# Patient Record
Sex: MALE | Race: WHITE | NOT HISPANIC OR LATINO | Employment: OTHER | URBAN - METROPOLITAN AREA
[De-identification: names, ages, dates, MRNs, and addresses within clinical notes are randomized per-mention and may not be internally consistent; named-entity substitution may affect disease eponyms.]

---

## 2017-02-19 ENCOUNTER — HOSPITAL ENCOUNTER (EMERGENCY)
Facility: HOSPITAL | Age: 72
Discharge: HOME | End: 2017-02-19
Attending: EMERGENCY MEDICINE | Admitting: EMERGENCY MEDICINE

## 2017-02-19 VITALS
SYSTOLIC BLOOD PRESSURE: 126 MMHG | TEMPERATURE: 98.4 F | HEART RATE: 82 BPM | WEIGHT: 164 LBS | DIASTOLIC BLOOD PRESSURE: 82 MMHG | HEIGHT: 64 IN | RESPIRATION RATE: 16 BRPM | BODY MASS INDEX: 28 KG/M2 | OXYGEN SATURATION: 95 %

## 2017-02-19 DIAGNOSIS — R33.9 URINARY RETENTION: Primary | ICD-10-CM

## 2017-02-19 LAB
BACTERIA UR QL AUTO: ABNORMAL /HPF
BILIRUB UR QL STRIP: NEGATIVE
CLARITY UR: CLEAR
COLOR UR: YELLOW
GLUCOSE UR STRIP-MCNC: NEGATIVE MG/DL
HGB UR QL STRIP.AUTO: ABNORMAL
HYALINE CASTS UR QL AUTO: ABNORMAL /LPF
KETONES UR QL STRIP: NEGATIVE
LEUKOCYTE ESTERASE UR QL STRIP.AUTO: ABNORMAL
NITRITE UR QL STRIP: NEGATIVE
PH UR STRIP.AUTO: 6.5 [PH] (ref 5–8)
PROT UR QL STRIP: NEGATIVE
RBC # UR: ABNORMAL /HPF
REF LAB TEST METHOD: ABNORMAL
SP GR UR STRIP: 1.01 (ref 1–1.03)
SQUAMOUS #/AREA URNS HPF: ABNORMAL /HPF
UROBILINOGEN UR QL STRIP: ABNORMAL
WBC UR QL AUTO: ABNORMAL /HPF

## 2017-02-19 PROCEDURE — 87086 URINE CULTURE/COLONY COUNT: CPT | Performed by: PHYSICIAN ASSISTANT

## 2017-02-19 PROCEDURE — 51702 INSERT TEMP BLADDER CATH: CPT

## 2017-02-19 PROCEDURE — 99283 EMERGENCY DEPT VISIT LOW MDM: CPT

## 2017-02-19 PROCEDURE — 81001 URINALYSIS AUTO W/SCOPE: CPT | Performed by: PHYSICIAN ASSISTANT

## 2017-02-19 RX ORDER — TAMSULOSIN HYDROCHLORIDE 0.4 MG/1
1 CAPSULE ORAL NIGHTLY
COMMUNITY
End: 2020-01-13 | Stop reason: SDUPTHER

## 2017-02-19 RX ORDER — CIPROFLOXACIN 500 MG/1
500 TABLET, FILM COATED ORAL 2 TIMES DAILY
Qty: 14 TABLET | Refills: 0 | Status: SHIPPED | OUTPATIENT
Start: 2017-02-19 | End: 2017-04-03

## 2017-02-19 RX ORDER — ROSUVASTATIN CALCIUM 10 MG/1
10 TABLET, COATED ORAL DAILY
COMMUNITY
End: 2017-04-03

## 2017-02-20 NOTE — ED PROVIDER NOTES
I supervised care provided by the midlevel provider.    We have discussed this patient's history, physical exam, and treatment plan.   I have reviewed the note and personally saw and examined the patient and agree with the plan of care.    Pt with urinary retention that onset today. Pt has a hx of urinary retention and has been on Flomax in the past. He has not seen a urologist previously for sxs.     On exam, there is a Davis catheter in place with >300cc of urine in leg bag. There is no abd tenderness. VS stable. He is A&Ox3 and in NAD.     Will check UA and discharge with Davis catheter in place to follow up with urologist.      Labs  Results for orders placed or performed during the hospital encounter of 02/19/17   Urinalysis With / Culture If Indicated   Result Value Ref Range    Color, UA Yellow Yellow, Straw    Appearance, UA Clear Clear    pH, UA 6.5 5.0 - 8.0    Specific Gravity, UA 1.007 1.005 - 1.030    Glucose, UA Negative Negative    Ketones, UA Negative Negative    Bilirubin, UA Negative Negative    Blood, UA Small (1+) (A) Negative    Protein, UA Negative Negative    Leuk Esterase, UA Small (1+) (A) Negative    Nitrite, UA Negative Negative    Urobilinogen, UA 0.2 E.U./dL 0.2 - 1.0 E.U./dL   Urinalysis, Microscopic Only   Result Value Ref Range    RBC, UA 13-20 (A) None Seen /HPF    WBC, UA 0-2 (A) None Seen /HPF    Bacteria, UA None Seen None Seen /HPF    Squamous Epithelial Cells, UA 0-2 None Seen, 0-2 /HPF    Hyaline Casts, UA None Seen None Seen /LPF    Methodology Automated Microscopy      Documentation assistance provided by dago Garcia for Dr. Del Cid.  Information recorded by the dago was done at my direction and has been verified and validated by me.       Travis Garcia  02/19/17 2024       Cosmo Del Cid MD  02/20/17 0642

## 2017-02-20 NOTE — ED PROVIDER NOTES
EMERGENCY DEPARTMENT ENCOUNTER    CHIEF COMPLAINT  Chief Complaint: Urinary Retention   History given by: patient, spouse  History limited by: none  Room Number: 18/18  PMD: Eden Molina MD      HPI:  Pt is a 72 y.o. male who presents complaining of urinary retention onset this morning at 9AM. The pt also c/o lower abd pain.   He denies N/V, CP, and SOA.   H/o congenital myopathy.   H/o prostate issues per the spouse.   He takes Flomax.     Duration:  Onset this morning  Onset: constant  Timing: constant  Location: , lower abd  Radiation: none  Quality: urine retention  Intensity/Severity: moderate  Progression: improved with catheter   Associated Symptoms: lower abd pain  Aggravating Factors: none  Alleviating Factors: urinary catheter  Previous Episodes: h/o prostate isseus  Treatment before arrival: none    PAST MEDICAL HISTORY  Active Ambulatory Problems     Diagnosis Date Noted   • CAD (coronary artery disease), native coronary artery    • Acute myocardial infarction    • Hypertension    • Hyperlipidemia      Resolved Ambulatory Problems     Diagnosis Date Noted   • No Resolved Ambulatory Problems     Past Medical History   Diagnosis Date   • Aneurysm of right femoral artery    • Diabetes mellitus    • Gastrointestinal hemorrhage        PAST SURGICAL HISTORY  Past Surgical History   Procedure Laterality Date   • Coronary angioplasty with stent placement     • Other surgical history       percutaneous injection of extremity pseudoaneurysm   • Cardiac catheterization  06/2015     30% Left main, 99% mid to distal LAD, 70-80% left circumflex, 80% proximal to mid RCA.       FAMILY HISTORY  Family History   Problem Relation Age of Onset   • Coronary artery disease Mother        SOCIAL HISTORY  Social History     Social History   • Marital status:      Spouse name: N/A   • Number of children: N/A   • Years of education: N/A     Occupational History   • Not on file.     Social History Main Topics   •  Smoking status: Never Smoker   • Smokeless tobacco: Not on file   • Alcohol use Yes      Comment: social   • Drug use: Not on file   • Sexual activity: Not on file     Other Topics Concern   • Not on file     Social History Narrative   • No narrative on file       ALLERGIES  Ampicillin and Iodinated diagnostic agents    REVIEW OF SYSTEMS  Review of Systems   Constitutional: Negative.    HENT: Negative.    Respiratory: Negative.    Cardiovascular: Negative.    Gastrointestinal: Negative.    Genitourinary: Negative.         Urine retention, now improved with khan catheter.    Musculoskeletal: Negative.    All other systems reviewed and are negative.      PHYSICAL EXAM  ED Triage Vitals   Temp Heart Rate Resp BP SpO2   02/19/17 1916 02/19/17 1916 02/19/17 1916 02/19/17 1916 02/19/17 1916   98.4 °F (36.9 °C) 80 16 140/77 97 %      Temp src Heart Rate Source Patient Position BP Location FiO2 (%)   -- -- -- -- --              Physical Exam   Constitutional: He is oriented to person, place, and time and well-developed, well-nourished, and in no distress.   Neck: Normal range of motion. Neck supple.   Cardiovascular: Normal rate, regular rhythm and normal heart sounds.    Pulmonary/Chest: Effort normal and breath sounds normal. No respiratory distress.   Abdominal: Soft. There is no tenderness. There is no rebound and no guarding.   Genitourinary:   Genitourinary Comments: Khan catheter in good position.  Testicles non-tender   Musculoskeletal: Normal range of motion. He exhibits no edema.   Neurological: He is alert and oriented to person, place, and time. He has normal sensation and normal strength.   Good strength in LE bilat.    Skin: Skin is warm and dry.   Psychiatric: Mood and affect normal.   Nursing note and vitals reviewed.      LAB RESULTS  Lab Results (last 24 hours)     Procedure Component Value Units Date/Time    Urinalysis With / Culture If Indicated [[id number]]  (Abnormal) Collected:  02/19/17 1948     Specimen:  Urine from Urine, Catheter Updated:  02/19/17 2005     Color, UA Yellow      Appearance, UA Clear      pH, UA 6.5      Specific Gravity, UA 1.007      Glucose, UA Negative      Ketones, UA Negative      Bilirubin, UA Negative      Blood, UA Small (1+) (A)      Protein, UA Negative      Leuk Esterase, UA Small (1+) (A)      Nitrite, UA Negative      Urobilinogen, UA 0.2 E.U./dL     Urinalysis, Microscopic Only [[id number]]  (Abnormal) Collected:  02/19/17 1948    Specimen:  Urine from Urine, Catheter Updated:  02/19/17 2005     RBC, UA 13-20 (A) /HPF      WBC, UA 0-2 (A) /HPF      Bacteria, UA None Seen /HPF      Squamous Epithelial Cells, UA 0-2 /HPF      Hyaline Casts, UA None Seen /LPF      Methodology Automated Microscopy     Urine Culture [[id number]] Collected:  02/19/17 1948    Specimen:  Urine from Urine, Catheter Updated:  02/19/17 2003          I ordered the above labs and reviewed the results        PROCEDURES  Procedures      PROGRESS AND CONSULTS  ED Course     8:04 PM  Ordered UA for further evaluation.     8:13 PM  Discussed pt's case with Dr. Del Cid who agrees with plan of care.     8:32 PM  Pt rechecked and is resting comfortably in NAD. Decision to d/c pt was discussed along with instructions to f/u with PMD and urologist. Pt understands and agrees with plan of care, all questions and concerns addressed.         MEDICAL DECISION MAKING  Results were reviewed/discussed with the patient and they were also made aware of online access. Pt also made aware that some labs, such as cultures, will not be resulted during ER visit and follow up with PMD is necessary.     MDM  Number of Diagnoses or Management Options  Urinary retention:      Amount and/or Complexity of Data Reviewed  Clinical lab tests: reviewed and ordered (UTI - see results)           DIAGNOSIS  Final diagnoses:   Urinary retention       DISPOSITION  Discharged    DISCHARGE    Patient discharged in stable condition.    Reviewed  implications of results, diagnosis, meds, responsibility to follow up, warning signs and symptoms of possible worsening, potential complications and reasons to return to ER.    Patient/Family voiced understanding of above instructions.    Discussed plan for discharge, as there is no emergent indication for admission.  Pt/family is agreeable and understands need for follow up and repeat testing.  Pt is aware that discharge does not mean that nothing is wrong but it indicates no emergency is present that requires admission and they must continue care with follow-up as given below or physician of their choice.     FOLLOW-UP  Patrick Prado MD  [address] [place], [place] [id number]  [phone]    Schedule an appointment as soon as possible for a visit in 2 days  for follow-up and catheter removal         Medication List      New Prescriptions          ciprofloxacin 500 MG tablet   Commonly known as:  CIPRO   Take 1 tablet by mouth 2 (Two) Times a Day.                 Latest Documented Vital Signs:  As of 8:34 PM  BP- 140/77 HR- 80 Temp- 98.4 °F (36.9 °C) O2 sat- 97%    --  Documentation assistance provided by dago Vaughan for JEFF GALVAN.  Information recorded by the dago was done at my direction and has been verified and validated by me.       Richie Vaughan  02/19/17 2967       CARLENE Sanchez  02/19/17 7546

## 2017-02-20 NOTE — ED NOTES
Patient's khan bag switched to a leg bag. Instructed patient and spouse how to use it and to follow up with urology to have the catheter removed.      Manjula Baum RN  02/19/17 2050

## 2017-02-21 LAB — BACTERIA SPEC AEROBE CULT: NO GROWTH

## 2017-04-03 ENCOUNTER — OFFICE VISIT (OUTPATIENT)
Dept: CARDIOLOGY | Facility: CLINIC | Age: 72
End: 2017-04-03

## 2017-04-03 VITALS
HEIGHT: 64 IN | HEART RATE: 67 BPM | DIASTOLIC BLOOD PRESSURE: 68 MMHG | SYSTOLIC BLOOD PRESSURE: 122 MMHG | BODY MASS INDEX: 28.17 KG/M2 | WEIGHT: 165 LBS

## 2017-04-03 DIAGNOSIS — E78.2 MIXED HYPERLIPIDEMIA: ICD-10-CM

## 2017-04-03 DIAGNOSIS — I25.10 CORONARY ARTERY DISEASE INVOLVING NATIVE CORONARY ARTERY OF NATIVE HEART WITHOUT ANGINA PECTORIS: Primary | ICD-10-CM

## 2017-04-03 DIAGNOSIS — I21.4 ACUTE NON-ST ELEVATION MYOCARDIAL INFARCTION (NSTEMI) (HCC): ICD-10-CM

## 2017-04-03 DIAGNOSIS — I10 ESSENTIAL HYPERTENSION: ICD-10-CM

## 2017-04-03 PROCEDURE — 93000 ELECTROCARDIOGRAM COMPLETE: CPT | Performed by: INTERNAL MEDICINE

## 2017-04-03 PROCEDURE — 99214 OFFICE O/P EST MOD 30 MIN: CPT | Performed by: INTERNAL MEDICINE

## 2017-04-03 RX ORDER — ROSUVASTATIN CALCIUM 40 MG/1
40 TABLET, COATED ORAL DAILY
COMMUNITY
End: 2020-02-05 | Stop reason: SDUPTHER

## 2017-04-03 NOTE — PROGRESS NOTES
Subjective:     Encounter Date: 4/3/2017      Patient ID: Amandeep Manriquez Jr. is a 72 y.o. male.    Chief Complaint:  Coronary Artery Disease   Symptoms include muscle weakness.       Dear Dr. Molina,    I had the pleasure of seeing this patient in the office today for follow-up of his cardiac status.  He has a history of CAD.It is been one year since his last visit.    His only complaint is that he had issues with his low back.  He did haven't have an epidural injection there.  He states that he has some difficulty urinating and had to have an indwelling catheter for a while.  Since then, he'shad some episodes with dizziness.  It seems to bother him more when he first stands up.  Outside of that he's had no other cardiac complaints.This patient denies any chest pain, pressure, tightness, squeezing, or heartburn.  This patient has not experienced any feeling of palpitations, tachycardia or heart racing and no presyncope or syncope.  There has not been any problems with dizziness or lightheadedness.  There has not been any orthopnea or PND, and no problems with lower extremity edema.  This patient denies any shortness of breath at rest or with activity and has not had any wheezing.  This patient has not had any problems with unexplained nausea or vomiting. The patient has continued to perform daily activities of living without any specific problem or change in the level of activity.  This patient has not been recently hospitalized for any reason.    He has a history of a non-ST segment elevation myocardial infarction.  There was caused by subtotally occluded LAD there was treated with stent placement.  He then was readmitted on July 21, 2015 for staged intervention to the circumflex and he received a 3.5 x 18 no immediate or drug-eluting stent placed in the distal left circumflex.    Patient had a GI bleed in June 2015 secondary to gastric ulcerations and H. pylori.  He had been on Proventil and he was changed  to Plavix.  He also had a right groin pseudoaneurysm that occurred after his initial cardiac catheterization; his heart catheterization been performed through the right femoral artery secondary to tortuosity in the right radial artery.    The following portions of the patient's history were reviewed and updated as appropriate: allergies, current medications, past family history, past medical history, past social history, past surgical history and problem list.    Past Medical History:   Diagnosis Date   • Acute myocardial infarction    • Aneurysm of right femoral artery    • CAD (coronary artery disease), native coronary artery    • Diabetes mellitus    • Gastrointestinal hemorrhage    • Hyperlipidemia    • Hypertension        Past Surgical History:   Procedure Laterality Date   • CARDIAC CATHETERIZATION  06/2015    30% Left main, 99% mid to distal LAD, 70-80% left circumflex, 80% proximal to mid RCA.   • CORONARY ANGIOPLASTY WITH STENT PLACEMENT     • OTHER SURGICAL HISTORY      percutaneous injection of extremity pseudoaneurysm       Social History     Social History   • Marital status:      Spouse name: N/A   • Number of children: N/A   • Years of education: N/A     Occupational History   • Not on file.     Social History Main Topics   • Smoking status: Never Smoker   • Smokeless tobacco: Not on file      Comment: caffeine use   • Alcohol use Yes      Comment: social   • Drug use: Not on file   • Sexual activity: Not on file     Other Topics Concern   • Not on file     Social History Narrative       Review of Systems   Constitution: Negative for chills, decreased appetite, fever and night sweats.   HENT: Positive for hearing loss. Negative for ear discharge, ear pain, nosebleeds and sore throat.    Eyes: Negative for blurred vision, double vision and pain.   Cardiovascular: Negative for cyanosis.   Respiratory: Negative for hemoptysis and sputum production.    Endocrine: Negative for cold intolerance and  "heat intolerance.   Hematologic/Lymphatic: Negative for adenopathy.   Skin: Negative for dry skin, itching, nail changes, rash and suspicious lesions.   Musculoskeletal: Positive for joint pain, joint swelling and muscle weakness. Negative for arthritis, gout, muscle cramps, myalgias and neck pain.   Gastrointestinal: Negative for anorexia, bowel incontinence, constipation, diarrhea, dysphagia, hematemesis and jaundice.   Genitourinary: Negative for bladder incontinence, dysuria, flank pain, frequency, hematuria and nocturia.   Neurological: Negative for focal weakness, numbness, paresthesias and seizures.   Psychiatric/Behavioral: Negative for altered mental status, hallucinations, hypervigilance, suicidal ideas and thoughts of violence.   Allergic/Immunologic: Negative for persistent infections.         ECG 12 Lead  Date/Time: 4/3/2017 11:37 AM  Performed by: MIKE ESPINAL III.  Authorized by: MIKE ESPINAL III   Comparison: compared with previous ECG   Similar to previous ECG  Rhythm: sinus rhythm  Rate: normal  Conduction: conduction normal  ST Segments: ST segments normal  T Waves: T waves normal  QRS axis: normal  Other: no other findings  Clinical impression: normal ECG               Objective:     Vitals:    04/03/17 1049   BP: 122/68   Pulse: 67   Weight: 165 lb (74.8 kg)   Height: 64\" (162.6 cm)         Physical Exam   Constitutional: He is oriented to person, place, and time. He appears well-developed and well-nourished. No distress.   HENT:   Head: Normocephalic and atraumatic.   Nose: Nose normal.   Mouth/Throat: Oropharynx is clear and moist.   Eyes: Conjunctivae and EOM are normal. Pupils are equal, round, and reactive to light. Right eye exhibits no discharge. Left eye exhibits no discharge.   Neck: Normal range of motion. Neck supple. No tracheal deviation present. No thyromegaly present.   Cardiovascular: Normal rate, regular rhythm, S1 normal, S2 normal, normal heart sounds and normal pulses.  " Exam reveals no S3.    Pulmonary/Chest: Effort normal and breath sounds normal. No stridor. No respiratory distress. He exhibits no tenderness.   Abdominal: Soft. Bowel sounds are normal. He exhibits no distension and no mass. There is no tenderness. There is no rebound and no guarding.   Musculoskeletal: Normal range of motion. He exhibits no tenderness or deformity.   Lymphadenopathy:     He has no cervical adenopathy.   Neurological: He is alert and oriented to person, place, and time. He has normal reflexes. He exhibits abnormal muscle tone.   Skin: Skin is warm and dry. No rash noted. He is not diaphoretic. No erythema.   Psychiatric: He has a normal mood and affect. Thought content normal.       Lab Review:           Performed      Assessment:          Diagnosis Plan   1. Coronary artery disease involving native coronary artery of native heart without angina pectoris  ECG 12 Lead   2. Acute non-ST elevation myocardial infarction (NSTEMI)  ECG 12 Lead   3. Essential hypertension  ECG 12 Lead   4. Mixed hyperlipidemia            Plan:     Coronary Artery Disease  Assessment  • The patient has no angina    Plan  • Lifestyle modifications discussed include adhering to a heart healthy diet, avoidance of tobacco products, maintenance of a healthy weight, medication compliance, regular exercise and regular monitoring of cholesterol and blood pressure    Subjective - Objective  • There has been a previous stent procedure using KELSEY  • Current antiplatelet therapy includes aspirin 81 mg      This patient is having some symptoms of dizziness that started after his issues with his low back.  I would have him stop his metoprolol, and call me in one week.    Thank you very much for allowing us to participate in the care of this pleasant patient.  Please don't hesitate to call if I can be of assistance in any way.      Current Outpatient Prescriptions:   •  aspirin 81 MG tablet, Take 1 tablet by mouth daily., Disp: , Rfl:    •  baclofen (LIORESAL) 10 MG tablet, Take 1 tablet by mouth 3 (three) times a day., Disp: , Rfl:   •  cholecalciferol (VITAMIN D3) 47569 UNITS capsule, Take 1 capsule by mouth daily., Disp: , Rfl:   •  Docusate Calcium (STOOL SOFTENER PO), Take  by mouth As Needed., Disp: , Rfl:   •  HYDROcodone-acetaminophen (NORCO) 5-325 MG per tablet, Take 1 tablet by mouth every 4 (four) hours as needed. For pain, Disp: , Rfl:   •  latanoprost (XALATAN) 0.005 % ophthalmic solution, 1 drop every night., Disp: , Rfl:   •  metoprolol tartrate (LOPRESSOR) 25 MG tablet, TAKE ONE TABLET BY MOUTH TWO TIMES A DAY, Disp: 60 tablet, Rfl: 5  •  Multiple Vitamin (MULTI VITAMIN DAILY PO), Take  by mouth., Disp: , Rfl:   •  nitroglycerin (NITROSTAT) 0.4 MG SL tablet, Place 1 tablet under the tongue. For chest pain.  If pain remains after 5 minutes, call 911 and repeat dose.  Max 3 tabs in 15 minutes., Disp: , Rfl:   •  rosuvastatin (CRESTOR) 40 MG tablet, Take 40 mg by mouth Daily., Disp: , Rfl:   •  Solifenacin Succinate (VESICARE PO), Take 5 mg by mouth., Disp: , Rfl:   •  tamsulosin (FLOMAX) 0.4 MG capsule 24 hr capsule, Take 1 capsule by mouth Every Night., Disp: , Rfl:

## 2017-04-11 ENCOUNTER — TELEPHONE (OUTPATIENT)
Dept: CARDIOLOGY | Facility: CLINIC | Age: 72
End: 2017-04-11

## 2017-04-11 NOTE — TELEPHONE ENCOUNTER
Pt was seen on 4/3/17. You wanted him to hold his metoprolol. He started back taking the metoprolol 25MG .5 Two times a day on sunday bc his blood pressure was 152/92.    FYI: He went to see Dr.Trinkle [facility] and they cleaned out his ears, Determine that he had a UTI and he is on antibiotics for three days. They also prescribe meclizine 25MG Three times a day PRN for his dizziness    PT #:[phone]    Thanks Sonja

## 2018-04-13 ENCOUNTER — OFFICE VISIT (OUTPATIENT)
Dept: CARDIOLOGY | Facility: CLINIC | Age: 73
End: 2018-04-13

## 2018-04-13 VITALS
SYSTOLIC BLOOD PRESSURE: 126 MMHG | DIASTOLIC BLOOD PRESSURE: 86 MMHG | WEIGHT: 173 LBS | HEIGHT: 64 IN | BODY MASS INDEX: 29.53 KG/M2 | HEART RATE: 67 BPM

## 2018-04-13 DIAGNOSIS — I25.10 CORONARY ARTERY DISEASE INVOLVING NATIVE CORONARY ARTERY OF NATIVE HEART WITHOUT ANGINA PECTORIS: Primary | Chronic | ICD-10-CM

## 2018-04-13 DIAGNOSIS — E78.2 MIXED HYPERLIPIDEMIA: Chronic | ICD-10-CM

## 2018-04-13 DIAGNOSIS — I21.4 ACUTE NON-ST ELEVATION MYOCARDIAL INFARCTION (NSTEMI) (HCC): Chronic | ICD-10-CM

## 2018-04-13 DIAGNOSIS — I10 ESSENTIAL HYPERTENSION: Chronic | ICD-10-CM

## 2018-04-13 PROCEDURE — 93000 ELECTROCARDIOGRAM COMPLETE: CPT | Performed by: INTERNAL MEDICINE

## 2018-04-13 PROCEDURE — 99214 OFFICE O/P EST MOD 30 MIN: CPT | Performed by: INTERNAL MEDICINE

## 2018-04-13 NOTE — PROGRESS NOTES
Subjective:     Encounter Date: 4/13/2018      Patient ID: Amandeep Manriquez Jr. is a 73 y.o. male.    Chief Complaint:  Coronary Artery Disease   Symptoms include muscle weakness.       Dear Dr. Molina,    I had the pleasure of seeing this patient in the office today for follow-up of his cardiac status.  He has a history of CAD. It is been one year since his last visit.    He's been having some problems with dizziness and thinks he could be coming from his metoprolol.  He is not had any cardiac complaints.This patient denies any chest pain, pressure, tightness, squeezing, or heartburn.  This patient has not experienced any feeling of palpitations, tachycardia or heart racing and no presyncope or syncope. There has not been any orthopnea or PND, and no problems with lower extremity edema.  This patient denies any shortness of breath at rest or with activity and has not had any wheezing.  This patient has not had any problems with unexplained nausea or vomiting. The patient has continued to perform daily activities of living without any specific problem or change in the level of activity.  This patient has not been recently hospitalized for any reason.    He has a history of a non-ST segment elevation myocardial infarction.  There was caused by subtotally occluded LAD there was treated with stent placement.  He then was readmitted on July 21, 2015 for staged intervention to the circumflex and he received a 3.5 x 18 no immediate or drug-eluting stent placed in the distal left circumflex.    Patient had a GI bleed in June 2015 secondary to gastric ulcerations and H. pylori.  He had been on Proventil and he was changed to Plavix.  He also had a right groin pseudoaneurysm that occurred after his initial cardiac catheterization; his heart catheterization been performed through the right femoral artery secondary to tortuosity in the right radial artery.    The following portions of the patient's history were reviewed  and updated as appropriate: allergies, current medications, past family history, past medical history, past social history, past surgical history and problem list.    Past Medical History:   Diagnosis Date   • Acute myocardial infarction    • Aneurysm of right femoral artery    • CAD (coronary artery disease), native coronary artery    • Diabetes mellitus    • Gastrointestinal hemorrhage    • Hyperlipidemia    • Hypertension        Past Surgical History:   Procedure Laterality Date   • CARDIAC CATHETERIZATION  06/2015    30% Left main, 99% mid to distal LAD, 70-80% left circumflex, 80% proximal to mid RCA.   • CORONARY ANGIOPLASTY WITH STENT PLACEMENT     • OTHER SURGICAL HISTORY      percutaneous injection of extremity pseudoaneurysm       Social History     Social History   • Marital status:      Spouse name: N/A   • Number of children: N/A   • Years of education: N/A     Occupational History   • Not on file.     Social History Main Topics   • Smoking status: Never Smoker   • Smokeless tobacco: Not on file      Comment: caffeine use   • Alcohol use Yes      Comment: social   • Drug use: Unknown   • Sexual activity: Not on file     Other Topics Concern   • Not on file     Social History Narrative   • No narrative on file       Review of Systems   Constitution: Negative for chills, decreased appetite, fever and night sweats.   HENT: Positive for hearing loss. Negative for ear discharge, ear pain, nosebleeds and sore throat.    Eyes: Negative for blurred vision, double vision and pain.   Cardiovascular: Negative for cyanosis.   Respiratory: Negative for hemoptysis and sputum production.    Endocrine: Negative for cold intolerance and heat intolerance.   Hematologic/Lymphatic: Negative for adenopathy.   Skin: Negative for dry skin, itching, nail changes, rash and suspicious lesions.   Musculoskeletal: Positive for joint pain, joint swelling and muscle weakness. Negative for arthritis, gout, muscle cramps,  "myalgias and neck pain.   Gastrointestinal: Negative for anorexia, bowel incontinence, constipation, diarrhea, dysphagia, hematemesis and jaundice.   Genitourinary: Negative for bladder incontinence, dysuria, flank pain, frequency, hematuria and nocturia.   Neurological: Negative for focal weakness, numbness, paresthesias and seizures.   Psychiatric/Behavioral: Negative for altered mental status, hallucinations, hypervigilance, suicidal ideas and thoughts of violence.   Allergic/Immunologic: Negative for persistent infections.         ECG 12 Lead  Date/Time: 4/13/2018 1:10 PM  Performed by: MIKE ESPINAL III.  Authorized by: MIKE ESPINAL III   Comparison: compared with previous ECG   Similar to previous ECG  Rhythm: sinus rhythm  Rate: normal  Conduction: conduction normal  ST Segments: ST segments normal  T Waves: T waves normal  QRS axis: normal  Other: no other findings  Clinical impression: normal ECG               Objective:     Vitals:    04/13/18 1252   BP: 126/86   Pulse: 67   Weight: 78.5 kg (173 lb)   Height: 162.6 cm (64\")         Physical Exam   Constitutional: He is oriented to person, place, and time. He appears well-developed and well-nourished. No distress.   HENT:   Head: Normocephalic and atraumatic.   Nose: Nose normal.   Mouth/Throat: Oropharynx is clear and moist.   Eyes: Conjunctivae and EOM are normal. Pupils are equal, round, and reactive to light. Right eye exhibits no discharge. Left eye exhibits no discharge.   Neck: Normal range of motion. Neck supple. No tracheal deviation present. No thyromegaly present.   Cardiovascular: Normal rate, regular rhythm, S1 normal, S2 normal, normal heart sounds and normal pulses.  Exam reveals no S3.    Pulmonary/Chest: Effort normal and breath sounds normal. No stridor. No respiratory distress. He exhibits no tenderness.   Abdominal: Soft. Bowel sounds are normal. He exhibits no distension and no mass. There is no tenderness. There is no rebound and " no guarding.   Musculoskeletal: Normal range of motion. He exhibits no tenderness or deformity.   Lymphadenopathy:     He has no cervical adenopathy.   Neurological: He is alert and oriented to person, place, and time. He has normal reflexes. He exhibits abnormal muscle tone.   Skin: Skin is warm and dry. No rash noted. He is not diaphoretic. No erythema.   Psychiatric: He has a normal mood and affect. Thought content normal.       Lab Review:           Performed      Assessment:          Diagnosis Plan   1. Coronary artery disease involving native coronary artery of native heart without angina pectoris  ECG 12 Lead   2. Essential hypertension  ECG 12 Lead   3. Acute non-ST elevation myocardial infarction (NSTEMI)  ECG 12 Lead   4. Mixed hyperlipidemia  ECG 12 Lead          Plan:     Coronary Artery Disease  Assessment  • The patient has no angina    Plan  • Lifestyle modifications discussed include adhering to a heart healthy diet, avoidance of tobacco products, maintenance of a healthy weight, medication compliance, regular exercise and regular monitoring of cholesterol and blood pressure    Subjective - Objective  • There has been a previous stent procedure using KELSEY  • Current antiplatelet therapy includes aspirin 81 mg      2. This patient is having some symptoms of dizziness that started after his issues with his low back.  I would have him stop his metoprolol, and call me in one week.  3.  Mixed hyperlipidemia-continue lipid-lowering therapy  4.  Hypertensive heart disease without heart failure-continue to follow; will await response off the metoprolol    Thank you very much for allowing us to participate in the care of this pleasant patient.  Please don't hesitate to call if I can be of assistance in any way.      Current Outpatient Prescriptions:   •  aspirin 81 MG tablet, Take 1 tablet by mouth daily., Disp: , Rfl:   •  baclofen (LIORESAL) 10 MG tablet, Take 1 tablet by mouth 3 (three) times a day., Disp:  , Rfl:   •  cholecalciferol (VITAMIN D3) 79512 UNITS capsule, Take 1 capsule by mouth daily., Disp: , Rfl:   •  Docusate Calcium (STOOL SOFTENER PO), Take 1 tablet by mouth As Needed., Disp: , Rfl:   •  HYDROcodone-acetaminophen (NORCO) 5-325 MG per tablet, Take 1 tablet by mouth every 4 (four) hours as needed. For pain, Disp: , Rfl:   •  latanoprost (XALATAN) 0.005 % ophthalmic solution, 1 drop every night., Disp: , Rfl:   •  metoprolol tartrate (LOPRESSOR) 25 MG tablet, TAKE ONE TABLET BY MOUTH TWO TIMES A DAY (Patient taking differently: TAKE ONE half TABLET BY MOUTH TWO TIMES A DAY), Disp: 60 tablet, Rfl: 5  •  Multiple Vitamin (MULTI VITAMIN DAILY PO), Take 1 tablet by mouth 2 (Two) Times a Day., Disp: , Rfl:   •  nitroglycerin (NITROSTAT) 0.4 MG SL tablet, Place 1 tablet under the tongue. For chest pain.  If pain remains after 5 minutes, call 911 and repeat dose.  Max 3 tabs in 15 minutes., Disp: , Rfl:   •  rosuvastatin (CRESTOR) 40 MG tablet, Take 40 mg by mouth Daily., Disp: , Rfl:   •  Solifenacin Succinate (VESICARE PO), Take 5 mg by mouth., Disp: , Rfl:   •  tamsulosin (FLOMAX) 0.4 MG capsule 24 hr capsule, Take 1 capsule by mouth Every Night., Disp: , Rfl:

## 2018-05-01 ENCOUNTER — TRANSCRIBE ORDERS (OUTPATIENT)
Dept: ADMINISTRATIVE | Facility: HOSPITAL | Age: 73
End: 2018-05-01

## 2018-05-01 DIAGNOSIS — R42 DIZZINESS: Primary | ICD-10-CM

## 2018-05-03 ENCOUNTER — TELEPHONE (OUTPATIENT)
Dept: CARDIOLOGY | Facility: CLINIC | Age: 73
End: 2018-05-03

## 2018-05-03 NOTE — TELEPHONE ENCOUNTER
Pt's wife called to update you regarding his dizziness. You had him stop the metoprolol to see if it helped. She said that it didn't so pt is back on the medication. His PCP is sending him to have an MRI of the head/neck and an US of the carotids on 5/8. That will be done at [facility].

## 2018-05-08 ENCOUNTER — HOSPITAL ENCOUNTER (OUTPATIENT)
Dept: CARDIOLOGY | Facility: HOSPITAL | Age: 73
Discharge: HOME | End: 2018-05-08

## 2018-05-08 ENCOUNTER — HOSPITAL ENCOUNTER (OUTPATIENT)
Dept: MRI IMAGING | Facility: HOSPITAL | Age: 73
Discharge: HOME | End: 2018-05-08
Admitting: FAMILY MEDICINE

## 2018-05-08 DIAGNOSIS — R42 DIZZINESS: ICD-10-CM

## 2018-05-08 LAB
BH CV XLRA MEAS LEFT DIST CCA EDV: 11 CM/SEC
BH CV XLRA MEAS LEFT DIST CCA PSV: 53 CM/SEC
BH CV XLRA MEAS LEFT DIST ICA EDV: 13.2 CM/SEC
BH CV XLRA MEAS LEFT DIST ICA PSV: 35.6 CM/SEC
BH CV XLRA MEAS LEFT MID ICA EDV: 24.8 CM/SEC
BH CV XLRA MEAS LEFT MID ICA PSV: 65.2 CM/SEC
BH CV XLRA MEAS LEFT PROX CCA EDV: 14.7 CM/SEC
BH CV XLRA MEAS LEFT PROX CCA PSV: 71.5 CM/SEC
BH CV XLRA MEAS LEFT PROX ECA EDV: 12.2 CM/SEC
BH CV XLRA MEAS LEFT PROX ECA PSV: 64 CM/SEC
BH CV XLRA MEAS LEFT PROX ICA EDV: 12.2 CM/SEC
BH CV XLRA MEAS LEFT PROX ICA PSV: 42 CM/SEC
BH CV XLRA MEAS LEFT PROX SCLA PSV: 57.4 CM/SEC
BH CV XLRA MEAS LEFT VERTEBRAL A EDV: 7.46 CM/SEC
BH CV XLRA MEAS LEFT VERTEBRAL A PSV: 19.2 CM/SEC
BH CV XLRA MEAS RIGHT DIST CCA EDV: 17.6 CM/SEC
BH CV XLRA MEAS RIGHT DIST CCA PSV: 66.3 CM/SEC
BH CV XLRA MEAS RIGHT DIST ICA EDV: 15.7 CM/SEC
BH CV XLRA MEAS RIGHT DIST ICA PSV: 46.4 CM/SEC
BH CV XLRA MEAS RIGHT MID ICA EDV: 15.7 CM/SEC
BH CV XLRA MEAS RIGHT MID ICA PSV: 47.5 CM/SEC
BH CV XLRA MEAS RIGHT PROX CCA EDV: 11.7 CM/SEC
BH CV XLRA MEAS RIGHT PROX CCA PSV: 65.7 CM/SEC
BH CV XLRA MEAS RIGHT PROX ECA EDV: 9.04 CM/SEC
BH CV XLRA MEAS RIGHT PROX ECA PSV: 54.2 CM/SEC
BH CV XLRA MEAS RIGHT PROX ICA EDV: 11.4 CM/SEC
BH CV XLRA MEAS RIGHT PROX ICA PSV: 43.6 CM/SEC
BH CV XLRA MEAS RIGHT PROX SCLA PSV: 73.3 CM/SEC
BH CV XLRA MEAS RIGHT VERTEBRAL A EDV: 7.27 CM/SEC
BH CV XLRA MEAS RIGHT VERTEBRAL A PSV: 22 CM/SEC
LEFT ARM BP: NORMAL MMHG
RIGHT ARM BP: NORMAL MMHG

## 2018-05-08 PROCEDURE — 82565 ASSAY OF CREATININE: CPT

## 2018-05-08 PROCEDURE — A9577 INJ MULTIHANCE: HCPCS | Performed by: FAMILY MEDICINE

## 2018-05-08 PROCEDURE — 0 GADOBENATE DIMEGLUMINE 529 MG/ML SOLUTION: Performed by: FAMILY MEDICINE

## 2018-05-08 PROCEDURE — 93880 EXTRACRANIAL BILAT STUDY: CPT

## 2018-05-08 PROCEDURE — 70553 MRI BRAIN STEM W/O & W/DYE: CPT

## 2018-05-08 RX ADMIN — GADOBENATE DIMEGLUMINE 15 ML: 529 INJECTION, SOLUTION INTRAVENOUS at 08:55

## 2018-05-09 LAB — CREAT BLDA-MCNC: 0.5 MG/DL (ref 0.6–1.3)

## 2018-09-21 ENCOUNTER — HOSPITAL ENCOUNTER (EMERGENCY)
Facility: HOSPITAL | Age: 73
Discharge: HOME | End: 2018-09-21
Attending: EMERGENCY MEDICINE | Admitting: EMERGENCY MEDICINE

## 2018-09-21 ENCOUNTER — APPOINTMENT (OUTPATIENT)
Dept: GENERAL RADIOLOGY | Facility: HOSPITAL | Age: 73
End: 2018-09-21

## 2018-09-21 VITALS
HEIGHT: 64 IN | DIASTOLIC BLOOD PRESSURE: 99 MMHG | WEIGHT: 170 LBS | RESPIRATION RATE: 15 BRPM | OXYGEN SATURATION: 95 % | HEART RATE: 75 BPM | TEMPERATURE: 98 F | BODY MASS INDEX: 29.02 KG/M2 | SYSTOLIC BLOOD PRESSURE: 162 MMHG

## 2018-09-21 DIAGNOSIS — M16.10 HIP ARTHRITIS: ICD-10-CM

## 2018-09-21 DIAGNOSIS — M53.3 SACROILIAC PAIN: Primary | ICD-10-CM

## 2018-09-21 PROCEDURE — 99283 EMERGENCY DEPT VISIT LOW MDM: CPT

## 2018-09-21 PROCEDURE — 73502 X-RAY EXAM HIP UNI 2-3 VIEWS: CPT

## 2018-09-21 RX ORDER — LIDOCAINE 50 MG/G
1 PATCH TOPICAL EVERY 24 HOURS
Qty: 7 PATCH | Refills: 0 | Status: SHIPPED | OUTPATIENT
Start: 2018-09-21 | End: 2018-09-28

## 2018-09-21 NOTE — ED PROVIDER NOTES
" EMERGENCY DEPARTMENT ENCOUNTER    Room Number:  HAI[facility]/I  Date seen:  9/21/2018  Time seen: 2:21 PM  PCP: Eden Molina MD  Historian: Patient      HPI:  Chief Complaint: Hip pain    Context: Amandeep Manriquez Jr. is a 73 y.o. male who presents to the ED c/o atraumatic R hip pain that began 1 week ago. Pt reports the pain has become progressively worse since that time. He reports similar episode approximately 2 years ago that improved with steroid injection and PT. He has been taking Norco. No fever. No recent trauma.     Pain Location: R hip  Radiation: none  Quality: \"pain\"  Intensity/Severity: moderate  Duration: 1 week  Onset quality: \"pain\"  Timing: constant  Progression: worsened  Aggravating Factors: bearing weight, movement  Alleviating Factors: none  Previous Episodes: similar episode 2 years ago that improved with steroids and PT  Treatment before arrival: took Norco without improvement  Associated Symptoms: none    PAST MEDICAL HISTORY  Active Ambulatory Problems     Diagnosis Date Noted   • CAD (coronary artery disease), native coronary artery    • Acute myocardial infarction    • Essential hypertension    • Mixed hyperlipidemia      Resolved Ambulatory Problems     Diagnosis Date Noted   • No Resolved Ambulatory Problems     Past Medical History:   Diagnosis Date   • Acute myocardial infarction    • 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CMS/HCC)    • CAD (coronary artery disease), native coronary artery    • Diabetes mellitus (CMS/[facility])    • Essential hypertension    • Gastrointestinal hemorrhage    • Hyperlipidemia    • Hypertension    • Mixed hyperlipidemia          PAST SURGICAL HISTORY  Past Surgical History:   Procedure Laterality Date   • CARDIAC CATHETERIZATION  06/2015    30% Left main, 99% mid to distal LAD, 70-80% left circumflex, 80% proximal to mid RCA.   • CORONARY ANGIOPLASTY WITH STENT PLACEMENT     • OTHER SURGICAL HISTORY      percutaneous injection of extremity pseudoaneurysm "         FAMILY HISTORY  Family History   Problem Relation Age of Onset   • Coronary artery disease Mother          SOCIAL HISTORY  Social History     Social History   • Marital status:      Spouse name: N/A   • Number of children: N/A   • Years of education: N/A     Occupational History   • Not on file.     Social History Main Topics   • Smoking status: Never Smoker   • Smokeless tobacco: Not on file      Comment: caffeine use   • Alcohol use Yes      Comment: social   • Drug use: Unknown   • Sexual activity: Not on file     Other Topics Concern   • Not on file     Social History Narrative   • No narrative on file         ALLERGIES  Ampicillin and Iodinated diagnostic agents        REVIEW OF SYSTEMS  Review of Systems   Constitutional: Negative for fever.   HENT: Negative for sore throat.    Respiratory: Negative for shortness of breath.    Cardiovascular: Negative for chest pain.   Gastrointestinal: Negative for abdominal pain.   Endocrine: Negative for polyuria.   Genitourinary: Negative for dysuria.   Musculoskeletal: Positive for arthralgias (R hip) and gait problem (due to pain). Negative for neck pain.   Skin: Negative for rash.   Neurological: Negative for headaches.   All other systems reviewed and are negative.           PHYSICAL EXAM  ED Triage Vitals   Temp Heart Rate Resp BP SpO2   09/21/18 1203 09/21/18 1203 09/21/18 1203 09/21/18 1259 09/21/18 1203   98.4 °F (36.9 °C) 75 18 162/99 95 %      Temp src Heart Rate Source Patient Position BP Location FiO2 (%)   -- -- 09/21/18 1259 09/21/18 1259 --     Sitting Right arm          GENERAL: not distressed  HENT: nares patent  EYES: no scleral icterus  CV: regular rhythm, regular rate, 2+ DP pulses  RESPIRATORY: normal effort  ABDOMEN: soft  MUSCULOSKELETAL: no deformity, tenderness R SI joint. Mild pain with hip rotation  NEURO: alert, DE [place] GARZA, FC  SKIN: warm, dry    Vital signs and nursing notes reviewed.        XR Hip With or Without Pelvis 2 - 3 View  Right - mild degenerative changes. Nothing acute.          Ordered the above noted radiological studies. Reviewed by me in PACS.          PROCEDURES  Procedures        MEDICATIONS GIVEN IN ER  Medications - No data to display                PROGRESS AND CONSULTS     2:27 PM  Discussed with pt about his negative acute imaging and plan to discharge. Instructed to apply lidocaine patches and to follow-up with orthopedist. Pt understands and agrees with plan. All concerns addressed.       MEDICAL DECISION MAKING      MDM  Number of Diagnoses or Management Options  Hip arthritis:   Sacroiliac pain:   Diagnosis management comments: Pt with hip pain mostly at SI joint. Will trial Lidoderm patches. He is neuro intact. Has obvious arthritis on plain films. F/U with ortho.        Amount and/or Complexity of Data Reviewed  Tests in the radiology section of CPT®: ordered and reviewed (XR R hip: degenerative changes otherwise negative)  Independent visualization of images, tracings, or specimens: yes (Arthritis )               DIAGNOSIS  Final diagnoses:   Sacroiliac pain   Hip arthritis         DISPOSITION  DISCHARGE    Patient discharged in stable condition.    Reviewed implications of results, diagnosis, meds, responsibility to follow up, warning signs and symptoms of possible worsening, potential complications and reasons to return to ER.    Patient/Family voiced understanding of above instructions.    Discussed plan for discharge, as there is no emergent indication for admission. Patient referred to primary care provider for BP management due to today's BP. Pt/family is agreeable and understands need for follow up and repeat testing.  Pt is aware that discharge does not mean that nothing is wrong but it indicates no emergency is present that requires admission and they must continue care with follow-up as given below or physician of their choice.     FOLLOW-UP  [facility]  [address] [address]  [phone]  Schedule an appointment as soon as possible for a visit   for hip pain         Medication List      New Prescriptions    lidocaine 5 %  Commonly known as:  LIDODERM  Place 1 patch on the skin as directed by provider Daily for 7 days. Remove   & Discard patch within 12 hours or as directed by MD        Changed    metoprolol tartrate 25 MG tablet  Commonly known as:  LOPRESSOR  TAKE ONE TABLET BY MOUTH TWO TIMES A DAY  What changed:  See the new instructions.                      Latest Documented Vital Signs:  As of 2:29 PM  BP- 162/99 HR- 75 Temp- 98.4 °F (36.9 °C) O2 sat- 95%        --  Documentation assistance provided by dago Garcia for Dr. Angel MD.  Information recorded by the scribe was done at my direction and has been verified and validated by me.                 Travis Garcia  09/21/18 1429       Aguilar Ortiz II, MD  09/22/18 0981

## 2018-09-24 ENCOUNTER — TELEPHONE (OUTPATIENT)
Dept: ORTHOPEDIC SURGERY | Facility: CLINIC | Age: 73
End: 2018-09-24

## 2018-10-01 ENCOUNTER — TELEPHONE (OUTPATIENT)
Dept: ORTHOPEDIC SURGERY | Facility: CLINIC | Age: 73
End: 2018-10-01

## 2018-10-01 NOTE — TELEPHONE ENCOUNTER
Side, I'm booked until then.  He can see if Dr. Garcia wants to reorder SI injection but I can't really overbook for that.

## 2018-10-09 ENCOUNTER — HOSPITAL ENCOUNTER (EMERGENCY)
Facility: HOSPITAL | Age: 73
Discharge: HOME | End: 2018-10-09
Attending: FAMILY MEDICINE | Admitting: FAMILY MEDICINE

## 2018-10-09 VITALS
BODY MASS INDEX: 24.48 KG/M2 | SYSTOLIC BLOOD PRESSURE: 160 MMHG | WEIGHT: 171 LBS | HEIGHT: 70 IN | HEART RATE: 75 BPM | OXYGEN SATURATION: 95 % | DIASTOLIC BLOOD PRESSURE: 98 MMHG | RESPIRATION RATE: 16 BRPM | TEMPERATURE: 98.1 F

## 2018-10-09 DIAGNOSIS — B02.9 HERPES ZOSTER WITHOUT COMPLICATION: Primary | ICD-10-CM

## 2018-10-09 PROCEDURE — 99283 EMERGENCY DEPT VISIT LOW MDM: CPT

## 2018-10-09 RX ORDER — OXYCODONE HYDROCHLORIDE AND ACETAMINOPHEN 5; 325 MG/1; MG/1
1 TABLET ORAL EVERY 4 HOURS PRN
Qty: 20 TABLET | Refills: 0 | Status: SHIPPED | OUTPATIENT
Start: 2018-10-09 | End: 2018-11-29 | Stop reason: DRUGHIGH

## 2018-10-09 RX ORDER — FAMCICLOVIR 500 MG/1
500 TABLET ORAL 3 TIMES DAILY
Qty: 30 TABLET | Refills: 0 | Status: SHIPPED | OUTPATIENT
Start: 2018-10-09 | End: 2018-11-29

## 2018-10-09 NOTE — DISCHARGE INSTRUCTIONS
Watch for eye symptoms or a sense of a foreign body in your eye.  We would want you to see your Opthalmologist promplty.  Return if you worsen or develop unexpected symptoms.

## 2018-10-09 NOTE — ED PROVIDER NOTES
EMERGENCY DEPARTMENT ENCOUNTER    CHIEF COMPLAINT  Chief Complaint: HA  History given by: Pt  History limited by: Nothing  Room Number: 29/29  PMD: Eden Molina MD      HPI:  Pt is a 73 y.o. male who presents complaining of L frontal HA that radiates to his L occiput starting at 0600 this morning. Pt reports gradual onset of his HA. He also notes he has a red area on his L forehead. Pt denies eye pain or foreign body sensation in the eye, but wife states that the pt chronically sleeps with his eyes open with chronic dry eyes. She reports he had cataract surgery in 1-2 months ago. Wife reports that the pt is to see Dr. Herrera for his chronic back pain. Wife reports that the pt is on hydrocodone for his back pain.     Duration - 3.5 hours  Onset - gradual  Timing - constant  Location of Headache - left-sided unilateral  Radiation - with radiation to left occiput  Description of Headache - pain  Intensity/Severity - moderate  Progression - unchanged  Associated Symptoms - rash to the L forehead  Aura -None  Aggravating Factors- none  Alleviating Factors - unable to obtain relief with hydrocodone  History of Headaches- None stated  Treatment Prior to Arrival - None stated PTA    PAST MEDICAL HISTORY  Active Ambulatory Problems     Diagnosis Date Noted   • CAD (coronary artery disease), native coronary artery    • Acute myocardial infarction (CMS/HCC)    • Essential hypertension    • Mixed hyperlipidemia      Resolved Ambulatory Problems     Diagnosis Date Noted   • No Resolved Ambulatory Problems     Past Medical History:   Diagnosis Date   • Acute myocardial infarction (CMS/HCC)    • 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CMS/HCC)    • CAD (coronary artery disease), native coronary artery    • Diabetes mellitus (CMS/HCC)    • Essential hypertension    • Gastrointestinal hemorrhage    • Hyperlipidemia    • Hypertension    • Mixed hyperlipidemia        PAST SURGICAL HISTORY  Past Surgical History:   Procedure Laterality  Date   • CARDIAC CATHETERIZATION  06/2015    30% Left main, 99% mid to distal LAD, 70-80% left circumflex, 80% proximal to mid RCA.   • CORONARY ANGIOPLASTY WITH STENT PLACEMENT     • OTHER SURGICAL HISTORY      percutaneous injection of extremity pseudoaneurysm       FAMILY HISTORY  Family History   Problem Relation Age of Onset   • Coronary artery disease Mother        SOCIAL HISTORY  Social History     Social History   • Marital status:      Spouse name: N/A   • Number of children: N/A   • Years of education: N/A     Occupational History   • Not on file.     Social History Main Topics   • Smoking status: Never Smoker   • Smokeless tobacco: Not on file      Comment: caffeine use   • Alcohol use Yes      Comment: social   • Drug use: Unknown   • Sexual activity: Not on file     Other Topics Concern   • Not on file     Social History Narrative   • No narrative on file       ALLERGIES  Ampicillin and Iodinated diagnostic agents    REVIEW OF SYSTEMS  Review of Systems   Constitutional: Negative for activity change, appetite change and fever.   HENT: Negative for congestion and sore throat.    Eyes: Negative.  Negative for discharge and redness.   Respiratory: Negative for cough and shortness of breath.    Cardiovascular: Negative for chest pain and leg swelling.   Gastrointestinal: Negative for abdominal pain, diarrhea and vomiting.   Endocrine: Negative.    Genitourinary: Negative for decreased urine volume and dysuria.   Musculoskeletal: Negative for neck pain.   Skin: Positive for rash (L forehead). Negative for wound.   Allergic/Immunologic: Negative.    Neurological: Positive for headaches. Negative for weakness and numbness.   Hematological: Negative.    Psychiatric/Behavioral: Negative.    All other systems reviewed and are negative.      PHYSICAL EXAM  ED Triage Vitals [10/09/18 0920]   Temp Heart Rate Resp BP SpO2   97.8 °F (36.6 °C) 78 -- 160/80 98 %      Temp src Heart Rate Source Patient Position BP  Location FiO2 (%)   -- -- -- -- --       Physical Exam   Constitutional: He is oriented to person, place, and time. No distress.   HENT:   Head: Normocephalic and atraumatic.   No active tearing, no foreign body sensation   Eyes: Pupils are equal, round, and reactive to light. EOM are normal.   Neck: Normal range of motion. Neck supple.   Cardiovascular: Normal rate, regular rhythm and normal heart sounds.    Pulmonary/Chest: Effort normal and breath sounds normal. No respiratory distress.   Abdominal: Soft. There is no tenderness. There is no rebound and no guarding.   Musculoskeletal: Normal range of motion. He exhibits no edema.   Neurological: He is alert and oriented to person, place, and time. He has normal sensation and normal strength.   Skin: Skin is warm and dry.   Pt has a rash along the first branch of the 5th cranial nerve. There is a quarter sized L vesicular lesion to the L forehead .   Psychiatric: Mood and affect normal.   Nursing note and vitals reviewed.      PROCEDURES  Procedures      PROGRESS AND CONSULTS     0974 - Informed pt that his examination is most consistent with shingles.  He has no signs of eye involvement but I warned him to [facility] for that. D/w pt the plan to discharge home with percocet and antiviral and the need to follow up with PCP. Provided RTER warnings. Pt understands and agrees with plan. All questions answered.      MEDICAL DECISION MAKING  Results were reviewed/discussed with the patient and they were also made aware of online access. Pt also made aware that some labs, such as cultures, will not be resulted during ER visit and follow up with PMD is necessary.     MDM       DIAGNOSIS  Final diagnoses:   Herpes zoster without complication       DISPOSITION  DISCHARGE    Patient discharged in stable condition.    Reviewed implications of results, diagnosis, meds, responsibility to follow up, warning signs and symptoms of possible worsening, potential complications and  reasons to return to ER.    Patient/Family voiced understanding of above instructions.    Discussed plan for discharge, as there is no emergent indication for admission. Patient referred to primary care provider for BP management due to today's BP. Pt/family is agreeable and understands need for follow up and repeat testing.  Pt is aware that discharge does not mean that nothing is wrong but it indicates no emergency is present that requires admission and they must continue care with follow-up as given below or physician of their choice.     FOLLOW-UP  Eden Molina MD  [address] [place], [place] [id number]  [phone]    Call in 1 week  For Follow up, As needed         Medication List      New Prescriptions    famciclovir 500 MG tablet  Commonly known as:  FAMVIR  Take 1 tablet by mouth 3 (Three) Times a Day.     oxyCODONE-acetaminophen 5-325 MG per tablet  Commonly known as:  PERCOCET  Take 1 tablet by mouth Every 4 (Four) Hours As Needed (pain).        Changed    metoprolol tartrate 25 MG tablet  Commonly known as:  LOPRESSOR  TAKE ONE TABLET BY MOUTH TWO TIMES A DAY  What changed:  See the new instructions.              Latest Documented Vital Signs:  As of 10:16 AM  BP- 160/98 HR- 75 Temp- 98.1 °F (36.7 °C) (Oral) O2 sat- 95%    --  Documentation assistance provided by dago Latif for Dr. Tom.  Information recorded by the scribe was done at my direction and has been verified and validated by me.     Fletcher Latif  10/09/18 1016       Julian Tom MD  10/09/18 [id number]

## 2018-10-10 ENCOUNTER — OFFICE VISIT (OUTPATIENT)
Dept: ORTHOPEDIC SURGERY | Facility: CLINIC | Age: 73
End: 2018-10-10

## 2018-10-10 VITALS — TEMPERATURE: 99.1 F | WEIGHT: 171 LBS | BODY MASS INDEX: 24.48 KG/M2 | HEIGHT: 70 IN

## 2018-10-10 DIAGNOSIS — M25.551 HIP PAIN, RIGHT: ICD-10-CM

## 2018-10-10 DIAGNOSIS — M54.50 SPINE PAIN, LUMBAR: Primary | ICD-10-CM

## 2018-10-10 PROCEDURE — 99204 OFFICE O/P NEW MOD 45 MIN: CPT | Performed by: ORTHOPAEDIC SURGERY

## 2018-10-10 PROCEDURE — 72100 X-RAY EXAM L-S SPINE 2/3 VWS: CPT | Performed by: ORTHOPAEDIC SURGERY

## 2018-10-10 PROCEDURE — 73502 X-RAY EXAM HIP UNI 2-3 VIEWS: CPT | Performed by: ORTHOPAEDIC SURGERY

## 2018-10-10 NOTE — PROGRESS NOTES
New patient or new problem visit    No chief complaint on file.      HPI: He complains of a two-week history of right hip pain worse with standing relieved with sitting.  The pain is severe intermittent aching in not associated with trauma and is relieved with anti-inflammatory medications and Tylenol.  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  He also has bilateral knee DJD and valgus deformity which the inhibits walking.    PFSH: See chart- reviewed    Review of Systems   Constitutional: Negative for chills, fever and unexpected weight change.   HENT: Positive for tinnitus. Negative for trouble swallowing and voice change.    Eyes: Negative for visual disturbance.   Respiratory: Negative for cough and shortness of breath.    Cardiovascular: Negative for chest pain and leg swelling.   Gastrointestinal: Negative for abdominal pain, nausea and vomiting.   Endocrine: Negative for cold intolerance and heat intolerance.   Genitourinary: Negative for difficulty urinating, frequency and urgency.   Musculoskeletal: Positive for arthralgias and back pain.   Skin: Positive for wound. Negative for rash.   Allergic/Immunologic: Negative for immunocompromised state.   Neurological: Positive for dizziness. Negative for weakness and numbness.   Hematological: Does not bruise/bleed easily.   Psychiatric/Behavioral: Negative for dysphoric mood. The patient is not nervous/anxious.        PE: Constitutional: Vital signs above-noted.  Awake, alert and oriented    Psychiatric: Affect and insight do not appear grossly disturbed.    Pulmonary: Breathing is unlabored, color is good.    Skin: Warm, dry and normal turgor    Cardiac:  pedal pulses intact.  No edema.    Eyesight and hearing appear adequate for examination purposes      Musculoskeletal:  There is no tenderness to percussion and palpation of the spine. Motion appears undisturbed.  Posture is unremarkable to coronal  and sagittal inspection.    The skin about the area is intact.  There is no palpable or visible deformity.  There is no local spasm.       Neurologic:   Reflexes are absent in the patellae and achilles.   Motor function is undisturbed in quadriceps, EHL, and gastrocnemius on the right but he has slight weakness the left EHL   Sensation appears symmetrically intact to light touch and he is weak in hip flexion bilaterally.  In the bilateral lower extremities there is evidence of bilateral Atrophy.   Clonus is absent..  Gait appears undisturbed.  SLR test negative    Knees appear arthritic and are associated with some flexion contracture and valgus deformity and Stinchfield test is negative.      MEDICAL DECISION MAKING    XRAY: Plain film x-rays of the lumbar spine show well-maintained lordosis and thoracolumbar anterior osteophytes and appear fairly unremarkable otherwise pelvis and hip x-rays look normal.    Other: n/a    Impression: This seems intrinsic to the hip.    Plan: Get a hip MRI for further evaluation looking for he fusion, occult fracture or subclinical arthritis.  I don't think this represents radicular pain

## 2018-10-19 ENCOUNTER — HOSPITAL ENCOUNTER (OUTPATIENT)
Dept: MRI IMAGING | Facility: HOSPITAL | Age: 73
Discharge: HOME | End: 2018-10-19
Attending: ORTHOPAEDIC SURGERY | Admitting: ORTHOPAEDIC SURGERY

## 2018-10-19 DIAGNOSIS — M25.551 HIP PAIN, RIGHT: ICD-10-CM

## 2018-10-19 PROCEDURE — 73721 MRI JNT OF LWR EXTRE W/O DYE: CPT

## 2018-10-22 ENCOUNTER — TELEPHONE (OUTPATIENT)
Dept: ORTHOPEDIC SURGERY | Facility: CLINIC | Age: 73
End: 2018-10-22

## 2018-10-22 NOTE — TELEPHONE ENCOUNTER
Patient wife has been informed of [facility] prior message and she voiced understanding wkt 10/22/18 0922

## 2018-10-22 NOTE — TELEPHONE ENCOUNTER
Wife called for hip MRI results. She was informed RAVIN out of office until 10/30. Says  in a lot of pain and asked if other MD could give results.

## 2018-10-22 NOTE — TELEPHONE ENCOUNTER
Can you call him?  Let him know that it shows arthritis and some swelling in one of hte hip muscles.  May be a candidate for an injection.  We will have RAVIN discuss this with him when he gets back

## 2018-10-29 ENCOUNTER — TELEPHONE (OUTPATIENT)
Dept: ORTHOPEDIC SURGERY | Facility: CLINIC | Age: 73
End: 2018-10-29

## 2018-10-29 DIAGNOSIS — M16.11 OSTEOARTHRITIS OF RIGHT HIP, UNSPECIFIED OSTEOARTHRITIS TYPE: Primary | ICD-10-CM

## 2018-10-31 NOTE — TELEPHONE ENCOUNTER
Him that the MRI demonstrates mild hip arthritis on both sides but no fractures.  Not sure what is causing the pain but I would suggest schedule him for fluoroscopic guided injection of steroid into the painful hip.  Have him call 2 days afterward and let us know if it is helping.

## 2018-10-31 NOTE — TELEPHONE ENCOUNTER
Wife called again for MRI results. She wanted me to remind [facility] that patient has had the hip pain since mid-September. I informed her that the message is on [facility]'s desk.

## 2018-11-06 ENCOUNTER — HOSPITAL ENCOUNTER (OUTPATIENT)
Dept: GENERAL RADIOLOGY | Facility: HOSPITAL | Age: 73
Discharge: HOME | End: 2018-11-06
Attending: ORTHOPAEDIC SURGERY | Admitting: RADIOLOGY

## 2018-11-06 DIAGNOSIS — M16.11 OSTEOARTHRITIS OF RIGHT HIP, UNSPECIFIED OSTEOARTHRITIS TYPE: ICD-10-CM

## 2018-11-06 PROCEDURE — 25010000002 METHYLPREDNISOLONE PER 125 MG: Performed by: RADIOLOGY

## 2018-11-06 PROCEDURE — 77002 NEEDLE LOCALIZATION BY XRAY: CPT

## 2018-11-06 RX ORDER — METHYLPREDNISOLONE SODIUM SUCCINATE 125 MG/2ML
80 INJECTION, POWDER, LYOPHILIZED, FOR SOLUTION INTRAMUSCULAR; INTRAVENOUS
Status: COMPLETED | OUTPATIENT
Start: 2018-11-06 | End: 2018-11-06

## 2018-11-06 RX ORDER — BUPIVACAINE HYDROCHLORIDE 2.5 MG/ML
10 INJECTION, SOLUTION EPIDURAL; INFILTRATION; INTRACAUDAL ONCE
Status: COMPLETED | OUTPATIENT
Start: 2018-11-06 | End: 2018-11-06

## 2018-11-06 RX ORDER — LIDOCAINE HYDROCHLORIDE 10 MG/ML
10 INJECTION, SOLUTION INFILTRATION; PERINEURAL ONCE
Status: COMPLETED | OUTPATIENT
Start: 2018-11-06 | End: 2018-11-06

## 2018-11-06 RX ADMIN — METHYLPREDNISOLONE SODIUM SUCCINATE 80 MG: 125 INJECTION, POWDER, LYOPHILIZED, FOR SOLUTION INTRAMUSCULAR; INTRAVENOUS at 14:31

## 2018-11-06 RX ADMIN — LIDOCAINE HYDROCHLORIDE 3 ML: 10 INJECTION, SOLUTION INFILTRATION; PERINEURAL at 14:31

## 2018-11-06 RX ADMIN — BUPIVACAINE HYDROCHLORIDE 5 ML: 2.5 INJECTION, SOLUTION EPIDURAL; INFILTRATION; INTRACAUDAL; PERINEURAL at 14:31

## 2018-11-09 ENCOUNTER — TELEPHONE (OUTPATIENT)
Dept: ORTHOPEDIC SURGERY | Facility: CLINIC | Age: 73
End: 2018-11-09

## 2018-11-09 NOTE — TELEPHONE ENCOUNTER
"Just FYI: Patient's wife Julissa stated patient was told to report back to [facility] after getting Fluro-guided R Hip injection Tuesday 11/06/18 PM - patient \"able to step into shower yesterday Thursday 11/08/19, no pain however legs & hips felt weak, but had not stood or walked for about 6-8 weeks. Was able to shower again today but had to sit to shave & brush his teeth. Scheduled to have in-home PT today and a couple of times per week for __ weeks to be determined to help patient get his strength back.\" Wife Julissa stated will call \[facility]\"" to request future LESI orders. Thanks /srh    "

## 2018-11-09 NOTE — TELEPHONE ENCOUNTER
Notified wife Julissa of [facility]'s reply and scheduled 1st available IHC per [facility] / R HIP / MRI 10/19/18 with SPM on 11/29/18.

## 2018-11-09 NOTE — TELEPHONE ENCOUNTER
Good, that tells me the pain is more likely from his hip and not his back.  Less get him into see Dr. pritchard.

## 2018-11-29 ENCOUNTER — CONSULT (OUTPATIENT)
Dept: ORTHOPEDIC SURGERY | Facility: CLINIC | Age: 73
End: 2018-11-29

## 2018-11-29 VITALS — WEIGHT: 170 LBS | TEMPERATURE: 98.2 F | BODY MASS INDEX: 28.32 KG/M2 | HEIGHT: 65 IN

## 2018-11-29 DIAGNOSIS — M47.818 ARTHROPATHY OF RIGHT SACROILIAC JOINT: ICD-10-CM

## 2018-11-29 DIAGNOSIS — M25.551 RIGHT HIP PAIN: Primary | ICD-10-CM

## 2018-11-29 DIAGNOSIS — G71.20 CONGENITAL MYOPATHY (HCC): ICD-10-CM

## 2018-11-29 PROCEDURE — 99214 OFFICE O/P EST MOD 30 MIN: CPT | Performed by: ORTHOPAEDIC SURGERY

## 2018-11-29 RX ORDER — METRONIDAZOLE 7.5 MG/G
GEL TOPICAL 2 TIMES DAILY
Status: ON HOLD | COMMUNITY
End: 2022-06-13

## 2018-11-29 NOTE — PROGRESS NOTES
"Patient Name: Amandeep Manriquez Jr.   YOB: 1945  Referring Primary Care Physician: Eden Molina MD  BMI: Body mass index is 28.73 kg/m².    Chief Complaint:    Chief Complaint   Patient presents with   • Right Hip - Establish Care, Pain        Subjective:    HPI:   Amandeep Manriquez Jr. is a pleasant 73 y.o. year old who presents today for evaluation of   Chief Complaint   Patient presents with   • Right Hip - Establish Care, Pain    (Location). Duration: This has been going on for weeks. Timing: The pain is acute. Context or causative factors: unknown.  Relieving Factors:  In the past has tried cortisone injections  In the right hip.  Seen by Dr Silva and sent here.  C/o \"hip\" pain and reaches for right SI joint.  Has congential myopathy.  Seen with wife who is well versed.  Slowly lost function over the last few months.  Walked with [place] crutches for years but recently more in wheelchair.  No injury. Has seen Dr Osborn and is going back.  Questionable progressive neurological disease at this point.    This problem is new to this examiner.     Medications:   Home Medications:  Current Outpatient Medications on File Prior to Visit   Medication Sig   • Ascorbic Acid (VITAMIN C PO) Take  by mouth.   • aspirin 81 MG tablet Take 1 tablet by mouth daily.   • baclofen (LIORESAL) 10 MG tablet Take 1 tablet by mouth 3 (three) times a day.   • cholecalciferol (VITAMIN D3) 76204 UNITS capsule Take 1 capsule by mouth daily.   • HYDROcodone-acetaminophen (NORCO) 5-325 MG per tablet Take 1 tablet by mouth every 4 (four) hours as needed. For pain   • latanoprost (XALATAN) 0.005 % ophthalmic solution 1 drop every night.   • metoprolol tartrate (LOPRESSOR) 25 MG tablet TAKE ONE TABLET BY MOUTH TWO TIMES A DAY (Patient taking differently: TAKE ONE half TABLET BY MOUTH TWO TIMES A DAY)   • metroNIDAZOLE (METROGEL) 0.75 % gel Apply  topically to the appropriate area as directed 2 (Two) Times a Day.   • Multiple " Vitamin (MULTI VITAMIN DAILY PO) Take 1 tablet by mouth 2 (Two) Times a Day.   • nitroglycerin (NITROSTAT) 0.4 MG SL tablet Place 1 tablet under the tongue. For chest pain.  If pain remains after 5 minutes, call 911 and repeat dose.  Max 3 tabs in 15 minutes.   • rosuvastatin (CRESTOR) 40 MG tablet Take 40 mg by mouth Daily.   • Solifenacin Succinate (VESICARE PO) Take 5 mg by mouth.   • tamsulosin (FLOMAX) 0.4 MG capsule 24 hr capsule Take 1 capsule by mouth Every Night.   • [DISCONTINUED] Docusate Calcium (STOOL SOFTENER PO) Take 1 tablet by mouth As Needed.   • [DISCONTINUED] famciclovir (FAMVIR) 500 MG tablet Take 1 tablet by mouth 3 (Three) Times a Day.   • [DISCONTINUED] oxyCODONE-acetaminophen (PERCOCET) 5-325 MG per tablet Take 1 tablet by mouth Every 4 (Four) Hours As Needed (pain).     No current facility-administered medications on file prior to visit.      Current Medications:  Scheduled Meds:  Continuous Infusions:  No current facility-administered medications for this visit.   PRN Meds:.    I have reviewed the patient's medical history in detail and updated the computerized patient record.  Review and summarization of old records includes:    Past Medical History:   Diagnosis Date   • Acute myocardial infarction (CMS/HCC)    • 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CMS/HCC)    • CAD (coronary artery disease), native coronary artery    • Diabetes mellitus (CMS/HCC)    • Essential hypertension    • Gastrointestinal hemorrhage    • Hyperlipidemia    • Hypertension    • Mixed hyperlipidemia         Past Surgical History:   Procedure Laterality Date   • CARDIAC CATHETERIZATION  06/2015    30% Left main, 99% mid to distal LAD, 70-80% left circumflex, 80% proximal to mid RCA.   • CORONARY ANGIOPLASTY WITH STENT PLACEMENT     • OTHER SURGICAL HISTORY      percutaneous injection of extremity pseudoaneurysm        Social History     Occupational History   • Not on file   Tobacco Use   • Smoking status: Never Smoker  "  • Tobacco comment: caffeine use   Substance and Sexual Activity   • Alcohol use: Yes     Comment: social   • Drug use: Not on file   • Sexual activity: Not on file    Social History     Social History Narrative   • Not on file        Family History   Problem Relation Age of Onset   • Coronary artery disease Mother        ROS: 14 point review of systems was performed and all other systems were reviewed and are negative except for documented findings in HPI and today's encounter.     Allergies:   Allergies   Allergen Reactions   • Iodinated Diagnostic Agents Hives   • Ampicillin Diarrhea     Constitutional:  Denies fever, shaking or chills   Eyes:  Denies change in visual acuity   HENT:  Denies nasal congestion or sore throat   Respiratory:  Denies cough or shortness of breath   Cardiovascular:  Denies chest pain or severe LE edema   GI:  Denies abdominal pain, nausea, vomiting, bloody stools or diarrhea   Musculoskeletal:  Numbness, tingling, pain, or loss of motor function only as noted above in history of present illness.  : Denies painful urination or hematuria  Integument:  Denies rash, lesion or ulceration   Neurologic:  Denies headache or focal weakness  Endocrine:  Denies lymphadenopathy  Psych:  Denies confusion or change in mental status   Hem:  Denies active bleeding    Subjective     Objective:    Physical Exam: 73 y.o. male  Wt Readings from Last 3 Encounters:   11/29/18 77.1 kg (170 lb)   10/19/18 77.6 kg (171 lb 1.2 oz)   10/10/18 77.6 kg (171 lb)     Ht Readings from Last 3 Encounters:   11/29/18 163.8 cm (64.5\")   10/19/18 177.8 cm (70\")   10/10/18 177.8 cm (70\")     Body mass index is 28.73 kg/m².    Vitals:    11/29/18 1035   Temp: 98.2 °F (36.8 °C)       Vital signs reviewed.   General Appearance:    Alert, cooperative, in no acute distress                  Eyes: conjunctiva clear  ENT: external ears and nose atraumatic  CV: no peripheral edema  Resp: normal respiratory effort  Skin: no rashes " or wounds; normal turgor  Psych: mood and affect appropriate  Lymph: no nodes appreciated  Neuro: gross sensation intact  Vascular:  Palpable peripheral pulse in noted extremity  Musculoskeletal Extremities: can get up from wheelchair very slowl and with great effort with legs spread wide and with altered balance.  right lower ext rotates outward at the ankle.  mod tender over the right SI joint.  axial loading and rotation of both his causes no pain.  symmetric hip stiffness- ? related to myopathy.  Facies and eye musculature affected.        Radiology:   Imaging done previously in the office, images were personally viewed and discussed at length with the patient:    Indication: pain related symptoms,  Views: 2V AP&LAT right hip(s) and lumbar spine   Findings: deg changes in the spine and SI joint, hips with mild to mod changes, small cyst.  MRI of the right hip shows same.  Comparison views: not available      Assessment:     ICD-10-CM ICD-9-CM   1. Right hip pain M25.551 719.45   2. Arthropathy of right sacroiliac joint M47.818 721.3   3. 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  Risks, benefits, alternatives, comparisons, and complications of accepted medicines, injections, recommendations, surgical procedures, and therapies explained and education provided in laymen's terms. The patient was given the opportunity to ask questions and they were answerved to their satisfaction.   Natural history and expected course of this patient's diagnosis discussed along with evaluation of therapies. Questions answered.  Reviewed medical records from other provider(s) for past and current medical history pertinent to this complaint:  PCP/ancillary staff, Care Everywhere and Specialist  Specialty referral. will refer to PMR and get opinion and seeing neurology back.  Loss of function more concerning for pathology in those areas.  Despite the injection  "helping the hip (perhaps more of a systemic effect like ACTH shot) do not feel that the \"hip\" is the problem here.  Could refer to neuro for SI syx/pain clinic.      11/29/2018  "

## 2019-01-04 ENCOUNTER — TELEPHONE (OUTPATIENT)
Dept: ORTHOPEDIC SURGERY | Facility: CLINIC | Age: 74
End: 2019-01-04

## 2019-01-04 DIAGNOSIS — M25.551 RIGHT HIP PAIN: Primary | ICD-10-CM

## 2019-01-04 NOTE — TELEPHONE ENCOUNTER
Patient's wife called stating Fluro guided Right hip injection worked for 5 weeks. Pain returned, but not as severe, lasted for 4-5 days, but patient able to withstand it & no pain currently.     Wanting to begin PT at [facility], wife left a message with Dr. Kimble's office today 01/04/19 to see if he can submit referral to [facility] for Right hip Physical therapy, but since Dr. Kimble is a [facility] doctor, would SPM please enter OP PT order to [facility] to eval & treat Right Hip? Thanks /srh

## 2019-01-07 DIAGNOSIS — M25.551 RIGHT HIP PAIN: Primary | ICD-10-CM

## 2019-01-15 ENCOUNTER — HOSPITAL ENCOUNTER (OUTPATIENT)
Dept: PHYSICAL THERAPY | Facility: HOSPITAL | Age: 74
Setting detail: THERAPIES SERIES
Discharge: HOME | End: 2019-01-15

## 2019-01-15 DIAGNOSIS — M53.3 SI (SACROILIAC) PAIN: ICD-10-CM

## 2019-01-15 DIAGNOSIS — R29.898 MUSCULAR DECONDITIONING: Primary | ICD-10-CM

## 2019-01-15 DIAGNOSIS — G71.20 CONGENITAL MYOPATHY (HCC): ICD-10-CM

## 2019-01-15 PROCEDURE — 97110 THERAPEUTIC EXERCISES: CPT | Performed by: PHYSICAL THERAPIST

## 2019-01-15 PROCEDURE — 97161 PT EVAL LOW COMPLEX 20 MIN: CPT | Performed by: PHYSICAL THERAPIST

## 2019-01-15 NOTE — THERAPY EVALUATION
Outpatient Physical Therapy Ortho Initial Evaluation  [facility]     Patient Name: Amandeep Manriquez Jr.  : 1945  MRN: [id number]  Today's Date: 1/15/2019      Visit Date: 01/15/2019      Subjective Evaluation    History of Present Illness  Onset date: 2018.  Mechanism of injury:  2 cataract surgeries with limited mobility.  Went to ER in Sept with pain and was referred to back specialist.  Lumbar MRI. R hip injection relieved pain for 5 weeks. Was referred to hip specialist who thought it could be SIJ.  Was referred to Dr. Kimble and back to Dr. Osborn who thinks he is out of shape from eye surgery.  Also had episode of shingles. He is back to exercise.  He cannot stand to shave everyday after showering, may sit in wheelchair. 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 He uses walker in the house and forearm crutches outside the house.       Patient Occupation: Retired   Pain  Current pain ratin  At best pain ratin  At worst pain rating: 3  Location: Lower back/ right SI joint   Quality: dull ache  Progression: improved    Social Support  Lives in: condominium  Lives with: spouse    Treatments  Previous treatment: injection treatment  Patient Goals  Patient goals for therapy: increased strength  Patient goal: Increased standing tolerance, do stairs with 1-2 rails, return to pool to do his HEP         Patient Active Problem List   Diagnosis   • CAD (coronary artery disease), native coronary artery   • Acute myocardial infarction (CMS/HCC)   • Essential hypertension   • Mixed hyperlipidemia   • Congenital myopathy (CMS/HCC)   • Right hip pain   • Arthropathy of right sacroiliac joint        Past Medical History:   Diagnosis Date   • Acute myocardial infarction (CMS/HCC)    • 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CMS/HCC)    • CAD (coronary artery disease), native coronary artery    • Diabetes mellitus (CMS/HCC)    • Essential hypertension    • Gastrointestinal  hemorrhage    • Hyperlipidemia    • Hypertension    • Mixed hyperlipidemia         Past Surgical History:   Procedure Laterality Date   • CARDIAC CATHETERIZATION  06/2015    30% Left main, 99% mid to distal LAD, 70-80% left circumflex, 80% proximal to mid RCA.   • CORONARY ANGIOPLASTY WITH STENT PLACEMENT     • OTHER SURGICAL HISTORY      percutaneous injection of extremity pseudoaneurysm       Visit Dx:     ICD-10-CM ICD-9-CM   1. Muscular deconditioning R29.898 781.99   2. SI (sacroiliac) pain M53.3 724.6   3. 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       Patient History     Row Name 01/15/19 1600             Fall Risk Assessment    Any falls in the past year:  No  -PB         Services    Are you currently receiving Home Health services  No  -PB         Daily Activities    Primary Language  English  -PB      Teaching needs identified  Home Exercise Program  -PB      Patient is concerned about/has problems with  Climbing Stairs;Difficulty with self care (i.e. bathing, dressing, toileting:;Standing;Transfers (getting out of a chair, bed);Walking  -PB      Does patient have problems with the following?  None  -PB      Barriers to learning  None  -PB      Pt Participated in POC and Goals  Yes  -PB         Safety    Are you being hurt, hit, or frightened by anyone at home or in your life?  No  -PB      Are you being neglected by a caregiver  No  -PB        User Key  (r) = Recorded By, (t) = Taken By, (c) = Cosigned By    Initials Name Provider Type    Gabriela Hart, PT Physical Therapist          Objective       Static Posture     Shoulders  Rounded.    Thoracic Spine  Hyperkyphosis.    Knee   Knee (Left): Flexed.   Knee (Right): Flexed.     Active Range of Motion     Additional Active Range of Motion Details  AROM B knee 25-70      Strength/Myotome Testing     Left Hip   Planes of Motion   Flexion: 3+  Abduction: 4-  Adduction: 3+    Right Hip   Planes of Motion   Flexion: 3+  Abduction: 4-  Adduction:  3+    Left Knee   Flexion: 3+  Extension: 3+    Right Knee   Flexion: 4-  Extension: 4-    Left Ankle/Foot   Dorsiflexion: 4  Plantar flexion: 3+  Inversion: 2-  Eversion: 3+    Right Ankle/Foot   Dorsiflexion: 4+  Plantar flexion: 3+  Inversion: 2-  Eversion: 3+    Ambulation   Weight-Bearing Status   Assistive device used: forearm crutches    Ambulation: Level Surfaces   Ambulation with assistive device: independent    Observational Gait   Decreased walking speed and stride length.     Additional Observational Gait Details  Patient walks using loft-strand crutches with 3-point gait.   Sit to stand from chair without arms require additional external support from table to push up to standing using a very wide base of support and weight over right forearm/elbow.  Sit to stand from chair with arms requires heavy support from both arms and very wide base of support.  Patient requires at least 1 crutch to  place and maintain balance.       Quality of Movement During Gait   Trunk  Forward lean.     Knee    Knee (Left): Positive stiff knee.   Knee (Right): Positive stiff knee.                              Assessment/Plan     PT OP Goals     Row Name 01/15/19 1700          PT Short Term Goals    STG Date to Achieve  02/26/19  -PB     STG 1  Patient will perform previous gym exercise program with reduced weight without increased pain with assistance for machine set-up  -PB     STG 1 Progress  New  -PB     STG 2  Patient will increase his standing tolerance to stand to shave daily instead of using wheelchair   -PB     STG 2 Progress  New  -PB     STG 3  Patient will go up and down 4 stairs using hand rail non-reciprocally  -PB     STG 3 Progress  New  -PB     STG 4  Patient will demonstrate minimal difficulty turning over from right to left sides to improve his bed mobility   -PB     STG 4 Progress  New  -PB        Long Term Goals    LTG Date to Achieve  04/13/19  -PB     LTG 1  Patient (with assistance from wife)  will return to gym exercise program without increased pain   -PB     LTG 1 Progress  New  -PB     LTG 2  Patient (with assistance from wife) will return to aquatic exercises using stairs to descend into pool and lift chair out of pool   -PB     LTG 2 Progress  New  -PB     LTG 3  Patient will walk 1 lap around track with good endurance   -PB     LTG 3 Progress  New  -PB        Time Calculation    PT Goal Re-Cert Due Date  04/13/19  -PB       User Key  (r) = Recorded By, (t) = Taken By, (c) = Cosigned By    Initials Name Provider Type    Gabriela Hart, PT Physical Therapist          PT Assessment/Plan     Row Name 01/15/19 1709 01/15/19 1701       PT Assessment    Functional Limitations  --  Impaired gait;Decreased safety during functional activities;Limitations in functional capacity and performance;Performance in self-care ADL;Performance in leisure activities;Limitation in home management  -PB    Impairments  --  Gait;Balance;Joint mobility;Joint integrity;Pain;Muscle strength;Posture  -PB    Assessment Comments  Amandeep ASHLIE Manriquez Jr. is a 73 y.o. year-old male referred to physical therapy for right SI joint pain that started during a period of reduced activity. He presents with a stable clinical presentation now that the pain is low and manageable.  He has comorbidities of congenital myelopathy, arthritis limiting the joint mobility of both knees and personal factors of relying on supportive wife for personal care that may affect his progress in the plan of care.  Signs and symptoms are consistent with physical therapy diagnosis of deconditioning after period of inactivity resulting in painful flare to R SI joint.  -PB  --    Please refer to paper survey for additional self-reported information  --  Yes  -PB    Rehab Potential  --  Good  -PB    Patient/caregiver participated in establishment of treatment plan and goals  --  Yes  -PB    Patient would benefit from skilled therapy intervention  --  Yes  -PB        PT Plan    PT Frequency  --  2x/week  -PB    Predicted Duration of Therapy Intervention (Therapy Eval)  --  30-90 days   -PB    Planned CPT's?  --  PT EVAL LOW COMPLEXITY: 32920;PT THER PROC EA 15 MIN: 51559;PT MANUAL THERAPY EA 15 MIN: 38617;PT GAIT TRAINING EA 15 MIN: 25011;PT AQUATIC THERAPY EA 15 MIN: 19542;PT HOT OR COLD PACK TREAT MCARE;PT ULTRASOUND EA 15 MIN: 14263;PT NEUROMUSC RE-EDUCATION EA 15 MIN: 68673;PT THER ACT EA 15 MIN: 59458;PT SELF CARE/HOME MGMT/TRAIN EA 15: 25628  -PB    PT Plan Comments  --  Gym exercises to work on returning patient with wife's assist to prior Matrix exercise machines; working on functional stair mobility; increasing standing tolerance, bed mobility turning to each side, and increaing walking endurance   -PB      User Key  (r) = Recorded By, (t) = Taken By, (c) = Cosigned By    Initials Name Provider Type    PB Gabriela Parson, PT Physical Therapist            Exercises     Row Name 01/15/19 1600             Subjective Pain    Able to rate subjective pain?  yes  -PB      Pre-Treatment Pain Level  0  -PB      Post-Treatment Pain Level  0  -PB      Subjective Pain Comment  No current pain   -PB         Total Minutes    64227 - PT Therapeutic Exercise Minutes  15  -PB         Exercise 1    Exercise Name 1  Patient and wife demonstrates his HEP that he has recently returned to doing that includes some upper body exercises for AROM, using band and using 3 pound weights and leg exercises for seated marches, supine pelvic tilts, leg raises supine and SL, hip adductor setting with pillow and ankle pulling out with band.  Discussed his prior HEP at [facility] that included some weight machines that his wife would set up for him to use and pool exercises (he feels confident with the pool exercises but discussed his fear of slipping on wet floor in family changing room although his wife does put towels down on floor and suggest it may be safer to just use the wheelchair)   -PB       Cueing 1  Verbal  -PB        User Key  (r) = Recorded By, (t) = Taken By, (c) = Cosigned By    Initials Name Provider Type    Gabriela Hart, PT Physical Therapist                        Outcome Measure Options: Modifed Owestry  Modified Oswestry  Modified Oswestry Score/Comments: 46% disability       Time Calculation:     Therapy Suggested Charges     Code   Minutes Charges    15794 (CPT®) Hc Pt Neuromusc Re Education Ea 15 Min      36119 (CPT®) Hc Pt Ther Proc Ea 15 Min 15 1    01688 (CPT®) Hc Gait Training Ea 15 Min      20466 (CPT®) Hc Pt Therapeutic Act Ea 15 Min      13676 (CPT®) Hc Pt Manual Therapy Ea 15 Min      11909 (CPT®) Hc Pt Ther Massage- Per 15 Min      21147 (CPT®) Hc Pt Iontophoresis Ea 15 Min      84285 (CPT®) Hc Pt Elec Stim Ea-Per 15 Min      46098 (CPT®) Hc Pt Ultrasound Ea 15 Min      47920 (CPT®) Hc Pt Self Care/Mgmt/Train Ea 15 Min      83792 (CPT®) Hc Pt Prosthetic (S) Train Initial Encounter, Each 15 Min      33197 (CPT®) Hc Orthotic(S) Mgmt/Train Initial Encounter, Each 15min      24266 (CPT®) Hc Pt Aquatic Therapy Ea 15 Min      24847 (CPT®) Hc Pt Orthotic(S)/Prosthetic(S) Encounter, Each 15 Min       (CPT®) Hc Pt Electrical Stim Unattended      Total  15 1          Start Time: 1300  Stop Time: 1345  Time Calculation (min): 45 min         PT G-Codes  Outcome Measure Options: Modifed Owestry  Modified Oswestry Score/Comments: 46% disability          Gabriela Parson, PT  1/15/2019

## 2019-01-17 ENCOUNTER — HOSPITAL ENCOUNTER (OUTPATIENT)
Dept: PHYSICAL THERAPY | Facility: HOSPITAL | Age: 74
Setting detail: THERAPIES SERIES
Discharge: HOME | End: 2019-01-17

## 2019-01-17 DIAGNOSIS — G71.20 CONGENITAL MYOPATHY (HCC): ICD-10-CM

## 2019-01-17 DIAGNOSIS — R29.898 MUSCULAR DECONDITIONING: Primary | ICD-10-CM

## 2019-01-17 DIAGNOSIS — M53.3 SI (SACROILIAC) PAIN: ICD-10-CM

## 2019-01-17 PROCEDURE — 97110 THERAPEUTIC EXERCISES: CPT | Performed by: PHYSICAL THERAPIST

## 2019-01-17 NOTE — THERAPY TREATMENT NOTE
Outpatient Physical Therapy Ortho Treatment Note  [facility]     Patient Name: Amandeep Manriquez Jr.  : 1945  MRN: [id number]  Today's Date: 2019      Visit Date: 2019    Visit Dx:    ICD-10-CM ICD-9-CM   1. Muscular deconditioning R29.898 781.99   2. SI (sacroiliac) pain M53.3 724.6   3. 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       Patient Active Problem List   Diagnosis   • CAD (coronary artery disease), native coronary artery   • Acute myocardial infarction (CMS/HCC)   • Essential hypertension   • Mixed hyperlipidemia   • Congenital myopathy (CMS/HCC)   • Right hip pain   • Arthropathy of right sacroiliac joint        Past Medical History:   Diagnosis Date   • Acute myocardial infarction (CMS/HCC)    • 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CMS/HCC)    • CAD (coronary artery disease), native coronary artery    • Diabetes mellitus (CMS/HCC)    • Essential hypertension    • Gastrointestinal hemorrhage    • Hyperlipidemia    • Hypertension    • Mixed hyperlipidemia         Past Surgical History:   Procedure Laterality Date   • CARDIAC CATHETERIZATION  2015    30% Left main, 99% mid to distal LAD, 70-80% left circumflex, 80% proximal to mid RCA.   • CORONARY ANGIOPLASTY WITH STENT PLACEMENT     • OTHER SURGICAL HISTORY      percutaneous injection of extremity pseudoaneurysm         PT Assessment/Plan     Row Name 19 1330          PT Assessment    Assessment Comments  Amandeep tolerated today's treatment well.  We experimented with previous machines in the wellness center and attempted the leg press (Matrix and Jay Jay varieties) and feel the Jay Jay will be more appropriate moving forward.  He had been using the Matrix hip adductor, which we attempted today and caused L knee pain, and the Matrix knee extension in the past and feel that he and his wife would benefit from some education to use more appropriate machines to improve function better.   The leg press, if he tolerates it, would  work similar muscle groups and add gluteal work as well and may prove, with time, to be more beneficial functionally.  We tried step ups on one inch step and he was unable to do this exercise.  During the leg press I noticed the R knee tended displayed a valgus drift and then added the hip clam to begin strengthening the gluteus medius to attempt strengthening that area.  -MP        PT Plan    PT Plan Comments  Monitor the effects of today's treatment and continue as indicated.  -MP       User Key  (r) = Recorded By, (t) = Taken By, (c) = Cosigned By    Initials Name Provider Type    Peyman Lunsford PT Physical Therapist          Exercises     Row Name 01/17/19 1300             Subjective Comments    Subjective Comments  Amandeep and his wife reported that he seems to be better generally.    -MP         Subjective Pain    Able to rate subjective pain?  yes  -MP      Pre-Treatment Pain Level  0  -MP      Post-Treatment Pain Level  0  -MP      Subjective Pain Comment  Amandeep reported no problems or pain following today's session.  -MP         Total Minutes    28912 - PT Therapeutic Exercise Minutes  45  -MP         Exercise 1    Exercise Name 1  Refer to land flow sheet  -MP      Cueing 1  Verbal;Tactile  -MP      Additional Comments  Cues required for the use of the NuStep, leg press (Matrix and Jay Jay), performance on the hip abductor for technique concerning range and eccentric poritions of the exercises for joint and soft tissue protection.    -MP         Exercise 2    Additional Comments  Exercises attempted today included NuStep, 2 leg presses, hip abductor, hip adductor, step ups and lying hip clams with theraband.    -MP        User Key  (r) = Recorded By, (t) = Taken By, (c) = Cosigned By    Initials Name Provider Type    Peyman Lunsford PT Physical Therapist          PT OP Goals     Row Name 01/17/19 1300          PT Short Term Goals    STG Date to Achieve  02/26/19  -MP     STG 1  Patient will perform  previous gym exercise program with reduced weight without increased pain with assistance for machine set-up  -MP     STG 1 Progress  Partially Met  -MP     STG 2  Patient will increase his standing tolerance to stand to shave daily instead of using wheelchair   -MP     STG 2 Progress  Ongoing  -MP     STG 3  Patient will go up and down 4 stairs using hand rail non-reciprocally  -MP     STG 3 Progress  Ongoing  -MP     STG 4  Patient will demonstrate minimal difficulty turning over from right to left sides to improve his bed mobility   -MP     STG 4 Progress  Ongoing  -MP        Long Term Goals    LTG Date to Achieve  04/13/19  -MP     LTG 1  Patient (with assistance from wife) will return to gym exercise program without increased pain   -MP     LTG 1 Progress  Ongoing  -MP     LTG 2  Patient (with assistance from wife) will return to aquatic exercises using stairs to descend into pool and lift chair out of pool   -MP     LTG 2 Progress  Ongoing  -MP     LTG 3  Patient will walk 1 lap around track with good endurance   -MP     LTG 3 Progress  Ongoing  -MP       User Key  (r) = Recorded By, (t) = Taken By, (c) = Cosigned By    Initials Name Provider Type    Peyman Lunsford, PT Physical Therapist          Therapy Education  Education Details: Progressed his HEP with hip clams, green theraband provided, in hooklying.  Given: HEP, Symptoms/condition management  Program: New  How Provided: Written  Provided to: Patient(and his spouse)  Level of Understanding: Teach back education performed    Time Calculation:   Start Time: 1115  Stop Time: 1200  Time Calculation (min): 45 min  Therapy Suggested Charges     Code   Minutes Charges    89622 (CPT®) Hc Pt Neuromusc Re Education Ea 15 Min      62180 (CPT®) Hc Pt Ther Proc Ea 15 Min 45 3    36341 (CPT®) Hc Gait Training Ea 15 Min      71899 (CPT®) Hc Pt Therapeutic Act Ea 15 Min      33326 (CPT®) Hc Pt Manual Therapy Ea 15 Min      72487 (CPT®) Hc Pt Ther Massage- Per 15 Min       13430 (CPT®) Hc Pt Iontophoresis Ea 15 Min      51180 (CPT®) Hc Pt Elec Stim Ea-Per 15 Min      55431 (CPT®) Hc Pt Ultrasound Ea 15 Min      39741 (CPT®) Hc Pt Self Care/Mgmt/Train Ea 15 Min      26216 (CPT®) Hc Pt Prosthetic (S) Train Initial Encounter, Each 15 Min      20402 (CPT®) Hc Orthotic(S) Mgmt/Train Initial Encounter, Each 15min      49207 (CPT®) Hc Pt Aquatic Therapy Ea 15 Min      79000 (CPT®) Hc Pt Orthotic(S)/Prosthetic(S) Encounter, Each 15 Min       (CPT®) Hc Pt Electrical Stim Unattended      Total  45 3        Therapy Charges for Today     Code Description Service Date Service Provider Modifiers Qty    09764371928 HC PT THER PROC EA 15 MIN 1/17/2019 Peyman Garsia, PT GP 3          Peyman Garsia, PT  1/17/2019

## 2019-01-21 ENCOUNTER — HOSPITAL ENCOUNTER (OUTPATIENT)
Dept: PHYSICAL THERAPY | Facility: HOSPITAL | Age: 74
Setting detail: THERAPIES SERIES
Discharge: HOME | End: 2019-01-21

## 2019-01-21 DIAGNOSIS — R29.898 MUSCULAR DECONDITIONING: Primary | ICD-10-CM

## 2019-01-21 PROCEDURE — 97110 THERAPEUTIC EXERCISES: CPT | Performed by: PHYSICAL THERAPIST

## 2019-01-21 NOTE — THERAPY TREATMENT NOTE
Outpatient Physical Therapy Ortho Treatment Note  [facility]     Patient Name: Amandeep Manriquez Jr.  : 1945  MRN: [id number]  Today's Date: 2019      Visit Date: 2019    Visit Dx:    ICD-10-CM ICD-9-CM   1. Muscular deconditioning R29.898 781.99       Patient Active Problem List   Diagnosis   • CAD (coronary artery disease), native coronary artery   • Acute myocardial infarction (CMS/HCC)   • Essential hypertension   • Mixed hyperlipidemia   • Congenital myopathy (CMS/HCC)   • Right hip pain   • Arthropathy of right sacroiliac joint        Past Medical History:   Diagnosis Date   • Acute myocardial infarction (CMS/HCC)    • 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CMS/HCC)    • CAD (coronary artery disease), native coronary artery    • Diabetes mellitus (CMS/[facility])    • Essential hypertension    • Gastrointestinal hemorrhage    • Hyperlipidemia    • Hypertension    • Mixed hyperlipidemia         Past Surgical History:   Procedure Laterality Date   • CARDIAC CATHETERIZATION  2015    30% Left main, 99% mid to distal LAD, 70-80% left circumflex, 80% proximal to mid RCA.   • CORONARY ANGIOPLASTY WITH STENT PLACEMENT     • OTHER SURGICAL HISTORY      percutaneous injection of extremity pseudoaneurysm                       PT Assessment/Plan     Row Name 19 1236          PT Assessment    Assessment Comments  progressed with closed chain strength today;  fatigued at conclusion.  issued progressed HEP. motivated.  should continue to do well with all intervention provided  -     Please refer to paper survey for additional self-reported information  Yes  -     Rehab Potential  Good  -     Patient/caregiver participated in establishment of treatment plan and goals  Yes  -       User Key  (r) = Recorded By, (t) = Taken By, (c) = Cosigned By    Initials Name Provider Type     Julian Sutton, PT Physical Therapist              Exercises     Row Name 19 1200             Subjective  Comments    Subjective Comments  quesitons about hEP  -DH         Total Minutes    05734 - PT Therapeutic Exercise Minutes  45  -DH         Exercise 1    Exercise Name 1  see flow sheet  -      Cueing 1  -- progressed with closed chain strengthening  -        User Key  (r) = Recorded By, (t) = Taken By, (c) = Cosigned By    Initials Name Provider Type     Julian Sutton, PT Physical Therapist                                            Time Calculation:   Start Time: 1100  Stop Time: 1145  Time Calculation (min): 45 min  Therapy Suggested Charges     Code   Minutes Charges    85411 (CPT®) Hc Pt Neuromusc Re Education Ea 15 Min      38002 (CPT®) Hc Pt Ther Proc Ea 15 Min 45 3    47804 (CPT®) Hc Gait Training Ea 15 Min      55665 (CPT®) Hc Pt Therapeutic Act Ea 15 Min      55467 (CPT®) Hc Pt Manual Therapy Ea 15 Min      78372 (CPT®) Hc Pt Ther Massage- Per 15 Min      16037 (CPT®) Hc Pt Iontophoresis Ea 15 Min      57104 (CPT®) Hc Pt Elec Stim Ea-Per 15 Min      35535 (CPT®) Hc Pt Ultrasound Ea 15 Min      88968 (CPT®) Hc Pt Self Care/Mgmt/Train Ea 15 Min      07296 (CPT®) Hc Pt Prosthetic (S) Train Initial Encounter, Each 15 Min      43218 (CPT®) Hc Orthotic(S) Mgmt/Train Initial Encounter, Each 15min      23003 (CPT®) Hc Pt Aquatic Therapy Ea 15 Min      09696 (CPT®) Hc Pt Orthotic(S)/Prosthetic(S) Encounter, Each 15 Min       (CPT®) Hc Pt Electrical Stim Unattended      Total  45 3        Therapy Charges for Today     Code Description Service Date Service Provider Modifiers Qty    53319900857 HC PT THER PROC EA 15 MIN 1/21/2019 Julian Sutton, PT GP 3                    Julian Sutton, PT  1/21/2019

## 2019-01-22 ENCOUNTER — APPOINTMENT (OUTPATIENT)
Dept: PHYSICAL THERAPY | Facility: HOSPITAL | Age: 74
End: 2019-01-22

## 2019-01-28 ENCOUNTER — HOSPITAL ENCOUNTER (OUTPATIENT)
Dept: PHYSICAL THERAPY | Facility: HOSPITAL | Age: 74
Setting detail: THERAPIES SERIES
Discharge: HOME | End: 2019-01-28

## 2019-01-28 DIAGNOSIS — G71.20 CONGENITAL MYOPATHY (HCC): ICD-10-CM

## 2019-01-28 DIAGNOSIS — M53.3 SI (SACROILIAC) PAIN: ICD-10-CM

## 2019-01-28 DIAGNOSIS — R29.898 MUSCULAR DECONDITIONING: Primary | ICD-10-CM

## 2019-01-28 PROCEDURE — 97110 THERAPEUTIC EXERCISES: CPT | Performed by: PHYSICAL THERAPIST

## 2019-01-28 PROCEDURE — 97116 GAIT TRAINING THERAPY: CPT | Performed by: PHYSICAL THERAPIST

## 2019-01-28 NOTE — THERAPY TREATMENT NOTE
Outpatient Physical Therapy Ortho Treatment Note  [facility]     Patient Name: Amandeep Manriquez Jr.  : 1945  MRN: [id number]  Today's Date: 2019      Visit Date: 2019    Visit Dx:    ICD-10-CM ICD-9-CM   1. Muscular deconditioning R29.898 781.99   2. SI (sacroiliac) pain M53.3 724.6   3. 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       Patient Active Problem List   Diagnosis   • CAD (coronary artery disease), native coronary artery   • Acute myocardial infarction (CMS/HCC)   • Essential hypertension   • Mixed hyperlipidemia   • Congenital myopathy (CMS/HCC)   • Right hip pain   • Arthropathy of right sacroiliac joint        Past Medical History:   Diagnosis Date   • Acute myocardial infarction (CMS/HCC)    • 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CMS/HCC)    • CAD (coronary artery disease), native coronary artery    • Diabetes mellitus (CMS/HCC)    • Essential hypertension    • Gastrointestinal hemorrhage    • Hyperlipidemia    • Hypertension    • Mixed hyperlipidemia         Past Surgical History:   Procedure Laterality Date   • CARDIAC CATHETERIZATION  2015    30% Left main, 99% mid to distal LAD, 70-80% left circumflex, 80% proximal to mid RCA.   • CORONARY ANGIOPLASTY WITH STENT PLACEMENT     • OTHER SURGICAL HISTORY      percutaneous injection of extremity pseudoaneurysm                       PT Assessment/Plan     Row Name 19 1614          PT Assessment    Assessment Comments  Jan is able to stand longer now to shave and not feeling pain.  He needs some assistance going up 4 stairs non reciprocally but able to do so with assist.   -PB       User Key  (r) = Recorded By, (t) = Taken By, (c) = Cosigned By    Initials Name Provider Type    Gabriela Hart, PT Physical Therapist              Exercises     Row Name 19 1600             Subjective Comments    Subjective Comments  Patient reports that HEP that used to hurt have not been hurting  -PB         Subjective Pain    Able  to rate subjective pain?  yes  -PB      Pre-Treatment Pain Level  0  -PB      Post-Treatment Pain Level  0  -PB         Total Minutes    63552 - Gait Training Minutes   10  -PB      10507 - PT Therapeutic Exercise Minutes  30  -PB         Exercise 1    Exercise Name 1  Ther ex: Patient started with independent Nustep L5 x 8 min.  Performed gym machines with patient and wife instruction in set up for hip abd increased to 50# 10/3; added seated leg curls 25# 15/2 (used RTB around ankles to help hold R leg onto ankle support); Matrix leg press 20# 10/3 (help getting legs onto plate), Jay Jay leg press R and L seperated 100 # x 10 each leg.    -PB      Cueing 1  Verbal;Tactile Cues for machines set up and leg positioning   -PB         Exercise 2    Exercise Name 2  Gait training:  Practiced functional stair climbing going up leading R non reciprocally with rail on L and crutch in R hand x 4 steps with some assist from his belt and wife assisting on third step to raise L leg up to step and going down with rail on L and crutch in R non reciprocally with CGA.   -PB      Cueing 2  Verbal;Tactile Safety and muscle assistance going up  -PB        User Key  (r) = Recorded By, (t) = Taken By, (c) = Cosigned By    Initials Name Provider Type    Gabriela Hart, PT Physical Therapist                                            Time Calculation:   Start Time: 1520  Stop Time: 1600  Time Calculation (min): 40 min  Therapy Suggested Charges     Code   Minutes Charges    43018 (CPT®) Hc Pt Neuromusc Re Education Ea 15 Min      92476 (CPT®) Hc Pt Ther Proc Ea 15 Min 30 2    18877 (CPT®) Hc Gait Training Ea 15 Min 10 1    35222 (CPT®) Hc Pt Therapeutic Act Ea 15 Min      61486 (CPT®) Hc Pt Manual Therapy Ea 15 Min      78027 (CPT®) Hc Pt Ther Massage- Per 15 Min      86746 (CPT®) Hc Pt Iontophoresis Ea 15 Min      29050 (CPT®) Hc Pt Elec Stim Ea-Per 15 Min      89220 (CPT®) Hc Pt Ultrasound Ea 15 Min      93751 (CPT®) Hc Pt Self  Care/Mgmt/Train Ea 15 Min      88963 (CPT®) Hc Pt Prosthetic (S) Train Initial Encounter, Each 15 Min      22482 (CPT®) Hc Orthotic(S) Mgmt/Train Initial Encounter, Each 15min      75555 (CPT®) Hc Pt Aquatic Therapy Ea 15 Min      25819 (CPT®) Hc Pt Orthotic(S)/Prosthetic(S) Encounter, Each 15 Min       (CPT®) Hc Pt Electrical Stim Unattended      Total  40 3        Therapy Charges for Today     Code Description Service Date Service Provider Modifiers Qty    68898717724 HC PT THER PROC EA 15 MIN 1/28/2019 Gabriela Parson, PT GP 2    57924047437 HC GAIT TRAINING EA 15 MIN 1/28/2019 Gabriela Parson, PT GP 1                    Gabriela Parson, PT  1/28/2019

## 2019-01-30 ENCOUNTER — APPOINTMENT (OUTPATIENT)
Dept: PHYSICAL THERAPY | Facility: HOSPITAL | Age: 74
End: 2019-01-30

## 2019-02-08 ENCOUNTER — HOSPITAL ENCOUNTER (OUTPATIENT)
Dept: PHYSICAL THERAPY | Facility: HOSPITAL | Age: 74
Setting detail: THERAPIES SERIES
Discharge: HOME | End: 2019-02-08

## 2019-02-08 DIAGNOSIS — R29.898 MUSCULAR DECONDITIONING: Primary | ICD-10-CM

## 2019-02-08 DIAGNOSIS — M53.3 SI (SACROILIAC) PAIN: ICD-10-CM

## 2019-02-08 DIAGNOSIS — G71.20 CONGENITAL MYOPATHY (HCC): ICD-10-CM

## 2019-02-08 PROCEDURE — 97116 GAIT TRAINING THERAPY: CPT | Performed by: PHYSICAL THERAPIST

## 2019-02-08 PROCEDURE — 97110 THERAPEUTIC EXERCISES: CPT | Performed by: PHYSICAL THERAPIST

## 2019-02-08 NOTE — THERAPY TREATMENT NOTE
Outpatient Physical Therapy Ortho Treatment Note  [facility]     Patient Name: Amandeep Manriquez Jr.  : 1945  MRN: [id number]  Today's Date: 2019      Visit Date: 2019    Visit Dx:    ICD-10-CM ICD-9-CM   1. Muscular deconditioning R29.898 781.99   2. SI (sacroiliac) pain M53.3 724.6   3. 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       Patient Active Problem List   Diagnosis   • CAD (coronary artery disease), native coronary artery   • Acute myocardial infarction (CMS/HCC)   • Essential hypertension   • Mixed hyperlipidemia   • Congenital myopathy (CMS/HCC)   • Right hip pain   • Arthropathy of right sacroiliac joint        Past Medical History:   Diagnosis Date   • Acute myocardial infarction (CMS/HCC)    • 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CMS/HCC)    • CAD (coronary artery disease), native coronary artery    • Diabetes mellitus (CMS/HCC)    • Essential hypertension    • Gastrointestinal hemorrhage    • Hyperlipidemia    • Hypertension    • Mixed hyperlipidemia         Past Surgical History:   Procedure Laterality Date   • CARDIAC CATHETERIZATION  2015    30% Left main, 99% mid to distal LAD, 70-80% left circumflex, 80% proximal to mid RCA.   • CORONARY ANGIOPLASTY WITH STENT PLACEMENT     • OTHER SURGICAL HISTORY      percutaneous injection of extremity pseudoaneurysm                       PT Assessment/Plan     Row Name 19 1638          PT Assessment    Assessment Comments  Jan reports no return of R hip pain but does have some chronic knee pain that he needs to be careful not to aggravate with exercises. He has most difficulty with stair mobility but walking has returned to at least 90%.   -PB       User Key  (r) = Recorded By, (t) = Taken By, (c) = Cosigned By    Initials Name Provider Type    Gabriela Hart, PT Physical Therapist              Exercises     Row Name 19 1600             Subjective Comments    Subjective Comments  Patient and wife report that he has  been to the gym twice on their own.  -PB         Subjective Pain    Able to rate subjective pain?  yes  -PB      Pre-Treatment Pain Level  0  -PB      Post-Treatment Pain Level  0  -PB      Subjective Pain Comment  Intermittent R knee pain reported on Nustep and leg extension   -PB         Total Minutes    68701 - Gait Training Minutes   10  -PB      68662 - PT Therapeutic Exercise Minutes  30  -PB         Exercise 1    Exercise Name 1  Ther ex: Patient started with independent Nustep L5 x 10 min.  Performed gym machines with patient and wife instruction in set up for hip abd increased to 60# 10/3; seated leg curls 27.5# 10/3 (Used GTB around ankles to help hold R leg onto ankle support); Matrix leg press 25# 10/3 (help getting legs onto plate), Jay Jay leg press R and L seperated 105 # x 10/3 each leg.  Added K leg extension 2# 10/3 (they had tried Matrix at 10# with pain in R knee and discussed how this exercise may not be good for his R knee).  Step ups performed to 4 inch step using B crutches with great difficullty going up with the R as well as the L leg).   -PB      Cueing 1  Verbal;Tactile Cues for leg positioning and assessing response   -PB         Exercise 2    Exercise Name 2  Gait training:  Practiced functional stair climbing going up leading R non reciprocally with rail on L and crutch in R hand x 4 steps with some assist from his belt and assisting on 3 of 4 steps to raise L leg up to step and going down with rail on L and crutch in R non reciprocally with SBA.  Patient given butt kicks and hip lifts as HEP to help with leg mobility weakness on stairs.   -PB      Cueing 2  Verbal;Tactile Assist with stairs for safety  -PB        User Key  (r) = Recorded By, (t) = Taken By, (c) = Cosigned By    Initials Name Provider Type    Gabriela Hart, PT Physical Therapist                         PT OP Goals     Row Name 02/08/19 1600          PT Short Term Goals    STG Date to Achieve  02/26/19  -PB     STG  1  Patient will perform previous gym exercise program with reduced weight without increased pain with assistance for machine set-up  -PB     STG 1 Progress  Met  -PB     STG 1 Progress Comments  No hip pain, some R knee pain with adjustments   -PB     STG 2  Patient will increase his standing tolerance to stand to shave daily instead of using wheelchair   -PB     STG 2 Progress  Met  -PB     STG 3  Patient will go up and down 4 stairs using hand rail non-reciprocally  -PB     STG 3 Progress  Progressing  -PB     STG 3 Progress Comments  Needs assistance to lift the L leg up to step   -PB     STG 4  Patient will demonstrate minimal difficulty turning over from right to left sides to improve his bed mobility   -PB     STG 4 Progress  Ongoing  -PB        Long Term Goals    LTG Date to Achieve  04/13/19  -PB     LTG 1  Patient (with assistance from wife) will return to gym exercise program without increased pain   -PB     LTG 1 Progress  Met  -PB     LTG 2  Patient (with assistance from wife) will return to aquatic exercises using stairs to descend into pool and lift chair out of pool   -PB     LTG 2 Progress  Ongoing  -PB     LTG 2 Progress Comments  Plan to return to this weekend   -PB     LTG 3  Patient will walk 1 lap around track with good endurance   -PB     LTG 3 Progress  Ongoing  -PB     LTG 3 Progress Comments  Not performed but patient is walking longer path into house   -PB       User Key  (r) = Recorded By, (t) = Taken By, (c) = Cosigned By    Initials Name Provider Type    Gabriela Hart, PT Physical Therapist                         Time Calculation:   Start Time: 1345  Stop Time: 1430  Time Calculation (min): 45 min  Therapy Suggested Charges     Code   Minutes Charges    54598 (CPT®) Hc Pt Neuromusc Re Education Ea 15 Min      18633 (CPT®) Hc Pt Ther Proc Ea 15 Min 30 2    31359 (CPT®) Hc Gait Training Ea 15 Min 10 1    65100 (CPT®) Hc Pt Therapeutic Act Ea 15 Min      37318 (CPT®) Hc Pt Manual  Therapy Ea 15 Min      81336 (CPT®) Hc Pt Ther Massage- Per 15 Min      57996 (CPT®) Hc Pt Iontophoresis Ea 15 Min      09584 (CPT®) Hc Pt Elec Stim Ea-Per 15 Min      86988 (CPT®) Hc Pt Ultrasound Ea 15 Min      88405 (CPT®) Hc Pt Self Care/Mgmt/Train Ea 15 Min      93694 (CPT®) Hc Pt Prosthetic (S) Train Initial Encounter, Each 15 Min      79515 (CPT®) Hc Orthotic(S) Mgmt/Train Initial Encounter, Each 15min      26435 (CPT®) Hc Pt Aquatic Therapy Ea 15 Min      10190 (CPT®) Hc Pt Orthotic(S)/Prosthetic(S) Encounter, Each 15 Min       (CPT®) Hc Pt Electrical Stim Unattended      Total  40 3        Therapy Charges for Today     Code Description Service Date Service Provider Modifiers Qty    45398152130 HC PT THER PROC EA 15 MIN 2/8/2019 Gabriela Parson, PT GP 2    73693522817 HC GAIT TRAINING EA 15 MIN 2/8/2019 Gabriela Parson, PT GP 1                    Gabriela Parson, PT  2/8/2019

## 2019-05-01 ENCOUNTER — DOCUMENTATION (OUTPATIENT)
Dept: PHYSICAL THERAPY | Facility: HOSPITAL | Age: 74
End: 2019-05-01

## 2019-05-01 DIAGNOSIS — R29.898 MUSCULAR DECONDITIONING: Primary | ICD-10-CM

## 2019-05-01 DIAGNOSIS — M53.3 SI (SACROILIAC) PAIN: ICD-10-CM

## 2019-05-01 DIAGNOSIS — G71.20 CONGENITAL MYOPATHY (HCC): ICD-10-CM

## 2019-05-01 NOTE — THERAPY DISCHARGE NOTE
Outpatient Physical Therapy Discharge Summary         Patient Name: Amandeep Manriquez Jr.  : 1945  MRN: [id number]    Today's Date: 2019    Visit Dx:    ICD-10-CM ICD-9-CM   1. Muscular deconditioning R29.898 781.99   2. SI (sacroiliac) pain M53.3 724.6   3. 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 He reports no return of R hip pain but does have some chronic knee pain that he needs to be careful not to aggravate with exercises. He has most difficulty with stair mobility but walking has returned to at least 90%  He returned to his regular exercise program.       Time Calculation:                    Gabriela Parson, PT  2019

## 2020-01-13 ENCOUNTER — TELEPHONE (OUTPATIENT)
Dept: FAMILY MEDICINE CLINIC | Facility: CLINIC | Age: 75
End: 2020-01-13

## 2020-01-13 RX ORDER — TAMSULOSIN HYDROCHLORIDE 0.4 MG/1
1 CAPSULE ORAL NIGHTLY
Qty: 30 CAPSULE | Refills: 5 | Status: SHIPPED | OUTPATIENT
Start: 2020-01-13 | End: 2020-07-06

## 2020-01-13 NOTE — TELEPHONE ENCOUNTER
Patient's wife Julissa called requesting refill on:  Tamsulosin 0.4 mg cap  Patient out of medication.      Send to [facility].  FYI- Patient scheduled for 2/5 with Tracy.     Thank you

## 2020-01-22 ENCOUNTER — APPOINTMENT (OUTPATIENT)
Dept: CT IMAGING | Facility: HOSPITAL | Age: 75
End: 2020-01-22

## 2020-01-22 ENCOUNTER — HOSPITAL ENCOUNTER (EMERGENCY)
Facility: HOSPITAL | Age: 75
Discharge: HOME | End: 2020-01-22
Attending: EMERGENCY MEDICINE | Admitting: EMERGENCY MEDICINE

## 2020-01-22 VITALS
TEMPERATURE: 97.2 F | WEIGHT: 170 LBS | RESPIRATION RATE: 14 BRPM | DIASTOLIC BLOOD PRESSURE: 72 MMHG | HEART RATE: 68 BPM | HEIGHT: 65 IN | SYSTOLIC BLOOD PRESSURE: 125 MMHG | BODY MASS INDEX: 28.32 KG/M2 | OXYGEN SATURATION: 96 %

## 2020-01-22 DIAGNOSIS — B37.2 SKIN YEAST INFECTION: Primary | ICD-10-CM

## 2020-01-22 DIAGNOSIS — N20.1 LEFT URETERAL STONE: ICD-10-CM

## 2020-01-22 LAB
ANION GAP SERPL CALCULATED.3IONS-SCNC: 9.5 MMOL/L (ref 5–15)
BACTERIA UR QL AUTO: NORMAL /HPF
BASOPHILS # BLD AUTO: 0.04 10*3/MM3 (ref 0–0.2)
BASOPHILS NFR BLD AUTO: 0.6 % (ref 0–1.5)
BILIRUB UR QL STRIP: NEGATIVE
BUN BLD-MCNC: 10 MG/DL (ref 8–23)
BUN/CREAT SERPL: 25.6 (ref 7–25)
CALCIUM SPEC-SCNC: 9.4 MG/DL (ref 8.6–10.5)
CHLORIDE SERPL-SCNC: 100 MMOL/L (ref 98–107)
CLARITY UR: CLEAR
CO2 SERPL-SCNC: 27.5 MMOL/L (ref 22–29)
COLOR UR: YELLOW
CREAT BLD-MCNC: 0.39 MG/DL (ref 0.76–1.27)
DEPRECATED RDW RBC AUTO: 42.4 FL (ref 37–54)
EOSINOPHIL # BLD AUTO: 0.1 10*3/MM3 (ref 0–0.4)
EOSINOPHIL NFR BLD AUTO: 1.5 % (ref 0.3–6.2)
ERYTHROCYTE [DISTWIDTH] IN BLOOD BY AUTOMATED COUNT: 12.5 % (ref 12.3–15.4)
GFR SERPL CREATININE-BSD FRML MDRD: >150 ML/MIN/1.73
GLUCOSE BLD-MCNC: 115 MG/DL (ref 65–99)
GLUCOSE UR STRIP-MCNC: NEGATIVE MG/DL
HCT VFR BLD AUTO: 42.2 % (ref 37.5–51)
HGB BLD-MCNC: 14 G/DL (ref 13–17.7)
HGB UR QL STRIP.AUTO: NEGATIVE
HYALINE CASTS UR QL AUTO: NORMAL /LPF
IMM GRANULOCYTES # BLD AUTO: 0.03 10*3/MM3 (ref 0–0.05)
IMM GRANULOCYTES NFR BLD AUTO: 0.4 % (ref 0–0.5)
KETONES UR QL STRIP: NEGATIVE
LEUKOCYTE ESTERASE UR QL STRIP.AUTO: ABNORMAL
LYMPHOCYTES # BLD AUTO: 1.02 10*3/MM3 (ref 0.7–3.1)
LYMPHOCYTES NFR BLD AUTO: 15 % (ref 19.6–45.3)
MCH RBC QN AUTO: 30.8 PG (ref 26.6–33)
MCHC RBC AUTO-ENTMCNC: 33.2 G/DL (ref 31.5–35.7)
MCV RBC AUTO: 92.7 FL (ref 79–97)
MONOCYTES # BLD AUTO: 0.8 10*3/MM3 (ref 0.1–0.9)
MONOCYTES NFR BLD AUTO: 11.8 % (ref 5–12)
NEUTROPHILS # BLD AUTO: 4.79 10*3/MM3 (ref 1.7–7)
NEUTROPHILS NFR BLD AUTO: 70.7 % (ref 42.7–76)
NITRITE UR QL STRIP: NEGATIVE
NRBC BLD AUTO-RTO: 0 /100 WBC (ref 0–0.2)
PH UR STRIP.AUTO: 7.5 [PH] (ref 5–8)
PLATELET # BLD AUTO: 178 10*3/MM3 (ref 140–450)
PMV BLD AUTO: 9.6 FL (ref 6–12)
POTASSIUM BLD-SCNC: 4.1 MMOL/L (ref 3.5–5.2)
PROT UR QL STRIP: NEGATIVE
RBC # BLD AUTO: 4.55 10*6/MM3 (ref 4.14–5.8)
RBC # UR: NORMAL /HPF
REF LAB TEST METHOD: NORMAL
SODIUM BLD-SCNC: 137 MMOL/L (ref 136–145)
SP GR UR STRIP: 1.01 (ref 1–1.03)
SQUAMOUS #/AREA URNS HPF: NORMAL /HPF
UROBILINOGEN UR QL STRIP: ABNORMAL
WBC NRBC COR # BLD: 6.78 10*3/MM3 (ref 3.4–10.8)
WBC UR QL AUTO: NORMAL /HPF

## 2020-01-22 PROCEDURE — 96375 TX/PRO/DX INJ NEW DRUG ADDON: CPT

## 2020-01-22 PROCEDURE — 85025 COMPLETE CBC W/AUTO DIFF WBC: CPT | Performed by: EMERGENCY MEDICINE

## 2020-01-22 PROCEDURE — 25010000002 DIPHENHYDRAMINE PER 50 MG: Performed by: EMERGENCY MEDICINE

## 2020-01-22 PROCEDURE — 25010000002 ONDANSETRON PER 1 MG: Performed by: EMERGENCY MEDICINE

## 2020-01-22 PROCEDURE — 99284 EMERGENCY DEPT VISIT MOD MDM: CPT

## 2020-01-22 PROCEDURE — 25010000002 HYDROMORPHONE PER 4 MG: Performed by: EMERGENCY MEDICINE

## 2020-01-22 PROCEDURE — 25010000002 IOPAMIDOL 61 % SOLUTION: Performed by: EMERGENCY MEDICINE

## 2020-01-22 PROCEDURE — 81001 URINALYSIS AUTO W/SCOPE: CPT | Performed by: EMERGENCY MEDICINE

## 2020-01-22 PROCEDURE — 74177 CT ABD & PELVIS W/CONTRAST: CPT

## 2020-01-22 PROCEDURE — 80048 BASIC METABOLIC PNL TOTAL CA: CPT | Performed by: EMERGENCY MEDICINE

## 2020-01-22 PROCEDURE — 63710000001 PREDNISONE PER 1 MG: Performed by: EMERGENCY MEDICINE

## 2020-01-22 PROCEDURE — 63710000001 PREDNISONE PER 5 MG: Performed by: EMERGENCY MEDICINE

## 2020-01-22 PROCEDURE — 96374 THER/PROPH/DIAG INJ IV PUSH: CPT

## 2020-01-22 RX ORDER — DIPHENHYDRAMINE HYDROCHLORIDE 50 MG/ML
50 INJECTION INTRAMUSCULAR; INTRAVENOUS ONCE
Status: COMPLETED | OUTPATIENT
Start: 2020-01-22 | End: 2020-01-22

## 2020-01-22 RX ORDER — CYCLOSPORINE 0.5 MG/ML
1 EMULSION OPHTHALMIC 2 TIMES DAILY
COMMUNITY

## 2020-01-22 RX ORDER — NYSTATIN 100000 [USP'U]/G
POWDER TOPICAL
Qty: 30 G | Refills: 0 | Status: ON HOLD | OUTPATIENT
Start: 2020-01-22 | End: 2022-06-13

## 2020-01-22 RX ORDER — ONDANSETRON 2 MG/ML
4 INJECTION INTRAMUSCULAR; INTRAVENOUS ONCE
Status: COMPLETED | OUTPATIENT
Start: 2020-01-22 | End: 2020-01-22

## 2020-01-22 RX ORDER — NYSTATIN 100000 [USP'U]/G
POWDER TOPICAL
Qty: 30 G | Refills: 0 | Status: SHIPPED | OUTPATIENT
Start: 2020-01-22 | End: 2020-01-22 | Stop reason: SDUPTHER

## 2020-01-22 RX ORDER — POLYETHYLENE GLYCOL 3350 17 G/17G
17 POWDER, FOR SOLUTION ORAL DAILY
Status: ON HOLD | COMMUNITY
End: 2022-06-13

## 2020-01-22 RX ORDER — HYDROMORPHONE HYDROCHLORIDE 1 MG/ML
0.5 INJECTION, SOLUTION INTRAMUSCULAR; INTRAVENOUS; SUBCUTANEOUS ONCE
Status: COMPLETED | OUTPATIENT
Start: 2020-01-22 | End: 2020-01-22

## 2020-01-22 RX ORDER — OXYBUTYNIN CHLORIDE 5 MG/1
5 TABLET ORAL 3 TIMES DAILY
COMMUNITY
End: 2020-02-07 | Stop reason: SDUPTHER

## 2020-01-22 RX ORDER — SENNOSIDES 8.6 MG
TABLET ORAL NIGHTLY
Status: ON HOLD | COMMUNITY
End: 2022-06-13

## 2020-01-22 RX ORDER — SACCHAROMYCES BOULARDII 250 MG
250 CAPSULE ORAL 2 TIMES DAILY
Status: ON HOLD | COMMUNITY
End: 2022-06-13

## 2020-01-22 RX ORDER — SODIUM CHLORIDE 0.9 % (FLUSH) 0.9 %
10 SYRINGE (ML) INJECTION AS NEEDED
Status: DISCONTINUED | OUTPATIENT
Start: 2020-01-22 | End: 2020-01-22 | Stop reason: HOSPADM

## 2020-01-22 RX ADMIN — HYDROMORPHONE HYDROCHLORIDE 0.5 MG: 1 INJECTION, SOLUTION INTRAMUSCULAR; INTRAVENOUS; SUBCUTANEOUS at 12:14

## 2020-01-22 RX ADMIN — IOPAMIDOL 85 ML: 612 INJECTION, SOLUTION INTRAVENOUS at 13:29

## 2020-01-22 RX ADMIN — ONDANSETRON 4 MG: 2 INJECTION INTRAMUSCULAR; INTRAVENOUS at 12:14

## 2020-01-22 RX ADMIN — DIPHENHYDRAMINE HYDROCHLORIDE 50 MG: 50 INJECTION, SOLUTION INTRAMUSCULAR; INTRAVENOUS at 11:50

## 2020-01-22 RX ADMIN — PREDNISONE 50 MG: 20 TABLET ORAL at 11:49

## 2020-01-23 NOTE — ED PROVIDER NOTES
EMERGENCY DEPARTMENT ENCOUNTER    Room Number:  39/39  Date of encounter:  1/22/2020  PCP: Eden Molina MD  Historian: patient, wife      HPI:  Chief Complaint: scrotal pain  A complete HPI/ROS/PMH/PSH/SH/FH are unobtainable due to: none    Context: Amandeep Manriquez Jr. is a 74 y.o. male who presents to the ED c/o scrotal irritation. Onset 2 days. Symptoms are constant. Worse with nothing. Improved by nothing. He declines pain medicine. No fever. Not diabetic although it is on his chart. No abdominal pain.       PAST MEDICAL HISTORY  Active Ambulatory Problems     Diagnosis Date Noted   • CAD (coronary artery disease), native coronary artery    • Acute myocardial infarction (CMS/HCC)    • Essential hypertension    • Mixed hyperlipidemia    • Congenital myopathy (CMS/HCC) 11/29/2018   • Right hip pain 11/29/2018   • Arthropathy of right sacroiliac joint 11/29/2018     Resolved Ambulatory Problems     Diagnosis Date Noted   • No Resolved Ambulatory Problems     Past Medical History:   Diagnosis Date   • 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CMS/[facility])    • Diabetes mellitus (CMS/[facility])    • Gastrointestinal hemorrhage    • Hyperlipidemia    • Hypertension          PAST SURGICAL HISTORY  Past Surgical History:   Procedure Laterality Date   • CARDIAC CATHETERIZATION  06/2015    30% Left main, 99% mid to distal LAD, 70-80% left circumflex, 80% proximal to mid RCA.   • CORONARY ANGIOPLASTY WITH STENT PLACEMENT     • OTHER SURGICAL HISTORY      percutaneous injection of extremity pseudoaneurysm         FAMILY HISTORY  Family History   Problem Relation Age of Onset   • Coronary artery disease Mother          SOCIAL HISTORY  Social History     Socioeconomic History   • Marital status:      Spouse name: Not on file   • Number of children: Not on file   • Years of education: Not on file   • Highest education level: Not on file   Tobacco Use   • Smoking status: Never Smoker   • Tobacco comment: caffeine use   Substance  and Sexual Activity   • Alcohol use: Yes     Comment: social         ALLERGIES  Iodinated diagnostic agents and Ampicillin        REVIEW OF SYSTEMS  Review of Systems     All systems reviewed and negative except for those discussed in HPI.       PHYSICAL EXAM    I have reviewed the triage vital signs and nursing notes.    ED Triage Vitals   Temp Heart Rate Resp BP SpO2   01/22/20 1006 01/22/20 1006 01/22/20 1006 01/22/20 1120 01/22/20 1006   96.8 °F (36 °C) 68 16 155/89 96 %      Temp src Heart Rate Source Patient Position BP Location FiO2 (%)   01/22/20 1006 01/22/20 1006 -- -- --   Tympanic Monitor          Physical Exam  GENERAL: not distressed  HENT: nares patent  EYES: no scleral icterus  CV: regular rhythm, regular rate  RESPIRATORY: normal effort  ABDOMEN: soft nontender  : beefy red irritation to scrotum that is most noticeable in a distribution consistent with the imprint of his phallus. No crepitus. No bullae. Uncircumcised.   MUSCULOSKELETAL: no deformity  NEURO: alert, moves all extremities, follows commands  SKIN: warm, dry. Irritation as noted above        LAB RESULTS  Recent Results (from the past 24 hour(s))   Basic Metabolic Panel    Collection Time: 01/22/20 11:50 AM   Result Value Ref Range    Glucose 115 (H) 65 - 99 mg/dL    BUN 10 8 - 23 mg/dL    Creatinine 0.39 (L) 0.76 - 1.27 mg/dL    Sodium 137 136 - 145 mmol/L    Potassium 4.1 3.5 - 5.2 mmol/L    Chloride 100 98 - 107 mmol/L    CO2 27.5 22.0 - 29.0 mmol/L    Calcium 9.4 8.6 - 10.5 mg/dL    eGFR Non African Amer >150 >60 mL/min/1.73    BUN/Creatinine Ratio 25.6 (H) 7.0 - 25.0    Anion Gap 9.5 5.0 - 15.0 mmol/L   CBC Auto Differential    Collection Time: 01/22/20 11:50 AM   Result Value Ref Range    WBC 6.78 3.40 - 10.80 10*3/mm3    RBC 4.55 4.14 - 5.80 10*6/mm3    Hemoglobin 14.0 13.0 - 17.7 g/dL    Hematocrit 42.2 37.5 - 51.0 %    MCV 92.7 79.0 - 97.0 fL    MCH 30.8 26.6 - 33.0 pg    MCHC 33.2 31.5 - 35.7 g/dL    RDW 12.5 12.3 - 15.4 %     RDW-SD 42.4 37.0 - 54.0 fl    MPV 9.6 6.0 - 12.0 fL    Platelets 178 140 - 450 10*3/mm3    Neutrophil % 70.7 42.7 - 76.0 %    Lymphocyte % 15.0 (L) 19.6 - 45.3 %    Monocyte % 11.8 5.0 - 12.0 %    Eosinophil % 1.5 0.3 - 6.2 %    Basophil % 0.6 0.0 - 1.5 %    Immature Grans % 0.4 0.0 - 0.5 %    Neutrophils, Absolute 4.79 1.70 - 7.00 10*3/mm3    Lymphocytes, Absolute 1.02 0.70 - 3.10 10*3/mm3    Monocytes, Absolute 0.80 0.10 - 0.90 10*3/mm3    Eosinophils, Absolute 0.10 0.00 - 0.40 10*3/mm3    Basophils, Absolute 0.04 0.00 - 0.20 10*3/mm3    Immature Grans, Absolute 0.03 0.00 - 0.05 10*3/mm3    nRBC 0.0 0.0 - 0.2 /100 WBC   Urinalysis With Microscopic If Indicated (No Culture) - Urine, Clean Catch    Collection Time: 01/22/20  1:59 PM   Result Value Ref Range    Color, UA Yellow Yellow, Straw    Appearance, UA Clear Clear    pH, UA 7.5 5.0 - 8.0    Specific Gravity, UA 1.007 1.005 - 1.030    Glucose, UA Negative Negative    Ketones, UA Negative Negative    Bilirubin, UA Negative Negative    Blood, UA Negative Negative    Protein, UA Negative Negative    Leuk Esterase, UA Trace (A) Negative    Nitrite, UA Negative Negative    Urobilinogen, UA 0.2 E.U./dL 0.2 - 1.0 E.U./dL   Urinalysis, Microscopic Only - Urine, Clean Catch    Collection Time: 01/22/20  1:59 PM   Result Value Ref Range    RBC, UA None Seen None Seen, 0-2 /HPF    WBC, UA 0-2 None Seen, 0-2 /HPF    Bacteria, UA None Seen None Seen /HPF    Squamous Epithelial Cells, UA 0-2 None Seen, 0-2 /HPF    Hyaline Casts, UA None Seen None Seen /LPF    Methodology Manual Light Microscopy        Ordered the above labs and independently reviewed the results.        RADIOLOGY  Ct Abdomen Pelvis With Contrast    Result Date: 1/22/2020  CT ABDOMEN AND PELVIS WITH IV CONTRAST  HISTORY: 74-year-old male with scrotal edema and erythema.  TECHNIQUE: Radiation dose reduction techniques were utilized, including automated exposure control and exposure modulation based on body  size. 3 mm images were obtained through the abdomen and pelvis after the administration of IV contrast. Compared with previous CTA of the abdomen and pelvis from 06/13/2015.  FINDINGS: There is no change in the appearance of the liver with atrophy of the left hepatic lobe. There is prominence of the biliary tree likely related to the previous cholecystectomy. Splenic size is normal. The pancreas and adrenals appear unremarkable. There is a 1 cm nonobstructing stone within the right kidney and there are several variable sized stones within the left kidney. There is a 7 mm stone within the distal left ureter just proximal to the UVJ with mild distention of the left ureter and essentially no dilatation of the left renal collecting system. There is a 4 cm left renal cyst. No acute bowel abnormality is seen. There is extensive sigmoid diverticulosis and there is diverticulosis scattered throughout the entire colon without evidence for diverticulitis. The appendix appears normal. There are extensive abdominal aortic atherosclerotic changes without aneurysmal dilatation. There is no free fluid or lymphadenopathy.      1. There is a 0.7 cm stone within the distal left ureter just proximal to the UVJ with only very mild dilatation of the left ureter and no significant dilatation of the left renal collecting system. There is bilateral nephrolithiasis. 2. Extensive colonic diverticulosis without evidence for diverticulitis.  Discussed with Dr. Ortiz.        I ordered the above noted radiological studies. Reviewed by me and discussed with radiologist.  See dictation for official radiology interpretation.      PROCEDURES    Procedures      MEDICATIONS GIVEN IN ER    Medications   diphenhydrAMINE (BENADRYL) injection 50 mg (50 mg Intravenous Given 1/22/20 1150)   HYDROmorphone (DILAUDID) injection 0.5 mg (0.5 mg Intravenous Given 1/22/20 1214)   ondansetron (ZOFRAN) injection 4 mg (4 mg Intravenous Given 1/22/20 1214)   iopamidol  (ISOVUE-300) 61 % injection 100 mL (85 mL Intravenous Given by Other 1/22/20 1329)         PROGRESS, DATA ANALYSIS, CONSULTS, AND MEDICAL DECISION MAKING    All labs have been independently reviewed by me.  All radiology studies have been reviewed by me and discussed with radiologist dictating the report.   EKG's independently viewed and interpreted by me.  Discussion below represents my analysis of pertinent findings related to patient's condition, differential diagnosis, treatment plan and final disposition.    I spoke with Dr. Alvarze, radiology who states patient has UVJ stone. He states his pain is well controlled. He initially declined pain meds. Then he wanted medicine.    Now, patient does not want anything for home. Already on flomax. No UTI.     ED Course as of Jan 22 2146 Wed Jan 22, 2020   1130 Differential diagnosis includes eczema herpeticum, yeast infection, neck-.    [TD]   1324 WBC: 6.78 [TD]   1324 Creatinine(!): 0.39 [TD]   1324 Sodium: 137 [TD]   1324 Potassium: 4.1 [TD]   1438 Bacteria, UA: None Seen [TD]   1438 WBC, UA: 0-2 [TD]   1438 Leukocytes, UA(!): Trace [TD]   1438 Nitrite, UA: Negative [TD]      ED Course User Index  [TD] Aguilar Ortiz II, MD       I find his scrotal exam to be c/w candidal infection. He has moisture noted at scrotum and wife believes he leaks quite a bit. I reiterated importance of staying dry. I gave detailed RTER precautions should he develop signs of Lachelle's.     He will f/u with urology. No hydro on CT scan.     I interpreted CT myself. No hydro.     AS OF 9:46 PM VITALS:    BP - 125/72  HR - 68  TEMP - 97.2 °F (36.2 °C) (Tympanic)  O2 SATS - 96%        DIAGNOSIS  Final diagnoses:   Skin yeast infection of scrotum   Left ureteral stone         DISPOSITION  DISCHARGE    FOLLOW-UP  Eden Molina MD  [address] [place] [id number] [phone]    Schedule an appointment as soon as possible for a visit in 1 day  If symptoms worsen          Medication List      New Prescriptions    nystatin 698362 UNIT/GM powder  Generic drug:  nystatin  Apply three times daily to the affected skin until skin heals.        Changed    metoprolol tartrate 25 MG tablet  Commonly known as:  LOPRESSOR  TAKE ONE TABLET BY MOUTH TWO TIMES A DAY  What changed:    how much to take  how to take this  when to take this                   Aguilar Ortiz II, MD  01/22/20 5556

## 2020-02-05 ENCOUNTER — OFFICE VISIT (OUTPATIENT)
Dept: FAMILY MEDICINE CLINIC | Facility: CLINIC | Age: 75
End: 2020-02-05

## 2020-02-05 VITALS
BODY MASS INDEX: 29.02 KG/M2 | DIASTOLIC BLOOD PRESSURE: 72 MMHG | OXYGEN SATURATION: 98 % | WEIGHT: 170 LBS | TEMPERATURE: 98 F | SYSTOLIC BLOOD PRESSURE: 142 MMHG | HEART RATE: 56 BPM | HEIGHT: 64 IN

## 2020-02-05 DIAGNOSIS — M17.11 PRIMARY OSTEOARTHRITIS OF RIGHT KNEE: ICD-10-CM

## 2020-02-05 DIAGNOSIS — I10 ESSENTIAL HYPERTENSION: Chronic | ICD-10-CM

## 2020-02-05 DIAGNOSIS — G71.20 CONGENITAL MYOPATHY (HCC): ICD-10-CM

## 2020-02-05 DIAGNOSIS — E78.2 MIXED HYPERLIPIDEMIA: Chronic | ICD-10-CM

## 2020-02-05 DIAGNOSIS — H61.23 BILATERAL IMPACTED CERUMEN: ICD-10-CM

## 2020-02-05 DIAGNOSIS — G71.20 MYOPATHY, CONGENITAL (HCC): ICD-10-CM

## 2020-02-05 DIAGNOSIS — N20.0 RENAL STONE: ICD-10-CM

## 2020-02-05 DIAGNOSIS — I25.10 CORONARY ARTERY DISEASE INVOLVING NATIVE CORONARY ARTERY OF NATIVE HEART WITHOUT ANGINA PECTORIS: Chronic | ICD-10-CM

## 2020-02-05 DIAGNOSIS — N40.0 BENIGN PROSTATIC HYPERPLASIA WITHOUT LOWER URINARY TRACT SYMPTOMS: ICD-10-CM

## 2020-02-05 DIAGNOSIS — M48.061 DEGENERATIVE LUMBAR SPINAL STENOSIS: Primary | ICD-10-CM

## 2020-02-05 DIAGNOSIS — M47.818 ARTHROPATHY OF RIGHT SACROILIAC JOINT: ICD-10-CM

## 2020-02-05 DIAGNOSIS — R42 VERTIGO: ICD-10-CM

## 2020-02-05 PROBLEM — M25.551 RIGHT HIP PAIN: Status: RESOLVED | Noted: 2018-11-29 | Resolved: 2020-02-05

## 2020-02-05 PROCEDURE — 99214 OFFICE O/P EST MOD 30 MIN: CPT | Performed by: FAMILY MEDICINE

## 2020-02-05 RX ORDER — ROSUVASTATIN CALCIUM 40 MG/1
40 TABLET, COATED ORAL DAILY
Qty: 90 TABLET | Refills: 1 | Status: SHIPPED | OUTPATIENT
Start: 2020-02-05 | End: 2020-02-07 | Stop reason: SDUPTHER

## 2020-02-05 RX ORDER — HYDROCODONE BITARTRATE AND ACETAMINOPHEN 5; 325 MG/1; MG/1
1 TABLET ORAL EVERY 8 HOURS PRN
Qty: 90 TABLET | Refills: 0 | Status: SHIPPED | OUTPATIENT
Start: 2020-02-05 | End: 2020-03-30 | Stop reason: SDUPTHER

## 2020-02-05 NOTE — PROGRESS NOTES
Subjective   Amandeep Manriquez Jr. is a 75 y.o. male.     Vitals:    02/05/20 0955   BP: 142/72   Pulse: 56   Temp: 98 °F (36.7 °C)   SpO2: 98%        Chief Complaint   Patient presents with   • Hypertension     get established with us at [facility] recent ER visit BP elevated   • Follow-up     yeast infection diagnosed at ER caught scrotum in zipper and went to ER        History of Present Illness    The following portions of the patient's history were reviewed and updated as appropriate: allergies, current medications, past family history, past medical history, past social history, past surgical history and problem list.    3-month follow-up for arthritis, chronic back pain, and emergency room follow-up from 2 weeks ago for scrotal yeast infection and an incidental finding of a renal stone    Patient was in the emergency room approximately 2 weeks ago after he accidentally caught his scrotum in the zipper of his pants.  He was having such severe pain and irritation that he sought evaluation at the emergency room.  At that time the emergency room doctors did perform a CT scan of his abdomen and pelvis which was within normal limits except for a left ureteral stone, mild hydronephrosis.  The only medication prescribed was some Nystop powder for the yeast infection otherwise patient was just told to increase his fluids for the kidney stone.  Since that time the scrotal infection has resolved.  He has had no further pain.  Thus, he thinks he has passed the kidney stone.  Note, he does have a chronic history of renal stones.    Today, he just needs his medication refilled for his chronic back pain which has been well controlled with 2-3 Norco 5 mg/day.  He has significant osteoarthritis of the right hip as well as severe lumbar degenerative disc disease.  His blood pressure has been well controlled on meta propanol times years, needs this refilled 2.  His cholesterol has been well controlled on Crestor times years, needs  refill last office visit with labs at [facility] was November 2019 and these labs were within normal limits per patient report.  At this time records are still unavailable as patient has not been to record release yet.    He would also like his ears checked today.  He does get recurrent cerumen impactions and often needs these irrigated at least 2-3 times per year.    Review of Systems   Constitutional: Negative for unexpected weight gain and unexpected weight loss.   Respiratory: Negative for shortness of breath.    Cardiovascular: Negative for chest pain.   Musculoskeletal: Positive for arthralgias and back pain.       Objective   Physical Exam   Constitutional: He appears well-developed.   HENT:   Head: Normocephalic and atraumatic.   Bilateral partial cerumen noted in both ears   Eyes: Pupils are equal, round, and reactive to light. Conjunctivae are normal.   Neck: Normal range of motion. Neck supple. No thyromegaly present.   Cardiovascular: Normal rate, regular rhythm and normal heart sounds.   Pulmonary/Chest: Effort normal and breath sounds normal.   Abdominal: Soft. Bowel sounds are normal. He exhibits no distension. There is no hepatosplenomegaly. There is no tenderness.   Musculoskeletal: He exhibits no edema.   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  Stable with his crutches   Lymphadenopathy:     He has no cervical adenopathy.   Nursing note and vitals reviewed.       LABS/STUDIES  ---11/2019 CMP, lipids within normal limits per patient report, urine tox done at [facility] and within prescription history use                                    Emergency room studies consistent with CT of abdomen pelvis with a left ureteral stone, otherwise labs within normal limits      Assessment/Plan   Amandeep was seen today for hypertension and follow-up.    Diagnoses and all orders for this visit:    Degenerative lumbar spinal stenosis  --- stable, Brady, urine tox, contract all  up-to-date.  No change with medication refill Norco 5 mg quantity 90, take as written below  -     HYDROcodone-acetaminophen (NORCO) 5-325 MG per tablet; Take 1 tablet by mouth Every 8 (Eight) Hours As Needed for Moderate Pain . For pain    Arthropathy of right sacroiliac joint  -     HYDROcodone-acetaminophen (NORCO) 5-325 MG per tablet; Take 1 tablet by mouth Every 8 (Eight) Hours As Needed for Moderate Pain . For pain    Primary osteoarthritis of right knee  -     HYDROcodone-acetaminophen (NORCO) 5-325 MG per tablet; Take 1 tablet by mouth Every 8 (Eight) Hours As Needed for Moderate Pain . For pain      Renal stone  --recurrent, asymptomatic, continue to push fluids as needed    Bilateral impacted cerumen  ----recurrent problem, partial occlusion at this point will hold irrigation until next visit    Vertigo  --- chronic, multifactorial, stable    Coronary artery disease involving native coronary artery of native heart without angina pectoris    Essential hypertension  ----stable, refill Lopressor 25 mg 1 p.o. twice daily    Mixed hyperlipidemia  --- stable, refill Crestor 40 mg 1 p.o. daily, labs due next office visit for lipids and CMP    Congenital myopathy (CMS/HCC)    Benign prostatic hyperplasia without lower urinary tract symptoms    Other orders  -     rosuvastatin (CRESTOR) 40 MG tablet; Take 1 tablet by mouth Daily.  -     metoprolol tartrate (LOPRESSOR) 25 MG tablet; Take 1 tablet by mouth 2 (Two) Times a Day.                   Return in about 3 months (around 5/5/2020).

## 2020-02-07 RX ORDER — OXYBUTYNIN CHLORIDE 5 MG/1
5 TABLET ORAL 3 TIMES DAILY
Qty: 90 TABLET | Refills: 3 | Status: SHIPPED | OUTPATIENT
Start: 2020-02-07 | End: 2020-06-05

## 2020-02-07 RX ORDER — ROSUVASTATIN CALCIUM 40 MG/1
20 TABLET, COATED ORAL DAILY
Qty: 90 TABLET | Refills: 3 | Status: SHIPPED | OUTPATIENT
Start: 2020-02-07 | End: 2020-08-03

## 2020-03-30 DIAGNOSIS — M47.818 ARTHROPATHY OF RIGHT SACROILIAC JOINT: ICD-10-CM

## 2020-03-30 DIAGNOSIS — M17.11 PRIMARY OSTEOARTHRITIS OF RIGHT KNEE: ICD-10-CM

## 2020-03-30 DIAGNOSIS — M48.061 DEGENERATIVE LUMBAR SPINAL STENOSIS: ICD-10-CM

## 2020-03-30 RX ORDER — HYDROCODONE BITARTRATE AND ACETAMINOPHEN 5; 325 MG/1; MG/1
1 TABLET ORAL EVERY 8 HOURS PRN
Qty: 90 TABLET | Refills: 0 | Status: SHIPPED | OUTPATIENT
Start: 2020-03-30 | End: 2020-05-05 | Stop reason: SDUPTHER

## 2020-05-04 ENCOUNTER — TELEPHONE (OUTPATIENT)
Dept: FAMILY MEDICINE CLINIC | Facility: CLINIC | Age: 75
End: 2020-05-04

## 2020-05-04 NOTE — TELEPHONE ENCOUNTER
PATIENTS WIFE CALLED IN REGARDS TO A APPOINTMENT THE PATIENT HAS SCHEDULED FOR 05/5/2020 AND SHE WOULD LIKE TO RESCHEDULE THAT FOR A PHONE VISIT INSTEAD.    PLEASE ADVISE AND CALL PT OR WIFE BACK [phone].

## 2020-05-05 ENCOUNTER — OFFICE VISIT (OUTPATIENT)
Dept: FAMILY MEDICINE CLINIC | Facility: CLINIC | Age: 75
End: 2020-05-05

## 2020-05-05 VITALS — HEIGHT: 64 IN | WEIGHT: 170 LBS | BODY MASS INDEX: 29.02 KG/M2

## 2020-05-05 DIAGNOSIS — E78.2 MIXED HYPERLIPIDEMIA: Primary | Chronic | ICD-10-CM

## 2020-05-05 DIAGNOSIS — M47.818 ARTHROPATHY OF RIGHT SACROILIAC JOINT: ICD-10-CM

## 2020-05-05 DIAGNOSIS — M48.061 DEGENERATIVE LUMBAR SPINAL STENOSIS: ICD-10-CM

## 2020-05-05 DIAGNOSIS — M17.11 PRIMARY OSTEOARTHRITIS OF RIGHT KNEE: ICD-10-CM

## 2020-05-05 PROCEDURE — 99443 PR PHYS/QHP TELEPHONE EVALUATION 21-30 MIN: CPT | Performed by: FAMILY MEDICINE

## 2020-05-05 RX ORDER — HYDROCODONE BITARTRATE AND ACETAMINOPHEN 5; 325 MG/1; MG/1
1 TABLET ORAL EVERY 8 HOURS PRN
Qty: 90 TABLET | Refills: 0 | Status: SHIPPED | OUTPATIENT
Start: 2020-05-05 | End: 2020-06-22 | Stop reason: SDUPTHER

## 2020-05-05 NOTE — PROGRESS NOTES
You have chosen to receive care through a telephone visit today. Do you consent to use a telephone visit for your medical care today? Yes     Pt is a pleasant 75 y.o. YO male here for televisit for 3-month follow-up for lumbar degenerative spinal stenosis and osteoarthritis, also hyperlipidemia    Patient's last office visit with me February 2020 was for medication refills.  No changes were made.  He was also seen for follow-up in the emergency room after renal stone.  On a good note, he has been doing fine.  He has been staying home and keeping safe.    Patient has significant lumbar degenerative disc disease with spinal stenosis, osteoarthritis of multiple joints, and congenital myopathy.  All of which cause extreme limitation in mobility and discomfort.  Currently, he takes approximately 2-3 hydrocodone per day.  Urine tox screen is up-to-date was performed November 2019 no aberrant behaviors.  Brady reviewed today shows hydrocodone 5 mg a quantity of 90 last refilled March 31, 2020.  Needs refill today.      Patient's hyperlipidemia has been well controlled with Crestor.  Tolerates medication without side effects.  Last labs November 2019 consistent with therapeutic LDL less than 70    Medication list reviewed    Amandeep was seen today for hip and knee pain.    Diagnoses and all orders for this visit:    Degenerative lumbar spinal stenosis --Brady, urine tox, contract all up-to-date.  Patient will be due for updated urine tox at next office visit.  Unable to do so today due to this being a telemedicine visit.  Will refill hydrocodone 5 mg a quantity of 90 per 30 to 45 days.  Refill sent today  -     HYDROcodone-acetaminophen (NORCO) 5-325 MG per tablet; Take 1 tablet by mouth Every 8 (Eight) Hours As Needed for Moderate Pain . For pain    Arthropathy of right sacroiliac joint  -     HYDROcodone-acetaminophen (NORCO) 5-325 MG per tablet; Take 1 tablet by mouth Every 8 (Eight) Hours As Needed for Moderate Pain .  For pain    Primary osteoarthritis of right knee  -     HYDROcodone-acetaminophen (NORCO) 5-325 MG per tablet; Take 1 tablet by mouth Every 8 (Eight) Hours As Needed for Moderate Pain . For pain    Hyperlipidemia --controlled.  No change to Crestor.  Will refill Crestor 40 mg 1 p.o. daily upon request.  Note, patient is due for lab work however given the pandemic we will postpone this until the next office visit in approximately 3 months.  At that time he will need lipids, CMP    Instructed patient to call the office if symptoms are not improving.  Warnings to go to emergency room given.    This visit has been rescheduled as a phone visit to comply with patient safety concerns in accordance with CDC recommendations. Total time of discussion was 21 minutes.      Arrive at 11:18am

## 2020-06-05 RX ORDER — OXYBUTYNIN CHLORIDE 5 MG/1
TABLET ORAL
Qty: 90 TABLET | Refills: 2 | Status: SHIPPED | OUTPATIENT
Start: 2020-06-05 | End: 2020-09-03

## 2020-06-22 DIAGNOSIS — M48.061 DEGENERATIVE LUMBAR SPINAL STENOSIS: ICD-10-CM

## 2020-06-22 DIAGNOSIS — M17.11 PRIMARY OSTEOARTHRITIS OF RIGHT KNEE: ICD-10-CM

## 2020-06-22 DIAGNOSIS — M47.818 ARTHROPATHY OF RIGHT SACROILIAC JOINT: ICD-10-CM

## 2020-07-02 RX ORDER — HYDROCODONE BITARTRATE AND ACETAMINOPHEN 5; 325 MG/1; MG/1
1 TABLET ORAL EVERY 8 HOURS PRN
Qty: 90 TABLET | Refills: 0 | Status: SHIPPED | OUTPATIENT
Start: 2020-07-02 | End: 2020-08-17 | Stop reason: SDUPTHER

## 2020-07-02 NOTE — TELEPHONE ENCOUNTER
Pt has called twice and sent to [facility] pool which has not been answered. Pt has been without meds for 2 nights and requesting this asap. Please advise.  Kathy Read

## 2020-07-06 RX ORDER — TAMSULOSIN HYDROCHLORIDE 0.4 MG/1
CAPSULE ORAL
Qty: 30 CAPSULE | Refills: 4 | Status: SHIPPED | OUTPATIENT
Start: 2020-07-06 | End: 2020-12-08

## 2020-08-03 RX ORDER — ROSUVASTATIN CALCIUM 40 MG/1
TABLET, COATED ORAL
Qty: 90 TABLET | Refills: 0 | Status: SHIPPED | OUTPATIENT
Start: 2020-08-03 | End: 2020-11-05

## 2020-08-17 ENCOUNTER — OFFICE VISIT (OUTPATIENT)
Dept: FAMILY MEDICINE CLINIC | Facility: CLINIC | Age: 75
End: 2020-08-17

## 2020-08-17 VITALS
HEART RATE: 67 BPM | HEIGHT: 64 IN | TEMPERATURE: 98.4 F | SYSTOLIC BLOOD PRESSURE: 112 MMHG | DIASTOLIC BLOOD PRESSURE: 70 MMHG | OXYGEN SATURATION: 98 % | WEIGHT: 170 LBS | BODY MASS INDEX: 29.02 KG/M2

## 2020-08-17 DIAGNOSIS — I25.10 CORONARY ARTERY DISEASE INVOLVING NATIVE CORONARY ARTERY OF NATIVE HEART WITHOUT ANGINA PECTORIS: Chronic | ICD-10-CM

## 2020-08-17 DIAGNOSIS — Z79.899 HIGH RISK MEDICATION USE: ICD-10-CM

## 2020-08-17 DIAGNOSIS — H61.23 BILATERAL IMPACTED CERUMEN: ICD-10-CM

## 2020-08-17 DIAGNOSIS — E78.2 MIXED HYPERLIPIDEMIA: Chronic | ICD-10-CM

## 2020-08-17 DIAGNOSIS — I10 ESSENTIAL HYPERTENSION: Chronic | ICD-10-CM

## 2020-08-17 DIAGNOSIS — M48.061 DEGENERATIVE LUMBAR SPINAL STENOSIS: Primary | ICD-10-CM

## 2020-08-17 DIAGNOSIS — E55.9 VITAMIN D DEFICIENCY: ICD-10-CM

## 2020-08-17 DIAGNOSIS — M17.11 PRIMARY OSTEOARTHRITIS OF RIGHT KNEE: ICD-10-CM

## 2020-08-17 DIAGNOSIS — M47.818 ARTHROPATHY OF RIGHT SACROILIAC JOINT: ICD-10-CM

## 2020-08-17 PROCEDURE — 99214 OFFICE O/P EST MOD 30 MIN: CPT | Performed by: FAMILY MEDICINE

## 2020-08-17 PROCEDURE — 69209 REMOVE IMPACTED EAR WAX UNI: CPT | Performed by: FAMILY MEDICINE

## 2020-08-17 RX ORDER — HYDROCODONE BITARTRATE AND ACETAMINOPHEN 5; 325 MG/1; MG/1
1 TABLET ORAL EVERY 8 HOURS PRN
Qty: 90 TABLET | Refills: 0 | Status: SHIPPED | OUTPATIENT
Start: 2020-08-17 | End: 2020-09-29 | Stop reason: SDUPTHER

## 2020-08-17 NOTE — PROGRESS NOTES
Subjective   Amandeep Manriquez Jr. is a 75 y.o. male.     Vitals:    08/17/20 1026   BP: 112/70   Pulse: 67   Temp: 98.4 °F (36.9 °C)   SpO2: 98%        Chief Complaint   Patient presents with   • Hyperlipidemia     check up   • Back Pain     needs update on pain meds   • Hypertension        History of Present Illness    3-month follow-up on chronic back pain, hypertension, hyperlipidemia, and complaints of decreased hearing/ear wax?    Last office visit via telehealth for medication refills.  No changes made.  Currently, patient is doing well even despite the pandemic.  He is staying home and practicing safe social distancing.  Today, only complains of decreased hearing in both ears.  May be impacted with earwax and requesting irrigation?  No recent illness, or ear pain.    Significant history of lumbar DDD with stenosis, bilateral hip and knee osteoarthritis have been well controlled with hydrocodone 5 mg a quantity of 2 to 3/day.  Last urine tox screen for compliance was approximately 1 year ago ([place] records still have not been scanned).  Brady reviewed by me today shows hydrocodone 5 mg a quantity of 90 last refilled July 3, 2020.  Request refill today    Hypertension has been well controlled with metoprolol 25 mg daily.  Tolerates medication without side effects.  Denies chest pain, shortness of air.    Hyperlipidemia has been well controlled with Crestor 40 mg daily.  Tolerates medication without side effects.  Last lipids checked January 2020, fasting today    History of vitamin D deficiency previously treated with prescription vitamin D 50,000 units weekly.  Of note, currently patient is only taking over-the-counter vitamin D 1000 units daily given him and his wife have been trying to get outside on a daily basis for at least 20 to 30 minutes.    The following portions of the patient's history were reviewed and updated as appropriate: allergies, current medications, past family history, past medical  history, past social history, past surgical history and problem list.    Review of Systems   Constitutional: Negative for unexpected weight gain and unexpected weight loss.   Respiratory: Negative for cough, chest tightness and shortness of breath.    Cardiovascular: Negative for chest pain.   I have reviewed and confirmed the accuracy of the ROS as documented by the MA/LPN/RN Eden Molina MD      Objective   Physical Exam   Constitutional: He appears well-developed.   HENT:   Head: Normocephalic and atraumatic.   Bilateral cerumen impaction   Eyes: Pupils are equal, round, and reactive to light. Conjunctivae are normal.   Neck: Normal range of motion. Neck supple. No thyromegaly present.   Cardiovascular: Normal rate, regular rhythm and normal heart sounds.   Pulmonary/Chest: Effort normal and breath sounds normal.   Abdominal: Soft. Bowel sounds are normal. He exhibits no distension. There is no hepatosplenomegaly. There is no tenderness.   Musculoskeletal: He exhibits no edema.   Lymphadenopathy:     He has no cervical adenopathy.   Psychiatric: He has a normal mood and affect. His behavior is normal. Judgment and thought content normal.   Nursing note and vitals reviewed.       LABS/STUDIES --January 2020, BMP, lipids within normal limits    Ear Cerumen Removal  Date/Time: 8/17/2020 12:22 PM  Performed by: Eden Molina MD  Authorized by: Eden Molina MD   Consent: Verbal consent obtained.  Consent given by: patient  Patient understanding: patient states understanding of the procedure being performed  Patient consent: the patient's understanding of the procedure matches consent given    Anesthesia:  Local Anesthetic: none  Location details: left ear and right ear  Patient tolerance: Patient tolerated the procedure well with no immediate complications  Procedure type: irrigation   Sedation:  Patient sedated: no             Assessment/Plan   Amandeep was seen today for hyperlipidemia, back pain and  hypertension.    Diagnoses and all orders for this visit:    Degenerative lumbar spinal stenosis ---stable.  Brady reviewed.  Will update urine tox screen for compliance today.  No change in medication.  Refill Norco as listed below, quantity 90 per 30 days  -     HYDROcodone-acetaminophen (NORCO) 5-325 MG per tablet; Take 1 tablet by mouth Every 8 (Eight) Hours As Needed for Moderate Pain . For pain    Arthropathy of right sacroiliac joint  -     HYDROcodone-acetaminophen (NORCO) 5-325 MG per tablet; Take 1 tablet by mouth Every 8 (Eight) Hours As Needed for Moderate Pain . For pain    Primary osteoarthritis of right knee  -     HYDROcodone-acetaminophen (NORCO) 5-325 MG per tablet; Take 1 tablet by mouth Every 8 (Eight) Hours As Needed for Moderate Pain . For pain    Essential hypertension --stable.  No change in medication.  Will refill metoprolol 25 mg daily upon request.  Check CMP today  -     Comprehensive Metabolic Panel    Mixed hyperlipidemia --stable.  Recheck lipids and CMP today.  Goal LDL needs to remain less than 70 given history of CAD, as long as therapeutic then no change with Crestor 40 mg daily, will refill upon request  -     Lipid Panel With LDL / HDL Ratio    Vitamin D deficiency --stable.  Recheck vitamin D today.  If therapeutic then may continue over-the-counter vitamin D, otherwise may need to restart prescription vitamin D at 50,000 units weekly?  -     Vitamin D 25 Hydroxy    Coronary artery disease involving native coronary artery of native heart without angina pectoris    High risk medication use  -     ToxASSURE Select 13 Discrete -    Bilateral impacted cerumen --exacerbation.  Status post irrigation bilaterally without complications.  Cleared                 Return in about 3 months (around 11/17/2020) for Medicare Wellness.

## 2020-08-20 LAB
25(OH)D3+25(OH)D2 SERPL-MCNC: 46.3 NG/ML (ref 30–100)
6MAM UR QL CFM: NEGATIVE
6MAM/CREAT UR: NOT DETECTED NG/MG CREAT
7AMINOCLONAZEPAM/CREAT UR: NOT DETECTED NG/MG CREAT
A-OH ALPRAZ/CREAT UR: NOT DETECTED NG/MG CREAT
A-OH-TRIAZOLAM/CREAT UR CFM: NOT DETECTED NG/MG CREAT
ALBUMIN SERPL-MCNC: 4.4 G/DL (ref 3.5–5.2)
ALBUMIN/GLOB SERPL: 2.6 G/DL
ALFENTANIL/CREAT UR CFM: NOT DETECTED NG/MG CREAT
ALP SERPL-CCNC: 81 U/L (ref 39–117)
ALPHA-HYDROXYMIDAZOLAM, URINE: NOT DETECTED NG/MG CREAT
ALPRAZ/CREAT UR CFM: NOT DETECTED NG/MG CREAT
ALT SERPL-CCNC: 15 U/L (ref 1–41)
AMOBARBITAL UR QL CFM: NOT DETECTED
AMPHET/CREAT UR: NOT DETECTED NG/MG CREAT
AMPHETAMINES UR QL CFM: NEGATIVE
AST SERPL-CCNC: 19 U/L (ref 1–40)
BARBITAL UR QL CFM: NOT DETECTED
BARBITURATES UR QL CFM: NEGATIVE
BENZODIAZ UR QL CFM: NEGATIVE
BILIRUB SERPL-MCNC: 0.5 MG/DL (ref 0–1.2)
BUN SERPL-MCNC: 14 MG/DL (ref 8–23)
BUN/CREAT SERPL: 30.4 (ref 7–25)
BUPRENORPHINE UR QL CFM: NEGATIVE
BUPRENORPHINE/CREAT UR: NOT DETECTED NG/MG CREAT
BUTABARBITAL UR QL CFM: NOT DETECTED
BUTALBITAL UR QL CFM: NOT DETECTED
BZE/CREAT UR: NOT DETECTED NG/MG CREAT
CALCIUM SERPL-MCNC: 9.2 MG/DL (ref 8.6–10.5)
CANNABINOIDS UR QL CFM: NEGATIVE
CARBOXYTHC/CREAT UR: NOT DETECTED NG/MG CREAT
CHLORIDE SERPL-SCNC: 103 MMOL/L (ref 98–107)
CHOLEST SERPL-MCNC: 133 MG/DL (ref 0–200)
CLONAZEPAM/CREAT UR CFM: NOT DETECTED NG/MG CREAT
CO2 SERPL-SCNC: 26.9 MMOL/L (ref 22–29)
COCAETHYLENE/CREAT UR CFM: NOT DETECTED NG/MG CREAT
COCAINE UR QL CFM: NEGATIVE
COCAINE/CREAT UR CFM: NOT DETECTED NG/MG CREAT
CODEINE/CREAT UR: NOT DETECTED NG/MG CREAT
CREAT SERPL-MCNC: 0.46 MG/DL (ref 0.76–1.27)
CREAT UR-MCNC: 63 MG/DL
DESALKYLFLURAZ/CREAT UR: NOT DETECTED NG/MG CREAT
DESMETHYLFLUNITRAZEPAM: NOT DETECTED NG/MG CREAT
DHC/CREAT UR: 478 NG/MG CREAT
DIAZEPAM/CREAT UR: NOT DETECTED NG/MG CREAT
DRUGS UR: NORMAL
EDDP/CREAT UR: NOT DETECTED NG/MG CREAT
ETHANOL UR CFM-MCNC: NOT DETECTED G/DL
ETHANOL UR QL CFM: NEGATIVE
FENTANYL UR QL CFM: NEGATIVE
FENTANYL/CREAT UR: NOT DETECTED NG/MG CREAT
FLUNITRAZEPAM UR QL CFM: NOT DETECTED NG/MG CREAT
GLOBULIN SER CALC-MCNC: 1.7 GM/DL
GLUCOSE SERPL-MCNC: 88 MG/DL (ref 65–99)
HDLC SERPL-MCNC: 49 MG/DL (ref 40–60)
HYDROCODONE/CREAT UR: 1981 NG/MG CREAT
HYDROMORPHONE/CREAT UR: 490 NG/MG CREAT
LDLC SERPL CALC-MCNC: 63 MG/DL (ref 0–100)
LDLC/HDLC SERPL: 1.29 {RATIO}
LEVEL OF DETECTION:: NORMAL
LORAZEPAM/CREAT UR: NOT DETECTED NG/MG CREAT
MDA/CREAT UR: NOT DETECTED NG/MG CREAT
MDMA/CREAT UR: NOT DETECTED NG/MG CREAT
MEPHOBARBITAL UR QL CFM: NOT DETECTED
METHADONE UR QL CFM: NEGATIVE
METHADONE/CREAT UR: NOT DETECTED NG/MG CREAT
METHAMPHET/CREAT UR: NOT DETECTED NG/MG CREAT
MIDAZOLAM/CREAT UR CFM: NOT DETECTED NG/MG CREAT
MORPHINE/CREAT UR: NOT DETECTED NG/MG CREAT
N-NORTRAMADOL/CREAT UR CFM: NOT DETECTED NG/MG CREAT
NARCOTICS UR: NEGATIVE
NORBUPRENORPHINE/CREAT UR: NOT DETECTED NG/MG CREAT
NORCODEINE/CREAT UR CFM: NOT DETECTED NG/MG CREAT
NORDIAZEPAM/CREAT UR: NOT DETECTED NG/MG CREAT
NORFENTANYL/CREAT UR: NOT DETECTED NG/MG CREAT
NORHYDROCODONE/CREAT UR: 3249 NG/MG CREAT
NORMORPHINE UR-MCNC: NOT DETECTED NG/MG CREAT
NOROXYCODONE/CREAT UR: NOT DETECTED NG/MG CREAT
NOROXYMORPHONE/CREAT UR CFM: NOT DETECTED NG/MG CREAT
O-NORTRAMADOL UR CFM-MCNC: NOT DETECTED NG/MG CREAT
OPIATES UR QL CFM: NORMAL
OXAZEPAM/CREAT UR: NOT DETECTED NG/MG CREAT
OXYCODONE UR QL CFM: NEGATIVE
OXYCODONE/CREAT UR: NOT DETECTED NG/MG CREAT
OXYMORPHONE/CREAT UR: NOT DETECTED NG/MG CREAT
PENTOBARB UR QL CFM: NOT DETECTED
PHENOBARB UR QL CFM: NOT DETECTED
POTASSIUM SERPL-SCNC: 4.4 MMOL/L (ref 3.5–5.2)
PROT SERPL-MCNC: 6.1 G/DL (ref 6–8.5)
SECOBARBITAL UR QL CFM: NOT DETECTED
SODIUM SERPL-SCNC: 141 MMOL/L (ref 136–145)
SUFENTANIL/CREAT UR CFM: NOT DETECTED NG/MG CREAT
TAPENTADOL UR QL CFM: NEGATIVE
TAPENTADOL/CREAT UR: NOT DETECTED NG/MG CREAT
TEMAZEPAM/CREAT UR: NOT DETECTED NG/MG CREAT
THIOPENTAL UR QL CFM: NOT DETECTED
TRAMADOL UR QL CFM: NOT DETECTED NG/MG CREAT
TRIGL SERPL-MCNC: 104 MG/DL (ref 0–150)
VLDLC SERPL CALC-MCNC: 20.8 MG/DL

## 2020-09-03 RX ORDER — OXYBUTYNIN CHLORIDE 5 MG/1
TABLET ORAL
Qty: 90 TABLET | Refills: 1 | Status: SHIPPED | OUTPATIENT
Start: 2020-09-03 | End: 2020-11-05

## 2020-09-29 DIAGNOSIS — M17.11 PRIMARY OSTEOARTHRITIS OF RIGHT KNEE: ICD-10-CM

## 2020-09-29 DIAGNOSIS — M48.061 DEGENERATIVE LUMBAR SPINAL STENOSIS: ICD-10-CM

## 2020-09-29 DIAGNOSIS — M47.818 ARTHROPATHY OF RIGHT SACROILIAC JOINT: ICD-10-CM

## 2020-10-01 RX ORDER — HYDROCODONE BITARTRATE AND ACETAMINOPHEN 5; 325 MG/1; MG/1
1 TABLET ORAL EVERY 8 HOURS PRN
Qty: 90 TABLET | Refills: 0 | Status: SHIPPED | OUTPATIENT
Start: 2020-10-01 | End: 2020-11-09 | Stop reason: SDUPTHER

## 2020-11-05 RX ORDER — ROSUVASTATIN CALCIUM 40 MG/1
TABLET, COATED ORAL
Qty: 30 TABLET | Refills: 1 | Status: SHIPPED | OUTPATIENT
Start: 2020-11-05 | End: 2021-01-04 | Stop reason: SDUPTHER

## 2020-11-05 RX ORDER — OXYBUTYNIN CHLORIDE 5 MG/1
TABLET ORAL
Qty: 30 TABLET | Refills: 1 | Status: SHIPPED | OUTPATIENT
Start: 2020-11-05 | End: 2020-11-26

## 2020-11-09 DIAGNOSIS — M17.11 PRIMARY OSTEOARTHRITIS OF RIGHT KNEE: ICD-10-CM

## 2020-11-09 DIAGNOSIS — M47.818 ARTHROPATHY OF RIGHT SACROILIAC JOINT: ICD-10-CM

## 2020-11-09 DIAGNOSIS — M48.061 DEGENERATIVE LUMBAR SPINAL STENOSIS: ICD-10-CM

## 2020-11-09 NOTE — TELEPHONE ENCOUNTER
Caller: Julissa Manriquez    Relationship: Emergency Contact    Best call back number: [phone]    Medication needed:   Requested Prescriptions     Pending Prescriptions Disp Refills   • HYDROcodone-acetaminophen (NORCO) 5-325 MG per tablet 90 tablet 0     Sig: Take 1 tablet by mouth Every 8 (Eight) Hours As Needed for Moderate Pain . For pain       When do you need the refill by: ASAP    What details did the patient provide when requesting the medication: none    Does the patient have less than a 3 day supply:  [] Yes  [x] No    What is the patient's preferred pharmacy: [facility] [place] [id number] [address] [facility] [address] [facility] [phone] [facility] [phone] FX

## 2020-11-14 RX ORDER — HYDROCODONE BITARTRATE AND ACETAMINOPHEN 5; 325 MG/1; MG/1
1 TABLET ORAL EVERY 8 HOURS PRN
Qty: 90 TABLET | Refills: 0 | Status: SHIPPED | OUTPATIENT
Start: 2020-11-14 | End: 2021-01-04 | Stop reason: SDUPTHER

## 2020-11-26 RX ORDER — OXYBUTYNIN CHLORIDE 5 MG/1
TABLET ORAL
Qty: 30 TABLET | Refills: 0 | Status: SHIPPED | OUTPATIENT
Start: 2020-11-26 | End: 2020-12-08

## 2020-12-08 RX ORDER — OXYBUTYNIN CHLORIDE 5 MG/1
TABLET ORAL
Qty: 90 TABLET | Refills: 1 | Status: SHIPPED | OUTPATIENT
Start: 2020-12-08 | End: 2021-02-03

## 2020-12-08 RX ORDER — TAMSULOSIN HYDROCHLORIDE 0.4 MG/1
CAPSULE ORAL
Qty: 90 CAPSULE | Refills: 1 | Status: SHIPPED | OUTPATIENT
Start: 2020-12-08 | End: 2021-06-02

## 2020-12-08 NOTE — TELEPHONE ENCOUNTER
PATIENT'S WIFE, KRANTHI, CALLED TO CHECK ON THE STATUS OF THE MEDICATION REFILL OF OXYBUTYNIN 5MG. THE PATIENT IS COMPLETELY OUT OF MEDICATION AND NEEDS MEDICATION FOR TONIGHT.    PATIENT'S WIFE CALLBACK: [phone]

## 2021-01-04 ENCOUNTER — OFFICE VISIT (OUTPATIENT)
Dept: FAMILY MEDICINE CLINIC | Facility: CLINIC | Age: 76
End: 2021-01-04

## 2021-01-04 DIAGNOSIS — M17.11 PRIMARY OSTEOARTHRITIS OF RIGHT KNEE: ICD-10-CM

## 2021-01-04 DIAGNOSIS — E78.2 MIXED HYPERLIPIDEMIA: Chronic | ICD-10-CM

## 2021-01-04 DIAGNOSIS — M48.061 DEGENERATIVE LUMBAR SPINAL STENOSIS: Primary | ICD-10-CM

## 2021-01-04 PROCEDURE — 99443 PR PHYS/QHP TELEPHONE EVALUATION 21-30 MIN: CPT | Performed by: FAMILY MEDICINE

## 2021-01-04 RX ORDER — HYDROCODONE BITARTRATE AND ACETAMINOPHEN 5; 325 MG/1; MG/1
1 TABLET ORAL EVERY 8 HOURS PRN
Qty: 90 TABLET | Refills: 0 | Status: SHIPPED | OUTPATIENT
Start: 2021-01-04 | End: 2021-02-17 | Stop reason: SDUPTHER

## 2021-01-04 RX ORDER — ROSUVASTATIN CALCIUM 40 MG/1
40 TABLET, COATED ORAL DAILY
Qty: 90 TABLET | Refills: 1 | Status: SHIPPED | OUTPATIENT
Start: 2021-01-04 | End: 2021-07-01

## 2021-01-04 NOTE — PROGRESS NOTES
You have chosen to receive care through a telephone visit today. Do you consent to use a telephone visit for your medical care today? Yes     Pt is a pleasant 75 y.o. YO male here for televisit for   4 month F/u for chronic back pain and hyperlipidemia    LOV with me in late Aug for med refills, cerumen impaction, and labs. No changes made.   Overall, has been doing very well.   No compliants today, just needs refills for pain and chol med.     Severe DDD with stenosis and Nasir knee OA--remains well controlled with NOrco 5mg qty of 2-3 per day.   Tox done in Aug, approp.  cesar reviewed today shows last refill qty 90 on 11/17/20.    Ran out of pain med recently.    Lipids--last LDL was 63 in Aug. No change with crestor 40 mg a day.     Medication list reviewed    Diagnoses and all orders for this visit:    1. Degenerative lumbar spinal stenosis (Primary) --stable. Urine tox and Cesar reviewed, appropriate. No change with med.   Refill Norco 5 mg qty 90 per 30-60 days as directed below.   -     HYDROcodone-acetaminophen (NORCO) 5-325 MG per tablet; Take 1 tablet by mouth Every 8 (Eight) Hours As Needed for Moderate Pain . For pain  Dispense: 90 tablet; Refill: 0    2. Primary osteoarthritis of right knee ---stable. Urine Tox and Cesar up to date, appropriate. No change with meds.   Refill Norco as directed below.   -     HYDROcodone-acetaminophen (NORCO) 5-325 MG per tablet; Take 1 tablet by mouth Every 8 (Eight) Hours As Needed for Moderate Pain . For pain  Dispense: 90 tablet; Refill: 0    3. Mixed hyperlipidemia --stable. Lipids done in Aug, no change in med.  Refill Crestor 40 mg q day  Cont with low chol and healthy heart diet.   Will need to recheck lipids and CMP at next visit in 3 months    Other orders  -     rosuvastatin (CRESTOR) 40 MG tablet; Take 1 tablet by mouth Daily.  Dispense: 90 tablet; Refill: 1         Instructed patient to call the office if symptoms are not improving.  Warnings to go to  emergency room given.    This visit has been rescheduled as a phone visit to comply with patient safety concerns in accordance with CDC recommendations. Total time of discussion was 21 minutes.  Telephone visit started 336pm

## 2021-02-03 RX ORDER — OXYBUTYNIN CHLORIDE 5 MG/1
TABLET ORAL
Qty: 90 TABLET | Refills: 0 | Status: SHIPPED | OUTPATIENT
Start: 2021-02-03 | End: 2021-03-03

## 2021-02-04 RX ORDER — BACLOFEN 10 MG/1
10 TABLET ORAL 3 TIMES DAILY
Qty: 270 TABLET | Refills: 0 | Status: SHIPPED | OUTPATIENT
Start: 2021-02-04 | End: 2021-02-17 | Stop reason: SDUPTHER

## 2021-02-17 DIAGNOSIS — M17.11 PRIMARY OSTEOARTHRITIS OF RIGHT KNEE: ICD-10-CM

## 2021-02-17 DIAGNOSIS — M48.061 DEGENERATIVE LUMBAR SPINAL STENOSIS: ICD-10-CM

## 2021-02-17 NOTE — TELEPHONE ENCOUNTER
Caller: Julissa Manriquez    Relationship: Emergency Contact    Best call back number: [phone]     Medication needed:   Requested Prescriptions     Pending Prescriptions Disp Refills   • HYDROcodone-acetaminophen (NORCO) 5-325 MG per tablet 90 tablet 0     Sig: Take 1 tablet by mouth Every 8 (Eight) Hours As Needed for Moderate Pain . For pain       When do you need the refill by: ASAP    Does the patient have less than a 3 day supply:  [x] Yes  [] No    What is the patient's preferred pharmacy: [facility] [place] [address] [address] [facility] [address]  - [phone] [facility] [phone] FX

## 2021-02-18 RX ORDER — BACLOFEN 10 MG/1
10 TABLET ORAL 3 TIMES DAILY
Qty: 270 TABLET | Refills: 0 | Status: SHIPPED | OUTPATIENT
Start: 2021-02-18 | End: 2021-07-09 | Stop reason: SDUPTHER

## 2021-02-18 RX ORDER — HYDROCODONE BITARTRATE AND ACETAMINOPHEN 5; 325 MG/1; MG/1
1 TABLET ORAL EVERY 8 HOURS PRN
Qty: 90 TABLET | Refills: 0 | Status: SHIPPED | OUTPATIENT
Start: 2021-02-18 | End: 2021-04-06 | Stop reason: SDUPTHER

## 2021-03-03 RX ORDER — OXYBUTYNIN CHLORIDE 5 MG/1
TABLET ORAL
Qty: 90 TABLET | Refills: 0 | Status: SHIPPED | OUTPATIENT
Start: 2021-03-03 | End: 2021-04-02

## 2021-04-02 DIAGNOSIS — M48.061 DEGENERATIVE LUMBAR SPINAL STENOSIS: ICD-10-CM

## 2021-04-02 DIAGNOSIS — M17.11 PRIMARY OSTEOARTHRITIS OF RIGHT KNEE: ICD-10-CM

## 2021-04-02 RX ORDER — OXYBUTYNIN CHLORIDE 5 MG/1
TABLET ORAL
Qty: 90 TABLET | Refills: 0 | Status: SHIPPED | OUTPATIENT
Start: 2021-04-02 | End: 2021-05-06

## 2021-04-02 RX ORDER — HYDROCODONE BITARTRATE AND ACETAMINOPHEN 5; 325 MG/1; MG/1
1 TABLET ORAL EVERY 8 HOURS PRN
Qty: 90 TABLET | Refills: 0 | Status: CANCELLED | OUTPATIENT
Start: 2021-04-02

## 2021-04-06 ENCOUNTER — OFFICE VISIT (OUTPATIENT)
Dept: FAMILY MEDICINE CLINIC | Facility: CLINIC | Age: 76
End: 2021-04-06

## 2021-04-06 VITALS
OXYGEN SATURATION: 98 % | BODY MASS INDEX: 29.18 KG/M2 | DIASTOLIC BLOOD PRESSURE: 82 MMHG | TEMPERATURE: 97.7 F | SYSTOLIC BLOOD PRESSURE: 140 MMHG | HEIGHT: 64 IN | HEART RATE: 52 BPM

## 2021-04-06 DIAGNOSIS — E78.2 MIXED HYPERLIPIDEMIA: Chronic | ICD-10-CM

## 2021-04-06 DIAGNOSIS — Z12.11 ENCOUNTER FOR SCREENING COLONOSCOPY: ICD-10-CM

## 2021-04-06 DIAGNOSIS — M48.061 DEGENERATIVE LUMBAR SPINAL STENOSIS: ICD-10-CM

## 2021-04-06 DIAGNOSIS — I10 ESSENTIAL HYPERTENSION: Chronic | ICD-10-CM

## 2021-04-06 DIAGNOSIS — E55.9 VITAMIN D DEFICIENCY: ICD-10-CM

## 2021-04-06 DIAGNOSIS — Z00.00 ENCOUNTER FOR MEDICARE ANNUAL WELLNESS EXAM: Primary | ICD-10-CM

## 2021-04-06 DIAGNOSIS — M17.11 PRIMARY OSTEOARTHRITIS OF RIGHT KNEE: ICD-10-CM

## 2021-04-06 PROBLEM — N32.81 OAB (OVERACTIVE BLADDER): Status: ACTIVE | Noted: 2021-04-06

## 2021-04-06 PROCEDURE — 96160 PT-FOCUSED HLTH RISK ASSMT: CPT | Performed by: FAMILY MEDICINE

## 2021-04-06 PROCEDURE — 1125F AMNT PAIN NOTED PAIN PRSNT: CPT | Performed by: FAMILY MEDICINE

## 2021-04-06 PROCEDURE — 99214 OFFICE O/P EST MOD 30 MIN: CPT | Performed by: FAMILY MEDICINE

## 2021-04-06 PROCEDURE — 1170F FXNL STATUS ASSESSED: CPT | Performed by: FAMILY MEDICINE

## 2021-04-06 PROCEDURE — G0439 PPPS, SUBSEQ VISIT: HCPCS | Performed by: FAMILY MEDICINE

## 2021-04-06 RX ORDER — HYDROCODONE BITARTRATE AND ACETAMINOPHEN 5; 325 MG/1; MG/1
1 TABLET ORAL EVERY 8 HOURS PRN
Qty: 60 TABLET | Refills: 0 | Status: SHIPPED | OUTPATIENT
Start: 2021-04-06 | End: 2021-05-25 | Stop reason: SDUPTHER

## 2021-04-06 NOTE — PROGRESS NOTES
The ABCs of the Annual Wellness Visit  Subsequent Medicare Wellness Visit    Chief Complaint   Patient presents with   • Medicare Wellness-subsequent     YEARLY WELLNESS VISIT PATIENT IS FASTING WANTS EARS CHECKED FOR WAX BUILD UP   • Hyperlipidemia   • Back Pain   • Hypertension       Subjective   History of Present Illness:  Amandeep Manriquez Jr. is a 76 y.o. male who presents for a Subsequent Medicare Wellness Visit.    Presents for yearly wellness exam  AND  6-month follow-up on lumbar stenosis, HTN, hyperlipidemia, vitamin D deficiency, and history of cerumen impaction    LOV with me in January via telephone visit only for med refills.  No changes were made at that visit.    Overall, continues to do very well all considering.  No ER visits or hospitalizations.    No complaints today except for he would like to have his ears checked to see if he is building up wax again?  Just due for refills, and 6-month labs  Compliant with and tolerates all medications without side effects.  Really only needing his hydrocodone 2 nightly.  Last Urine Tox screen for compliance was in August 2020 and consistent with prescription history.  Brady reviewed by me today shows last refill for Norco 5 mg a quantity of 90 on 2/18/2021.  Very low risk patient behavior.    Routine health maintenance/screening test:  Last C-scope approximately 5 years ago, at [facility], patient believes it was normal yet history of polyps in past?  All vaccines up-to-date  Tries to maintain a healthy well-balanced diet and exercises on a daily basis/walking  Cigar smoker, social alcohol only  Family history negative for colon cancer, prostate cancer    HEALTH RISK ASSESSMENT    Recent Hospitalizations:  No hospitalization(s) within the last year.    Current Medical Providers:  Patient Care Team:  Eden Molina MD as PCP - General (Family Medicine)    Smoking Status:  Social History     Tobacco Use   Smoking Status Current Every Day  Smoker   • Types: Cigars   Smokeless Tobacco Never Used   Tobacco Comment    caffeine use       Alcohol Consumption:  Social History     Substance and Sexual Activity   Alcohol Use Yes    Comment: social       Depression Screen:   PHQ-2/PHQ-9 Depression Screening 4/6/2021   Little interest or pleasure in doing things 0   Feeling down, depressed, or hopeless 0   Total Score 0       Fall Risk Screen:  ALIDA Fall Risk Assessment was completed, and patient is at MODERATE risk for falls. Assessment completed on:4/6/2021    Health Habits and Functional and Cognitive Screening:  Functional & Cognitive Status 4/6/2021   Do you have difficulty preparing food and eating? Yes   Do you have difficulty bathing yourself, getting dressed or grooming yourself? Yes   Do you have difficulty using the toilet? No   Do you have difficulty moving around from place to place? Yes   Do you have trouble with steps or getting out of a bed or a chair? Yes   Current Diet Well Balanced Diet   Dental Exam Up to date   Eye Exam Up to date   Exercise (times per week) 7 times per week   Current Exercise Activities Include Walking   Do you need help using the phone?  No   Are you deaf or do you have serious difficulty hearing?  Yes   Do you need help with transportation? Yes   Do you need help shopping? Yes   Do you need help preparing meals?  Yes   Do you need help with housework?  Yes   Do you need help with laundry? Yes   Do you need help taking your medications? No   Do you need help managing money? No   Do you ever drive or ride in a car without wearing a seat belt? No         Does the patient have evidence of cognitive impairment? No    Asprin use counseling:Taking ASA appropriately as indicated    Age-appropriate Screening Schedule:  Refer to the list below for future screening recommendations based on patient's age, sex and/or medical conditions. Orders for these recommended tests are listed in the plan section. The patient has been provided  with a written plan.    Health Maintenance   Topic Date Due   • COLONOSCOPY  Never done   • INFLUENZA VACCINE  08/01/2021   • LIPID PANEL  08/17/2021   • TDAP/TD VACCINES (2 - Td) 10/02/2029   • ZOSTER VACCINE  Completed          The following portions of the patient's history were reviewed and updated as appropriate: allergies, current medications, past family history, past medical history, past social history, past surgical history and problem list.    Outpatient Medications Prior to Visit   Medication Sig Dispense Refill   • aspirin 81 MG tablet Take 1 tablet by mouth daily.     • baclofen (LIORESAL) 10 MG tablet Take 1 tablet by mouth 3 (Three) Times a Day. 270 tablet 0   • cycloSPORINE (RESTASIS) 0.05 % ophthalmic emulsion 1 drop 2 (Two) Times a Day.     • latanoprost (XALATAN) 0.005 % ophthalmic solution 1 drop every night.     • metoprolol tartrate (LOPRESSOR) 25 MG tablet Take 1 tablet by mouth 2 (Two) Times a Day. 60 tablet 5   • Multiple Vitamin (MULTI VITAMIN DAILY PO) Take 1 tablet by mouth 2 (Two) Times a Day.     • oxybutynin (DITROPAN) 5 MG tablet TAKE ONE TABLET BY MOUTH THREE TIMES A DAY 90 tablet 0   • rosuvastatin (CRESTOR) 40 MG tablet Take 1 tablet by mouth Daily. 90 tablet 1   • tamsulosin (FLOMAX) 0.4 MG capsule 24 hr capsule TAKE ONE CAPSULE BY MOUTH ONCE NIGHTLY 90 capsule 1   • HYDROcodone-acetaminophen (NORCO) 5-325 MG per tablet Take 1 tablet by mouth Every 8 (Eight) Hours As Needed for Moderate Pain . For pain 90 tablet 0   • Ascorbic Acid (VITAMIN C PO) Take  by mouth.     • cholecalciferol (VITAMIN D3) 89686 UNITS capsule Take 1 capsule by mouth daily.     • hydrocortisone-pramoxine (PRAMOSONE) 1-2.5 % lotion lotion Apply 1 application topically to the appropriate area as directed Every 12 (Twelve) Hours.     • metroNIDAZOLE (METROGEL) 0.75 % gel Apply  topically to the appropriate area as directed 2 (Two) Times a Day.     • nitroglycerin (NITROSTAT) 0.4 MG SL tablet Place 1 tablet  "under the tongue. For chest pain.  If pain remains after 5 minutes, call 911 and repeat dose.  Max 3 tabs in 15 minutes.     • nystatin (MYCOSTATIN) 313810 UNIT/GM powder Apply three times daily to the affected skin until skin heals. 30 g 0   • polyethylene glycol (MIRALAX) packet Take 17 g by mouth Daily.     • saccharomyces boulardii (FLORASTOR) 250 MG capsule Take 250 mg by mouth 2 (Two) Times a Day.     • senna (SENOKOT) 8.6 MG tablet tablet Take  by mouth Every Night.     • Aspirin Buf,CaCarb-MgCarb-MgO, 81 MG tablet Take 81 mg by mouth Daily.       No facility-administered medications prior to visit.       Patient Active Problem List   Diagnosis   • CAD (coronary artery disease), native coronary artery   • Essential hypertension   • Mixed hyperlipidemia   • Congenital myopathy (CMS/HCC)   • Arthropathy of right sacroiliac joint   • Osteoarthritis of right knee   • Degenerative lumbar spinal stenosis   • Renal stone   • Bilateral impacted cerumen   • Benign prostatic hyperplasia without lower urinary tract symptoms   • Vertigo   • High risk medication use   • Vitamin D deficiency   • OAB (overactive bladder)       Advanced Care Planning:  ACP discussion was held with the patient during this visit. Patient has an advance directive (not in EMR), copy requested.    Review of Systems   Constitutional: Negative for fever and unexpected weight change.   Respiratory: Negative for cough and shortness of breath.        Compared to one year ago, the patient feels his physical health is the same.  Compared to one year ago, the patient feels his mental health is the same.    Reviewed chart for potential of high risk medication in the elderly: yes  Reviewed chart for potential of harmful drug interactions in the elderly:yes    Objective         Vitals:    04/06/21 1130   BP: 140/82   Pulse: 52   Temp: 97.7 °F (36.5 °C)   SpO2: 98%   Weight: Comment: WHEELCHAIR   Height: 162.6 cm (64\")       Body mass index is 29.18 " kg/m².  Discussed the patient's BMI with him. The BMI is in the acceptable range.    Physical Exam  Vitals and nursing note reviewed.   Constitutional:       Appearance: Normal appearance. He is well-developed and normal weight.   HENT:      Head: Normocephalic and atraumatic.      Right Ear: Tympanic membrane, ear canal and external ear normal.      Left Ear: Tympanic membrane, ear canal and external ear normal.      Nose: Nose normal.   Eyes:      Conjunctiva/sclera: Conjunctivae normal.      Pupils: Pupils are equal, round, and reactive to light.   Neck:      Thyroid: No thyromegaly.   Cardiovascular:      Rate and Rhythm: Normal rate and regular rhythm.      Heart sounds: Normal heart sounds. No murmur heard.     Pulmonary:      Effort: Pulmonary effort is normal.      Breath sounds: Normal breath sounds.   Abdominal:      General: Abdomen is flat. Bowel sounds are normal. There is no distension.      Palpations: Abdomen is soft. There is no hepatomegaly, splenomegaly or mass.      Tenderness: There is no abdominal tenderness. There is no guarding or rebound.      Hernia: No hernia is present.   Musculoskeletal:         General: Normal range of motion.      Cervical back: Normal range of motion and neck supple.      Right lower leg: No edema.      Left lower leg: No edema.   Lymphadenopathy:      Cervical: No cervical adenopathy.   Skin:     General: Skin is warm.   Neurological:      General: No focal deficit present.      Mental Status: He is alert.   Psychiatric:         Mood and Affect: Mood normal.         Behavior: Behavior normal.         Thought Content: Thought content normal.         Judgment: Judgment normal.       LABS --August 2020 -LDL 63, HDL 49, normal CMP, vitamin D 46        Assessment/Plan   Medicare Risks and Personalized Health Plan  CMS Preventative Services Quick Reference  Advance Directive Discussion  Alcohol Misuse  Cardiovascular risk  Chronic Pain   Colon Cancer  Screening  Dementia/Memory   Depression/Dysphoria  Diabetic Lab Screening   Fall Risk  Glaucoma Risk  Hearing Problem  Immunizations Discussed/Encouraged (specific immunizations; adacel Tdap, Influenza, Pneumococcal 23, Prevnar, Shingrix and Covid vaccine )    The above risks/problems have been discussed with the patient.  Pertinent information has been shared with the patient in the After Visit Summary.  Follow up plans and orders are seen below in the Assessment/Plan Section.    Diagnoses and all orders for this visit:    1. Encounter for Medicare annual wellness exam (Primary)  -S/P subsequent  exam done, WNL.  All screening up-to-date except for needs labs listed below, Cologuard (given physical/immobility issues patient prefers this method), an updated copy of Living Will.  See orders below.  Otherwise, continue to improve upon healthy well-balanced diet that includes fruits, vegetables, and increase cardio exercise to greater than 150 minutes weekly.  Continue with safety and fall precautions  -     CBC & Differential  -     Hepatitis C Antibody    2. Encounter for screening colonoscopy  -     Cologuard - Stool, Per Rectum; Future    3. Degenerative lumbar spinal stenosis  -stable.  Urine Tox screen/Rena both reviewed, appropriate.  Continue/refill Norco 5 mg, may take 2 p.o. nightly, quantity of 60 per 30 days.  -     HYDROcodone-acetaminophen (NORCO) 5-325 MG per tablet; Take 1 tablet by mouth Every 8 (Eight) Hours As Needed for Moderate Pain . For pain  Dispense: 60 tablet; Refill: 0    4. Primary osteoarthritis of right knee  -stable.  Same plan as above  -     HYDROcodone-acetaminophen (NORCO) 5-325 MG per tablet; Take 1 tablet by mouth Every 8 (Eight) Hours As Needed for Moderate Pain . For pain  Dispense: 60 tablet; Refill: 0    5. Essential hypertension  -stable.  No change with medications  Continue Lopressor as prescribed, will refill upon request  Continue aspirin 81 mg daily  -      Comprehensive Metabolic Panel    6. Mixed hyperlipidemia  -well-controlled.  Due to recheck lipids and CMP today  Goal LDL needs remain less than 70 given history of CAD  If at goal the no change with Crestor 40 mg 1 p.o. daily, will refill upon request  Otherwise, continue with a low-cholesterol/low carb diet and regular cardio exercise greater than 150 minutes weekly  -     Comprehensive Metabolic Panel  -     Lipid Panel With LDL / HDL Ratio    7. Vitamin D deficiency  -controlled?  Asymptomatic.  Currently off supplemental vitamin D given spring weather and trying to get outside.  We will recheck vitamin D level, further recommendations to follow  -     Vitamin D 25 Hydroxy      Follow Up:  Return in about 3 months (around 7/6/2021) for Recheck.     An After Visit Summary and PPPS were given to the patient.     Wore goggles and face mask during entire visit

## 2021-04-06 NOTE — PATIENT INSTRUCTIONS
Info for living will  Continue current treatment plan  
Has The Growth Been Previously Biopsied?: has been previously biopsied

## 2021-04-07 LAB
25(OH)D3+25(OH)D2 SERPL-MCNC: 58.1 NG/ML
ALBUMIN SERPL-MCNC: 4 G/DL (ref 3.5–5.2)
ALBUMIN/GLOB SERPL: 1.5 G/DL
ALP SERPL-CCNC: 87 U/L (ref 39–117)
ALT SERPL-CCNC: 26 U/L (ref 1–41)
AST SERPL-CCNC: 28 U/L (ref 1–40)
BASOPHILS # BLD AUTO: 0.04 10*3/MM3 (ref 0–0.2)
BASOPHILS NFR BLD AUTO: 0.5 % (ref 0–1.5)
BILIRUB SERPL-MCNC: 0.6 MG/DL (ref 0–1.2)
BUN SERPL-MCNC: 14 MG/DL (ref 8–23)
BUN/CREAT SERPL: 32.6 (ref 7–25)
CALCIUM SERPL-MCNC: 9.4 MG/DL (ref 8.6–10.5)
CHLORIDE SERPL-SCNC: 106 MMOL/L (ref 98–107)
CHOLEST SERPL-MCNC: 128 MG/DL (ref 0–200)
CO2 SERPL-SCNC: 23.9 MMOL/L (ref 22–29)
CREAT SERPL-MCNC: 0.43 MG/DL (ref 0.76–1.27)
EOSINOPHIL # BLD AUTO: 0.1 10*3/MM3 (ref 0–0.4)
EOSINOPHIL NFR BLD AUTO: 1.3 % (ref 0.3–6.2)
ERYTHROCYTE [DISTWIDTH] IN BLOOD BY AUTOMATED COUNT: 13 % (ref 12.3–15.4)
GLOBULIN SER CALC-MCNC: 2.6 GM/DL
GLUCOSE SERPL-MCNC: 92 MG/DL (ref 65–99)
HCT VFR BLD AUTO: 45.9 % (ref 37.5–51)
HCV AB S/CO SERPL IA: <0.1 S/CO RATIO (ref 0–0.9)
HDLC SERPL-MCNC: 50 MG/DL (ref 40–60)
HGB BLD-MCNC: 14.7 G/DL (ref 13–17.7)
IMM GRANULOCYTES # BLD AUTO: 0.03 10*3/MM3 (ref 0–0.05)
IMM GRANULOCYTES NFR BLD AUTO: 0.4 % (ref 0–0.5)
LDLC SERPL CALC-MCNC: 56 MG/DL (ref 0–100)
LDLC/HDLC SERPL: 1.06 {RATIO}
LYMPHOCYTES # BLD AUTO: 1.45 10*3/MM3 (ref 0.7–3.1)
LYMPHOCYTES NFR BLD AUTO: 18.9 % (ref 19.6–45.3)
MCH RBC QN AUTO: 30.5 PG (ref 26.6–33)
MCHC RBC AUTO-ENTMCNC: 32 G/DL (ref 31.5–35.7)
MCV RBC AUTO: 95.2 FL (ref 79–97)
MONOCYTES # BLD AUTO: 0.67 10*3/MM3 (ref 0.1–0.9)
MONOCYTES NFR BLD AUTO: 8.7 % (ref 5–12)
NEUTROPHILS # BLD AUTO: 5.37 10*3/MM3 (ref 1.7–7)
NEUTROPHILS NFR BLD AUTO: 70.2 % (ref 42.7–76)
NRBC BLD AUTO-RTO: 0 /100 WBC (ref 0–0.2)
PLATELET # BLD AUTO: 218 10*3/MM3 (ref 140–450)
POTASSIUM SERPL-SCNC: 4.4 MMOL/L (ref 3.5–5.2)
PROT SERPL-MCNC: 6.6 G/DL (ref 6–8.5)
RBC # BLD AUTO: 4.82 10*6/MM3 (ref 4.14–5.8)
SODIUM SERPL-SCNC: 145 MMOL/L (ref 136–145)
TRIGL SERPL-MCNC: 126 MG/DL (ref 0–150)
VLDLC SERPL CALC-MCNC: 22 MG/DL (ref 5–40)
WBC # BLD AUTO: 7.66 10*3/MM3 (ref 3.4–10.8)

## 2021-05-06 RX ORDER — OXYBUTYNIN CHLORIDE 5 MG/1
TABLET ORAL
Qty: 90 TABLET | Refills: 0 | Status: SHIPPED | OUTPATIENT
Start: 2021-05-06 | End: 2021-06-01

## 2021-05-25 DIAGNOSIS — M17.11 PRIMARY OSTEOARTHRITIS OF RIGHT KNEE: ICD-10-CM

## 2021-05-25 DIAGNOSIS — M48.061 DEGENERATIVE LUMBAR SPINAL STENOSIS: ICD-10-CM

## 2021-05-26 RX ORDER — HYDROCODONE BITARTRATE AND ACETAMINOPHEN 5; 325 MG/1; MG/1
1 TABLET ORAL EVERY 8 HOURS PRN
Qty: 60 TABLET | Refills: 0 | Status: SHIPPED | OUTPATIENT
Start: 2021-05-26 | End: 2021-06-29 | Stop reason: SDUPTHER

## 2021-06-04 RX ORDER — OXYBUTYNIN CHLORIDE 5 MG/1
TABLET ORAL
Qty: 90 TABLET | Refills: 1 | Status: SHIPPED | OUTPATIENT
Start: 2021-06-04 | End: 2021-07-09 | Stop reason: SDUPTHER

## 2021-06-04 RX ORDER — TAMSULOSIN HYDROCHLORIDE 0.4 MG/1
CAPSULE ORAL
Qty: 90 CAPSULE | Refills: 1 | Status: SHIPPED | OUTPATIENT
Start: 2021-06-04 | End: 2021-07-09 | Stop reason: SDUPTHER

## 2021-06-29 DIAGNOSIS — M17.11 PRIMARY OSTEOARTHRITIS OF RIGHT KNEE: ICD-10-CM

## 2021-06-29 DIAGNOSIS — M48.061 DEGENERATIVE LUMBAR SPINAL STENOSIS: ICD-10-CM

## 2021-07-01 RX ORDER — ROSUVASTATIN CALCIUM 40 MG/1
TABLET, COATED ORAL
Qty: 30 TABLET | Refills: 0 | Status: SHIPPED | OUTPATIENT
Start: 2021-07-01 | End: 2021-07-09 | Stop reason: SDUPTHER

## 2021-07-01 RX ORDER — HYDROCODONE BITARTRATE AND ACETAMINOPHEN 5; 325 MG/1; MG/1
1 TABLET ORAL EVERY 8 HOURS PRN
Qty: 60 TABLET | Refills: 0 | Status: SHIPPED | OUTPATIENT
Start: 2021-07-01 | End: 2021-07-09 | Stop reason: SDUPTHER

## 2021-07-09 ENCOUNTER — OFFICE VISIT (OUTPATIENT)
Dept: FAMILY MEDICINE CLINIC | Facility: CLINIC | Age: 76
End: 2021-07-09

## 2021-07-09 VITALS
BODY MASS INDEX: 29.18 KG/M2 | HEART RATE: 60 BPM | DIASTOLIC BLOOD PRESSURE: 70 MMHG | OXYGEN SATURATION: 97 % | WEIGHT: 170 LBS | TEMPERATURE: 98 F | SYSTOLIC BLOOD PRESSURE: 126 MMHG

## 2021-07-09 DIAGNOSIS — N40.0 BENIGN PROSTATIC HYPERPLASIA WITHOUT LOWER URINARY TRACT SYMPTOMS: ICD-10-CM

## 2021-07-09 DIAGNOSIS — E78.2 MIXED HYPERLIPIDEMIA: Chronic | ICD-10-CM

## 2021-07-09 DIAGNOSIS — M48.061 DEGENERATIVE LUMBAR SPINAL STENOSIS: Primary | ICD-10-CM

## 2021-07-09 DIAGNOSIS — I10 ESSENTIAL HYPERTENSION: Chronic | ICD-10-CM

## 2021-07-09 DIAGNOSIS — N32.81 OAB (OVERACTIVE BLADDER): ICD-10-CM

## 2021-07-09 DIAGNOSIS — Z79.899 HIGH RISK MEDICATION USE: ICD-10-CM

## 2021-07-09 DIAGNOSIS — M17.11 PRIMARY OSTEOARTHRITIS OF RIGHT KNEE: ICD-10-CM

## 2021-07-09 PROBLEM — L71.9 ROSACEA: Status: ACTIVE | Noted: 2021-07-09

## 2021-07-09 PROCEDURE — 99214 OFFICE O/P EST MOD 30 MIN: CPT | Performed by: FAMILY MEDICINE

## 2021-07-09 RX ORDER — ROSUVASTATIN CALCIUM 40 MG/1
40 TABLET, COATED ORAL DAILY
Qty: 90 TABLET | Refills: 1 | Status: SHIPPED | OUTPATIENT
Start: 2021-07-09 | End: 2022-01-27

## 2021-07-09 RX ORDER — OXYBUTYNIN CHLORIDE 5 MG/1
5 TABLET ORAL 3 TIMES DAILY
Qty: 270 TABLET | Refills: 1 | Status: SHIPPED | OUTPATIENT
Start: 2021-07-09 | End: 2021-10-19

## 2021-07-09 RX ORDER — BACLOFEN 10 MG/1
10 TABLET ORAL 3 TIMES DAILY
Qty: 270 TABLET | Refills: 1 | Status: SHIPPED | OUTPATIENT
Start: 2021-07-09 | End: 2022-06-20 | Stop reason: HOSPADM

## 2021-07-09 RX ORDER — HYDROCODONE BITARTRATE AND ACETAMINOPHEN 5; 325 MG/1; MG/1
1 TABLET ORAL EVERY 8 HOURS PRN
Qty: 60 TABLET | Refills: 0 | Status: SHIPPED | OUTPATIENT
Start: 2021-08-01 | End: 2021-08-03 | Stop reason: SDUPTHER

## 2021-07-09 RX ORDER — TAMSULOSIN HYDROCHLORIDE 0.4 MG/1
1 CAPSULE ORAL NIGHTLY
Qty: 90 CAPSULE | Refills: 1 | Status: SHIPPED | OUTPATIENT
Start: 2021-07-09 | End: 2021-10-19 | Stop reason: SDUPTHER

## 2021-07-09 NOTE — PROGRESS NOTES
Subjective   Amandeep Manriquez Jr. is a 76 y.o. male.     Vitals:    07/09/21 1006   BP: 126/70   Pulse: 60   Temp: 98 °F (36.7 °C)   SpO2: 97%        Chief Complaint   Patient presents with   • Back Pain     VISIT TO EVALUATE DDD LUMBAR SPINAL STENOSIS REFILL MEDS   • Hyperlipidemia   • Hypertension   • Med Refill        History of Present Illness    3-month follow-up on chronic lumbar spinal stenosis  And  6-month follow-up on HTN, hyperlipidemia, BPH, and OAB    LOV with me in April for wellness exam and chronic med refills.  No changes made at that visit as all labs are WNL.  Screening test/Cologuard updated, negative    Overall, continues to do very well.  No recent ER visits or hospitalizations.  Enjoying some family time this week --as his son is in town with his grandchildren.  Maintains a healthy well-balanced diet, limited exercise due to congenital myopathy and balance issues.    No complaints today.  Just needs several refills.  Compliant with and tolerates all medications without side effects.  Still takes his Norco 1 to 2/day, most recently he has been taking 2 every night.  Last Urine Tox screen for compliance was August 2020 and consistent with prescription history.  Brady report reviewed by me today shows last refill for Norco a quantity of 60 on 7/2/2021.  Very low risk patient behavior.    The following portions of the patient's history were reviewed and updated as appropriate: allergies, current medications, past family history, past medical history, past social history, past surgical history and problem list.    Review of Systems   Constitutional: Negative for fever, unexpected weight gain and unexpected weight loss.   Respiratory: Negative for cough and shortness of breath.    Cardiovascular: Negative for chest pain.       Objective   Physical Exam  Vitals and nursing note reviewed.   Constitutional:       Appearance: Normal appearance. He is well-developed and normal weight.   HENT:       Head: Normocephalic and atraumatic.      Nose: Nose normal.   Eyes:      Conjunctiva/sclera: Conjunctivae normal.      Pupils: Pupils are equal, round, and reactive to light.   Neck:      Thyroid: No thyromegaly.   Cardiovascular:      Rate and Rhythm: Normal rate and regular rhythm.      Heart sounds: Normal heart sounds. No murmur heard.     Pulmonary:      Effort: Pulmonary effort is normal.      Breath sounds: Normal breath sounds.   Abdominal:      General: Abdomen is flat. Bowel sounds are normal. There is no distension.      Palpations: Abdomen is soft. There is no hepatomegaly, splenomegaly or mass.      Tenderness: There is no abdominal tenderness. There is no guarding or rebound.      Hernia: No hernia is present.   Musculoskeletal:         General: Normal range of motion.      Cervical back: Normal range of motion and neck supple.      Right lower leg: No edema.      Left lower leg: No edema.   Lymphadenopathy:      Cervical: No cervical adenopathy.   Skin:     General: Skin is warm.   Neurological:      General: No focal deficit present.      Mental Status: He is alert.   Psychiatric:         Mood and Affect: Mood normal.         Behavior: Behavior normal.         Thought Content: Thought content normal.         Judgment: Judgment normal.          LABS/STUDIES  -April 2021 --LDL 56, HDL 50, vitamin D 58, normal CMP, normal CBC    Procedures     Assessment/Plan   Diagnoses and all orders for this visit:    1. Degenerative lumbar spinal stenosis (Primary)  -stable.  Update Urine Tox screen for compliance today.  Brady report reviewed, appropriate.  No change with medication.  Refill Norco 5 mg as directed below, quantity 60 per 30 days.  Next refill due 8/1/2021  -     HYDROcodone-acetaminophen (NORCO) 5-325 MG per tablet; Take 1 tablet by mouth Every 8 (Eight) Hours As Needed for Moderate Pain . For pain  Dispense: 60 tablet; Refill: 0  -     ToxASSURE Select 13 Discrete -    2. Primary osteoarthritis of  right knee stable.  Same plan as above  -     HYDROcodone-acetaminophen (NORCO) 5-325 MG per tablet; Take 1 tablet by mouth Every 8 (Eight) Hours As Needed for Moderate Pain . For pain  Dispense: 60 tablet; Refill: 0    3. Essential hypertension  -well-controlled.  No change with medication.  Refill Lopressor 25 mg twice daily  Continue ASA 81 mg daily    4. Mixed hyperlipidemia  -well-controlled.  No change with medication.  Lipids and CMP up-to-date and therapeutic.  Refill Crestor 40 mg 1 p.o. daily  Continue with low-cholesterol diet.    5. Benign prostatic hyperplasia without lower urinary tract symptoms  -stable.  No change with medication.  Refill Flomax 0.4 mg 1 p.o. nightly    6. OAB (overactive bladder) stable.  No change of medication.  Refill Ditropan 5 mg 1 p.o. 3 times daily as needed    7. High risk medication use  -     ToxASSURE Select 13 Discrete -    Other orders  -     metoprolol tartrate (LOPRESSOR) 25 MG tablet; Take 1 tablet by mouth 2 (Two) Times a Day.  Dispense: 180 tablet; Refill: 1  -     baclofen (LIORESAL) 10 MG tablet; Take 1 tablet by mouth 3 (Three) Times a Day.  Dispense: 270 tablet; Refill: 1  -     rosuvastatin (CRESTOR) 40 MG tablet; Take 1 tablet by mouth Daily.  Dispense: 90 tablet; Refill: 1  -     oxybutynin (DITROPAN) 5 MG tablet; Take 1 tablet by mouth 3 (Three) Times a Day.  Dispense: 270 tablet; Refill: 1  -     tamsulosin (FLOMAX) 0.4 MG capsule 24 hr capsule; Take 1 capsule by mouth Every Night.  Dispense: 90 capsule; Refill: 1                 Wore goggles and face mask during entire visit.    Return in about 3 months (around 10/9/2021) for Recheck.

## 2021-07-16 LAB
6MAM UR QL CFM: NEGATIVE
6MAM/CREAT UR: NOT DETECTED NG/MG CREAT
7AMINOCLONAZEPAM/CREAT UR: NOT DETECTED NG/MG CREAT
A-OH ALPRAZ/CREAT UR: NOT DETECTED NG/MG CREAT
A-OH-TRIAZOLAM/CREAT UR CFM: NOT DETECTED NG/MG CREAT
ALFENTANIL/CREAT UR CFM: NOT DETECTED NG/MG CREAT
ALPHA-HYDROXYMIDAZOLAM, URINE: NOT DETECTED NG/MG CREAT
ALPRAZ/CREAT UR CFM: NOT DETECTED NG/MG CREAT
AMOBARBITAL UR QL CFM: NOT DETECTED
AMPHET/CREAT UR: NOT DETECTED NG/MG CREAT
AMPHETAMINES UR QL CFM: NEGATIVE
BARBITAL UR QL CFM: NOT DETECTED
BARBITURATES UR QL CFM: NEGATIVE
BENZODIAZ UR QL CFM: NEGATIVE
BUPRENORPHINE UR QL CFM: NEGATIVE
BUPRENORPHINE/CREAT UR: NOT DETECTED NG/MG CREAT
BUTABARBITAL UR QL CFM: NOT DETECTED
BUTALBITAL UR QL CFM: NOT DETECTED
BZE/CREAT UR: NOT DETECTED NG/MG CREAT
CANNABINOIDS UR QL CFM: NEGATIVE
CARBOXYTHC/CREAT UR: NOT DETECTED NG/MG CREAT
CLONAZEPAM/CREAT UR CFM: NOT DETECTED NG/MG CREAT
COCAETHYLENE/CREAT UR CFM: NOT DETECTED NG/MG CREAT
COCAINE UR QL CFM: NEGATIVE
COCAINE/CREAT UR CFM: NOT DETECTED NG/MG CREAT
CODEINE/CREAT UR: NOT DETECTED NG/MG CREAT
CREAT UR-MCNC: 56 MG/DL
DESALKYLFLURAZ/CREAT UR: NOT DETECTED NG/MG CREAT
DESMETHYLFLUNITRAZEPAM: NOT DETECTED NG/MG CREAT
DHC/CREAT UR: 693 NG/MG CREAT
DIAZEPAM/CREAT UR: NOT DETECTED NG/MG CREAT
DRUGS UR: NORMAL
EDDP/CREAT UR: NOT DETECTED NG/MG CREAT
ETHANOL UR CFM-MCNC: NOT DETECTED G/DL
ETHANOL UR QL CFM: NEGATIVE
FENTANYL UR QL CFM: NEGATIVE
FENTANYL/CREAT UR: NOT DETECTED NG/MG CREAT
FLUNITRAZEPAM UR QL CFM: NOT DETECTED NG/MG CREAT
HYDROCODONE/CREAT UR: 2507 NG/MG CREAT
HYDROMORPHONE/CREAT UR: 991 NG/MG CREAT
LEVEL OF DETECTION:: NORMAL
LORAZEPAM/CREAT UR: NOT DETECTED NG/MG CREAT
MDA/CREAT UR: NOT DETECTED NG/MG CREAT
MDMA/CREAT UR: NOT DETECTED NG/MG CREAT
MEPHOBARBITAL UR QL CFM: NOT DETECTED
METHADONE UR QL CFM: NEGATIVE
METHADONE/CREAT UR: NOT DETECTED NG/MG CREAT
METHAMPHET/CREAT UR: NOT DETECTED NG/MG CREAT
MIDAZOLAM/CREAT UR CFM: NOT DETECTED NG/MG CREAT
MORPHINE/CREAT UR: NOT DETECTED NG/MG CREAT
N-NORTRAMADOL/CREAT UR CFM: NOT DETECTED NG/MG CREAT
NARCOTICS UR: NEGATIVE
NORBUPRENORPHINE/CREAT UR: NOT DETECTED NG/MG CREAT
NORCODEINE/CREAT UR CFM: NOT DETECTED NG/MG CREAT
NORDIAZEPAM/CREAT UR: NOT DETECTED NG/MG CREAT
NORFENTANYL/CREAT UR: NOT DETECTED NG/MG CREAT
NORHYDROCODONE/CREAT UR: 4523 NG/MG CREAT
NORMORPHINE UR-MCNC: NOT DETECTED NG/MG CREAT
NOROXYCODONE/CREAT UR: NOT DETECTED NG/MG CREAT
NOROXYMORPHONE/CREAT UR CFM: NOT DETECTED NG/MG CREAT
O-NORTRAMADOL UR CFM-MCNC: NOT DETECTED NG/MG CREAT
OPIATES UR QL CFM: NORMAL
OXAZEPAM/CREAT UR: NOT DETECTED NG/MG CREAT
OXYCODONE UR QL CFM: NEGATIVE
OXYCODONE/CREAT UR: NOT DETECTED NG/MG CREAT
OXYMORPHONE/CREAT UR: NOT DETECTED NG/MG CREAT
PENTOBARB UR QL CFM: NOT DETECTED
PHENOBARB UR QL CFM: NOT DETECTED
SECOBARBITAL UR QL CFM: NOT DETECTED
SUFENTANIL/CREAT UR CFM: NOT DETECTED NG/MG CREAT
TAPENTADOL UR QL CFM: NEGATIVE
TAPENTADOL/CREAT UR: NOT DETECTED NG/MG CREAT
TEMAZEPAM/CREAT UR: NOT DETECTED NG/MG CREAT
THIOPENTAL UR QL CFM: NOT DETECTED
TRAMADOL UR QL CFM: NOT DETECTED NG/MG CREAT

## 2021-08-03 DIAGNOSIS — M17.11 PRIMARY OSTEOARTHRITIS OF RIGHT KNEE: ICD-10-CM

## 2021-08-03 DIAGNOSIS — M48.061 DEGENERATIVE LUMBAR SPINAL STENOSIS: ICD-10-CM

## 2021-08-04 RX ORDER — HYDROCODONE BITARTRATE AND ACETAMINOPHEN 5; 325 MG/1; MG/1
1 TABLET ORAL EVERY 8 HOURS PRN
Qty: 60 TABLET | Refills: 0 | Status: SHIPPED | OUTPATIENT
Start: 2021-08-04 | End: 2021-08-30 | Stop reason: SDUPTHER

## 2021-08-26 ENCOUNTER — TELEPHONE (OUTPATIENT)
Dept: FAMILY MEDICINE CLINIC | Facility: CLINIC | Age: 76
End: 2021-08-26

## 2021-08-26 NOTE — TELEPHONE ENCOUNTER
Caller: Julissa Manriquez     Relationship to Patient: WIFE    Phone Number: [phone]     Reason for Call: PATIENT'S WIFE CALLED OUT OF CONCERN FOR A CHANGE IN THE PATIENT'S MOBILITY. SHE SAID IT HAS BEEN NORMAL FOR HIM TO HAVE MOBILITY ISSUES, BUT THEY HAVE DECLINED MORE. SHE SAID HE IS HAVING DIFFICULTY STEPPING OUT OF THEIR SHOWER (IT'S A STEP-IN ABOUT 6 INCHES). PATIENT'S WIFE IS NOT SURE WHAT'S GOING ON OR WHAT IS CAUSING THIS.  PATIENT HAS AN APPOINTMENT SCHEDULED FOR 10/19/77483 BUT WANTED TO GO AHEAD AND LET DR. NGUYEN WHAT'S GOING ON AHEAD OF TIME. PATIENT HAS BEEN ADDED TO THE WAIT LIST, BUT WIFE ALSO WANTED TO SEE IF THERE WAS ANY WAY TO GET HIM IN [place]ON[place].

## 2021-08-30 DIAGNOSIS — M48.061 DEGENERATIVE LUMBAR SPINAL STENOSIS: ICD-10-CM

## 2021-08-30 DIAGNOSIS — M17.11 PRIMARY OSTEOARTHRITIS OF RIGHT KNEE: ICD-10-CM

## 2021-08-30 NOTE — TELEPHONE ENCOUNTER
Wife notified did better today getting in shower will let me know if she wants to proceed with referral

## 2021-09-01 RX ORDER — HYDROCODONE BITARTRATE AND ACETAMINOPHEN 5; 325 MG/1; MG/1
1 TABLET ORAL EVERY 8 HOURS PRN
Qty: 60 TABLET | Refills: 0 | Status: SHIPPED | OUTPATIENT
Start: 2021-09-01 | End: 2021-09-30 | Stop reason: SDUPTHER

## 2021-09-30 DIAGNOSIS — M48.061 DEGENERATIVE LUMBAR SPINAL STENOSIS: ICD-10-CM

## 2021-09-30 DIAGNOSIS — M17.11 PRIMARY OSTEOARTHRITIS OF RIGHT KNEE: ICD-10-CM

## 2021-10-01 NOTE — TELEPHONE ENCOUNTER
Rx Refill Note  Requested Prescriptions     Pending Prescriptions Disp Refills   • HYDROcodone-acetaminophen (NORCO) 5-325 MG per tablet 60 tablet 0     Sig: Take 1 tablet by mouth Every 8 (Eight) Hours As Needed for Moderate Pain . For pain      Last office visit with prescribing clinician: 7/9/2021      Next office visit with prescribing clinician: 10/19/2021            Kolby Hayden MA  10/01/21, 11:20 EDT

## 2021-10-02 RX ORDER — HYDROCODONE BITARTRATE AND ACETAMINOPHEN 5; 325 MG/1; MG/1
1 TABLET ORAL EVERY 8 HOURS PRN
Qty: 60 TABLET | Refills: 0 | Status: SHIPPED | OUTPATIENT
Start: 2021-10-02 | End: 2021-10-19 | Stop reason: SDUPTHER

## 2021-10-19 ENCOUNTER — OFFICE VISIT (OUTPATIENT)
Dept: FAMILY MEDICINE CLINIC | Facility: CLINIC | Age: 76
End: 2021-10-19

## 2021-10-19 VITALS
BODY MASS INDEX: 29.18 KG/M2 | HEART RATE: 64 BPM | TEMPERATURE: 97.8 F | HEIGHT: 64 IN | OXYGEN SATURATION: 98 % | DIASTOLIC BLOOD PRESSURE: 82 MMHG | SYSTOLIC BLOOD PRESSURE: 136 MMHG

## 2021-10-19 DIAGNOSIS — N40.0 BENIGN PROSTATIC HYPERPLASIA WITHOUT LOWER URINARY TRACT SYMPTOMS: ICD-10-CM

## 2021-10-19 DIAGNOSIS — M17.11 PRIMARY OSTEOARTHRITIS OF RIGHT KNEE: ICD-10-CM

## 2021-10-19 DIAGNOSIS — M48.061 DEGENERATIVE LUMBAR SPINAL STENOSIS: ICD-10-CM

## 2021-10-19 DIAGNOSIS — E78.2 MIXED HYPERLIPIDEMIA: Chronic | ICD-10-CM

## 2021-10-19 DIAGNOSIS — R41.3 SHORT-TERM MEMORY LOSS: Primary | ICD-10-CM

## 2021-10-19 DIAGNOSIS — I10 ESSENTIAL HYPERTENSION: Chronic | ICD-10-CM

## 2021-10-19 DIAGNOSIS — R32 URINARY INCONTINENCE, UNSPECIFIED TYPE: ICD-10-CM

## 2021-10-19 DIAGNOSIS — N32.81 OAB (OVERACTIVE BLADDER): ICD-10-CM

## 2021-10-19 PROCEDURE — 99215 OFFICE O/P EST HI 40 MIN: CPT | Performed by: FAMILY MEDICINE

## 2021-10-19 RX ORDER — HYDROCODONE BITARTRATE AND ACETAMINOPHEN 5; 325 MG/1; MG/1
1 TABLET ORAL EVERY 8 HOURS PRN
Qty: 60 TABLET | Refills: 0 | Status: SHIPPED | OUTPATIENT
Start: 2021-11-01 | End: 2021-11-01 | Stop reason: SDUPTHER

## 2021-10-19 RX ORDER — TAMSULOSIN HYDROCHLORIDE 0.4 MG/1
1 CAPSULE ORAL NIGHTLY
Qty: 90 CAPSULE | Refills: 1 | Status: SHIPPED | OUTPATIENT
Start: 2021-10-19 | End: 2021-12-01 | Stop reason: SDUPTHER

## 2021-10-19 NOTE — PROGRESS NOTES
"Subjective   Amandeep Manriquez Jr. is a 76 y.o. male.     Vitals:    10/19/21 1117   BP: 136/82   Pulse: 64   Temp: 97.8 °F (36.6 °C)   SpO2: 98%        Chief Complaint   Patient presents with   • Back Pain     LUMBAR SPINAL STENOSIS RECENT EPISODE WHERE HE COULDNT GET OUT OF SHOWER   • Urinary Incontinence   • FORGETFULL     WANTS TO DISCUSS THIS ALSO   • Hyperlipidemia   • Hypertension        History of Present Illness    3 month F/u on lumbar spinal stenosis  And   6 month F/u on HTN, Hyperlipidemia, OAB, BPH, and complaints of worse urinary incont, episode of back \"locking up\", and wife states noticing increased memory loss    LOV with me in July for chronic med refills only.   No changes made at that visit, urine tox updated only.   Presents with wife today.    Has a few complaints today:  -- complains of an episode of either \"his back locking up \" where he was unable to get out of the shower or was \"just afraid to move his other leg forward\" according to the wife. No fall, weakness, speech or impairment.   Previously seen neurologist/ Dr. Osborn in past, > 5 years ago.    -- complains of increased urinary incont. B/c he just cannot get to the bathroom in time.   Currently, takes Ditropan TID.    -- also, complains of more issues with short term memory loss. Jan himself complains of this and he wife agrees.   Other family members have said the same thing.   Really never goes anywhere since the pandemic, less stimulation. Still reconciles the checkbook.   Wife has noticed a gradual decline over the last 6 months.     Otherwise, just needs chronic med refills.  Also, due for 6 month fasting labs.  Compliant with and tolerates all meds without side effects.  Still takes Norco 5 mg qty of 2 per day for significant lumbar stenosis and knee OA.     The following portions of the patient's history were reviewed and updated as appropriate: allergies, current medications, past family history, past medical history, past " social history, past surgical history and problem list.    Review of Systems   Constitutional: Negative for fever, unexpected weight gain and unexpected weight loss.   Respiratory: Negative for cough and shortness of breath.    Cardiovascular: Negative for chest pain.       Objective   Physical Exam  Vitals and nursing note reviewed.   Constitutional:       Appearance: Normal appearance. He is well-developed and normal weight.   HENT:      Head: Normocephalic and atraumatic.      Nose: Nose normal.   Eyes:      Conjunctiva/sclera: Conjunctivae normal.      Pupils: Pupils are equal, round, and reactive to light.   Neck:      Thyroid: No thyromegaly.   Cardiovascular:      Rate and Rhythm: Normal rate and regular rhythm.      Heart sounds: Normal heart sounds. No murmur heard.      Pulmonary:      Effort: Pulmonary effort is normal.      Breath sounds: Normal breath sounds.   Abdominal:      General: Abdomen is flat. Bowel sounds are normal. There is no distension.      Palpations: Abdomen is soft. There is no hepatomegaly, splenomegaly or mass.      Tenderness: There is no abdominal tenderness. There is no guarding or rebound.      Hernia: No hernia is present.   Musculoskeletal:         General: Normal range of motion.      Cervical back: Normal range of motion and neck supple.      Right lower leg: No edema.      Left lower leg: No edema.   Lymphadenopathy:      Cervical: No cervical adenopathy.   Skin:     General: Skin is warm.   Neurological:      General: No focal deficit present.      Mental Status: He is alert and oriented to person, place, and time.      Cranial Nerves: No cranial nerve deficit.      Sensory: No sensory deficit.      Motor: Weakness and atrophy present. No tremor.      Gait: Gait abnormal.      Comments: Brief short term mini mental status exam -- 2/3 recalled.     No cogwheel rigidity or masked facies  Walks with assisted aids, chronic x years, history of congenital myopathy  Comes to  visits now in wheelchair   Psychiatric:         Mood and Affect: Mood normal.         Behavior: Behavior normal.         Thought Content: Thought content normal.         Judgment: Judgment normal.          LABS/STUDIES  - April 2021 -- LDL 54, CBC and CMP normal    Procedures     Assessment/Plan   Diagnoses and all orders for this visit:    1. Short-term memory loss (Primary)  - new Dx  Discussed at length with both patient and his wife today about possible Dx, suspect early cognitive impairment due to Vascular dementia??   Yet, given his significant neurologic history, myopathy, and incont issues with refer back to neurologist/ Dr. Osborn (shakira given recent shower episode.)   Check TSH today   -     TSH  -     Ambulatory Referral to Neurology    2. Urinary incontinence, unspecified type  - worse/uncontrolled  Related to some dementia?? Vs just uncontrolled OAB   To neurologist  -     Ambulatory Referral to Neurology    3. OAB (overactive bladder)  - uncontrolled  Stop Ditropan   Start Myrbetriq 25 mg q day, may increase to 50 mg q day    4. Benign prostatic hyperplasia without lower urinary tract symptoms  - controlled  Refill Flomax 0.4 mg qhs    5. Essential hypertension  - controlled  Cont Lopressor as prescribed, will refill upon request.   -     Comprehensive Metabolic Panel    6. Mixed hyperlipidemia  - controlled  Recheck CMP and lipids  Goal LDL needs to be less than 70   Given history of CAD  If at goal then continue crestor 40 mg as prescribed, will refill upon request.  Otherwise may need to add Zetia .  -     Comprehensive Metabolic Panel  -     Lipid Panel With LDL / HDL Ratio  -     TSH    7. Degenerative lumbar spinal stenosis  - controlled.   Urine tox/July and Brady both reviewed, appropriate.  Cont/refill Norco as prescribed below, next refill due 11/1/21  -     HYDROcodone-acetaminophen (NORCO) 5-325 MG per tablet; Take 1 tablet by mouth Every 8 (Eight) Hours As Needed for Moderate Pain . For pain   Dispense: 60 tablet; Refill: 0    8. Primary osteoarthritis of right knee  - same plan as above.  -     HYDROcodone-acetaminophen (NORCO) 5-325 MG per tablet; Take 1 tablet by mouth Every 8 (Eight) Hours As Needed for Moderate Pain . For pain  Dispense: 60 tablet; Refill: 0    Other orders  -     tamsulosin (FLOMAX) 0.4 MG capsule 24 hr capsule; Take 1 capsule by mouth Every Night.  Dispense: 90 capsule; Refill: 1  -     Mirabegron ER (MYRBETRIQ) 50 MG tablet sustained-release 24 hour 24 hr tablet; Take 50 mg by mouth Daily.  Dispense: 30 tablet; Refill: 2      Total time of visit -- 42 min -- this included a more extensive history taking from both patient and wife today, and comprehensive physical exam and medical decision making for multiple uncontrolled problems/new significant problem of memory loss.  Performing brief MMS exam, coordinating care, and answering all questions and concerns from both patient and wife today.            Wore goggles and face mask during entire visit.    Return in about 3 months (around 1/19/2022) for Recheck.

## 2021-10-19 NOTE — PATIENT INSTRUCTIONS
Stop Ditropan, start Myrbetriq    To neurologist for further evaluation    Continue current treatment plan.

## 2021-10-20 LAB
ALBUMIN SERPL-MCNC: 4.2 G/DL (ref 3.7–4.7)
ALBUMIN/GLOB SERPL: 1.9 {RATIO} (ref 1.2–2.2)
ALP SERPL-CCNC: 81 IU/L (ref 44–121)
ALT SERPL-CCNC: 16 IU/L (ref 0–44)
AST SERPL-CCNC: 21 IU/L (ref 0–40)
BILIRUB SERPL-MCNC: 0.4 MG/DL (ref 0–1.2)
BUN SERPL-MCNC: 14 MG/DL (ref 8–27)
BUN/CREAT SERPL: 33 (ref 10–24)
CALCIUM SERPL-MCNC: 9.3 MG/DL (ref 8.6–10.2)
CHLORIDE SERPL-SCNC: 104 MMOL/L (ref 96–106)
CHOLEST SERPL-MCNC: 127 MG/DL (ref 100–199)
CO2 SERPL-SCNC: 25 MMOL/L (ref 20–29)
CREAT SERPL-MCNC: 0.42 MG/DL (ref 0.76–1.27)
GLOBULIN SER CALC-MCNC: 2.2 G/DL (ref 1.5–4.5)
GLUCOSE SERPL-MCNC: 104 MG/DL (ref 65–99)
HDLC SERPL-MCNC: 49 MG/DL
LDLC SERPL CALC-MCNC: 58 MG/DL (ref 0–99)
LDLC/HDLC SERPL: 1.2 RATIO (ref 0–3.6)
POTASSIUM SERPL-SCNC: 4.2 MMOL/L (ref 3.5–5.2)
PROT SERPL-MCNC: 6.4 G/DL (ref 6–8.5)
SODIUM SERPL-SCNC: 141 MMOL/L (ref 134–144)
TRIGL SERPL-MCNC: 112 MG/DL (ref 0–149)
TSH SERPL DL<=0.005 MIU/L-ACNC: 1.14 UIU/ML (ref 0.45–4.5)
VLDLC SERPL CALC-MCNC: 20 MG/DL (ref 5–40)

## 2021-11-01 DIAGNOSIS — M48.061 DEGENERATIVE LUMBAR SPINAL STENOSIS: ICD-10-CM

## 2021-11-01 DIAGNOSIS — M17.11 PRIMARY OSTEOARTHRITIS OF RIGHT KNEE: ICD-10-CM

## 2021-11-01 NOTE — TELEPHONE ENCOUNTER
Rx Refill Note  Requested Prescriptions     Pending Prescriptions Disp Refills   • HYDROcodone-acetaminophen (NORCO) 5-325 MG per tablet 60 tablet 0     Sig: Take 1 tablet by mouth Every 8 (Eight) Hours As Needed for Moderate Pain . For pain      Last office visit with prescribing clinician: 10/19/2021      Next office visit with prescribing clinician: 1/25/2022            Kolby Hayden MA  11/01/21, 10:59 EDT

## 2021-11-01 NOTE — TELEPHONE ENCOUNTER
Caller: Julissa Manriquez    Relationship: Emergency Contact    Requested Prescriptions:   Requested Prescriptions     Pending Prescriptions Disp Refills   • HYDROcodone-acetaminophen (NORCO) 5-325 MG per tablet 60 tablet 0     Sig: Take 1 tablet by mouth Every 8 (Eight) Hours As Needed for Moderate Pain . For pain        [facility] [address] [facility] - [phone]  - [phone] FX    Additional details provided by patient: THE PATIENT WILL NEED THIS MEDICATION BY THIS UPCOMING Thursday.    Best call back number: [phone]    Does the patient have less than a 3 day supply:  [] Yes  [x] No    Milan Jones Rep   11/01/21 10:56 EDT

## 2021-11-04 RX ORDER — HYDROCODONE BITARTRATE AND ACETAMINOPHEN 5; 325 MG/1; MG/1
1 TABLET ORAL EVERY 8 HOURS PRN
Qty: 60 TABLET | Refills: 0 | Status: SHIPPED | OUTPATIENT
Start: 2021-11-04 | End: 2021-12-29 | Stop reason: SDUPTHER

## 2021-12-01 RX ORDER — TAMSULOSIN HYDROCHLORIDE 0.4 MG/1
1 CAPSULE ORAL NIGHTLY
Qty: 90 CAPSULE | Refills: 1 | Status: SHIPPED | OUTPATIENT
Start: 2021-12-01

## 2021-12-01 NOTE — TELEPHONE ENCOUNTER
Caller: Amandeep Manriquez Jr.    Relationship: Self    Best call back number: [phone]    Requested Prescriptions     Pending Prescriptions Disp Refills   • tamsulosin (FLOMAX) 0.4 MG capsule 24 hr capsule 90 capsule 1     Sig: Take 1 capsule by mouth Every Night.        Pharmacy where request should be sent:  [place] [address] [address] [place] [facility]  [facility] - [phone]  - [phone]   [phone]    Additional details provided by patient: PATIENT HAS A FEW DAYS SUPPLY    Does the patient have less than a 3 day supply:  [] Yes  [x] No    Milan Murguia Rep   12/01/21 08:50 EST

## 2021-12-15 ENCOUNTER — TRANSCRIBE ORDERS (OUTPATIENT)
Dept: ADMINISTRATIVE | Facility: HOSPITAL | Age: 76
End: 2021-12-15

## 2021-12-15 DIAGNOSIS — R29.898 LEFT LEG WEAKNESS: Primary | ICD-10-CM

## 2021-12-29 DIAGNOSIS — M48.061 DEGENERATIVE LUMBAR SPINAL STENOSIS: ICD-10-CM

## 2021-12-29 DIAGNOSIS — M17.11 PRIMARY OSTEOARTHRITIS OF RIGHT KNEE: ICD-10-CM

## 2021-12-29 NOTE — TELEPHONE ENCOUNTER
PATIENT NEEDS REFILL ON:HYDROcodone-acetaminophen (NORCO) 5-325 MG per tablet     PATIENT CAN BE REACHED ON: [phone]     PHARMACY PREFERRED:[facility] [address] [place] [address] [address] [facility] [address]  - [phone]  - [phone]   [phone]

## 2021-12-29 NOTE — TELEPHONE ENCOUNTER
Rx Refill Note  Requested Prescriptions     Pending Prescriptions Disp Refills   • HYDROcodone-acetaminophen (NORCO) 5-325 MG per tablet 60 tablet 0     Sig: Take 1 tablet by mouth Every 8 (Eight) Hours As Needed for Moderate Pain . For pain      Last office visit with prescribing clinician: 10/19/2021      Next office visit with prescribing clinician: 1/25/2022            Kolby Hayden MA  12/29/21, 08:49 EST

## 2021-12-31 RX ORDER — HYDROCODONE BITARTRATE AND ACETAMINOPHEN 5; 325 MG/1; MG/1
1 TABLET ORAL EVERY 8 HOURS PRN
Qty: 60 TABLET | Refills: 0 | Status: SHIPPED | OUTPATIENT
Start: 2021-12-31 | End: 2022-02-07 | Stop reason: SDUPTHER

## 2022-01-11 ENCOUNTER — HOSPITAL ENCOUNTER (OUTPATIENT)
Dept: MRI IMAGING | Facility: HOSPITAL | Age: 77
Discharge: HOME | End: 2022-01-11
Admitting: PSYCHIATRY & NEUROLOGY

## 2022-01-11 DIAGNOSIS — R29.898 LEFT LEG WEAKNESS: ICD-10-CM

## 2022-01-11 PROCEDURE — 70551 MRI BRAIN STEM W/O DYE: CPT

## 2022-01-26 ENCOUNTER — TELEPHONE (OUTPATIENT)
Dept: FAMILY MEDICINE CLINIC | Facility: CLINIC | Age: 77
End: 2022-01-26

## 2022-01-27 RX ORDER — ROSUVASTATIN CALCIUM 40 MG/1
TABLET, COATED ORAL
Qty: 90 TABLET | Refills: 1 | Status: SHIPPED | OUTPATIENT
Start: 2022-01-27

## 2022-01-28 NOTE — TELEPHONE ENCOUNTER
Caller: Julissa Manriquez    Relationship: Emergency Contact    Best call back number: [phone]    What is the best time to reach you: ANY TIME    Who are you requesting to speak with (clinical staff, provider,  specific staff member): DR. NGUYEN, CLINICAL STAFF    What was the call regarding: PATIENT'S WIFE CALLED TO RESCHEDULE HIS APPOINTMENT FROM YESTERDAY. DR NGUYEN'S NEXT AVAILABLE APPOINTMENT ISN'T UNTIL END OF MAY AND PATIENT'S WIFE DOES NOT WANT HIM TO WAIT THAT LONG. HE HAS PRESCRIPTIONS THAT REQUIRE HIM TO SEE HER EVERY 3 MONTHS AND THEY ALSO NEEDED TO DISCUSS PATIENT'S RESULTS FROM THE NEUROLOGIST. SHE WANTS TO KNOW IF THERE IS ANY WAY PATIENT CAN BE WORKED INTO THE SCHEDULE IN THE NEXT FEW WEEKS.    PLEASE ADVISE    Do you require a callback: YES    
Pt has been work into a telemedicine visit slot on 2/21/22   
Pt now has been rescheduled to 2/7/22  
20-Nov-2021

## 2022-01-31 RX ORDER — MIRABEGRON 50 MG/1
TABLET, FILM COATED, EXTENDED RELEASE ORAL
Qty: 30 TABLET | Refills: 2 | Status: SHIPPED | OUTPATIENT
Start: 2022-01-31

## 2022-02-07 ENCOUNTER — OFFICE VISIT (OUTPATIENT)
Dept: FAMILY MEDICINE CLINIC | Facility: CLINIC | Age: 77
End: 2022-02-07

## 2022-02-07 VITALS
WEIGHT: 157.5 LBS | DIASTOLIC BLOOD PRESSURE: 74 MMHG | TEMPERATURE: 97.5 F | OXYGEN SATURATION: 98 % | BODY MASS INDEX: 26.89 KG/M2 | HEART RATE: 68 BPM | SYSTOLIC BLOOD PRESSURE: 120 MMHG | HEIGHT: 64 IN

## 2022-02-07 DIAGNOSIS — G31.84 MILD COGNITIVE IMPAIRMENT WITH MEMORY LOSS: ICD-10-CM

## 2022-02-07 DIAGNOSIS — M48.061 DEGENERATIVE LUMBAR SPINAL STENOSIS: Primary | ICD-10-CM

## 2022-02-07 DIAGNOSIS — N32.81 OAB (OVERACTIVE BLADDER): ICD-10-CM

## 2022-02-07 DIAGNOSIS — G45.9 TIA (TRANSIENT ISCHEMIC ATTACK): ICD-10-CM

## 2022-02-07 PROBLEM — F01.50 VASCULAR DEMENTIA WITHOUT BEHAVIORAL DISTURBANCE: Status: ACTIVE | Noted: 2022-02-07

## 2022-02-07 PROBLEM — F01.50 VASCULAR DEMENTIA WITHOUT BEHAVIORAL DISTURBANCE (HCC): Status: RESOLVED | Noted: 2022-02-07 | Resolved: 2022-02-07

## 2022-02-07 PROCEDURE — 99214 OFFICE O/P EST MOD 30 MIN: CPT | Performed by: FAMILY MEDICINE

## 2022-02-07 RX ORDER — ASPIRIN 325 MG
325 TABLET ORAL DAILY
Status: ON HOLD | COMMUNITY
End: 2022-06-13

## 2022-02-07 RX ORDER — HYDROCODONE BITARTRATE AND ACETAMINOPHEN 5; 325 MG/1; MG/1
1 TABLET ORAL EVERY 8 HOURS PRN
Qty: 60 TABLET | Refills: 0 | Status: ON HOLD | OUTPATIENT
Start: 2022-02-07 | End: 2022-06-20 | Stop reason: SDUPTHER

## 2022-02-07 NOTE — PROGRESS NOTES
"Chief Complaint  Back Pain (LUMBAR SPINAL STENOSIS AND FOLLOW UP FROM NEUROLOGIST)     3-month follow-up on lumbar spinal stenosis, OAB, and memory loss  He presents with his wife today.    LOV with me in October for chronic med refills, memory loss issues, and uncontrolled OAB.  At that visit, a few things changes were made.  --Given some gradual onset and concern for short-term memory loss and an episode of \"back locking up and unable to move left leg \"while in shower in September, the decision was made to refer back to his neurologist/Dr. Osborn for further evaluation.  --Ditropan was changed to Myrbetriq due to uncontrolled OAB and side effects  --Routine labs were done and WNL.    Overall, doing okay.  No recent hospitalizations or ER visits.  No further episodes of confusion, weakness.  No recent falls.    He did see his neurologist/Dr. Osborn December 2021,, records unavailable.  Per wife's report, and EEG and numerous labs were done which were essentially all WNL.  An MRI of the brain was ordered and found to be consistent with \"a mini stroke\" in the past.  Therefore, his ASA 81 mg was increased to  mg daily.  No other diagnoses were given, no other medications were started, told to follow-up in 6 months/June 2022.    No complaints today.  Just needs chronic med refills today.  Compliant with and tolerates all medications without side effects.  He does think the Myrbetriq is helping more than the Ditropan, also less side effects.  Still takes Norco 0 to 2/day as needed for significant lumbar spinal stenosis.  Last refill for quantity of 60 with 12/31/2021.    Review of Systems   Constitutional: Negative for fatigue, fever and unexpected weight change.   Respiratory: Negative for cough and shortness of breath.    Cardiovascular: Negative for chest pain.        Subjective          Amandeep Manriquez Jr. presents to [facility]    Objective   Vital Signs:   Vitals:    02/07/22 1305 " "  BP: 120/74   Pulse: 68   Temp: 97.5 °F (36.4 °C)   SpO2: 98%   Weight: 71.4 kg (157 lb 8 oz)   Height: 162.6 cm (64\")      Physical Exam  Vitals and nursing note reviewed.   Constitutional:       Appearance: Normal appearance. He is well-developed and normal weight.   HENT:      Head: Normocephalic and atraumatic.      Nose: Nose normal.   Eyes:      Conjunctiva/sclera: Conjunctivae normal.      Pupils: Pupils are equal, round, and reactive to light.   Neck:      Thyroid: No thyromegaly.   Cardiovascular:      Rate and Rhythm: Normal rate and regular rhythm.      Heart sounds: Normal heart sounds. No murmur heard.      Pulmonary:      Effort: Pulmonary effort is normal.      Breath sounds: Normal breath sounds.   Abdominal:      General: Abdomen is flat. Bowel sounds are normal. There is no distension.      Palpations: Abdomen is soft. There is no hepatomegaly, splenomegaly or mass.      Tenderness: There is no abdominal tenderness. There is no guarding or rebound.      Hernia: No hernia is present.   Musculoskeletal:         General: Normal range of motion.      Cervical back: Normal range of motion and neck supple.      Right lower leg: No edema.      Left lower leg: No edema.   Lymphadenopathy:      Cervical: No cervical adenopathy.   Skin:     General: Skin is warm.   Neurological:      General: No focal deficit present.      Mental Status: He is alert.   Psychiatric:         Mood and Affect: Mood normal.         Behavior: Behavior normal.         Thought Content: Thought content normal.         Judgment: Judgment normal.        Result Review :     CMP    CMP 4/6/21 10/19/21   Glucose 92 104 (A)   BUN 14 14   Creatinine 0.43 (A) 0.42 (A)   eGFR Non  Am >150 112   eGFR African Am >150 129   Sodium 145 141   Potassium 4.4 4.2   Chloride 106 104   Calcium 9.4 9.3   Total Protein 6.6 6.4   Albumin 4.00 4.2   Globulin 2.6 2.2   Total Bilirubin 0.6 0.4   Alkaline Phosphatase 87 81   AST (SGOT) 28 21   ALT " (SGPT) 26 16   (A) Abnormal value       Comments are available for some flowsheets but are not being displayed.           CBC w/diff    CBC w/Diff 4/6/21   WBC 7.66   RBC 4.82   Hemoglobin 14.7   Hematocrit 45.9   MCV 95.2   MCH 30.5   MCHC 32.0   RDW 13.0   Platelets 218   Neutrophil Rel % 70.2   Lymphocyte Rel % 18.9 (A)   Monocyte Rel % 8.7   Eosinophil Rel % 1.3   Basophil Rel % 0.5   (A) Abnormal value            Lipid Panel    Lipid Panel 4/6/21 10/19/21   Total Cholesterol 128 127   Triglycerides 126 112   HDL Cholesterol 50 49   VLDL Cholesterol 22 20   LDL Cholesterol  56 58   LDL/HDL Ratio 1.06 1.2      Comments are available for some flowsheets but are not being displayed.           TSH    TSH 10/19/21   TSH 1.140           January 2022 --- MRI brain with and without contrast --consistent with old infarct compared to 2018, moderate chronic small vessel disease, no acute infarct         Assessment and Plan    Diagnoses and all orders for this visit:    1. Degenerative lumbar spinal stenosis (Primary)  -controlled.  Urine Tox screen/July and Brady report reviewed, appropriate.  No change of medication.  Continue/refill Norco as directed below  -     HYDROcodone-acetaminophen (NORCO) 5-325 MG per tablet; Take 1 tablet by mouth Every 8 (Eight) Hours As Needed for Moderate Pain . For pain  Dispense: 60 tablet; Refill: 0    2. TIA (transient ischemic attack)  -new DX.  S/P MRI of brain, neurology consultation.  Agree with increasing dose of ASA to 325 mg daily.  Continue with aggressive risk factor control --HTN, hyperlipidemia well-controlled (labs up-to-date.)    3. Mild cognitive impairment with memory loss  -stable.  Suspect this is secondary to vascular disease, moderate small vessel disease on MRI of brain.  Discussed in depth natural history with both patient and wife today.  Hold on adding any medications at this time, may consider Namenda in future?  Continue with aggressive risk factor control.    4.  OAB (overactive bladder)  -stable with Myrbetriq, continue medication.        Follow Up   Return in about 3 months (around 5/7/2022) for Medicare Wellness, Recheck.  Patient was given instructions and counseling regarding his condition or for health maintenance advice. Please see specific information pulled into the AVS if appropriate.

## 2022-06-12 ENCOUNTER — HOSPITAL ENCOUNTER (EMERGENCY)
Facility: HOSPITAL | Age: 77
Discharge: HOME | End: 2022-06-12
Attending: EMERGENCY MEDICINE | Admitting: EMERGENCY MEDICINE

## 2022-06-12 ENCOUNTER — APPOINTMENT (OUTPATIENT)
Dept: GENERAL RADIOLOGY | Facility: HOSPITAL | Age: 77
End: 2022-06-12

## 2022-06-12 VITALS
HEART RATE: 91 BPM | RESPIRATION RATE: 20 BRPM | DIASTOLIC BLOOD PRESSURE: 83 MMHG | OXYGEN SATURATION: 94 % | SYSTOLIC BLOOD PRESSURE: 159 MMHG | TEMPERATURE: 98.9 F

## 2022-06-12 DIAGNOSIS — R50.9 FEVER, UNSPECIFIED FEVER CAUSE: ICD-10-CM

## 2022-06-12 DIAGNOSIS — U07.1 COVID-19: Primary | ICD-10-CM

## 2022-06-12 DIAGNOSIS — R05.9 COUGH: ICD-10-CM

## 2022-06-12 LAB
ALBUMIN SERPL-MCNC: 4 G/DL (ref 3.5–5.2)
ALBUMIN/GLOB SERPL: 1.5 G/DL
ALP SERPL-CCNC: 95 U/L (ref 39–117)
ALT SERPL W P-5'-P-CCNC: 21 U/L (ref 1–41)
ANION GAP SERPL CALCULATED.3IONS-SCNC: 13.1 MMOL/L (ref 5–15)
AST SERPL-CCNC: 30 U/L (ref 1–40)
B PARAPERT DNA SPEC QL NAA+PROBE: NOT DETECTED
B PERT DNA SPEC QL NAA+PROBE: NOT DETECTED
BASOPHILS # BLD AUTO: 0.04 10*3/MM3 (ref 0–0.2)
BASOPHILS NFR BLD AUTO: 0.4 % (ref 0–1.5)
BILIRUB SERPL-MCNC: 0.6 MG/DL (ref 0–1.2)
BUN SERPL-MCNC: 10 MG/DL (ref 8–23)
BUN/CREAT SERPL: 20.8 (ref 7–25)
C PNEUM DNA NPH QL NAA+NON-PROBE: NOT DETECTED
CALCIUM SPEC-SCNC: 9.2 MG/DL (ref 8.6–10.5)
CHLORIDE SERPL-SCNC: 101 MMOL/L (ref 98–107)
CO2 SERPL-SCNC: 21.9 MMOL/L (ref 22–29)
CREAT SERPL-MCNC: 0.48 MG/DL (ref 0.76–1.27)
D-LACTATE SERPL-SCNC: 1.4 MMOL/L (ref 0.5–2)
DEPRECATED RDW RBC AUTO: 43.6 FL (ref 37–54)
EGFRCR SERPLBLD CKD-EPI 2021: 105 ML/MIN/1.73
EOSINOPHIL # BLD AUTO: 0.01 10*3/MM3 (ref 0–0.4)
EOSINOPHIL NFR BLD AUTO: 0.1 % (ref 0.3–6.2)
ERYTHROCYTE [DISTWIDTH] IN BLOOD BY AUTOMATED COUNT: 12.7 % (ref 12.3–15.4)
FLUAV H1 2009 PAND RNA NPH QL NAA+PROBE: NOT DETECTED
FLUAV H1 HA GENE NPH QL NAA+PROBE: NOT DETECTED
FLUAV H3 RNA NPH QL NAA+PROBE: NOT DETECTED
FLUAV SUBTYP SPEC NAA+PROBE: NOT DETECTED
FLUBV RNA ISLT QL NAA+PROBE: NOT DETECTED
GLOBULIN UR ELPH-MCNC: 2.7 GM/DL
GLUCOSE SERPL-MCNC: 110 MG/DL (ref 65–99)
HADV DNA SPEC NAA+PROBE: NOT DETECTED
HCOV 229E RNA SPEC QL NAA+PROBE: NOT DETECTED
HCOV HKU1 RNA SPEC QL NAA+PROBE: NOT DETECTED
HCOV NL63 RNA SPEC QL NAA+PROBE: NOT DETECTED
HCOV OC43 RNA SPEC QL NAA+PROBE: NOT DETECTED
HCT VFR BLD AUTO: 46.3 % (ref 37.5–51)
HGB BLD-MCNC: 15 G/DL (ref 13–17.7)
HMPV RNA NPH QL NAA+NON-PROBE: NOT DETECTED
HPIV1 RNA ISLT QL NAA+PROBE: NOT DETECTED
HPIV2 RNA SPEC QL NAA+PROBE: NOT DETECTED
HPIV3 RNA NPH QL NAA+PROBE: NOT DETECTED
HPIV4 P GENE NPH QL NAA+PROBE: NOT DETECTED
IMM GRANULOCYTES # BLD AUTO: 0.03 10*3/MM3 (ref 0–0.05)
IMM GRANULOCYTES NFR BLD AUTO: 0.3 % (ref 0–0.5)
LYMPHOCYTES # BLD AUTO: 0.66 10*3/MM3 (ref 0.7–3.1)
LYMPHOCYTES NFR BLD AUTO: 7 % (ref 19.6–45.3)
M PNEUMO IGG SER IA-ACNC: NOT DETECTED
MCH RBC QN AUTO: 30.6 PG (ref 26.6–33)
MCHC RBC AUTO-ENTMCNC: 32.4 G/DL (ref 31.5–35.7)
MCV RBC AUTO: 94.5 FL (ref 79–97)
MONOCYTES # BLD AUTO: 1.31 10*3/MM3 (ref 0.1–0.9)
MONOCYTES NFR BLD AUTO: 13.9 % (ref 5–12)
NEUTROPHILS NFR BLD AUTO: 7.35 10*3/MM3 (ref 1.7–7)
NEUTROPHILS NFR BLD AUTO: 78.3 % (ref 42.7–76)
NRBC BLD AUTO-RTO: 0 /100 WBC (ref 0–0.2)
PLATELET # BLD AUTO: 185 10*3/MM3 (ref 140–450)
PMV BLD AUTO: 10.3 FL (ref 6–12)
POTASSIUM SERPL-SCNC: 3.9 MMOL/L (ref 3.5–5.2)
PROCALCITONIN SERPL-MCNC: 0.06 NG/ML (ref 0–0.25)
PROT SERPL-MCNC: 6.7 G/DL (ref 6–8.5)
RBC # BLD AUTO: 4.9 10*6/MM3 (ref 4.14–5.8)
RHINOVIRUS RNA SPEC NAA+PROBE: NOT DETECTED
RSV RNA NPH QL NAA+NON-PROBE: NOT DETECTED
SARS-COV-2 RNA NPH QL NAA+NON-PROBE: DETECTED
SODIUM SERPL-SCNC: 136 MMOL/L (ref 136–145)
WBC NRBC COR # BLD: 9.4 10*3/MM3 (ref 3.4–10.8)

## 2022-06-12 PROCEDURE — 87040 BLOOD CULTURE FOR BACTERIA: CPT | Performed by: EMERGENCY MEDICINE

## 2022-06-12 PROCEDURE — 99283 EMERGENCY DEPT VISIT LOW MDM: CPT

## 2022-06-12 PROCEDURE — 0202U NFCT DS 22 TRGT SARS-COV-2: CPT | Performed by: EMERGENCY MEDICINE

## 2022-06-12 PROCEDURE — 36415 COLL VENOUS BLD VENIPUNCTURE: CPT

## 2022-06-12 PROCEDURE — 80053 COMPREHEN METABOLIC PANEL: CPT | Performed by: EMERGENCY MEDICINE

## 2022-06-12 PROCEDURE — 84145 PROCALCITONIN (PCT): CPT | Performed by: EMERGENCY MEDICINE

## 2022-06-12 PROCEDURE — 71045 X-RAY EXAM CHEST 1 VIEW: CPT

## 2022-06-12 PROCEDURE — 83605 ASSAY OF LACTIC ACID: CPT | Performed by: EMERGENCY MEDICINE

## 2022-06-12 PROCEDURE — 85025 COMPLETE CBC W/AUTO DIFF WBC: CPT | Performed by: EMERGENCY MEDICINE

## 2022-06-12 RX ORDER — ACETAMINOPHEN 500 MG
1000 TABLET ORAL EVERY 8 HOURS PRN
Qty: 30 TABLET | Refills: 0 | Status: SHIPPED | OUTPATIENT
Start: 2022-06-12 | End: 2022-06-20 | Stop reason: HOSPADM

## 2022-06-12 RX ORDER — ACETAMINOPHEN 500 MG
1000 TABLET ORAL ONCE
Status: COMPLETED | OUTPATIENT
Start: 2022-06-12 | End: 2022-06-12

## 2022-06-12 RX ORDER — BENZONATATE 200 MG/1
200 CAPSULE ORAL 3 TIMES DAILY PRN
Qty: 21 CAPSULE | Refills: 0 | Status: ON HOLD | OUTPATIENT
Start: 2022-06-12 | End: 2022-06-13

## 2022-06-12 RX ADMIN — ACETAMINOPHEN 1000 MG: 500 TABLET ORAL at 10:00

## 2022-06-12 NOTE — ED PROVIDER NOTES
EMERGENCY DEPARTMENT ENCOUNTER  I wore full protective equipment throughout this patient encounter including a N95 mask, eye shield, gown and gloves. Hand hygiene was performed before donning protective equipment and after removal when leaving the room.    Room Number:  16/16  Date of encounter:  6/12/2022  PCP: Eden Molina MD    HPI:  Context: Amandeep Manriquez Jr. is a 77 y.o. male who presents to the ED c/o chief complaint of COVID-positive.  History supplied by patient and patient's wife.  Patient began having generalized malaise as well as body aches yesterday, nonproductive cough.  Patient tested positive for COVID 19 this morning.  Patient previously had been vaccinated.  Patient denies any specific sick contacts but does live in a condominium, reports that there has been some sickness there.  Patient was febrile here but denies any fevers at home, no shakes chills or night sweats.  Patient denies any chest pain or shortness of breath.    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   • Arthropathy of right sacroiliac joint 11/29/2018   • Osteoarthritis of right knee 12/27/2012   • Degenerative lumbar spinal stenosis 02/05/2020   • Renal stone 02/05/2020   • Bilateral impacted cerumen 02/05/2020   • Benign prostatic hyperplasia without lower urinary tract symptoms 02/05/2020   • Vertigo 02/05/2020   • High risk medication use 08/17/2020   • Vitamin D deficiency 08/17/2020   • OAB (overactive bladder) 04/06/2021   • Rosacea 07/09/2021   • Short-term memory loss 10/19/2021   • TIA (transient ischemic attack) 02/07/2022     Resolved Ambulatory Problems     Diagnosis Date Noted   • Acute myocardial infarction (CMS/HCC)    • Right hip pain 11/29/2018   • Myopathy, congenital (HCC)  12/03/2013   • Vascular dementia without behavioral disturbance (HCC) 02/07/2022     Past Medical History:   Diagnosis Date   • 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HCC)    • Arthritis    • BPH (benign prostatic hyperplasia)    • DDD (degenerative disc disease), lumbar    • Diabetes mellitus (HCC)    • Dizziness    • Encounter for Medicare annual wellness exam    • Gastrointestinal hemorrhage    • History of impacted cerumen    • Hyperlipidemia    • Hypertension        PAST SURGICAL HISTORY  Past Surgical History:   Procedure Laterality Date   • CARDIAC CATHETERIZATION  06/2015    30% Left main, 99% mid to distal LAD, 70-80% left circumflex, 80% proximal to mid RCA.   • CATARACT EXTRACTION     • CERVICAL FUSION     • CHOLECYSTECTOMY     • CORONARY ANGIOPLASTY WITH STENT PLACEMENT     • KNEE SURGERY Right    • OTHER SURGICAL HISTORY      percutaneous injection of extremity pseudoaneurysm       FAMILY HISTORY  Family History   Problem Relation Age of Onset   • Coronary artery disease Mother    • Heart disease Mother    • Heart attack Mother 68   • Cancer Father         bladder   • Heart disease Father         CHF   • No Known Problems Other        SOCIAL HISTORY  Social History     Socioeconomic History   • Marital status:    Tobacco Use   • Smoking status: Current Every Day Smoker     Types: Cigars   • Smokeless tobacco: Never Used   • Tobacco comment: caffeine use   Substance and Sexual Activity   • Alcohol use: Yes     Comment: social   • Drug use: Never   • Sexual activity: Never       ALLERGIES  Iodinated diagnostic agents and Ampicillin    The patient's allergies have been reviewed    REVIEW OF SYSTEMS  All systems reviewed and negative except for those discussed in HPI.     PHYSICAL EXAM  I have reviewed the triage vital signs and nursing notes.  ED Triage Vitals [06/12/22 0911]   Temp Heart Rate Resp BP SpO2   (!) 100.7 °F (38.2 °C) 90 18 -- 92 %      Temp src Heart Rate Source Patient Position BP Location  FiO2 (%)   Tympanic Monitor -- -- --       General: No acute distress.  HENT: NCAT, PERRL, Nares patent.  Eyes: no scleral icterus.  Neck: trachea midline, no ROM limitations.  CV: regular rhythm, regular rate.  Respiratory: normal effort, no accessory muscle use, diminished at bilateral lung bases with trace crackles, no wheezing  Abdomen: soft, nondistended, NTTP, no rebound tenderness, no guarding or rigidity.  Musculoskeletal: no deformity.  Neuro: alert, moves all extremities, follows commands.  Skin: warm, dry.    LAB RESULTS  Recent Results (from the past 24 hour(s))   Comprehensive Metabolic Panel    Collection Time: 06/12/22  9:59 AM    Specimen: Blood   Result Value Ref Range    Glucose 110 (H) 65 - 99 mg/dL    BUN 10 8 - 23 mg/dL    Creatinine 0.48 (L) 0.76 - 1.27 mg/dL    Sodium 136 136 - 145 mmol/L    Potassium 3.9 3.5 - 5.2 mmol/L    Chloride 101 98 - 107 mmol/L    CO2 21.9 (L) 22.0 - 29.0 mmol/L    Calcium 9.2 8.6 - 10.5 mg/dL    Total Protein 6.7 6.0 - 8.5 g/dL    Albumin 4.00 3.50 - 5.20 g/dL    ALT (SGPT) 21 1 - 41 U/L    AST (SGOT) 30 1 - 40 U/L    Alkaline Phosphatase 95 39 - 117 U/L    Total Bilirubin 0.6 0.0 - 1.2 mg/dL    Globulin 2.7 gm/dL    A/G Ratio 1.5 g/dL    BUN/Creatinine Ratio 20.8 7.0 - 25.0    Anion Gap 13.1 5.0 - 15.0 mmol/L    eGFR 105.0 >60.0 mL/min/1.73   Lactic Acid, Plasma    Collection Time: 06/12/22  9:59 AM    Specimen: Blood   Result Value Ref Range    Lactate 1.4 0.5 - 2.0 mmol/L   Procalcitonin    Collection Time: 06/12/22  9:59 AM    Specimen: Blood   Result Value Ref Range    Procalcitonin 0.06 0.00 - 0.25 ng/mL   CBC Auto Differential    Collection Time: 06/12/22  9:59 AM    Specimen: Blood   Result Value Ref Range    WBC 9.40 3.40 - 10.80 10*3/mm3    RBC 4.90 4.14 - 5.80 10*6/mm3    Hemoglobin 15.0 13.0 - 17.7 g/dL    Hematocrit 46.3 37.5 - 51.0 %    MCV 94.5 79.0 - 97.0 fL    MCH 30.6 26.6 - 33.0 pg    MCHC 32.4 31.5 - 35.7 g/dL    RDW 12.7 12.3 - 15.4 %    RDW-SD  43.6 37.0 - 54.0 fl    MPV 10.3 6.0 - 12.0 fL    Platelets 185 140 - 450 10*3/mm3    Neutrophil % 78.3 (H) 42.7 - 76.0 %    Lymphocyte % 7.0 (L) 19.6 - 45.3 %    Monocyte % 13.9 (H) 5.0 - 12.0 %    Eosinophil % 0.1 (L) 0.3 - 6.2 %    Basophil % 0.4 0.0 - 1.5 %    Immature Grans % 0.3 0.0 - 0.5 %    Neutrophils, Absolute 7.35 (H) 1.70 - 7.00 10*3/mm3    Lymphocytes, Absolute 0.66 (L) 0.70 - 3.10 10*3/mm3    Monocytes, Absolute 1.31 (H) 0.10 - 0.90 10*3/mm3    Eosinophils, Absolute 0.01 0.00 - 0.40 10*3/mm3    Basophils, Absolute 0.04 0.00 - 0.20 10*3/mm3    Immature Grans, Absolute 0.03 0.00 - 0.05 10*3/mm3    nRBC 0.0 0.0 - 0.2 /100 WBC       I ordered the above labs and reviewed the results.    RADIOLOGY  XR Chest 1 View    Result Date: 6/12/2022  PORTABLE CHEST 06/12/2022 AT 1008 HOURS  CLINICAL HISTORY: Cough  Lungs are well-expanded and appear free of active infiltrates. There are no pleural effusions. The heart is at the upper limits of normal in size. The thoracic aorta is mildly ectatic.  IMPRESSIONS: No evidence of active disease within the chest.  This report was finalized on 6/12/2022 11:01 AM by Dr. Cosmo Wylie M.D.        I ordered the above noted radiological studies. I reviewed the images and results. I agree with the radiologist interpretation.    PROCEDURES  Procedures    MEDICATIONS GIVEN IN ER  Medications   acetaminophen (TYLENOL) tablet 1,000 mg (1,000 mg Oral Given 6/12/22 1000)       PROGRESS, DATA ANALYSIS, CONSULTS, AND MEDICAL DECISION MAKING  A complete history and physical exam have been performed.  All available laboratory and imaging results have been reviewed by myself prior to disposition.    MDM  After the initial H&P, I discussed pertinent information from history and physical exam with patient/family.  Discussed differential diagnosis.  Discussed plan for ED evaluation/workup/treatment.  All questions answered.  Patient/family is agreeable with plan.  ED Course as of 06/12/22  1146   Sun Jun 12, 2022   1112 Patient was able ambulate, maintain oxygen saturation of 96% on room air. [JG]   1130 Patient COVID-positive at home, COVID test here currently pending.  Patient did have fever, fever resolved with Tylenol, no leukocytosis or left shift, procalcitonin normal, lactic acid normal, chest x-ray unremarkable.  Lab work does show mild hyperglycemia just over normal, otherwise lab work is unremarkable.  Patient was able ambulate, maintain oxygen saturation without difficulty.  Patient does not qualify for any inpatient treatment for COVID at present, well-appearing, appears appropriate for discharge with outpatient follow-up.  Will give pulse oximeter, instructed in use, instructed in need to quarantine, extensive discussion return precautions, discharging. [JG]      ED Course User Index  [JG] Julio Cesar Amador MD       AS OF 11:46 EDT VITALS:    BP - 159/83  HR - 91  TEMP - 98.9 °F (37.2 °C) (Oral)  O2 SATS - 94%    DIAGNOSIS  Final diagnoses:   COVID-19   Cough   Fever, unspecified fever cause         DISPOSITION  DISCHARGE    Patient discharged in stable condition.    Reviewed implications of results, diagnosis, meds, responsibility to follow up, warning signs and symptoms of possible worsening, potential complications and reasons to return to ER.    Patient/Family voiced understanding of above instructions.    Discussed plan for discharge, as there is no emergent indication for admission. Patient referred to primary care provider for BP management due to today's BP. Pt/family is agreeable and understands need for follow up and repeat testing.  Pt is aware that discharge does not mean that nothing is wrong but it indicates no emergency is present that requires admission and they must continue care with follow-up as given below or physician of their choice.     FOLLOW-UP  Eden Molina MD  [address] [id number] [phone]    Schedule an appointment as soon as  possible for a visit in 2 days      [facility] Emergency Department  [address]  [facility] [address] [phone]  Go to   as needed         Medication List      New Prescriptions    acetaminophen 500 MG tablet  Commonly known as: TYLENOL  Take 2 tablets by mouth Every 8 (Eight) Hours As Needed for Fever.     benzonatate 200 MG capsule  Commonly known as: TESSALON  Take 1 capsule by mouth 3 (Three) Times a Day As Needed for Cough.           Where to Get Your Medications      [facility] [place] [address] [address] [facility] [address]  - [phone]  - [phone] [address] [place] [id number]    Phone: [phone]   · acetaminophen 500 MG tablet  · benzonatate 200 MG capsule          Julio Cesar Amador MD  06/12/22 3463

## 2022-06-12 NOTE — ED NOTES
AMBULATED PT WITH WALKER TOOK 2 PEOPLE ASSIT STATS STAYED 96%-97% HAD TROUBLE LIFTING LEFT LEG TO AMBULATE

## 2022-06-13 ENCOUNTER — HOSPITAL ENCOUNTER (OUTPATIENT)
Facility: HOSPITAL | Age: 77
Setting detail: OBSERVATION
LOS: 3 days | Discharge: NURSING, REHAB OR LONG-TERM CARE | End: 2022-06-20
Attending: EMERGENCY MEDICINE | Admitting: HOSPITALIST

## 2022-06-13 ENCOUNTER — APPOINTMENT (OUTPATIENT)
Dept: GENERAL RADIOLOGY | Facility: HOSPITAL | Age: 77
End: 2022-06-13

## 2022-06-13 ENCOUNTER — READMISSION MANAGEMENT (OUTPATIENT)
Dept: CALL CENTER | Facility: HOSPITAL | Age: 77
End: 2022-06-13

## 2022-06-13 DIAGNOSIS — M48.061 DEGENERATIVE LUMBAR SPINAL STENOSIS: ICD-10-CM

## 2022-06-13 DIAGNOSIS — U07.1 COVID-19: Primary | ICD-10-CM

## 2022-06-13 DIAGNOSIS — R53.1 GENERALIZED WEAKNESS: ICD-10-CM

## 2022-06-13 PROBLEM — Z86.73 HISTORY OF CVA (CEREBROVASCULAR ACCIDENT): Status: ACTIVE | Noted: 2022-02-07

## 2022-06-13 LAB
ALBUMIN SERPL-MCNC: 4.1 G/DL (ref 3.5–5.2)
ALBUMIN/GLOB SERPL: 1.6 G/DL
ALP SERPL-CCNC: 89 U/L (ref 39–117)
ALT SERPL W P-5'-P-CCNC: 30 U/L (ref 1–41)
ANION GAP SERPL CALCULATED.3IONS-SCNC: 13 MMOL/L (ref 5–15)
AST SERPL-CCNC: 61 U/L (ref 1–40)
BACTERIA UR QL AUTO: ABNORMAL /HPF
BASOPHILS # BLD AUTO: 0.03 10*3/MM3 (ref 0–0.2)
BASOPHILS NFR BLD AUTO: 0.3 % (ref 0–1.5)
BILIRUB SERPL-MCNC: 0.4 MG/DL (ref 0–1.2)
BILIRUB UR QL STRIP: NEGATIVE
BUN SERPL-MCNC: 8 MG/DL (ref 8–23)
BUN/CREAT SERPL: 18.6 (ref 7–25)
CALCIUM SPEC-SCNC: 8.9 MG/DL (ref 8.6–10.5)
CHLORIDE SERPL-SCNC: 106 MMOL/L (ref 98–107)
CLARITY UR: CLEAR
CO2 SERPL-SCNC: 22 MMOL/L (ref 22–29)
COLOR UR: YELLOW
CREAT SERPL-MCNC: 0.43 MG/DL (ref 0.76–1.27)
D-LACTATE SERPL-SCNC: 1.1 MMOL/L (ref 0.5–2)
DEPRECATED RDW RBC AUTO: 42.3 FL (ref 37–54)
EGFRCR SERPLBLD CKD-EPI 2021: 108.6 ML/MIN/1.73
EOSINOPHIL # BLD AUTO: 0.02 10*3/MM3 (ref 0–0.4)
EOSINOPHIL NFR BLD AUTO: 0.2 % (ref 0.3–6.2)
ERYTHROCYTE [DISTWIDTH] IN BLOOD BY AUTOMATED COUNT: 12.4 % (ref 12.3–15.4)
GLOBULIN UR ELPH-MCNC: 2.5 GM/DL
GLUCOSE SERPL-MCNC: 114 MG/DL (ref 65–99)
GLUCOSE UR STRIP-MCNC: NEGATIVE MG/DL
HCT VFR BLD AUTO: 44.6 % (ref 37.5–51)
HGB BLD-MCNC: 14.6 G/DL (ref 13–17.7)
HGB UR QL STRIP.AUTO: ABNORMAL
HYALINE CASTS UR QL AUTO: ABNORMAL /LPF
IMM GRANULOCYTES # BLD AUTO: 0.03 10*3/MM3 (ref 0–0.05)
IMM GRANULOCYTES NFR BLD AUTO: 0.3 % (ref 0–0.5)
KETONES UR QL STRIP: ABNORMAL
LDH SERPL-CCNC: 257 U/L (ref 135–225)
LEUKOCYTE ESTERASE UR QL STRIP.AUTO: NEGATIVE
LYMPHOCYTES # BLD AUTO: 0.53 10*3/MM3 (ref 0.7–3.1)
LYMPHOCYTES NFR BLD AUTO: 6.1 % (ref 19.6–45.3)
MCH RBC QN AUTO: 30.3 PG (ref 26.6–33)
MCHC RBC AUTO-ENTMCNC: 32.7 G/DL (ref 31.5–35.7)
MCV RBC AUTO: 92.5 FL (ref 79–97)
MONOCYTES # BLD AUTO: 1.08 10*3/MM3 (ref 0.1–0.9)
MONOCYTES NFR BLD AUTO: 12.5 % (ref 5–12)
NEUTROPHILS NFR BLD AUTO: 6.96 10*3/MM3 (ref 1.7–7)
NEUTROPHILS NFR BLD AUTO: 80.6 % (ref 42.7–76)
NITRITE UR QL STRIP: NEGATIVE
NRBC BLD AUTO-RTO: 0 /100 WBC (ref 0–0.2)
PH UR STRIP.AUTO: 5.5 [PH] (ref 5–8)
PLATELET # BLD AUTO: 197 10*3/MM3 (ref 140–450)
PMV BLD AUTO: 10 FL (ref 6–12)
POTASSIUM SERPL-SCNC: 3.6 MMOL/L (ref 3.5–5.2)
PROCALCITONIN SERPL-MCNC: 0.08 NG/ML (ref 0–0.25)
PROT SERPL-MCNC: 6.6 G/DL (ref 6–8.5)
PROT UR QL STRIP: ABNORMAL
QT INTERVAL: 338 MS
RBC # BLD AUTO: 4.82 10*6/MM3 (ref 4.14–5.8)
RBC # UR STRIP: ABNORMAL /HPF
REF LAB TEST METHOD: ABNORMAL
SODIUM SERPL-SCNC: 141 MMOL/L (ref 136–145)
SP GR UR STRIP: 1.02 (ref 1–1.03)
SQUAMOUS #/AREA URNS HPF: ABNORMAL /HPF
UROBILINOGEN UR QL STRIP: ABNORMAL
WBC # UR STRIP: ABNORMAL /HPF
WBC NRBC COR # BLD: 8.65 10*3/MM3 (ref 3.4–10.8)

## 2022-06-13 PROCEDURE — 81001 URINALYSIS AUTO W/SCOPE: CPT | Performed by: EMERGENCY MEDICINE

## 2022-06-13 PROCEDURE — 93005 ELECTROCARDIOGRAM TRACING: CPT | Performed by: EMERGENCY MEDICINE

## 2022-06-13 PROCEDURE — G0378 HOSPITAL OBSERVATION PER HR: HCPCS

## 2022-06-13 PROCEDURE — 71045 X-RAY EXAM CHEST 1 VIEW: CPT

## 2022-06-13 PROCEDURE — 25010000002 REMDESIVIR 100 MG/20ML SOLUTION 1 EACH VIAL: Performed by: INTERNAL MEDICINE

## 2022-06-13 PROCEDURE — 83605 ASSAY OF LACTIC ACID: CPT | Performed by: EMERGENCY MEDICINE

## 2022-06-13 PROCEDURE — 84145 PROCALCITONIN (PCT): CPT | Performed by: EMERGENCY MEDICINE

## 2022-06-13 PROCEDURE — 83615 LACTATE (LD) (LDH) ENZYME: CPT | Performed by: NURSE PRACTITIONER

## 2022-06-13 PROCEDURE — 96372 THER/PROPH/DIAG INJ SC/IM: CPT

## 2022-06-13 PROCEDURE — 80053 COMPREHEN METABOLIC PANEL: CPT | Performed by: EMERGENCY MEDICINE

## 2022-06-13 PROCEDURE — 85025 COMPLETE CBC W/AUTO DIFF WBC: CPT | Performed by: EMERGENCY MEDICINE

## 2022-06-13 PROCEDURE — 93010 ELECTROCARDIOGRAM REPORT: CPT | Performed by: INTERNAL MEDICINE

## 2022-06-13 PROCEDURE — 25010000002 ENOXAPARIN PER 10 MG: Performed by: NURSE PRACTITIONER

## 2022-06-13 PROCEDURE — 96365 THER/PROPH/DIAG IV INF INIT: CPT

## 2022-06-13 PROCEDURE — 87040 BLOOD CULTURE FOR BACTERIA: CPT | Performed by: EMERGENCY MEDICINE

## 2022-06-13 PROCEDURE — 99284 EMERGENCY DEPT VISIT MOD MDM: CPT

## 2022-06-13 RX ORDER — ONDANSETRON 2 MG/ML
4 INJECTION INTRAMUSCULAR; INTRAVENOUS EVERY 6 HOURS PRN
Status: DISCONTINUED | OUTPATIENT
Start: 2022-06-13 | End: 2022-06-20 | Stop reason: HOSPADM

## 2022-06-13 RX ORDER — TAMSULOSIN HYDROCHLORIDE 0.4 MG/1
0.4 CAPSULE ORAL NIGHTLY
Status: DISCONTINUED | OUTPATIENT
Start: 2022-06-13 | End: 2022-06-20 | Stop reason: HOSPADM

## 2022-06-13 RX ORDER — ACETAMINOPHEN 500 MG
1000 TABLET ORAL ONCE
Status: COMPLETED | OUTPATIENT
Start: 2022-06-13 | End: 2022-06-13

## 2022-06-13 RX ORDER — BACLOFEN 10 MG/1
10 TABLET ORAL NIGHTLY
Status: DISCONTINUED | OUTPATIENT
Start: 2022-06-13 | End: 2022-06-20 | Stop reason: HOSPADM

## 2022-06-13 RX ORDER — DEXTROMETHORPHAN POLISTIREX 30 MG/5ML
60 SUSPENSION ORAL EVERY 12 HOURS SCHEDULED
Status: DISCONTINUED | OUTPATIENT
Start: 2022-06-13 | End: 2022-06-20 | Stop reason: HOSPADM

## 2022-06-13 RX ORDER — MULTIPLE VITAMINS W/ MINERALS TAB 9MG-400MCG
1 TAB ORAL DAILY
Status: DISCONTINUED | OUTPATIENT
Start: 2022-06-13 | End: 2022-06-20 | Stop reason: HOSPADM

## 2022-06-13 RX ORDER — ASPIRIN 81 MG/1
81 TABLET ORAL DAILY
Status: DISCONTINUED | OUTPATIENT
Start: 2022-06-13 | End: 2022-06-20 | Stop reason: HOSPADM

## 2022-06-13 RX ORDER — ENOXAPARIN SODIUM 100 MG/ML
40 INJECTION SUBCUTANEOUS DAILY
Status: DISCONTINUED | OUTPATIENT
Start: 2022-06-13 | End: 2022-06-20 | Stop reason: HOSPADM

## 2022-06-13 RX ORDER — CYCLOSPORINE 0.5 MG/ML
1 EMULSION OPHTHALMIC 2 TIMES DAILY
Status: DISCONTINUED | OUTPATIENT
Start: 2022-06-13 | End: 2022-06-20 | Stop reason: HOSPADM

## 2022-06-13 RX ORDER — ACETAMINOPHEN 325 MG/1
650 TABLET ORAL EVERY 4 HOURS PRN
Status: DISCONTINUED | OUTPATIENT
Start: 2022-06-13 | End: 2022-06-20 | Stop reason: HOSPADM

## 2022-06-13 RX ORDER — ACETAMINOPHEN 650 MG/1
650 SUPPOSITORY RECTAL EVERY 4 HOURS PRN
Status: DISCONTINUED | OUTPATIENT
Start: 2022-06-13 | End: 2022-06-20 | Stop reason: HOSPADM

## 2022-06-13 RX ORDER — ROSUVASTATIN CALCIUM 40 MG/1
40 TABLET, COATED ORAL DAILY
Status: DISCONTINUED | OUTPATIENT
Start: 2022-06-13 | End: 2022-06-20 | Stop reason: HOSPADM

## 2022-06-13 RX ORDER — ACETAMINOPHEN 160 MG/5ML
650 SOLUTION ORAL EVERY 4 HOURS PRN
Status: DISCONTINUED | OUTPATIENT
Start: 2022-06-13 | End: 2022-06-20 | Stop reason: HOSPADM

## 2022-06-13 RX ORDER — SODIUM CHLORIDE 0.9 % (FLUSH) 0.9 %
10 SYRINGE (ML) INJECTION AS NEEDED
Status: DISCONTINUED | OUTPATIENT
Start: 2022-06-13 | End: 2022-06-20 | Stop reason: HOSPADM

## 2022-06-13 RX ORDER — HYDROCODONE BITARTRATE AND ACETAMINOPHEN 5; 325 MG/1; MG/1
1 TABLET ORAL EVERY 8 HOURS PRN
Status: DISCONTINUED | OUTPATIENT
Start: 2022-06-13 | End: 2022-06-20 | Stop reason: HOSPADM

## 2022-06-13 RX ORDER — SODIUM CHLORIDE 0.9 % (FLUSH) 0.9 %
10 SYRINGE (ML) INJECTION EVERY 12 HOURS SCHEDULED
Status: DISCONTINUED | OUTPATIENT
Start: 2022-06-13 | End: 2022-06-20 | Stop reason: HOSPADM

## 2022-06-13 RX ORDER — NITROGLYCERIN 0.4 MG/1
0.4 TABLET SUBLINGUAL
Status: DISCONTINUED | OUTPATIENT
Start: 2022-06-13 | End: 2022-06-20 | Stop reason: HOSPADM

## 2022-06-13 RX ORDER — ONDANSETRON 4 MG/1
4 TABLET, FILM COATED ORAL EVERY 6 HOURS PRN
Status: DISCONTINUED | OUTPATIENT
Start: 2022-06-13 | End: 2022-06-20 | Stop reason: HOSPADM

## 2022-06-13 RX ORDER — LATANOPROST 50 UG/ML
1 SOLUTION/ DROPS OPHTHALMIC NIGHTLY
Status: DISCONTINUED | OUTPATIENT
Start: 2022-06-13 | End: 2022-06-20 | Stop reason: HOSPADM

## 2022-06-13 RX ADMIN — Medication 10 ML: at 09:06

## 2022-06-13 RX ADMIN — DEXTROMETHORPHAN 60 MG: 30 SUSPENSION, EXTENDED RELEASE ORAL at 14:35

## 2022-06-13 RX ADMIN — MIRABEGRON 50 MG: 25 TABLET, FILM COATED, EXTENDED RELEASE ORAL at 14:35

## 2022-06-13 RX ADMIN — ASPIRIN 81 MG: 81 TABLET, COATED ORAL at 14:38

## 2022-06-13 RX ADMIN — REMDESIVIR 200 MG: 100 INJECTION, POWDER, LYOPHILIZED, FOR SOLUTION INTRAVENOUS at 18:33

## 2022-06-13 RX ADMIN — Medication 10 ML: at 20:18

## 2022-06-13 RX ADMIN — BACLOFEN 10 MG: 10 TABLET ORAL at 20:18

## 2022-06-13 RX ADMIN — SODIUM CHLORIDE 1000 ML: 9 INJECTION, SOLUTION INTRAVENOUS at 03:25

## 2022-06-13 RX ADMIN — CYCLOSPORINE 1 DROP: 0.5 EMULSION OPHTHALMIC at 20:17

## 2022-06-13 RX ADMIN — DEXTROMETHORPHAN 60 MG: 30 SUSPENSION, EXTENDED RELEASE ORAL at 20:18

## 2022-06-13 RX ADMIN — LATANOPROST 1 DROP: 50 SOLUTION OPHTHALMIC at 20:18

## 2022-06-13 RX ADMIN — ACETAMINOPHEN 1000 MG: 500 TABLET ORAL at 03:08

## 2022-06-13 RX ADMIN — ENOXAPARIN SODIUM 40 MG: 100 INJECTION SUBCUTANEOUS at 09:06

## 2022-06-13 RX ADMIN — ROSUVASTATIN CALCIUM 40 MG: 40 TABLET, FILM COATED ORAL at 14:35

## 2022-06-13 RX ADMIN — METOPROLOL TARTRATE 12.5 MG: 25 TABLET, FILM COATED ORAL at 20:18

## 2022-06-13 RX ADMIN — MULTIPLE VITAMINS W/ MINERALS TAB 1 TABLET: TAB at 14:35

## 2022-06-13 RX ADMIN — TAMSULOSIN HYDROCHLORIDE 0.4 MG: 0.4 CAPSULE ORAL at 20:17

## 2022-06-13 NOTE — PROGRESS NOTES
[facility]  Clinical Pharmacy Department     Remdesivir Review Note    Amandeep Manriquez Jr. is a 77 y.o. male with confirmed COVID-19 infection on day 1 of hospitalization.     Consulting Provider:  Dr. Guy  Date of Confirmed SARS-CoV-2: 6/12/22  Date of Symptom Onset: 6/9/22  Other Antimicrobials: none  Hydroxychloroquine or chloroquine prior to arrival: no    Allergies  Allergies as of 06/13/2022 - Reviewed 06/13/2022   Allergen Reaction Noted    Iodinated diagnostic agents Hives 11/13/2015    Ampicillin Diarrhea 11/13/2015       Microbiology:  Microbiology Results (last 10 days)       Procedure Component Value - Date/Time    Blood Culture - Blood, Arm, Left [[id number]]  (Normal) Collected: 06/12/22 0959    Lab Status: Preliminary result Specimen: Blood from Arm, Left Updated: 06/13/22 1017     Blood Culture No growth at 24 hours    Blood Culture - Blood, Arm, Right [[id number]]  (Normal) Collected: 06/12/22 0959    Lab Status: Preliminary result Specimen: Blood from Arm, Right Updated: 06/13/22 1017     Blood Culture No growth at 24 hours    Respiratory Panel PCR w/COVID-19(SARS-CoV-2) BRUCE/ZHANNA/AMA/PAD/COR/MAD/OSITO In-House, NP Swab in UTM/VTM, 3-4 HR TAT - Swab, Nasopharynx [[id number]]  (Abnormal) Collected: 06/12/22 0959    Lab Status: Final result Specimen: Swab from Nasopharynx Updated: 06/12/22 1147     ADENOVIRUS, PCR Not Detected     Coronavirus 229E Not Detected     Coronavirus HKU1 Not Detected     Coronavirus NL63 Not Detected     Coronavirus OC43 Not Detected     COVID19 Detected     Human Metapneumovirus Not Detected     Human Rhinovirus/Enterovirus Not Detected     Influenza A PCR Not Detected     Influenza A H1 Not Detected     Influenza A H1 2009 PCR Not Detected     Influenza A H3 Not Detected     Influenza B PCR Not Detected     Parainfluenza Virus 1 Not Detected     Parainfluenza Virus 2 Not Detected     Parainfluenza Virus 3 Not Detected     Parainfluenza Virus 4 Not Detected     RSV, PCR  "Not Detected     Bordetella pertussis pcr Not Detected     Bordetella parapertussis PCR Not Detected     Chlamydophila pneumoniae PCR Not Detected     Mycoplasma pneumo by PCR Not Detected    Narrative:      In the setting of a positive respiratory panel with a viral infection PLUS a negative procalcitonin without other underlying concern for bacterial infection, consider observing off antibiotics or discontinuation of antibiotics and continue supportive care. If the respiratory panel is positive for atypical bacterial infection (Bordetella pertussis, Chlamydophila pneumoniae, or Mycoplasma pneumoniae), consider antibiotic de-escalation to target atypical bacterial infection.            Vitals/Labs/I&O  Visit Vitals  /86 (BP Location: Right arm, Patient Position: Lying)   Pulse 81   Temp 98.3 °F (36.8 °C) (Oral)   Resp 18   Ht 165.1 cm (65\")   Wt 79.9 kg (176 lb 2.4 oz)   SpO2 97%   BMI 29.31 kg/m²       Results from last 7 days   Lab Units 06/13/22  0308 06/12/22  0959   WBC 10*3/mm3 8.65 9.40     Results from last 7 days   Lab Units 06/13/22  0308 06/12/22  0959   PROCALCITONIN ng/mL 0.08 0.06     Results from last 7 days   Lab Units 06/13/22  0308 06/12/22  0959   AST (SGOT) U/L 61* 30      Results from last 7 days   Lab Units 06/13/22  0308 06/12/22  0959   ALT (SGPT) U/L 30 21       Estimated Creatinine Clearance: 135.8 mL/min (A) (by C-G formula based on SCr of 0.43 mg/dL (L)).  Results from last 7 days   Lab Units 06/13/22  0308 06/12/22  0959   BUN mg/dL 8 10   CREATININE mg/dL 0.43* 0.48*     Intake & Output (last 3 days)         06/10 0701 06/11 0700 06/11 0701 06/12 0700 06/12 0701 06/13 0700 06/13 0701 06/14 0700    P.O.    360    IV Piggyback   1000     Total Intake(mL/kg)   1000 (12.5) 360 (4.5)    Urine (mL/kg/hr)   175 350 (0.4)    Total Output   175 350    Net   +825 +10                    Assessment/Plan:    Patient meets the following inclusion/exclusion criteria:  Patient is " hospitalized with confirmed COVID-19 infection (and accompanying symptoms) Yes  Patient meets one of the two below criteria:  Patient is requiring an increase in baseline supplemental oxygen requirements OR SpO2 </= 94% on room air secondary to COVID-19 infection OR  Patient is symptomatic but not requiring supplemental oxygen or an increase in baseline oxygen AND has at least one of the below high risk criteria for progression to severe illness. High risk criteria:   Age >/= 65  Cardiovascular diseases (HF, CAD, or cardiomyopathies)  Chronic lung disease (COPD, CF, interstitial lung disease, PE, pulmonary hypertension, bronchopulmonary dysplasia, bronchiectasis)  Diabetes mellitis (insulin dependent or poorly controlled)    Baseline and daily LFTs and Scr have been ordered prior to remdesivir initiation Yes  ALT is not ? 10 times the upper limit of normal No  Patient is not on concomitant hydroxychloroquine or chloroquine No  Patient does not require invasive mechanical ventilation (IMV) or ECMO No  Patient is within 10 days from symptom onset (for criteria 2a) or within 7 days of symptom onset (for criteria 2b) Yes    Remdesivir 200 mg IV x 1 dose, followed by 100 mg IV q24h for 2 days or until hospital discharge (whichever comes first) has been ordered.    Thank you for involving pharmacy in this patient's care. Please contact pharmacy with any questions or concerns.                           SEBASTIEN HAYES, [facility]  Clinical Pharmacist

## 2022-06-13 NOTE — ED NOTES
Pt spouse would like a phone call regarding pts treatment plan and concerns.  Julissa Manriquez [phone].

## 2022-06-13 NOTE — OUTREACH NOTE
COVID-19 Week 1 Survey    Flowsheet Row Responses   [facility] patient discharged from? [place]   Does the patient have one of the following disease processes/diagnoses(primary or secondary)? COVID-19   COVID-19 underlying condition? None   Call Number Call 1   Week 1 Call successful? No   Revoke Readmitted   Discharge diagnosis COVID-19          VIRGINIA PANCHAL - Registered Nurse

## 2022-06-13 NOTE — H&P
Patient Name:  Amandeep Manriquez Jr.  YOB: 1945  MRN:  [id number]  Admit Date:  6/13/2022  Patient Care Team:  Eden Molina MD as PCP - General (Family Medicine)      Subjective   History Present Illness     Chief Complaint   Patient presents with   • Fatigue   • Exposure To Known Illness       Mr. Manriquez is a 77 y.o. male with a history of CAD/MI, BPH, DM, HTN, GIB that presents to [facility] complaining of weakness, difficulty walking. He presented to ED yesterday due to fever, weakness. He tested positive for Covid 19 but labs and CXR were unremarkable so he was discharged. He has had continued weakness, difficulty walking, and sustained a fall to the floor while trying to get into bed. He was not able to get himself up. He reports having a chronic cough but may be worse recently, mostly dry. He has had fever, denies chills. Denies chest pain, shortness of breath, n/v/d, abd pain, dysuria. He ambulates with a walker at baseline. He is fully vaccinated for Covid.    TMax 101.2. HR controlled. BP stable. Sats stable on room air. WBC 8.65, Hgb 14.6. Procal 0.08. Lactate 1.1. Blood cult collected. Gluc 114, Cr. 0.43, AST 61; remaining chem panel wnl. UA neg for infection. Repeat CXR negative for acute findings    Review of Systems   Constitutional: Positive for fatigue and fever.   HENT: Negative for congestion.    Respiratory: Positive for cough. Negative for shortness of breath.    Cardiovascular: Negative for chest pain.   Gastrointestinal: Negative for nausea and vomiting.   Genitourinary: Negative for dysuria.   Musculoskeletal: Positive for gait problem.   Skin: Negative for rash.   Neurological: Positive for weakness.   Psychiatric/Behavioral: Negative for sleep disturbance.        Personal History     Past Medical History:   Diagnosis Date   • Acute myocardial infarction (HCC)    • 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HCC)    • Arthritis    • BPH (benign prostatic  hyperplasia)    • CAD (coronary artery disease), native coronary artery    • Congenital myopathy (HCC)    • DDD (degenerative disc disease), lumbar     with stenosis   • Diabetes mellitus (HCC)    • Dizziness     Chronic Dizziness   • Encounter for Medicare annual wellness exam    • Essential hypertension    • Gastrointestinal hemorrhage    • History of impacted cerumen    • Hyperlipidemia    • Hypertension    • Mixed hyperlipidemia      Past Surgical History:   Procedure Laterality Date   • CARDIAC CATHETERIZATION  06/2015    30% Left main, 99% mid to distal LAD, 70-80% left circumflex, 80% proximal to mid RCA.   • CATARACT EXTRACTION     • CERVICAL FUSION     • CHOLECYSTECTOMY     • CORONARY ANGIOPLASTY WITH STENT PLACEMENT     • KNEE SURGERY Right    • OTHER SURGICAL HISTORY      percutaneous injection of extremity pseudoaneurysm     Family History   Problem Relation Age of Onset   • Coronary artery disease Mother    • Heart disease Mother    • Heart attack Mother 68   • Cancer Father         bladder   • Heart disease Father         CHF   • No Known Problems Other      Social History     Tobacco Use   • Smoking status: Current Every Day Smoker     Types: Cigars   • Smokeless tobacco: Never Used   • Tobacco comment: caffeine use   Substance Use Topics   • Alcohol use: Yes     Comment: social   • Drug use: Never     No current facility-administered medications on file prior to encounter.     Current Outpatient Medications on File Prior to Encounter   Medication Sig Dispense Refill   • acetaminophen (TYLENOL) 500 MG tablet Take 2 tablets by mouth Every 8 (Eight) Hours As Needed for Fever. (Patient taking differently: Take 1,000 mg by mouth Every 6 (Six) Hours As Needed for Fever or Mild Pain .) 30 tablet 0   • aspirin 81 MG tablet Take 1 tablet by mouth daily.     • baclofen (LIORESAL) 10 MG tablet Take 1 tablet by mouth 3 (Three) Times a Day. (Patient taking differently: Take 10 mg by mouth every night at bedtime.)  270 tablet 1   • cycloSPORINE (RESTASIS) 0.05 % ophthalmic emulsion 1 drop 2 (Two) Times a Day.     • HYDROcodone-acetaminophen (NORCO) 5-325 MG per tablet Take 1 tablet by mouth Every 8 (Eight) Hours As Needed for Moderate Pain . For pain 60 tablet 0   • latanoprost (XALATAN) 0.005 % ophthalmic solution Administer 1 drop to both eyes Every Night.     • metoprolol tartrate (LOPRESSOR) 25 MG tablet Take 1 tablet by mouth 2 (Two) Times a Day. (Patient taking differently: Take 12.5 mg by mouth 2 (Two) Times a Day.) 180 tablet 1   • Multiple Vitamins-Minerals (PRESERVISION AREDS 2+MULTI VIT PO) Take 1 tablet by mouth Daily.     • Myrbetriq 50 MG tablet sustained-release 24 hour 24 hr tablet TAKE ONE TABLET BY MOUTH DAILY 30 tablet 2   • nitroglycerin (NITROSTAT) 0.4 MG SL tablet Place 1 tablet under the tongue. For chest pain.  If pain remains after 5 minutes, call 911 and repeat dose.  Max 3 tabs in 15 minutes.     • rosuvastatin (CRESTOR) 40 MG tablet TAKE ONE TABLET BY MOUTH DAILY 90 tablet 1   • tamsulosin (FLOMAX) 0.4 MG capsule 24 hr capsule Take 1 capsule by mouth Every Night. 90 capsule 1   • [DISCONTINUED] Ascorbic Acid (VITAMIN C PO) Take  by mouth.     • [DISCONTINUED] aspirin 325 MG tablet Take 325 mg by mouth Daily.     • [DISCONTINUED] benzonatate (TESSALON) 200 MG capsule Take 1 capsule by mouth 3 (Three) Times a Day As Needed for Cough. 21 capsule 0   • [DISCONTINUED] cholecalciferol (VITAMIN D3) 08142 UNITS capsule Take 1 capsule by mouth daily.     • [DISCONTINUED] hydrocortisone-pramoxine (PRAMOSONE) 1-2.5 % lotion lotion Apply 1 application topically to the appropriate area as directed Every 12 (Twelve) Hours.     • [DISCONTINUED] metroNIDAZOLE (METROGEL) 0.75 % gel Apply  topically to the appropriate area as directed 2 (Two) Times a Day.     • [DISCONTINUED] Multiple Vitamin (MULTI VITAMIN DAILY PO) Take 1 tablet by mouth 2 (Two) Times a Day.     • [DISCONTINUED] nystatin (MYCOSTATIN) 940847 UNIT/GM  powder Apply three times daily to the affected skin until skin heals. 30 g 0   • [DISCONTINUED] polyethylene glycol (MIRALAX) packet Take 17 g by mouth Daily.     • [DISCONTINUED] saccharomyces boulardii (FLORASTOR) 250 MG capsule Take 250 mg by mouth 2 (Two) Times a Day.     • [DISCONTINUED] senna (SENOKOT) 8.6 MG tablet tablet Take  by mouth Every Night.       Allergies   Allergen Reactions   • Iodinated Diagnostic Agents Hives   • Ampicillin Diarrhea       Objective    Objective     Vital Signs  Temp:  [97.7 °F (36.5 °C)-101.2 °F (38.4 °C)] 98.3 °F (36.8 °C)  Heart Rate:  [] 81  Resp:  [18] 18  BP: (109-156)/(72-97) 156/86  SpO2:  [94 %-98 %] 97 %  on  Flow (L/min):  [2] 2;   Device (Oxygen Therapy): room air  Body mass index is 29.31 kg/m².    Physical Exam  Vitals and nursing note reviewed.   Constitutional:       General: He is not in acute distress.     Appearance: He is ill-appearing.   HENT:      Head: Normocephalic.      Mouth/Throat:      Mouth: Mucous membranes are moist.   Eyes:      Conjunctiva/sclera: Conjunctivae normal.   Cardiovascular:      Rate and Rhythm: Normal rate and regular rhythm.   Pulmonary:      Effort: Pulmonary effort is normal. No respiratory distress.      Breath sounds: Examination of the right-lower field reveals decreased breath sounds. Examination of the left-lower field reveals decreased breath sounds. Decreased breath sounds present.   Abdominal:      General: Bowel sounds are normal.      Palpations: Abdomen is soft.   Musculoskeletal:      Cervical back: Neck supple.      Right lower leg: Edema present.      Left lower leg: Edema present.      Comments: Trace pedal edema   Skin:     General: Skin is warm and dry.   Neurological:      Mental Status: He is alert and oriented to person, place, and time.   Psychiatric:         Mood and Affect: Mood normal.         Behavior: Behavior normal.         Results Review:  I reviewed the patient's new clinical results.  I reviewed  the patient's new imaging results and agree with the interpretation.  I reviewed the patient's other test results and agree with the interpretation  I personally viewed and interpreted the patient's EKG/Telemetry data    Lab Results (last 24 hours)     Procedure Component Value Units Date/Time    CBC & Differential [[id number]]  (Abnormal) Collected: 06/13/22 0308    Specimen: Blood Updated: 06/13/22 0341    Narrative:      The following orders were created for panel order CBC & Differential.  Procedure                               Abnormality         Status                     ---------                               -----------         ------                     CBC Auto Differential[[id number]]        Abnormal            Final result                 Please view results for these tests on the individual orders.    Comprehensive Metabolic Panel [[id number]]  (Abnormal) Collected: 06/13/22 0308    Specimen: Blood Updated: 06/13/22 0358     Glucose 114 mg/dL      BUN 8 mg/dL      Creatinine 0.43 mg/dL      Sodium 141 mmol/L      Potassium 3.6 mmol/L      Chloride 106 mmol/L      CO2 22.0 mmol/L      Calcium 8.9 mg/dL      Total Protein 6.6 g/dL      Albumin 4.10 g/dL      ALT (SGPT) 30 U/L      AST (SGOT) 61 U/L      Alkaline Phosphatase 89 U/L      Total Bilirubin 0.4 mg/dL      Globulin 2.5 gm/dL      A/G Ratio 1.6 g/dL      BUN/Creatinine Ratio 18.6     Anion Gap 13.0 mmol/L      eGFR 108.6 mL/min/1.73      Comment: National Kidney Foundation and American Society of Nephrology (ASN) Task Force recommended calculation based on the Chronic Kidney Disease Epidemiology Collaboration (CKD-EPI) equation refit without adjustment for race.       Narrative:      GFR Normal >60  Chronic Kidney Disease <60  Kidney Failure <15      Blood Culture - Blood, Arm, Left [[id number]] Collected: 06/13/22 0308    Specimen: Blood from Arm, Left Updated: 06/13/22 0332    Lactic Acid, Plasma [[id number]]  (Normal) Collected: 06/13/22 0308     "Specimen: Blood Updated: 06/13/22 0356     Lactate 1.1 mmol/L     Procalcitonin [[id number]]  (Normal) Collected: 06/13/22 0308    Specimen: Blood Updated: 06/13/22 0404     Procalcitonin 0.08 ng/mL     Narrative:      As a Marker for Sepsis (Non-Neonates):    1. <0.5 ng/mL represents a low risk of severe sepsis and/or septic shock.  2. >2 ng/mL represents a high risk of severe sepsis and/or septic shock.    As a Marker for Lower Respiratory Tract Infections that require antibiotic therapy:    PCT on Admission    Antibiotic Therapy       6-12 Hrs later    >0.5                Strongly Recommended  >0.25 - <0.5        Recommended   0.1 - 0.25          Discouraged              Remeasure/reassess PCT  <0.1                Strongly Discouraged     Remeasure/reassess PCT    As 28 day mortality risk marker: \"Change in Procalcitonin Result\" (>80% or <=80%) if Day 0 (or Day 1) and Day 4 values are available. Refer to http://www.[employer]-pct-calculator.com    Change in PCT <=80%  A decrease of PCT levels below or equal to 80% defines a positive change in PCT test result representing a higher risk for 28-day all-cause mortality of patients diagnosed with severe sepsis for septic shock.    Change in PCT >80%  A decrease of PCT levels of more than 80% defines a negative change in PCT result representing a lower risk for 28-day all-cause mortality of patients diagnosed with severe sepsis or septic shock.       CBC Auto Differential [[id number]]  (Abnormal) Collected: 06/13/22 0308    Specimen: Blood Updated: 06/13/22 0341     WBC 8.65 10*3/mm3      RBC 4.82 10*6/mm3      Hemoglobin 14.6 g/dL      Hematocrit 44.6 %      MCV 92.5 fL      MCH 30.3 pg      MCHC 32.7 g/dL      RDW 12.4 %      RDW-SD 42.3 fl      MPV 10.0 fL      Platelets 197 10*3/mm3      Neutrophil % 80.6 %      Lymphocyte % 6.1 %      Monocyte % 12.5 %      Eosinophil % 0.2 %      Basophil % 0.3 %      Immature Grans % 0.3 %      Neutrophils, Absolute 6.96 10*3/mm3      " Lymphocytes, Absolute 0.53 10*3/mm3      Monocytes, Absolute 1.08 10*3/mm3      Eosinophils, Absolute 0.02 10*3/mm3      Basophils, Absolute 0.03 10*3/mm3      Immature Grans, Absolute 0.03 10*3/mm3      nRBC 0.0 /100 WBC     Blood Culture - Blood, Arm, Left [[id number]] Collected: 06/13/22 0324    Specimen: Blood from Arm, Left Updated: 06/13/22 0332    Urinalysis With Microscopic If Indicated (No Culture) - Urine, Clean Catch [[id number]]  (Abnormal) Collected: 06/13/22 1214    Specimen: Urine, Clean Catch Updated: 06/13/22 1248     Color, UA Yellow     Appearance, UA Clear     pH, UA 5.5     Specific Gravity, UA 1.017     Glucose, UA Negative     Ketones, UA 80 mg/dL (3+)     Bilirubin, UA Negative     Blood, UA Trace     Protein, UA Trace     Leuk Esterase, UA Negative     Nitrite, UA Negative     Urobilinogen, UA 0.2 E.U./dL    Urinalysis, Microscopic Only - Urine, Clean Catch [[id number]]  (Abnormal) Collected: 06/13/22 1214    Specimen: Urine, Clean Catch Updated: 06/13/22 1248     RBC, UA 13-20 /HPF      WBC, UA 3-5 /HPF      Bacteria, UA None Seen /HPF      Squamous Epithelial Cells, UA 0-2 /HPF      Hyaline Casts, UA 0-2 /LPF      Methodology Automated Microscopy          Imaging Results (Last 24 Hours)     Procedure Component Value Units Date/Time    XR Chest 1 View [[id number]] Collected: 06/13/22 0314     Updated: 06/13/22 0318    Narrative:      SINGLE VIEW OF THE CHEST     HISTORY: COVID     COMPARISON: 06/12/2022     FINDINGS:  Heart size is within normal limits for technique. There is some  calcification of the aorta. No pneumothorax or pleural effusion is seen.  No acute infiltrates are identified.       Impression:      No acute findings.     This report was finalized on 6/13/2022 3:15 AM by Dr. Elke Fernandez M.D.                 ECG 12 Lead   Final Result   HEART RATE= 101  bpm   RR Interval= 594  ms   DE Interval= 170  ms   P Horizontal Axis= 19  deg   P Front Axis= 48  deg   QRSD Interval= 100   ms   QT Interval= 338  ms   QRS Axis= 53  deg   T Wave Axis= 28  deg   - OTHERWISE NORMAL ECG -   Sinus tachycardia   Consider left atrial enlargement   No change from previous tracing   Electronically Signed By: Ean SierraJOSÉ) ([facility]) 13-Jun-2022 12:47:36   Date and Time of Study: 2022-06-13 03:15:39           Assessment/Plan     Active Hospital Problems    Diagnosis  POA   • **COVID-19 [U07.1]  Yes   • Generalized weakness [R53.1]  Yes   • Benign prostatic hyperplasia without lower urinary tract symptoms [N40.0]  Yes   • CAD (coronary artery disease), native coronary artery [I25.10]  Yes   • Essential hypertension [I10]  Yes   • Mixed hyperlipidemia [E78.2]  Yes      Resolved Hospital Problems   No resolved problems to display.       Mr. Manriquez is a 77 y.o. male with a history of CAD/MI, BPH, DM, HTN, GIB who is admitted for fever with weakness, fatigue, Covid 19 virus    -Sats stable on RA. No evidence of infiltrate on CXR. Procal wnl, no leukocytosis. Monitor off antibiotics. Add Delsym for cough. Supportive treatment for Covid 19. OT/PT eval  -Denies difficulty urinating, dysuria. UA negative for infection. Continue Flomax, Myrbetriq  -Denies chest pain, CHF symptoms. Monitor on telemetry. ASA, statin, metoprolol  -Further recommendations based on hospital course    · I discussed the patient's findings and my recommendations with patient, nursing staff and Dr. Guy.    VTE Prophylaxis - Lovenox 40 mg SC daily.  Code Status - Full code.       MARVIN Hooper  [facility]  06/13/22  13:20 EDT

## 2022-06-13 NOTE — ED TRIAGE NOTES
Pt arrives to ED via EMS from home after a fall at home.  Pt spouse states they are confused, appears A&O x4 for EMS. Pt was discharged from this facility yesterday for a COVID diagnosis.      Patient was placed in face mask during first look triage.  Patient was wearing a face mask throughout encounter.  I wore personal protective equipment throughout the encounter.  Hand hygiene was performed before and after patient encounter.

## 2022-06-13 NOTE — OUTREACH NOTE
Prep Survey    Flowsheet Row Responses   [facility] facility patient discharged from? [place]   Is LACE score < 7 ? No   Emergency Room discharge w/ pulse ox? Yes   Eligibility Readm Mgmt   Discharge diagnosis COVID-19   Does the patient have one of the following disease processes/diagnoses(primary or secondary)? COVID-19   Does the patient have Home health ordered? No   Is there a DME ordered? Yes   What DME was ordered? sent home with pulse oximeter   General alerts for this patient ED visit-sent home with pulse oximeter   Prep survey completed? Yes          DEMETRIO VALDES - Registered Nurse

## 2022-06-13 NOTE — ED PROVIDER NOTES
EMERGENCY DEPARTMENT ENCOUNTER    Room Number:  15/15  Date of encounter:  6/13/2022  PCP: Eden Molina MD  Historian: Patient      HPI:  Chief Complaint: Generalized weakness, fell out of bed  A complete HPI/ROS/PMH/PSH/SH/FH are unobtainable due to: N/A    Context: Amandeep Manriquez Jr. is a 77 y.o. male who presents to the ED c/o generalized weakness.  He was diagnosed with COVID yesterday-see discussion below.  Tonight, he states he was trying to get in bed and could not-he was falling to the floor.  He did not hurt himself or strike his head.  He was unable to get up from the floor.    Prior record review-patient was evaluated in this emergency department on 6/12/2022 after testing positive for COVID.  Chest x-ray and labs were unremarkable.    He has been vaccinated and received both boosters for COVID-19.    Duration: Today  Onset: Gradual  Timing: Constant  Location: N/A  Radiation: N/A  Quality: Weakness  Intensity/Severity: Moderate to severe  Progression: Continues  Associated Symptoms: Weakness, fever   Aggravating Factors: Exertion  Alleviating Factors: Nothing  Previous Episodes: No  Treatment before arrival: His usual medications    The patient was placed in a mask in triage, hand hygiene was performed before and after my interaction with the patient.  I wore a mask, safety glasses and gloves during my entire interaction with the patient.    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   • Arthropathy of right sacroiliac joint 11/29/2018   • Osteoarthritis of right knee 12/27/2012   • Degenerative lumbar spinal stenosis 02/05/2020   • Renal stone 02/05/2020   • Bilateral impacted cerumen 02/05/2020   • Benign prostatic hyperplasia without lower urinary tract symptoms 02/05/2020   • Vertigo 02/05/2020   • High risk  medication use 08/17/2020   • Vitamin D deficiency 08/17/2020   • OAB (overactive bladder) 04/06/2021   • Rosacea 07/09/2021   • Short-term memory loss 10/19/2021   • TIA (transient ischemic attack) 02/07/2022     Resolved Ambulatory Problems     Diagnosis Date Noted   • Acute myocardial infarction (CMS/[facility])    • Right hip pain 11/29/2018   • Myopathy, congenital ([facility]) 12/03/2013   • Vascular dementia without behavioral disturbance ([facility]) 02/07/2022     Past Medical History:   Diagnosis Date   • 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   • CATARACT EXTRACTION     • CERVICAL FUSION     • CHOLECYSTECTOMY     • CORONARY ANGIOPLASTY WITH STENT PLACEMENT     • KNEE SURGERY Right    • OTHER SURGICAL HISTORY      percutaneous injection of extremity pseudoaneurysm         FAMILY HISTORY  Family History   Problem Relation Age of Onset   • Coronary artery disease Mother    • Heart disease Mother    • Heart attack Mother 68   • Cancer Father         bladder   • Heart disease Father         CHF   • No Known Problems Other          SOCIAL HISTORY  Social History     Socioeconomic History   • Marital status:    Tobacco Use   • Smoking status: Current Every Day Smoker     Types: Cigars   • Smokeless tobacco: Never Used   • Tobacco comment: caffeine use   Substance and Sexual Activity   • Alcohol use: Yes     Comment: social   • Drug use: Never   • Sexual activity: Never         ALLERGIES  Iodinated diagnostic agents and  Ampicillin        REVIEW OF SYSTEMS  Review of Systems   Constitutional: Positive for fatigue and fever.   Respiratory: Negative for cough and shortness of breath.    Cardiovascular: Negative for chest pain.   Gastrointestinal: Negative for abdominal pain, diarrhea, nausea and vomiting.   Neurological: Positive for weakness.        All systems reviewed and negative except for those discussed in HPI.       PHYSICAL EXAM    I have reviewed the triage vital signs and nursing notes.    ED Triage Vitals   Temp Pulse Resp BP SpO2   -- -- -- -- --      Temp src Heart Rate Source Patient Position BP Location FiO2 (%)   -- -- -- -- --       Physical Exam   Constitutional: Pt. is oriented to person, place, and time and well-developed, well-nourished, and in no distress.   HENT: Normocephalic and atraumatic.   Neck: Normal range of motion. Neck supple. No JVD present.   Cardiovascular: Normal rate, regular rhythm and normal heart sounds. No murmur heard.  Pulmonary/Chest: Effort normal and breath sounds normal. No stridor. No respiratory distress. No wheezes, no rales.   Abdominal: Soft. Bowel sounds are normal. No distension. There is no tenderness. There is no rebound and no guarding.   Musculoskeletal: Normal range of motion. No edema, tenderness or deformity.   Neurological: Pt. is alert and oriented to person, place, and time.  Other than generalized weakness, he has no focal neurologic deficits  Skin: Skin is warm and dry. No rash noted. Pt. is not diaphoretic. No erythema.   Psychiatric: Mood, affect and judgment normal.  He is pleasant and cooperative.  Nursing note and vitals reviewed.        LAB RESULTS  Recent Results (from the past 24 hour(s))   Comprehensive Metabolic Panel    Collection Time: 06/12/22  9:59 AM    Specimen: Blood   Result Value Ref Range    Glucose 110 (H) 65 - 99 mg/dL    BUN 10 8 - 23 mg/dL    Creatinine 0.48 (L) 0.76 - 1.27 mg/dL    Sodium 136 136 - 145 mmol/L    Potassium 3.9 3.5 - 5.2 mmol/L     Chloride 101 98 - 107 mmol/L    CO2 21.9 (L) 22.0 - 29.0 mmol/L    Calcium 9.2 8.6 - 10.5 mg/dL    Total Protein 6.7 6.0 - 8.5 g/dL    Albumin 4.00 3.50 - 5.20 g/dL    ALT (SGPT) 21 1 - 41 U/L    AST (SGOT) 30 1 - 40 U/L    Alkaline Phosphatase 95 39 - 117 U/L    Total Bilirubin 0.6 0.0 - 1.2 mg/dL    Globulin 2.7 gm/dL    A/G Ratio 1.5 g/dL    BUN/Creatinine Ratio 20.8 7.0 - 25.0    Anion Gap 13.1 5.0 - 15.0 mmol/L    eGFR 105.0 >60.0 mL/min/1.73   Lactic Acid, Plasma    Collection Time: 06/12/22  9:59 AM    Specimen: Blood   Result Value Ref Range    Lactate 1.4 0.5 - 2.0 mmol/L   Procalcitonin    Collection Time: 06/12/22  9:59 AM    Specimen: Blood   Result Value Ref Range    Procalcitonin 0.06 0.00 - 0.25 ng/mL   Respiratory Panel PCR w/COVID-19(SARS-CoV-2) [facility] In-House, NP Swab in [facility], 3-4 HR TAT - Swab, Nasopharynx    Collection Time: 06/12/22  9:59 AM    Specimen: Nasopharynx; Swab   Result Value Ref Range    ADENOVIRUS, PCR Not Detected Not Detected    Coronavirus 229E Not Detected Not Detected    Coronavirus HKU1 Not Detected Not Detected    Coronavirus NL63 Not Detected Not Detected    Coronavirus OC43 Not Detected Not Detected    COVID19 Detected (C) Not Detected - Ref. Range    Human Metapneumovirus Not Detected Not Detected    Human Rhinovirus/Enterovirus Not Detected Not Detected    Influenza A PCR Not Detected Not Detected    Influenza A H1 Not Detected Not Detected    Influenza A H1 2009 PCR Not Detected Not Detected    Influenza A H3 Not Detected Not Detected    Influenza B PCR Not Detected Not Detected    Parainfluenza Virus 1 Not Detected Not Detected    Parainfluenza Virus 2 Not Detected Not Detected    Parainfluenza Virus 3 Not Detected Not Detected    Parainfluenza Virus 4 Not Detected Not Detected    RSV, PCR Not Detected Not Detected    Bordetella pertussis pcr Not Detected Not Detected    Bordetella parapertussis PCR Not Detected Not Detected    Chlamydophila  pneumoniae PCR Not Detected Not Detected    Mycoplasma pneumo by PCR Not Detected Not Detected   CBC Auto Differential    Collection Time: 06/12/22  9:59 AM    Specimen: Blood   Result Value Ref Range    WBC 9.40 3.40 - 10.80 10*3/mm3    RBC 4.90 4.14 - 5.80 10*6/mm3    Hemoglobin 15.0 13.0 - 17.7 g/dL    Hematocrit 46.3 37.5 - 51.0 %    MCV 94.5 79.0 - 97.0 fL    MCH 30.6 26.6 - 33.0 pg    MCHC 32.4 31.5 - 35.7 g/dL    RDW 12.7 12.3 - 15.4 %    RDW-SD 43.6 37.0 - 54.0 fl    MPV 10.3 6.0 - 12.0 fL    Platelets 185 140 - 450 10*3/mm3    Neutrophil % 78.3 (H) 42.7 - 76.0 %    Lymphocyte % 7.0 (L) 19.6 - 45.3 %    Monocyte % 13.9 (H) 5.0 - 12.0 %    Eosinophil % 0.1 (L) 0.3 - 6.2 %    Basophil % 0.4 0.0 - 1.5 %    Immature Grans % 0.3 0.0 - 0.5 %    Neutrophils, Absolute 7.35 (H) 1.70 - 7.00 10*3/mm3    Lymphocytes, Absolute 0.66 (L) 0.70 - 3.10 10*3/mm3    Monocytes, Absolute 1.31 (H) 0.10 - 0.90 10*3/mm3    Eosinophils, Absolute 0.01 0.00 - 0.40 10*3/mm3    Basophils, Absolute 0.04 0.00 - 0.20 10*3/mm3    Immature Grans, Absolute 0.03 0.00 - 0.05 10*3/mm3    nRBC 0.0 0.0 - 0.2 /100 WBC   Comprehensive Metabolic Panel    Collection Time: 06/13/22  3:08 AM    Specimen: Blood   Result Value Ref Range    Glucose 114 (H) 65 - 99 mg/dL    BUN 8 8 - 23 mg/dL    Creatinine 0.43 (L) 0.76 - 1.27 mg/dL    Sodium 141 136 - 145 mmol/L    Potassium 3.6 3.5 - 5.2 mmol/L    Chloride 106 98 - 107 mmol/L    CO2 22.0 22.0 - 29.0 mmol/L    Calcium 8.9 8.6 - 10.5 mg/dL    Total Protein 6.6 6.0 - 8.5 g/dL    Albumin 4.10 3.50 - 5.20 g/dL    ALT (SGPT) 30 1 - 41 U/L    AST (SGOT) 61 (H) 1 - 40 U/L    Alkaline Phosphatase 89 39 - 117 U/L    Total Bilirubin 0.4 0.0 - 1.2 mg/dL    Globulin 2.5 gm/dL    A/G Ratio 1.6 g/dL    BUN/Creatinine Ratio 18.6 7.0 - 25.0    Anion Gap 13.0 5.0 - 15.0 mmol/L    eGFR 108.6 >60.0 mL/min/1.73   Lactic Acid, Plasma    Collection Time: 06/13/22  3:08 AM    Specimen: Blood   Result Value Ref Range    Lactate  1.1 0.5 - 2.0 mmol/L   Procalcitonin    Collection Time: 06/13/22  3:08 AM    Specimen: Blood   Result Value Ref Range    Procalcitonin 0.08 0.00 - 0.25 ng/mL   CBC Auto Differential    Collection Time: 06/13/22  3:08 AM    Specimen: Blood   Result Value Ref Range    WBC 8.65 3.40 - 10.80 10*3/mm3    RBC 4.82 4.14 - 5.80 10*6/mm3    Hemoglobin 14.6 13.0 - 17.7 g/dL    Hematocrit 44.6 37.5 - 51.0 %    MCV 92.5 79.0 - 97.0 fL    MCH 30.3 26.6 - 33.0 pg    MCHC 32.7 31.5 - 35.7 g/dL    RDW 12.4 12.3 - 15.4 %    RDW-SD 42.3 37.0 - 54.0 fl    MPV 10.0 6.0 - 12.0 fL    Platelets 197 140 - 450 10*3/mm3    Neutrophil % 80.6 (H) 42.7 - 76.0 %    Lymphocyte % 6.1 (L) 19.6 - 45.3 %    Monocyte % 12.5 (H) 5.0 - 12.0 %    Eosinophil % 0.2 (L) 0.3 - 6.2 %    Basophil % 0.3 0.0 - 1.5 %    Immature Grans % 0.3 0.0 - 0.5 %    Neutrophils, Absolute 6.96 1.70 - 7.00 10*3/mm3    Lymphocytes, Absolute 0.53 (L) 0.70 - 3.10 10*3/mm3    Monocytes, Absolute 1.08 (H) 0.10 - 0.90 10*3/mm3    Eosinophils, Absolute 0.02 0.00 - 0.40 10*3/mm3    Basophils, Absolute 0.03 0.00 - 0.20 10*3/mm3    Immature Grans, Absolute 0.03 0.00 - 0.05 10*3/mm3    nRBC 0.0 0.0 - 0.2 /100 WBC   ECG 12 Lead    Collection Time: 06/13/22  3:15 AM   Result Value Ref Range    QT Interval 338 ms       Ordered the above labs and independently reviewed the results.        RADIOLOGY  XR Chest 1 View    Result Date: 6/13/2022  SINGLE VIEW OF THE CHEST  HISTORY: COVID  COMPARISON: 06/12/2022  FINDINGS: Heart size is within normal limits for technique. There is some calcification of the aorta. No pneumothorax or pleural effusion is seen. No acute infiltrates are identified.      No acute findings.  This report was finalized on 6/13/2022 3:15 AM by Dr. Elke Fernandez M.D.      XR Chest 1 View    Result Date: 6/12/2022  PORTABLE CHEST 06/12/2022 AT 1008 HOURS  CLINICAL HISTORY: Cough  Lungs are well-expanded and appear free of active infiltrates. There are no pleural  effusions. The heart is at the upper limits of normal in size. The thoracic aorta is mildly ectatic.  IMPRESSIONS: No evidence of active disease within the chest.  This report was finalized on 6/12/2022 11:01 AM by Dr. Cosmo Wylie M.D.        I ordered the above noted radiological studies. Reviewed by me and discussed with radiologist.  See dictation for official radiology interpretation.      PROCEDURES    Procedures      MEDICATIONS GIVEN IN ER    Medications   sodium chloride 0.9 % flush 10 mL (has no administration in time range)   sodium chloride 0.9 % bolus 1,000 mL (1,000 mL Intravenous New Bag 6/13/22 0325)   acetaminophen (TYLENOL) tablet 1,000 mg (1,000 mg Oral Given 6/13/22 0308)         PROGRESS, DATA ANALYSIS, CONSULTS, AND MEDICAL DECISION MAKING    Any/all labs have been independently reviewed by me.  Any/all radiology studies have been reviewed by me and discussed with radiologist dictating the report.   EKG's independently viewed and interpreted by me.  Discussion below represents my analysis of pertinent findings related to patient's condition, differential diagnosis, treatment plan and final disposition.    Number of Diagnoses or Management Options     Amount and/or Complexity of Data Reviewed  Clinical lab tests:  Yes  Tests in the radiology section of CPT®:  Yes  Tests in the medicine section of CPT®:  Yes  Review and summarize past medical records:  (See HPI)  Independent visualization of images, tracings, or specimens: (See below)      ED Course as of 06/13/22 0509   Mon Jun 13, 2022   0317 EKG performed at 3:15 AM and interpreted by me shows normal sinus rhythm with a rate of 101 bpm.  TN intervals, QS complexes and ST-T segments are unremarkable.  There is no significant change with except heart rate when compared to 7/20/2015 [WC]   0358 Lactate: 1.1  Normal. [WC]   0359 CBC and CMP are unremarkable. [WC]   0423 Procalcitonin: 0.08 [WC]   0442 X-ray of the chest was independently  visualized by me and interpreted by/discussed with Dr. Fernandez (radiology)-there are no acute processes identified.  For official interpretation, see dictated report. [WC]   0507 Patient presents with COVID-19, fever and generalized weakness.  He is unable to care for himself at home in his current state.    Discussed with MARVIN Larson (on-call for [facility])-she accepted patient for observation admission to the telemetry floor on behalf of Dr. Shabazz. [WC]      ED Course User Index  [WC] Ean Zheng MD       AS OF 05:09 EDT VITALS:    BP - 136/94  HR - 103  TEMP - (!) 101.2 °F (38.4 °C) (Oral)  02 SATS - 97%        DIAGNOSIS  Final diagnoses:   COVID-19   Generalized weakness         DISPOSITION  Observation           Ean Zheng MD  06/13/22 7195

## 2022-06-13 NOTE — PLAN OF CARE
Goal Outcome Evaluation:  Plan of Care Reviewed With: patient  Progress: improving  Outcome Evaluation: VSS, no c/o pain, c/o cough (see MAR). BLEE 2+. UA sent to lab. PO care performed. Pt self turns, just needs to be reminded. Remains RA, afebrile. Spouse updated at bedside  Problem: Fall Injury Risk  Goal: Absence of Fall and Fall-Related Injury  Outcome: Ongoing, Progressing  Intervention: Identify and Manage Contributors  Recent Flowsheet Documentation  Taken 6/13/2022 1607 by Saad Hector, RN  Medication Review/Management: medications reviewed  Taken 6/13/2022 1438 by Saad Hector, RN  Medication Review/Management: medications reviewed  Taken 6/13/2022 1214 by Saad Hector RN  Medication Review/Management: medications reviewed  Taken 6/13/2022 1055 by Saad Hector, RN  Medication Review/Management: medications reviewed  Taken 6/13/2022 0906 by Saad Hector RN  Medication Review/Management: medications reviewed  Intervention: Promote Injury-Free Environment  Recent Flowsheet Documentation  Taken 6/13/2022 1607 by Saad Hector, RN  Safety Promotion/Fall Prevention:   activity supervised   assistive device/personal items within reach   clutter free environment maintained   fall prevention program maintained   lighting adjusted   nonskid shoes/slippers when out of bed   room organization consistent   safety round/check completed  Taken 6/13/2022 1438 by Saad Hector, RN  Safety Promotion/Fall Prevention:   activity supervised   assistive device/personal items within reach   clutter free environment maintained   fall prevention program maintained   nonskid shoes/slippers when out of bed   room organization consistent   safety round/check completed  Taken 6/13/2022 1214 by Saad Hector, RN  Safety Promotion/Fall Prevention:   activity supervised   assistive device/personal items within reach   clutter free environment maintained   fall  prevention program maintained   nonskid shoes/slippers when out of bed   room organization consistent   safety round/check completed   lighting adjusted   muscle strengthening facilitated  Taken 6/13/2022 1055 by Saad Hector, RN  Safety Promotion/Fall Prevention:   activity supervised   assistive device/personal items within reach   clutter free environment maintained   fall prevention program maintained   nonskid shoes/slippers when out of bed   room organization consistent   safety round/check completed  Taken 6/13/2022 0906 by Saad Hector, RN  Safety Promotion/Fall Prevention:   activity supervised   assistive device/personal items within reach   clutter free environment maintained   fall prevention program maintained   nonskid shoes/slippers when out of bed   room organization consistent   safety round/check completed   lighting adjusted     Problem: Adult Inpatient Plan of Care  Goal: Plan of Care Review  Outcome: Ongoing, Progressing  Flowsheets (Taken 6/13/2022 1657)  Progress: improving  Plan of Care Reviewed With: patient  Outcome Evaluation: VSS, no c/o pain, c/o cough (see MAR). BLEE 2+. UA sent to lab. PO care performed. Pt self turns, just needs to be reminded. Remains RA, afebrile. Spouse updated at bedside  Goal: Patient-Specific Goal (Individualized)  Outcome: Ongoing, Progressing  Goal: Absence of Hospital-Acquired Illness or Injury  Outcome: Ongoing, Progressing  Intervention: Identify and Manage Fall Risk  Recent Flowsheet Documentation  Taken 6/13/2022 1607 by Saad Hector, RN  Safety Promotion/Fall Prevention:   activity supervised   assistive device/personal items within reach   clutter free environment maintained   fall prevention program maintained   lighting adjusted   nonskid shoes/slippers when out of bed   room organization consistent   safety round/check completed  Taken 6/13/2022 1438 by Saad Hector, RN  Safety Promotion/Fall Prevention:   activity  supervised   assistive device/personal items within reach   clutter free environment maintained   fall prevention program maintained   nonskid shoes/slippers when out of bed   room organization consistent   safety round/check completed  Taken 6/13/2022 1214 by Saad Hector RN  Safety Promotion/Fall Prevention:   activity supervised   assistive device/personal items within reach   clutter free environment maintained   fall prevention program maintained   nonskid shoes/slippers when out of bed   room organization consistent   safety round/check completed   lighting adjusted   muscle strengthening facilitated  Taken 6/13/2022 1055 by Saad Hector RN  Safety Promotion/Fall Prevention:   activity supervised   assistive device/personal items within reach   clutter free environment maintained   fall prevention program maintained   nonskid shoes/slippers when out of bed   room organization consistent   safety round/check completed  Taken 6/13/2022 0906 by Saad Hector RN  Safety Promotion/Fall Prevention:   activity supervised   assistive device/personal items within reach   clutter free environment maintained   fall prevention program maintained   nonskid shoes/slippers when out of bed   room organization consistent   safety round/check completed   lighting adjusted  Intervention: Prevent Skin Injury  Recent Flowsheet Documentation  Taken 6/13/2022 1607 by Saad Hector RN  Body Position:   side-lying   right   position changed independently  Taken 6/13/2022 1438 by Saad Hector RN  Skin Protection:   adhesive use limited   transparent dressing maintained   tubing/devices free from skin contact  Taken 6/13/2022 1214 by Saad Hector RN  Body Position: position changed independently  Taken 6/13/2022 1055 by Saad Hector RN  Body Position:   side-lying   left   dangle, side of bed  Taken 6/13/2022 0906 by Saad Hector RN  Body Position: position  changed independently  Intervention: Prevent and Manage VTE (Venous Thromboembolism) Risk  Recent Flowsheet Documentation  Taken 6/13/2022 1607 by Saad Hector RN  Activity Management:   activity encouraged   activity adjusted per tolerance  Taken 6/13/2022 1438 by Saad Hector RN  Activity Management:   activity encouraged   activity adjusted per tolerance  VTE Prevention/Management:   dorsiflexion/plantar flexion performed   bilateral  Taken 6/13/2022 1214 by Saad Hector RN  Activity Management:   activity adjusted per tolerance   activity encouraged  Taken 6/13/2022 1055 by Saad Hector RN  Activity Management: activity adjusted per tolerance  Taken 6/13/2022 0906 by Saad Hector RN  Activity Management:   activity adjusted per tolerance   activity encouraged  Range of Motion:   ROM (range of motion) performed   active ROM (range of motion) encouraged  Intervention: Prevent Infection  Recent Flowsheet Documentation  Taken 6/13/2022 1607 by Saad Hector RN  Infection Prevention:   rest/sleep promoted   personal protective equipment utilized   hand hygiene promoted  Taken 6/13/2022 0906 by Saad Hector RN  Infection Prevention:   personal protective equipment utilized   hand hygiene promoted  Goal: Optimal Comfort and Wellbeing  Outcome: Ongoing, Progressing  Goal: Readiness for Transition of Care  Outcome: Ongoing, Progressing     Problem: Hypertension Comorbidity  Goal: Blood Pressure in Desired Range  Outcome: Ongoing, Progressing  Intervention: Maintain Blood Pressure Management  Recent Flowsheet Documentation  Taken 6/13/2022 1607 by Saad Hector RN  Medication Review/Management: medications reviewed  Taken 6/13/2022 1438 by Saad Hector RN  Medication Review/Management: medications reviewed  Taken 6/13/2022 1214 by Saad Hector RN  Medication Review/Management: medications reviewed  Taken 6/13/2022 1055 by Stefan  Saad Rivas RN  Medication Review/Management: medications reviewed  Taken 6/13/2022 0906 by Saad Hector RN  Medication Review/Management: medications reviewed     Problem: Osteoarthritis Comorbidity  Goal: Maintenance of Osteoarthritis Symptom Control  Outcome: Ongoing, Progressing  Intervention: Maintain Osteoarthritis Symptom Control  Recent Flowsheet Documentation  Taken 6/13/2022 1607 by Saad Hector RN  Activity Management:   activity encouraged   activity adjusted per tolerance  Medication Review/Management: medications reviewed  Taken 6/13/2022 1438 by Saad Hector RN  Activity Management:   activity encouraged   activity adjusted per tolerance  Medication Review/Management: medications reviewed  Taken 6/13/2022 1214 by Saad Hector RN  Activity Management:   activity adjusted per tolerance   activity encouraged  Medication Review/Management: medications reviewed  Taken 6/13/2022 1055 by Saad Hector RN  Activity Management: activity adjusted per tolerance  Medication Review/Management: medications reviewed  Taken 6/13/2022 0906 by Saad Hector RN  Activity Management:   activity adjusted per tolerance   activity encouraged  Medication Review/Management: medications reviewed     Problem: Pain Chronic (Persistent) (Comorbidity Management)  Goal: Acceptable Pain Control and Functional Ability  Outcome: Ongoing, Progressing  Intervention: Manage Persistent Pain  Recent Flowsheet Documentation  Taken 6/13/2022 1607 by Saad Hector RN  Medication Review/Management: medications reviewed  Taken 6/13/2022 1438 by Saad Hector RN  Medication Review/Management: medications reviewed  Taken 6/13/2022 1214 by Saad Hector RN  Medication Review/Management: medications reviewed  Taken 6/13/2022 1055 by Saad Hector RN  Medication Review/Management: medications reviewed  Taken 6/13/2022 0906 by Saad Hector  RN  Medication Review/Management: medications reviewed  Intervention: Optimize Psychosocial Wellbeing  Recent Flowsheet Documentation  Taken 6/13/2022 1438 by Saad Hector RN  Supportive Measures: active listening utilized  Taken 6/13/2022 0906 by Saad Hector RN  Supportive Measures: active listening utilized     Problem: Skin Injury Risk Increased  Goal: Skin Health and Integrity  Outcome: Ongoing, Progressing  Intervention: Optimize Skin Protection  Recent Flowsheet Documentation  Taken 6/13/2022 1607 by Saad Hector RN  Head of Bed (HOB) Positioning: HOB elevated  Taken 6/13/2022 1438 by Saad Hector RN  Skin Protection:   adhesive use limited   transparent dressing maintained   tubing/devices free from skin contact  Taken 6/13/2022 1214 by Saad Hector RN  Head of Bed (HOB) Positioning: HOB elevated  Taken 6/13/2022 1055 by Saad Hector RN  Head of Bed (HOB) Positioning: HOB elevated  Taken 6/13/2022 0906 by Saad Hector RN  Head of Bed (HOB) Positioning: HOB elevated

## 2022-06-14 LAB
ALBUMIN SERPL-MCNC: 3.5 G/DL (ref 3.5–5.2)
ALBUMIN/GLOB SERPL: 1.5 G/DL
ALP SERPL-CCNC: 77 U/L (ref 39–117)
ALT SERPL W P-5'-P-CCNC: 28 U/L (ref 1–41)
ANION GAP SERPL CALCULATED.3IONS-SCNC: 13 MMOL/L (ref 5–15)
AST SERPL-CCNC: 63 U/L (ref 1–40)
BASOPHILS # BLD AUTO: 0.04 10*3/MM3 (ref 0–0.2)
BASOPHILS NFR BLD AUTO: 0.5 % (ref 0–1.5)
BILIRUB SERPL-MCNC: 0.4 MG/DL (ref 0–1.2)
BUN SERPL-MCNC: 10 MG/DL (ref 8–23)
BUN/CREAT SERPL: 26.3 (ref 7–25)
CALCIUM SPEC-SCNC: 8.3 MG/DL (ref 8.6–10.5)
CHLORIDE SERPL-SCNC: 106 MMOL/L (ref 98–107)
CO2 SERPL-SCNC: 20 MMOL/L (ref 22–29)
CREAT SERPL-MCNC: 0.38 MG/DL (ref 0.76–1.27)
CRP SERPL-MCNC: 5.96 MG/DL (ref 0–0.5)
D DIMER PPP FEU-MCNC: 0.67 MCGFEU/ML (ref 0–0.49)
DEPRECATED RDW RBC AUTO: 42.5 FL (ref 37–54)
EGFRCR SERPLBLD CKD-EPI 2021: 112.7 ML/MIN/1.73
EOSINOPHIL # BLD AUTO: 0.03 10*3/MM3 (ref 0–0.4)
EOSINOPHIL NFR BLD AUTO: 0.4 % (ref 0.3–6.2)
ERYTHROCYTE [DISTWIDTH] IN BLOOD BY AUTOMATED COUNT: 12.7 % (ref 12.3–15.4)
GLOBULIN UR ELPH-MCNC: 2.3 GM/DL
GLUCOSE SERPL-MCNC: 71 MG/DL (ref 65–99)
HCT VFR BLD AUTO: 41.5 % (ref 37.5–51)
HGB BLD-MCNC: 13.6 G/DL (ref 13–17.7)
IMM GRANULOCYTES # BLD AUTO: 0.02 10*3/MM3 (ref 0–0.05)
IMM GRANULOCYTES NFR BLD AUTO: 0.3 % (ref 0–0.5)
INR PPP: 1.04 (ref 0.9–1.1)
LYMPHOCYTES # BLD AUTO: 0.79 10*3/MM3 (ref 0.7–3.1)
LYMPHOCYTES NFR BLD AUTO: 10 % (ref 19.6–45.3)
MAGNESIUM SERPL-MCNC: 2.3 MG/DL (ref 1.6–2.4)
MCH RBC QN AUTO: 29.8 PG (ref 26.6–33)
MCHC RBC AUTO-ENTMCNC: 32.8 G/DL (ref 31.5–35.7)
MCV RBC AUTO: 91 FL (ref 79–97)
MONOCYTES # BLD AUTO: 1.06 10*3/MM3 (ref 0.1–0.9)
MONOCYTES NFR BLD AUTO: 13.5 % (ref 5–12)
NEUTROPHILS NFR BLD AUTO: 5.93 10*3/MM3 (ref 1.7–7)
NEUTROPHILS NFR BLD AUTO: 75.3 % (ref 42.7–76)
NRBC BLD AUTO-RTO: 0 /100 WBC (ref 0–0.2)
PLATELET # BLD AUTO: 182 10*3/MM3 (ref 140–450)
PMV BLD AUTO: 9.4 FL (ref 6–12)
POTASSIUM SERPL-SCNC: 3.3 MMOL/L (ref 3.5–5.2)
PROT SERPL-MCNC: 5.8 G/DL (ref 6–8.5)
PROTHROMBIN TIME: 13.5 SECONDS (ref 11.7–14.2)
RBC # BLD AUTO: 4.56 10*6/MM3 (ref 4.14–5.8)
SODIUM SERPL-SCNC: 139 MMOL/L (ref 136–145)
WBC NRBC COR # BLD: 7.87 10*3/MM3 (ref 3.4–10.8)

## 2022-06-14 PROCEDURE — 25010000002 REMDESIVIR 100 MG/20ML SOLUTION 1 EACH VIAL: Performed by: INTERNAL MEDICINE

## 2022-06-14 PROCEDURE — 25010000002 ENOXAPARIN PER 10 MG: Performed by: NURSE PRACTITIONER

## 2022-06-14 PROCEDURE — 85379 FIBRIN DEGRADATION QUANT: CPT | Performed by: NURSE PRACTITIONER

## 2022-06-14 PROCEDURE — 83735 ASSAY OF MAGNESIUM: CPT | Performed by: NURSE PRACTITIONER

## 2022-06-14 PROCEDURE — 80053 COMPREHEN METABOLIC PANEL: CPT | Performed by: NURSE PRACTITIONER

## 2022-06-14 PROCEDURE — G0378 HOSPITAL OBSERVATION PER HR: HCPCS

## 2022-06-14 PROCEDURE — 97530 THERAPEUTIC ACTIVITIES: CPT

## 2022-06-14 PROCEDURE — 97162 PT EVAL MOD COMPLEX 30 MIN: CPT

## 2022-06-14 PROCEDURE — 96372 THER/PROPH/DIAG INJ SC/IM: CPT

## 2022-06-14 PROCEDURE — 86140 C-REACTIVE PROTEIN: CPT | Performed by: NURSE PRACTITIONER

## 2022-06-14 PROCEDURE — 85610 PROTHROMBIN TIME: CPT | Performed by: NURSE PRACTITIONER

## 2022-06-14 PROCEDURE — 97110 THERAPEUTIC EXERCISES: CPT

## 2022-06-14 PROCEDURE — 36415 COLL VENOUS BLD VENIPUNCTURE: CPT | Performed by: NURSE PRACTITIONER

## 2022-06-14 PROCEDURE — 85025 COMPLETE CBC W/AUTO DIFF WBC: CPT | Performed by: NURSE PRACTITIONER

## 2022-06-14 PROCEDURE — 97166 OT EVAL MOD COMPLEX 45 MIN: CPT

## 2022-06-14 RX ADMIN — MIRABEGRON 50 MG: 25 TABLET, FILM COATED, EXTENDED RELEASE ORAL at 08:49

## 2022-06-14 RX ADMIN — BACLOFEN 10 MG: 10 TABLET ORAL at 20:38

## 2022-06-14 RX ADMIN — TAMSULOSIN HYDROCHLORIDE 0.4 MG: 0.4 CAPSULE ORAL at 20:38

## 2022-06-14 RX ADMIN — ROSUVASTATIN CALCIUM 40 MG: 40 TABLET, FILM COATED ORAL at 08:50

## 2022-06-14 RX ADMIN — Medication 10 ML: at 20:38

## 2022-06-14 RX ADMIN — Medication 10 ML: at 08:49

## 2022-06-14 RX ADMIN — METOPROLOL TARTRATE 12.5 MG: 25 TABLET, FILM COATED ORAL at 08:49

## 2022-06-14 RX ADMIN — CYCLOSPORINE 1 DROP: 0.5 EMULSION OPHTHALMIC at 08:49

## 2022-06-14 RX ADMIN — MULTIPLE VITAMINS W/ MINERALS TAB 1 TABLET: TAB at 08:49

## 2022-06-14 RX ADMIN — LATANOPROST 1 DROP: 50 SOLUTION OPHTHALMIC at 20:40

## 2022-06-14 RX ADMIN — CYCLOSPORINE 1 DROP: 0.5 EMULSION OPHTHALMIC at 20:39

## 2022-06-14 RX ADMIN — METOPROLOL TARTRATE 12.5 MG: 25 TABLET, FILM COATED ORAL at 20:38

## 2022-06-14 RX ADMIN — ENOXAPARIN SODIUM 40 MG: 100 INJECTION SUBCUTANEOUS at 08:49

## 2022-06-14 RX ADMIN — DEXTROMETHORPHAN 60 MG: 30 SUSPENSION, EXTENDED RELEASE ORAL at 20:40

## 2022-06-14 RX ADMIN — REMDESIVIR 100 MG: 100 INJECTION, POWDER, LYOPHILIZED, FOR SOLUTION INTRAVENOUS at 16:41

## 2022-06-14 RX ADMIN — ASPIRIN 81 MG: 81 TABLET, COATED ORAL at 08:49

## 2022-06-14 RX ADMIN — DEXTROMETHORPHAN 60 MG: 30 SUSPENSION, EXTENDED RELEASE ORAL at 08:49

## 2022-06-14 NOTE — THERAPY EVALUATION
Patient Name: Amandeep Manriquez Jr.  : 1945    MRN: [id number]                              Today's Date: 2022       Admit Date: 2022    Visit Dx:     ICD-10-CM ICD-9-CM   1. COVID-19  U07.1 079.89   2. Generalized weakness  R53.1 780.79     Patient Active Problem List   Diagnosis   • CAD (coronary artery disease), native coronary artery   • Essential hypertension   • Mixed hyperlipidemia   • Congenital myopathy (HCC)   • Arthropathy of right sacroiliac joint   • Osteoarthritis of right knee   • Degenerative lumbar spinal stenosis   • Renal stone   • Bilateral impacted cerumen   • Benign prostatic hyperplasia without lower urinary tract symptoms   • Vertigo   • High risk medication use   • Vitamin D deficiency   • OAB (overactive bladder)   • Rosacea   • Short-term memory loss   • History of CVA (cerebrovascular accident)   • COVID-19   • Generalized weakness     Past Medical History:   Diagnosis Date   • Acute myocardial infarction (HCC)    • 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[facility])    • Arthritis    • BPH (benign prostatic hyperplasia)    • CAD (coronary artery disease), native coronary artery    • Congenital myopathy (HCC)    • DDD (degenerative disc disease), lumbar     with stenosis   • Diabetes mellitus ([facility])    • Dizziness     Chronic Dizziness   • Encounter for Medicare annual wellness exam    • Essential hypertension    • Gastrointestinal hemorrhage    • History of impacted cerumen    • Hyperlipidemia    • Hypertension    • Mixed hyperlipidemia      Past Surgical History:   Procedure Laterality Date   • CARDIAC CATHETERIZATION  2015    30% Left main, 99% mid to distal LAD, 70-80% left circumflex, 80% proximal to mid RCA.   • CATARACT EXTRACTION     • CERVICAL FUSION     • CHOLECYSTECTOMY     • CORONARY ANGIOPLASTY WITH STENT PLACEMENT     • KNEE SURGERY Right    • OTHER SURGICAL HISTORY      percutaneous injection of extremity pseudoaneurysm      General Information     Row Name 22  1452          OT Time and Intention    Document Type evaluation  -     Mode of Treatment individual therapy;occupational therapy  -[facility] [place] Name 06/14/22 1452          General Information    Patient Profile Reviewed yes  -     Prior Level of Function --  Reports using standard walker in the home and w/c for community mobility. wife reports assisting him with ADLs at home  -     Existing Precautions/Restrictions fall  -     Barriers to Rehab medically complex  -[facility] [place] Name 06/14/22 1452          Living Environment    People in Home spouse  -[facility] [place] Name 06/14/22 1452          Home Main Entrance    Number of Stairs, Main Entrance none  elevator in condo/ apartment complex  -[facility] [place] Name 06/14/22 1452          Cognition    Orientation Status (Cognition) oriented x 3  -[facility] [place] Name 06/14/22 1452          Safety Issues, Functional Mobility    Impairments Affecting Function (Mobility) balance;coordination;endurance/activity tolerance;strength  -           User Key  (r) = Recorded By, (t) = Taken By, (c) = Cosigned By    Initials Name Provider Type     Oumou Lynch, OT Occupational Therapist                 Mobility/ADL's     Row Name 06/14/22 1453          Bed Mobility    Comment, (Bed Mobility) pt sitting EOB with wife present upon arrival  -KA     Row Name 06/14/22 1453          Sit-Stand Transfer    Comment, (Sit-Stand Transfer) pt reported he was unable to fully stand with PT earlier  -KA     Row Name 06/14/22 1453          Functional Mobility    Functional Mobility- Ind. Level not tested  -KA     Row Name 06/14/22 1453          Activities of Daily Living    BADL Assessment/Intervention lower body dressing;grooming;toileting  -KA     Row Name 06/14/22 1453          Lower Body Dressing Assessment/Training    Rich Level (Lower Body Dressing) lower body dressing skills;don;doff;maximum assist (25% patient effort)  -     Position (Lower Body Dressing) edge of bed sitting  -      Comment, (Lower Body Dressing) Wife reports assisting pt with ADLs at home  -     Row Name 06/14/22 1450          Grooming Assessment/Training    King Level (Grooming) grooming skills;wash face, hands;set up;standby assist  -     Position (Grooming) edge of bed sitting  -     Comment, (Grooming) Pt was sitting EOB attempting to change hearing aide batteries with wife present. Required increased time and several attempts. Therapist assisted required to insert battery.  FM deficit noted.  -     Row Name 06/14/22 1453          Toileting Assessment/Training    King Level (Toileting) toileting skills;dependent (less than 25% patient effort)  brief donned  -           User Key  (r) = Recorded By, (t) = Taken By, (c) = Cosigned By    Initials Name Provider Type    Oumou Jain OT Occupational Therapist               Obj/Interventions     [facility] Name 06/14/22 1525          Sensory Assessment (Somatosensory)    Sensory Assessment Reported no issues with BUE  -[facility] [place] [facility] Name 06/14/22 1525          Range of Motion Comprehensive    General Range of Motion bilateral upper extremity ROM WFL  -[facility] Name 06/14/22 1525          Strength Comprehensive (MMT)    Comment, General Manual Muscle Testing (MMT) Assessment BUE held slight resistance  -     Row Name 06/14/22 1525          Shoulder (Therapeutic Exercise)    Shoulder (Therapeutic Exercise) strengthening exercise  -     Shoulder Strengthening (Therapeutic Exercise) bilateral;flexion;extension;sitting;10 repetitions  slight resistance holding a pillow; forward press  -[facility] Name 06/14/22 1525          Elbow/Forearm (Therapeutic Exercise)    Elbow/Forearm (Therapeutic Exercise) strengthening exercise  -     Elbow/Forearm Strengthening (Therapeutic Exercise) bilateral;flexion;extension;10 repetitions  slight resistance holding a pillow  -[facility] Name 06/14/22 1525          Motor Skills    Therapeutic Exercise  shoulder;elbow/forearm  -[facility] Name 06/14/22 1525          Balance    Balance Assessment sitting static balance;sitting dynamic balance  -KA     Static Sitting Balance standby assist  -KA     Dynamic Sitting Balance contact guard  -KA     Position, Sitting Balance sitting edge of bed  -KA           User Key  (r) = Recorded By, (t) = Taken By, (c) = Cosigned By    Initials Name Provider Type    Oumou Jain, OT Occupational Therapist               Goals/Plan     [facility] Name 06/14/22 1538          Transfer Goal 1 (OT)    Activity/Assistive Device (Transfer Goal 1, OT) commode, 3-in-1  -KA     [place] Level/Cues Needed (Transfer Goal 1, OT) moderate assist (50-74% patient effort)  -KA     Time Frame (Transfer Goal 1, OT) 2 weeks  -KA     Progress/Outcome (Transfer Goal 1, OT) goal ongoing  -KA     Row Name 06/14/22 1538          Bathing Goal 1 (OT)    Activity/Device (Bathing Goal 1, OT) bathing skills, all  -KA     Massey Level/Cues Needed (Bathing Goal 1, OT) minimum assist (75% or more patient effort)  -KA     Time Frame (Bathing Goal 1, OT) 2 weeks  -KA     Progress/Outcomes (Bathing Goal 1, OT) goal ongoing  -KA     Row Name 06/14/22 1538          Dressing Goal 1 (OT)    Activity/Device (Dressing Goal 1, OT) dressing skills, all;upper body dressing  -KA     Massey/Cues Needed (Dressing Goal 1, OT) minimum assist (75% or more patient effort)  -KA     Time Frame (Dressing Goal 1, OT) 2 weeks  -KA     Progress/Outcome (Dressing Goal 1, OT) goal ongoing  -KA     Row Name 06/14/22 1538          Grooming Goal 1 (OT)    Activity/Device (Grooming Goal 1, OT) grooming skills, all  -KA     Massey (Grooming Goal 1, OT) standby assist  -KA     Time Frame (Grooming Goal 1, OT) 2 weeks  -KA     Progress/Outcome (Grooming Goal 1, OT) goal ongoing  -KA     Row Name 06/14/22 1538          Strength Goal 1 (OT)    Strength Goal 1 (OT) Pt to demonstrate understanding of HEP in order to increase  functional endurance and strength required for ADLs.  -     Time Frame (Strength Goal 1, OT) 2 weeks  -     Progress/Outcome (Strength Goal 1, OT) goal ongoing  -     Row Name 06/14/22 1538          Therapy Assessment/Plan (OT)    Planned Therapy Interventions (OT) activity tolerance training;functional balance retraining;BADL retraining;patient/caregiver education/training;transfer/mobility retraining;strengthening exercise;occupation/activity based interventions  -           User Key  (r) = Recorded By, (t) = Taken By, (c) = Cosigned By    Initials Name Provider Type    Oumou Jain OT Occupational Therapist               Clinical Impression     Row Name 06/14/22 1529          Pain Assessment    Pretreatment Pain Rating 0/10 - no pain  -     Posttreatment Pain Rating 0/10 - no pain  -[facility] Name 06/14/22 1529          Plan of Care Review    Plan of Care Reviewed With patient  -     Outcome Evaluation Pt seen for OT eval this PM. Admitted with fall, generalized weakness, body aches and COVID. Pt reports living with his wife who assists him with ADLs at home. Reports using a standard walker for functional mobility within the home. Pt wife reports he has difficulty initially standing and does better from elevated surfaces. Pt today presented with generalized weakness, decreased balance, endurance and activity tolerance which hinders his participation in functional mobility and ADLs.  Sat EOB and participated in BUE HEP to improve functional endurance/strength. Wife is very supportive and explained their current HEP they perform daily.  Pt to benefit from skilled OT to address deficits and maximize independence with ADLs. Rec SNF at discharge  -[facility] Name 06/14/22 1529          Therapy Assessment/Plan (OT)    Criteria for Skilled Therapeutic Interventions Met (OT) yes  -     Therapy Frequency (OT) 5 times/wk  -     Row Name 06/14/22 1529          Therapy Plan Review/Discharge Plan (OT)     Anticipated Discharge Disposition (OT) skilled nursing [facility]  -     Row Name 06/14/22 1529          Vital Signs    Pre Patient Position Sitting  EOB  -KA     Intra Patient Position Sitting  EOB  -KA     Post Patient Position Sitting  EOB  -KA     Row Name 06/14/22 1529          Positioning and Restraints    Pre-Treatment Position in bed  -KA     Post Treatment Position bed  -KA     In Bed encouraged to call for assist;call light within reach;exit alarm on;with family/caregiver  Sitting EOB with wife present  -           User Key  (r) = Recorded By, (t) = Taken By, (c) = Cosigned By    Initials Name Provider Type    Oumou Jain, HPUONG Occupational Therapist               Outcome Measures     Row Name 06/14/22 1542          How much help from another is currently needed...    Putting on and taking off regular lower body clothing? 1  -KA     Bathing (including washing, rinsing, and drying) 2  -KA     Toileting (which includes using toilet bed pan or urinal) 1  -KA     Putting on and taking off regular upper body clothing 2  -KA     Taking care of personal grooming (such as brushing teeth) 3  -KA     Eating meals 3  -KA     AM-PAC 6 Clicks Score (OT) 12  -KA     Row Name 06/14/22 1339          How much help from another person do you currently need...    Turning from your back to your side while in flat bed without using bedrails? 2  -CH     Moving from lying on back to sitting on the side of a flat bed without bedrails? 2  -CH     Moving to and from a bed to a chair (including a wheelchair)? 1  -CH     Standing up from a chair using your arms (e.g., wheelchair, bedside chair)? 2  -CH     Climbing 3-5 steps with a railing? 1  -CH     To walk in hospital room? 1  -CH     AM-PAC 6 Clicks Score (PT) 9  -CH     Highest level of mobility 3 --> Sat at edge of bed  -     Row Name 06/14/22 1542 06/14/22 1339       Functional Assessment    Outcome Measure Options AM-PAC 6 Clicks Daily Activity (OT)  -KA AM-PAC 6  Clicks Basic Mobility (PT)  -          User Key  (r) = Recorded By, (t) = Taken By, (c) = Cosigned By    Initials Name Provider Type    CH Erica Anderson, PT Physical Therapist    Oumou Jain OT Occupational Therapist                Occupational Therapy Education                 Title: PT OT SLP Therapies (Done)     Topic: Occupational Therapy (Done)     Point: Home exercise program (Done)     Description:   Instruct learner(s) on appropriate technique for monitoring, assisting and/or progressing therapeutic exercises/activities.              Learning Progress Summary           Patient Acceptance, E,D, VU by LEROY at 6/14/2022 1543                               User Key     Initials Effective Dates Name Provider Type Discipline    LEROY 02/22/22 -  Oumou Lynch OT Occupational Therapist OT              OT Recommendation and Plan  Planned Therapy Interventions (OT): activity tolerance training, functional balance retraining, BADL retraining, patient/caregiver education/training, transfer/mobility retraining, strengthening exercise, occupation/activity based interventions  Therapy Frequency (OT): 5 times/wk  Plan of Care Review  Plan of Care Reviewed With: patient  Outcome Evaluation: Pt seen for OT eval this PM. Admitted with fall, generalized weakness, body aches and COVID. Pt reports living with his wife who assists him with ADLs at home. Reports using a standard walker for functional mobility within the home. Pt wife reports he has difficulty initially standing and does better from elevated surfaces. Pt today presented with generalized weakness, decreased balance, endurance and activity tolerance which hinders his participation in functional mobility and ADLs.  Sat EOB and participated in BUE HEP to improve functional endurance/strength. Wife is very supportive and explained their current HEP they perform daily.  Pt to benefit from skilled OT to address deficits and maximize independence with ADLs.  Rec SNF at discharge     Time Calculation:    Time Calculation- OT     Row Name 06/14/22 1543             Time Calculation- OT    OT Start Time 1342  -KA      OT Stop Time 1417  -KA      OT Time Calculation (min) 35 min  -KA      Total Timed Code Minutes- OT 25 minute(s)  -KA      OT Received On 06/14/22  -KA      OT - Next Appointment 06/15/22  -KA      OT Goal Re-Cert Due Date 06/28/22  -KA              Timed Charges    59695 - OT Therapeutic Exercise Minutes 10  -KA      15667 - OT Therapeutic Activity Minutes 15  -KA              Untimed Charges    OT Eval/Re-eval Minutes 10  -KA              Total Minutes    Timed Charges Total Minutes 25  -KA      Untimed Charges Total Minutes 10  -KA       Total Minutes 35  -KA            User Key  (r) = Recorded By, (t) = Taken By, (c) = Cosigned By    Initials Name Provider Type    Simone Jain OT Occupational Therapist              Therapy Charges for Today     Code Description Service Date Service Provider Modifiers Qty    00295767728 HC OT THER PROC EA 15 MIN 6/14/2022 Simone Lynch OT GO 1    84272981853 HC OT THERAPEUTIC ACT EA 15 MIN 6/14/2022 Simone Lynch OT GO 1    36087844470 HC OT EVAL MOD COMPLEXITY 2 6/14/2022 Simone Lynch OT GO 1               SIMONE LYNCH OT  6/14/2022

## 2022-06-14 NOTE — PROGRESS NOTES
Name: Amandeep Manriquez Jr. ADMIT: 2022   : 1945  PCP: Eden Molina MD    MRN: [id number] LOS: 0 days   AGE/SEX: 77 y.o. male  ROOM: [facility]     Subjective   Subjective   CC: cough/generalized weakness  No acute events. Patient overall feels better. No new complaints. Taking PO. No CP/f/c/n/v/d. Dyspnea is minimal.     Objective   Objective   Vital Signs  Temp:  [97.9 °F (36.6 °C)-98.7 °F (37.1 °C)] 98.7 °F (37.1 °C)  Heart Rate:  [71-85] 71  Resp:  [16-18] 18  BP: (115-163)/(68-77) 115/71  SpO2:  [94 %-96 %] 94 %  on   ;   Device (Oxygen Therapy): room air  Body mass index is 29.31 kg/m².  Physical Exam  Vitals and nursing note reviewed.   Constitutional:       General: He is not in acute distress.     Appearance: He is not toxic-appearing or diaphoretic.   HENT:      Head: Normocephalic and atraumatic.      Nose: Nose normal.      Mouth/Throat:      Mouth: Mucous membranes are moist.      Pharynx: Oropharynx is clear.   Eyes:      Extraocular Movements: Extraocular movements intact.      Conjunctiva/sclera: Conjunctivae normal.      Pupils: Pupils are equal, round, and reactive to light.   Cardiovascular:      Rate and Rhythm: Normal rate and regular rhythm.      Pulses: Normal pulses.   Pulmonary:      Effort: Pulmonary effort is normal.      Breath sounds: Normal breath sounds.   Abdominal:      General: Bowel sounds are normal.      Palpations: Abdomen is soft.      Tenderness: There is no abdominal tenderness.   Musculoskeletal:         General: No swelling or tenderness.      Cervical back: Normal range of motion and neck supple.   Skin:     General: Skin is warm and dry.      Capillary Refill: Capillary refill takes less than 2 seconds.   Neurological:      General: No focal deficit present.      Mental Status: He is alert.   Psychiatric:         Mood and Affect: Mood normal.         Behavior: Behavior normal.       Results Review     I reviewed the patient's new clinical results.  I  reviewed the patient's telemetry.   Results from last 7 days   Lab Units 06/14/22  0815 06/13/22  0308 06/12/22  0959   WBC 10*3/mm3 7.87 8.65 9.40   HEMOGLOBIN g/dL 13.6 14.6 15.0   PLATELETS 10*3/mm3 182 197 185     Results from last 7 days   Lab Units 06/14/22  0815 06/13/22  0308 06/12/22  0959   SODIUM mmol/L 139 141 136   POTASSIUM mmol/L 3.3* 3.6 3.9   CHLORIDE mmol/L 106 106 101   CO2 mmol/L 20.0* 22.0 21.9*   BUN mg/dL 10 8 10   CREATININE mg/dL 0.38* 0.43* 0.48*   GLUCOSE mg/dL 71 114* 110*   EGFR mL/min/1.73 112.7 108.6 105.0     Results from last 7 days   Lab Units 06/14/22  0815 06/13/22  0308 06/12/22  0959   ALBUMIN g/dL 3.50 4.10 4.00   BILIRUBIN mg/dL 0.4 0.4 0.6   ALK PHOS U/L 77 89 95   AST (SGOT) U/L 63* 61* 30   ALT (SGPT) U/L 28 30 21     Results from last 7 days   Lab Units 06/14/22  0815 06/13/22  0308 06/12/22  0959   CALCIUM mg/dL 8.3* 8.9 9.2   ALBUMIN g/dL 3.50 4.10 4.00   MAGNESIUM mg/dL 2.3  --   --      Results from last 7 days   Lab Units 06/13/22  0308 06/12/22  0959   PROCALCITONIN ng/mL 0.08 0.06   LACTATE mmol/L 1.1 1.4     No results found for: HGBA1C, POCGLU    XR Chest 1 View    Result Date: 6/13/2022  No acute findings.  This report was finalized on 6/13/2022 3:15 AM by Dr. Elke Fernandez M.D.      Scheduled Medications  aspirin, 81 mg, Oral, Daily  baclofen, 10 mg, Oral, Nightly  cycloSPORINE, 1 drop, Both Eyes, BID  dextromethorphan polistirex ER, 60 mg, Oral, Q12H  enoxaparin, 40 mg, Subcutaneous, Daily  latanoprost, 1 drop, Both Eyes, Nightly  metoprolol tartrate, 12.5 mg, Oral, BID  Mirabegron ER, 50 mg, Oral, Daily  multivitamin with minerals, 1 tablet, Oral, Daily  remdesivir, 100 mg, Intravenous, Q24H  rosuvastatin, 40 mg, Oral, Daily  sodium chloride, 10 mL, Intravenous, Q12H  tamsulosin, 0.4 mg, Oral, Nightly    Infusions   Diet  Diet Regular; Cardiac       Assessment/Plan     Active Hospital Problems    Diagnosis  POA   • **COVID-19 [U07.1]  Yes   • Generalized  weakness [R53.1]  Yes   • History of CVA (cerebrovascular accident) [Z86.73]  Not Applicable   • Benign prostatic hyperplasia without lower urinary tract symptoms [N40.0]  Yes   • 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   COVID-19  - no supplemental oxygen requirement but given his age and cardiovascular disease he is on a 3 day course of remdesivir  - follow inflammatory markers, LFTs, renal function, and CBC    Generalized Weakness/Falls  - has hx of congential myopathy, weakness exacerbated by above issue  - continue PT    HTN/CAD  - no anginal symptoms, BP acceptable  - continue current regimen    BPH  - continue flomax    Hx of CVA  - right cerebellar, from small vessel disease  - on ASA and statin  - follows Dr. Osborn as outpatient      Lovenox 40 mg SC daily for DVT prophylaxis.  Full code.  Discussed with patient and nursing staff.  Anticipate discharge home with HH vs SNU facility in 1-2 days.      Owen Guy MD  [facility]  06/14/22  17:20 EDT

## 2022-06-14 NOTE — PLAN OF CARE
Goal Outcome Evaluation:  Plan of Care Reviewed With: patient           Outcome Evaluation: Pt seen for OT val this PM. Admitted with fall, generalized weakness, body aches and COVID. Pt reports living with his wife who assists him with ADLs at home. Reports using a standard walker for functional mobility within the home. Pt wife reports he has difficulty initially standing and does better from elevated surfaces. Pt today presented with generalized weakness, decreased balance, endurance and activity tolerance which hinders his participation in functional mobility and ADLs.  Sat EOB and participated in BUE HEP to improve functional endurance/strength. Wife is very supportive and explained their current HEP they perform daily.  Pt to benefit from skilled OT to address deficits and maximize independence with ADLs. Rec SNF at discharge

## 2022-06-14 NOTE — THERAPY EVALUATION
Patient Name: Amandeep Manriquez Jr.  : 1945    MRN: [id number]                              Today's Date: 2022       Admit Date: 2022    Visit Dx:     ICD-10-CM ICD-9-CM   1. COVID-19  U07.1 079.89   2. Generalized weakness  R53.1 780.79     Patient Active Problem List   Diagnosis   • CAD (coronary artery disease), native coronary artery   • Essential hypertension   • Mixed hyperlipidemia   • Congenital myopathy (HCC)   • Arthropathy of right sacroiliac joint   • Osteoarthritis of right knee   • Degenerative lumbar spinal stenosis   • Renal stone   • Bilateral impacted cerumen   • Benign prostatic hyperplasia without lower urinary tract symptoms   • Vertigo   • High risk medication use   • Vitamin D deficiency   • OAB (overactive bladder)   • Rosacea   • Short-term memory loss   • History of CVA (cerebrovascular accident)   • COVID-19   • Generalized weakness     Past Medical History:   Diagnosis Date   • Acute myocardial infarction (HCC)    • 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[facility])    • Arthritis    • BPH (benign prostatic hyperplasia)    • CAD (coronary artery disease), native coronary artery    • Congenital myopathy (HCC)    • DDD (degenerative disc disease), lumbar     with stenosis   • Diabetes mellitus ([facility])    • Dizziness     Chronic Dizziness   • Encounter for Medicare annual wellness exam    • Essential hypertension    • Gastrointestinal hemorrhage    • History of impacted cerumen    • Hyperlipidemia    • Hypertension    • Mixed hyperlipidemia      Past Surgical History:   Procedure Laterality Date   • CARDIAC CATHETERIZATION  2015    30% Left main, 99% mid to distal LAD, 70-80% left circumflex, 80% proximal to mid RCA.   • CATARACT EXTRACTION     • CERVICAL FUSION     • CHOLECYSTECTOMY     • CORONARY ANGIOPLASTY WITH STENT PLACEMENT     • KNEE SURGERY Right    • OTHER SURGICAL HISTORY      percutaneous injection of extremity pseudoaneurysm      General Information     Row Name 22  1332          Physical Therapy Time and Intention    Document Type evaluation  -     Mode of Treatment individual therapy;physical therapy  -     Row Name 06/14/22 1332          General Information    Prior Level of Function gait;transfer;bed mobility;min assist:;independent:  walks with standard walker in home, uses wheelchair in community  -     Existing Precautions/Restrictions fall  -     Barriers to Rehab medically complex  -     Row Name 06/14/22 1332          Living Environment    People in Home spouse  -     Row Name 06/14/22 1332          Cognition    Orientation Status (Cognition) oriented x 3  -     Row Name 06/14/22 1332          Safety Issues, Functional Mobility    Impairments Affecting Function (Mobility) balance;coordination;endurance/activity tolerance;strength  -           User Key  (r) = Recorded By, (t) = Taken By, (c) = Cosigned By    Initials Name Provider Type     Erica Anderson, PT Physical Therapist               Mobility     Row Name 06/14/22 1334          Bed Mobility    Bed Mobility supine-sit;sit-supine  -     Supine-Sit [place] (Bed Mobility) verbal cues;nonverbal cues (demo/gesture);moderate assist (50% patient effort)  -     Sit-Supine [place] (Bed Mobility) verbal cues;nonverbal cues (demo/gesture);moderate assist (50% patient effort)  -     Assistive Device (Bed Mobility) bed rails;head of bed elevated  -     Row Name 06/14/22 1334          Sit-Stand Transfer    Sit-Stand [place] (Transfers) verbal cues;nonverbal cues (demo/gesture);moderate assist (50% patient effort)  -     Assistive Device (Sit-Stand Transfers) walker, front-wheeled  -     Comment, (Sit-Stand Transfer) pt acheived partial stand x3, unable to stand fully erect, pt seems anxious about the wheels on the walker and insists that the PT hold the walker still  -           User Key  (r) = Recorded By, (t) = Taken By, (c) = Cosigned By    Initials Name Provider Type      Erica Anderson, PT Physical Therapist               Obj/Interventions     Row Name 06/14/22 1335          Range of Motion Comprehensive    General Range of Motion no range of motion deficits identified  -[facility] Name 06/14/22 1335          Strength Comprehensive (MMT)    Comment, General Manual Muscle Testing (MMT) Assessment generalized weakness noted with functioanl mobility,  BLE grossly >/=3/5  -     Row Name 06/14/22 1335          Balance    Balance Assessment standing static balance;sitting dynamic balance  -CH     Dynamic Sitting Balance contact guard  -CH     Static Standing Balance moderate assist  -CH     Position/Device Used, Standing Balance walker, front-wheeled  -           User Key  (r) = Recorded By, (t) = Taken By, (c) = Cosigned By    Initials Name Provider Type     Erica Anderson, PT Physical Therapist               Goals/Plan     [facility] Name 06/14/22 1338          Bed Mobility Goal 1 (PT)    Activity/Assistive Device (Bed Mobility Goal 1, PT) bed mobility activities, all  -CH     [place] Level/Cues Needed (Bed Mobility Goal 1, PT) contact guard required  -CH     Time Frame (Bed Mobility Goal 1, PT) 1 week  -CH     Row Name 06/14/22 1338          Transfer Goal 1 (PT)    Activity/Assistive Device (Transfer Goal 1, PT) transfers, all;walker, rolling  -CH     [place] Level/Cues Needed (Transfer Goal 1, PT) contact guard required  -CH     Time Frame (Transfer Goal 1, PT) 1 week  -[facility] Name 06/14/22 1338          Gait Training Goal 1 (PT)    Activity/Assistive Device (Gait Training Goal 1, PT) gait (walking locomotion);walker, rolling  -CH     [place] Level (Gait Training Goal 1, PT) contact guard required  -CH     Distance (Gait Training Goal 1, PT) 30  -CH     Time Frame (Gait Training Goal 1, PT) 1 week  -[facility] Name 06/14/22 1330          Therapy Assessment/Plan (PT)    Planned Therapy Interventions (PT) balance training;bed mobility training;gait  training;home exercise program;patient/family education;strengthening;transfer training  -           User Key  (r) = Recorded By, (t) = Taken By, (c) = Cosigned By    Initials Name Provider Type    Erica Hilliard, PT Physical Therapist               Clinical Impression     Row Name 06/14/22 6506          Pain    Pre/Posttreatment Pain Comment pt reports generalized pain  -     Pain Intervention(s) Repositioned  -     Row Name 06/14/22 0666          Plan of Care Review    Plan of Care Reviewed With patient  -     Outcome Evaluation Pt is a 78 yo M who was admitted with fall, malaise, body aches, weakness, and COVID. Pt present to PT with impaired functional mobility and gait secondary to generalized weakness, impaired balance, and decreased activity tolerance. Pt may benefit from skilled PT to address strength, mobility, and gait.  -     Row Name 06/14/22 1188          Therapy Assessment/Plan (PT)    Patient/Family Therapy Goals Statement (PT) to return to OF  -     Rehab Potential (PT) good, to achieve stated therapy goals  -     Criteria for Skilled Interventions Met (PT) skilled treatment is necessary  -     Therapy Frequency (PT) 3 times/wk  -     Row Name 06/14/22 1333          Positioning and Restraints    Pre-Treatment Position in bed  -     Post Treatment Position bed  -CH     In Bed supine;call light within reach;encouraged to call for assist;exit alarm on  -           User Key  (r) = Recorded By, (t) = Taken By, (c) = Cosigned By    Initials Name Provider Type    Erica Hilliard, PT Physical Therapist               Outcome Measures     Row Name 06/14/22 2242          How much help from another person do you currently need...    Turning from your back to your side while in flat bed without using bedrails? 2  -CH     Moving from lying on back to sitting on the side of a flat bed without bedrails? 2  -CH     Moving to and from a bed to a chair (including a wheelchair)? 1   -CH     Standing up from a chair using your arms (e.g., wheelchair, bedside chair)? 2  -CH     Climbing 3-5 steps with a railing? 1  -CH     To walk in hospital room? 1  -CH     AM-PAC 6 Clicks Score (PT) 9  -CH     Highest level of mobility 3 --> Sat at edge of bed  -     Row Name 06/14/22 1339          Functional Assessment    Outcome Measure Options AM-PAC 6 Clicks Basic Mobility (PT)  -           User Key  (r) = Recorded By, (t) = Taken By, (c) = Cosigned By    Initials Name Provider Type     Erica Anderson PT Physical Therapist                             Physical Therapy Education                 Title: PT OT SLP Therapies (Done)     Topic: Physical Therapy (Done)     Point: Mobility training (Done)     Learning Progress Summary           Patient Acceptance, E,TB,D, VU,NR by  at 6/14/2022 1340                   Point: Home exercise program (Done)     Learning Progress Summary           Patient Acceptance, E,TB,D, VU,NR by  at 6/14/2022 1340                   Point: Body mechanics (Done)     Learning Progress Summary           Patient Acceptance, E,TB,D, VU,NR by  at 6/14/2022 1340                   Point: Precautions (Done)     Learning Progress Summary           Patient Acceptance, E,TB,D, VU,NR by  at 6/14/2022 1340                               User Key     Initials Effective Dates Name Provider Type [facility] 06/16/21 -  Erica Anderson PT Physical Therapist PT              PT Recommendation and Plan  Planned Therapy Interventions (PT): balance training, bed mobility training, gait training, home exercise program, patient/family education, strengthening, transfer training  Plan of Care Reviewed With: patient  Outcome Evaluation: Pt is a 78 yo M who was admitted with fall, malaise, body aches, weakness, and COVID. Pt present to PT with impaired functional mobility and gait secondary to generalized weakness, impaired balance, and decreased activity tolerance. Pt may benefit from  skilled PT to address strength, mobility, and gait.     Time Calculation:    PT Charges     Row Name 06/14/22 1340             Time Calculation    Start Time 1005  -CH      Stop Time 1028  -CH      Time Calculation (min) 23 min  -CH      PT Received On 06/14/22  -      PT - Next Appointment 06/16/22  -      PT Goal Re-Cert Due Date 06/21/22  -              Time Calculation- PT    Total Timed Code Minutes- PT 18 minute(s)  -CH              Timed Charges    93168 - PT Therapeutic Activity Minutes 18  -CH              Total Minutes    Timed Charges Total Minutes 18  -CH       Total Minutes 18  -CH            User Key  (r) = Recorded By, (t) = Taken By, (c) = Cosigned By    Initials Name Provider Type     Erica Anderson, PT Physical Therapist              Therapy Charges for Today     Code Description Service Date Service Provider Modifiers Qty    24016733937  PT THERAPEUTIC ACT EA 15 MIN 6/14/2022 Erica Anderson, PT GP 1    [id number]  PT EVAL MOD COMPLEXITY 2 6/14/2022 Erica Anderson, PT GP 1          PT G-Codes  Outcome Measure Options: AM-PAC 6 Clicks Basic Mobility (PT)  AM-PAC 6 Clicks Score (PT): 9    Erica Anderson PT  6/14/2022

## 2022-06-14 NOTE — PLAN OF CARE
Goal Outcome Evaluation:  Plan of Care Reviewed With: patient        Progress: improving  Outcome Evaluation: Pt A&Ox4, room air, reg HH diet, reports no pain, cough none productive, edema BLE, turns with some help, afebrile this shift,RAKESH winkler aides in place

## 2022-06-14 NOTE — DISCHARGE PLACEMENT REQUEST
"Amandeep Manriquez Jr. (77 y.o. Male)             Date of Birth   1945    Social Security Number       Address   [address]  [place] [address]    Home Phone   [phone]    MRN   [id number]       P[employer]al   None    Marital Status                               Admission Date   6/13/22    Admission Type   Emergency    Admitting Provider   Gavin Shabazz MD    Attending Provider   Owen Guy MD    Department, Room/Bed   [address], [address]       Discharge Date       Discharge Disposition       Discharge Destination                               Attending Provider: Owen Guy MD    Allergies: Iodinated Diagnostic Agents, Ampicillin    Isolation: Enh Drop/Con   Infection: COVID (confirmed) (06/12/22)   Code Status: CPR   Advance Care Planning Activity    Ht: 165.1 cm (65\")   Wt: 79.9 kg (176 lb 2.4 oz)    Admission Cmt: None   Principal Problem: COVID-19 [U07.1]                 Active Insurance as of 6/13/2022     Primary Coverage     Payor Plan Insurance Group Employer/Plan Group    [employer] MEDICARE REPLACEMENT [employer] MEDICARE REPLACEMENT [id number]     Payor Plan Address Payor Plan Phone Number Payor Plan Fax Number Effective Dates    [address] [address] [phone]  1/1/2018 - None Entered    [facility] [address]       Subscriber Name Subscriber Birth Date Member ID       AMANDEEP MANRIQUEZ JR. 1945 [id number]                 Emergency Contacts      (Rel.) Home Phone Work Phone Mobile Phone    RomeliaJulissa may (Spouse) [phone] -- [phone]              "

## 2022-06-14 NOTE — CASE MANAGEMENT/SOCIAL WORK
Discharge Planning Assessment  [facility]     Patient Name: Amandeep Manriquez Jr.  MRN: [id number]  Today's Date: 6/14/2022    Admit Date: 6/13/2022     Discharge Needs Assessment     Row Name 06/14/22 1505       Living Environment    People in Home spouse    Current Living Arrangements home    Primary Care Provided by self;spouse/significant other    Provides Primary Care For no one, unable/limited ability to care for self    Family Caregiver if Needed spouse    Able to Return to Prior Arrangements yes       Resource/Environmental Concerns    Resource/Environmental Concerns none       Transition Planning    Patient/Family Anticipates Transition to inpatient rehabilitation facility;home with help/services    Patient/Family Anticipated Services at Transition home health care;skilled nursing;rehabilitation services    Transportation Anticipated family or friend will provide;health plan transportation       Discharge Needs Assessment    Equipment Currently Used at Home walker, rolling;wheelchair;grab bar;commode    Concerns to be Addressed discharge planning    Discharge Facility/Level of Care Needs nursing facility, skilled;home with home health    Current Discharge Risk dependent with mobility/activities of daily living               Discharge Plan     Row Name 06/14/22 1506       Plan    Plan HH vs SNF    Patient/Family in Agreement with Plan yes    Plan Comments CSW spoke to patient's spouse over the bedside phone; explained role, verified facesheet, and discussed dc plan. Plan is HH vs SNF. Patient uses a walker, wheelchair, grab bars, and raised toilet at home. He lives with his spouse who takes care of him. The home is 1 level with an elevator to get inside. He has used HH in the past but spouse cannot remember what agency. He has never been to a SNF. Patient was unable to walk with PT today and was brought in due to a fall. CSW discussed HH vs SNF with patient's spouse. She is agreeable to referral to [facility].  CSW explained to patient's spouse that the only SNF accepting of covid positive patient's is [facility]. Spouse is agreeable to referral to [facility] but prefers for plan to be home w/ HH. Patient will need a [employer] MCR pre-cert if he needs SNF. CSW to follow pending treatment course for HH vs SNF recommendations. LILLIAN, CSW              Continued Care and Services - Admitted Since 6/13/2022     Destination     Service Provider Request Status Selected Services Address Phone Fax Patient Preferred    [facility]  Pending - Request Sent N/A [address] [facility] [address] [phone] --          Home Medical Care     Service Provider Request Status Selected Services Address Phone Fax Patient Preferred    [facility]  Pending - Request Sent N/A [address] [address] [phone] --              Expected Discharge Date and Time     Expected Discharge Date Expected Discharge Time    Jun 17, 2022          Demographic Summary     Row Name 06/14/22 1505       General Information    Admission Type observation    Arrived From home    Referral Source admission list    Reason for Consult discharge planning    Preferred Language English       Contact Information    Permission Granted to Share Info With family/designee               Functional Status     Row Name 06/14/22 1505       Functional Status    Usual Activity Tolerance fair    Current Activity Tolerance poor       Functional Status, IADL    Medications assistive equipment and person    Meal Preparation assistive equipment and person    Housekeeping assistive equipment and person    Laundry assistive equipment and person    Shopping assistive equipment and person       Mental Status    General Appearance WDL WDL               Psychosocial    No documentation.                Abuse/Neglect    No documentation.                Legal    No documentation.                 Substance Abuse    No documentation.                Patient Forms    No documentation.                   SEAMUS FRANKEL

## 2022-06-15 PROBLEM — E87.6 HYPOKALEMIA: Status: ACTIVE | Noted: 2022-06-15

## 2022-06-15 LAB
ALBUMIN SERPL-MCNC: 3.2 G/DL (ref 3.5–5.2)
ALBUMIN/GLOB SERPL: 1.4 G/DL
ALP SERPL-CCNC: 71 U/L (ref 39–117)
ALT SERPL W P-5'-P-CCNC: 30 U/L (ref 1–41)
ANION GAP SERPL CALCULATED.3IONS-SCNC: 11 MMOL/L (ref 5–15)
AST SERPL-CCNC: 54 U/L (ref 1–40)
BASOPHILS # BLD AUTO: 0.02 10*3/MM3 (ref 0–0.2)
BASOPHILS NFR BLD AUTO: 0.3 % (ref 0–1.5)
BILIRUB SERPL-MCNC: 0.3 MG/DL (ref 0–1.2)
BUN SERPL-MCNC: 14 MG/DL (ref 8–23)
BUN/CREAT SERPL: 37.8 (ref 7–25)
CALCIUM SPEC-SCNC: 8.5 MG/DL (ref 8.6–10.5)
CHLORIDE SERPL-SCNC: 107 MMOL/L (ref 98–107)
CO2 SERPL-SCNC: 25 MMOL/L (ref 22–29)
CREAT SERPL-MCNC: 0.37 MG/DL (ref 0.76–1.27)
CRP SERPL-MCNC: 4.88 MG/DL (ref 0–0.5)
DEPRECATED RDW RBC AUTO: 40.6 FL (ref 37–54)
EGFRCR SERPLBLD CKD-EPI 2021: 113.6 ML/MIN/1.73
EOSINOPHIL # BLD AUTO: 0.13 10*3/MM3 (ref 0–0.4)
EOSINOPHIL NFR BLD AUTO: 2 % (ref 0.3–6.2)
ERYTHROCYTE [DISTWIDTH] IN BLOOD BY AUTOMATED COUNT: 12.4 % (ref 12.3–15.4)
GLOBULIN UR ELPH-MCNC: 2.3 GM/DL
GLUCOSE SERPL-MCNC: 132 MG/DL (ref 65–99)
HCT VFR BLD AUTO: 41.4 % (ref 37.5–51)
HGB BLD-MCNC: 13.9 G/DL (ref 13–17.7)
IMM GRANULOCYTES # BLD AUTO: 0.02 10*3/MM3 (ref 0–0.05)
IMM GRANULOCYTES NFR BLD AUTO: 0.3 % (ref 0–0.5)
LYMPHOCYTES # BLD AUTO: 0.93 10*3/MM3 (ref 0.7–3.1)
LYMPHOCYTES NFR BLD AUTO: 14.2 % (ref 19.6–45.3)
MAGNESIUM SERPL-MCNC: 2 MG/DL (ref 1.6–2.4)
MCH RBC QN AUTO: 30.2 PG (ref 26.6–33)
MCHC RBC AUTO-ENTMCNC: 33.6 G/DL (ref 31.5–35.7)
MCV RBC AUTO: 90 FL (ref 79–97)
MONOCYTES # BLD AUTO: 0.83 10*3/MM3 (ref 0.1–0.9)
MONOCYTES NFR BLD AUTO: 12.7 % (ref 5–12)
NEUTROPHILS NFR BLD AUTO: 4.6 10*3/MM3 (ref 1.7–7)
NEUTROPHILS NFR BLD AUTO: 70.5 % (ref 42.7–76)
NRBC BLD AUTO-RTO: 0 /100 WBC (ref 0–0.2)
PLATELET # BLD AUTO: 199 10*3/MM3 (ref 140–450)
PMV BLD AUTO: 9.8 FL (ref 6–12)
POTASSIUM SERPL-SCNC: 3.4 MMOL/L (ref 3.5–5.2)
PROT SERPL-MCNC: 5.5 G/DL (ref 6–8.5)
RBC # BLD AUTO: 4.6 10*6/MM3 (ref 4.14–5.8)
SODIUM SERPL-SCNC: 143 MMOL/L (ref 136–145)
WBC NRBC COR # BLD: 6.53 10*3/MM3 (ref 3.4–10.8)

## 2022-06-15 PROCEDURE — G0378 HOSPITAL OBSERVATION PER HR: HCPCS

## 2022-06-15 PROCEDURE — 96372 THER/PROPH/DIAG INJ SC/IM: CPT

## 2022-06-15 PROCEDURE — 25010000002 ENOXAPARIN PER 10 MG: Performed by: NURSE PRACTITIONER

## 2022-06-15 PROCEDURE — 36415 COLL VENOUS BLD VENIPUNCTURE: CPT | Performed by: NURSE PRACTITIONER

## 2022-06-15 PROCEDURE — 86140 C-REACTIVE PROTEIN: CPT | Performed by: NURSE PRACTITIONER

## 2022-06-15 PROCEDURE — 83735 ASSAY OF MAGNESIUM: CPT | Performed by: NURSE PRACTITIONER

## 2022-06-15 PROCEDURE — 85025 COMPLETE CBC W/AUTO DIFF WBC: CPT | Performed by: NURSE PRACTITIONER

## 2022-06-15 PROCEDURE — 80053 COMPREHEN METABOLIC PANEL: CPT | Performed by: NURSE PRACTITIONER

## 2022-06-15 PROCEDURE — 25010000002 REMDESIVIR 100 MG/20ML SOLUTION 1 EACH VIAL: Performed by: INTERNAL MEDICINE

## 2022-06-15 RX ADMIN — METOPROLOL TARTRATE 12.5 MG: 25 TABLET, FILM COATED ORAL at 19:53

## 2022-06-15 RX ADMIN — LATANOPROST 1 DROP: 50 SOLUTION OPHTHALMIC at 19:54

## 2022-06-15 RX ADMIN — MIRABEGRON 50 MG: 25 TABLET, FILM COATED, EXTENDED RELEASE ORAL at 08:53

## 2022-06-15 RX ADMIN — ROSUVASTATIN CALCIUM 40 MG: 40 TABLET, FILM COATED ORAL at 08:53

## 2022-06-15 RX ADMIN — TAMSULOSIN HYDROCHLORIDE 0.4 MG: 0.4 CAPSULE ORAL at 19:53

## 2022-06-15 RX ADMIN — CYCLOSPORINE 1 DROP: 0.5 EMULSION OPHTHALMIC at 19:52

## 2022-06-15 RX ADMIN — BACLOFEN 10 MG: 10 TABLET ORAL at 19:53

## 2022-06-15 RX ADMIN — DEXTROMETHORPHAN 60 MG: 30 SUSPENSION, EXTENDED RELEASE ORAL at 19:52

## 2022-06-15 RX ADMIN — Medication 10 ML: at 08:53

## 2022-06-15 RX ADMIN — Medication 10 ML: at 19:55

## 2022-06-15 RX ADMIN — DEXTROMETHORPHAN 60 MG: 30 SUSPENSION, EXTENDED RELEASE ORAL at 08:52

## 2022-06-15 RX ADMIN — ENOXAPARIN SODIUM 40 MG: 100 INJECTION SUBCUTANEOUS at 08:52

## 2022-06-15 RX ADMIN — ASPIRIN 81 MG: 81 TABLET, COATED ORAL at 08:53

## 2022-06-15 RX ADMIN — MULTIPLE VITAMINS W/ MINERALS TAB 1 TABLET: TAB at 08:53

## 2022-06-15 RX ADMIN — REMDESIVIR 100 MG: 100 INJECTION, POWDER, LYOPHILIZED, FOR SOLUTION INTRAVENOUS at 14:38

## 2022-06-15 RX ADMIN — CYCLOSPORINE 1 DROP: 0.5 EMULSION OPHTHALMIC at 08:53

## 2022-06-15 RX ADMIN — METOPROLOL TARTRATE 12.5 MG: 25 TABLET, FILM COATED ORAL at 08:53

## 2022-06-15 NOTE — PLAN OF CARE
Goal Outcome Evaluation:  Plan of Care Reviewed With: patient        Progress: improving  Outcome Evaluation: Bed alarm in use.  Weak, but can assist with turning.  INC at times.  Sats WNL on RA.

## 2022-06-15 NOTE — PLAN OF CARE
Goal Outcome Evaluation:  Plan of Care Reviewed With: patient   Sat patient up on side of bed with minimal assist. Patient resting between care. Remains stable at this time.

## 2022-06-15 NOTE — PROGRESS NOTES
Name: Amandeep Manriquez Jr. ADMIT: 2022   : 1945  PCP: Eden Molina MD    MRN: [id number] LOS: 1 days   AGE/SEX: 77 y.o. male  ROOM: [facility]     Subjective   Subjective   Feeling the same today--just weak. No SOA or CP. Mild dry cough. Tolerating diet. No N/V/D/abd pain. Voiding well.    Review of Systems   as above    Objective   Objective   Vital Signs  Temp:  [97.6 °F (36.4 °C)-98.1 °F (36.7 °C)] 97.6 °F (36.4 °C)  Heart Rate:  [58-85] 58  Resp:  [18-19] 19  BP: (134-157)/(75-84) 150/77  SpO2:  [95 %-96 %] 95 %  on   ;   Device (Oxygen Therapy): room air  Body mass index is 29.31 kg/m².  Physical Exam  Vitals and nursing note reviewed.   Constitutional:       General: He is not in acute distress.     Appearance: He is ill-appearing (chronically). He is not toxic-appearing or diaphoretic.   HENT:      Head: Normocephalic and atraumatic.      Mouth/Throat:      Mouth: Mucous membranes are moist.      Pharynx: Oropharynx is clear.   Eyes:      General: No scleral icterus.     Extraocular Movements: Extraocular movements intact.      Conjunctiva/sclera: Conjunctivae normal.   Cardiovascular:      Rate and Rhythm: Normal rate and regular rhythm.      Pulses: Normal pulses.   Pulmonary:      Effort: Pulmonary effort is normal. No respiratory distress.      Breath sounds: Normal breath sounds. No wheezing or rales.   Abdominal:      General: Bowel sounds are normal. There is no distension.      Palpations: Abdomen is soft.      Tenderness: There is no abdominal tenderness.   Musculoskeletal:         General: No swelling or deformity. Normal range of motion.      Cervical back: Neck supple. No rigidity.   Lymphadenopathy:      Cervical: No cervical adenopathy.   Skin:     General: Skin is warm and dry.      Coloration: Skin is not jaundiced.      Findings: No rash.   Neurological:      General: No focal deficit present.      Mental Status: He is alert and oriented to person, place, and time. Mental  status is at baseline.      Cranial Nerves: No cranial nerve deficit.      Motor: Weakness (generalized) present.      Coordination: Coordination normal.   Psychiatric:         Mood and Affect: Mood normal.         Behavior: Behavior normal.      Comments: Very pleasant, affect flat         Results Review     I reviewed the patient's new clinical results.  Results from last 7 days   Lab Units 06/15/22  1047 06/14/22  0815 06/13/22  0308 06/12/22  0959   WBC 10*3/mm3 6.53 7.87 8.65 9.40   HEMOGLOBIN g/dL 13.9 13.6 14.6 15.0   PLATELETS 10*3/mm3 199 182 197 185     Results from last 7 days   Lab Units 06/15/22  1047 06/14/22  0815 06/13/22 0308 06/12/22  0959   SODIUM mmol/L 143 139 141 136   POTASSIUM mmol/L 3.4* 3.3* 3.6 3.9   CHLORIDE mmol/L 107 106 106 101   CO2 mmol/L 25.0 20.0* 22.0 21.9*   BUN mg/dL 14 10 8 10   CREATININE mg/dL 0.37* 0.38* 0.43* 0.48*   GLUCOSE mg/dL 132* 71 114* 110*   EGFR mL/min/1.73 113.6 112.7 108.6 105.0     Results from last 7 days   Lab Units 06/15/22  1047 06/14/22  0815 06/13/22  0308 06/12/22  0959   ALBUMIN g/dL 3.20* 3.50 4.10 4.00   BILIRUBIN mg/dL 0.3 0.4 0.4 0.6   ALK PHOS U/L 71 77 89 95   AST (SGOT) U/L 54* 63* 61* 30   ALT (SGPT) U/L 30 28 30 21     Results from last 7 days   Lab Units 06/15/22  1047 06/14/22  0815 06/13/22  0308 06/12/22  0959   CALCIUM mg/dL 8.5* 8.3* 8.9 9.2   ALBUMIN g/dL 3.20* 3.50 4.10 4.00   MAGNESIUM mg/dL 2.0 2.3  --   --      Results from last 7 days   Lab Units 06/13/22 0308 06/12/22  0959   PROCALCITONIN ng/mL 0.08 0.06   LACTATE mmol/L 1.1 1.4     No results found for: HGBA1C, POCGLU    No radiology results for the last day  Scheduled Medications  aspirin, 81 mg, Oral, Daily  baclofen, 10 mg, Oral, Nightly  cycloSPORINE, 1 drop, Both Eyes, BID  dextromethorphan polistirex ER, 60 mg, Oral, Q12H  enoxaparin, 40 mg, Subcutaneous, Daily  latanoprost, 1 drop, Both Eyes, Nightly  metoprolol tartrate, 12.5 mg, Oral, BID  Mirabegron ER, 50 mg, Oral,  "Daily  multivitamin with minerals, 1 tablet, Oral, Daily  rosuvastatin, 40 mg, Oral, Daily  sodium chloride, 10 mL, Intravenous, Q12H  tamsulosin, 0.4 mg, Oral, Nightly    Infusions   Diet  Diet Regular; Cardiac       Assessment/Plan     Active Hospital Problems    Diagnosis  POA   • **COVID-19 [U07.1]  Yes   • Hypokalemia [E87.6]  No   • Generalized weakness [R53.1]  Yes   • History of CVA (cerebrovascular accident) [Z86.73]  Not Applicable   • Benign prostatic hyperplasia without lower urinary tract symptoms [N40.0]  Yes   • 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       77 y.o. male who presented with weakness and was admitted with COVID-19.    COVID-19  - no supplemental oxygen requirement and negative CXR, but given his age and cardiovascular disease he is on a 3 day course of Remdesivir  - following inflammatory markers, LFTs, renal function, and CBC  - if becomes hypoxic would start Decadron and extend course of Remdesivir to 5 days  - febrile on admission but has not recurred, blood cultures NGTD, CXR neg, PCT wnl x 2--no abx indicated     Generalized Weakness/Falls  - has hx of congential myopathy, weakness exacerbated by above issue  - continue PT    Hypokalemia  - start protocol, Mg++ level fine     HTN/CAD  - no anginal symptoms, BPs acceptable  - continue metoprolol, ASA, Crestor     BPH  - continue Flomax, voiding well     Hx of CVA  - right cerebellar, from small vessel disease  - on ASA and Crestor  - follows Dr. Osborn as outpatient        Lovenox 40 mg SC daily for DVT prophylaxis.  Full code confirmed.  Discussed with patient and nursing staff. Tried to call wife but no answer through her \"Google Assistant\".  Anticipate discharge to [facility] when bed available and precert " obtained.      During all interaction with pt proper PPE was utilized (gown, gloves, mask, goggles, and face shield) and was donned/doffed according to recommendations. Hands were cleaned before and after interaction.      Jonathan Bird MD  [facility]  06/15/22  19:15 EDT

## 2022-06-15 NOTE — CASE MANAGEMENT/SOCIAL WORK
Continued Stay Note  [facility]     Patient Name: Amandeep Manriquez Jr.  MRN: [id number]  Today's Date: 6/15/2022    Admit Date: 6/13/2022     Discharge Plan     Row Name 06/15/22 1337       Plan    Plan [facility] when medically stable pending bed availability & [employer] MCR pre-cert    Patient/Family in Agreement with Plan yes    Plan Comments Spouse requesting to speak to CCP. CSW spoke to patient’s spouse over the bedside phone who states the plan is for patient to go to SNF. Family is agreeable to [facility]. [facility] not have any covid beds at this time until early next week. Patient will need a [employer] MCR pre-cert. Patient will likely need stretcher transport. SEAMUS SNYDER               Discharge Codes    No documentation.               Expected Discharge Date and Time     Expected Discharge Date Expected Discharge Time    Jun 17, 2022             [facility] FRANKEL

## 2022-06-16 LAB
ALBUMIN SERPL-MCNC: 3.4 G/DL (ref 3.5–5.2)
ALBUMIN/GLOB SERPL: 1.4 G/DL
ALP SERPL-CCNC: 78 U/L (ref 39–117)
ALT SERPL W P-5'-P-CCNC: 27 U/L (ref 1–41)
ANION GAP SERPL CALCULATED.3IONS-SCNC: 11 MMOL/L (ref 5–15)
AST SERPL-CCNC: 44 U/L (ref 1–40)
BASOPHILS # BLD AUTO: 0.02 10*3/MM3 (ref 0–0.2)
BASOPHILS NFR BLD AUTO: 0.3 % (ref 0–1.5)
BILIRUB SERPL-MCNC: 0.3 MG/DL (ref 0–1.2)
BUN SERPL-MCNC: 15 MG/DL (ref 8–23)
BUN/CREAT SERPL: 35.7 (ref 7–25)
CALCIUM SPEC-SCNC: 8.7 MG/DL (ref 8.6–10.5)
CHLORIDE SERPL-SCNC: 103 MMOL/L (ref 98–107)
CO2 SERPL-SCNC: 26 MMOL/L (ref 22–29)
CREAT SERPL-MCNC: 0.42 MG/DL (ref 0.76–1.27)
CRP SERPL-MCNC: 3.17 MG/DL (ref 0–0.5)
D DIMER PPP FEU-MCNC: 0.77 MCGFEU/ML (ref 0–0.49)
DEPRECATED RDW RBC AUTO: 41.7 FL (ref 37–54)
EGFRCR SERPLBLD CKD-EPI 2021: 109.4 ML/MIN/1.73
EOSINOPHIL # BLD AUTO: 0.12 10*3/MM3 (ref 0–0.4)
EOSINOPHIL NFR BLD AUTO: 1.9 % (ref 0.3–6.2)
ERYTHROCYTE [DISTWIDTH] IN BLOOD BY AUTOMATED COUNT: 12.6 % (ref 12.3–15.4)
FERRITIN SERPL-MCNC: 139 NG/ML (ref 30–400)
GLOBULIN UR ELPH-MCNC: 2.5 GM/DL
GLUCOSE SERPL-MCNC: 124 MG/DL (ref 65–99)
HCT VFR BLD AUTO: 42.7 % (ref 37.5–51)
HGB BLD-MCNC: 14 G/DL (ref 13–17.7)
IMM GRANULOCYTES # BLD AUTO: 0.01 10*3/MM3 (ref 0–0.05)
IMM GRANULOCYTES NFR BLD AUTO: 0.2 % (ref 0–0.5)
INR PPP: 1.18 (ref 0.9–1.1)
LDH SERPL-CCNC: 221 U/L (ref 135–225)
LYMPHOCYTES # BLD AUTO: 0.91 10*3/MM3 (ref 0.7–3.1)
LYMPHOCYTES NFR BLD AUTO: 14.6 % (ref 19.6–45.3)
MAGNESIUM SERPL-MCNC: 2.2 MG/DL (ref 1.6–2.4)
MCH RBC QN AUTO: 29.9 PG (ref 26.6–33)
MCHC RBC AUTO-ENTMCNC: 32.8 G/DL (ref 31.5–35.7)
MCV RBC AUTO: 91.2 FL (ref 79–97)
MONOCYTES # BLD AUTO: 0.73 10*3/MM3 (ref 0.1–0.9)
MONOCYTES NFR BLD AUTO: 11.7 % (ref 5–12)
NEUTROPHILS NFR BLD AUTO: 4.46 10*3/MM3 (ref 1.7–7)
NEUTROPHILS NFR BLD AUTO: 71.3 % (ref 42.7–76)
NRBC BLD AUTO-RTO: 0 /100 WBC (ref 0–0.2)
PLATELET # BLD AUTO: 220 10*3/MM3 (ref 140–450)
PMV BLD AUTO: 10.1 FL (ref 6–12)
POTASSIUM SERPL-SCNC: 3.3 MMOL/L (ref 3.5–5.2)
PROT SERPL-MCNC: 5.9 G/DL (ref 6–8.5)
PROTHROMBIN TIME: 14.9 SECONDS (ref 11.7–14.2)
RBC # BLD AUTO: 4.68 10*6/MM3 (ref 4.14–5.8)
SODIUM SERPL-SCNC: 140 MMOL/L (ref 136–145)
WBC NRBC COR # BLD: 6.25 10*3/MM3 (ref 3.4–10.8)

## 2022-06-16 PROCEDURE — G0378 HOSPITAL OBSERVATION PER HR: HCPCS

## 2022-06-16 PROCEDURE — 82728 ASSAY OF FERRITIN: CPT | Performed by: HOSPITALIST

## 2022-06-16 PROCEDURE — 86140 C-REACTIVE PROTEIN: CPT | Performed by: NURSE PRACTITIONER

## 2022-06-16 PROCEDURE — 83615 LACTATE (LD) (LDH) ENZYME: CPT | Performed by: NURSE PRACTITIONER

## 2022-06-16 PROCEDURE — 97110 THERAPEUTIC EXERCISES: CPT

## 2022-06-16 PROCEDURE — 85379 FIBRIN DEGRADATION QUANT: CPT | Performed by: NURSE PRACTITIONER

## 2022-06-16 PROCEDURE — 25010000002 ENOXAPARIN PER 10 MG: Performed by: NURSE PRACTITIONER

## 2022-06-16 PROCEDURE — 83735 ASSAY OF MAGNESIUM: CPT | Performed by: NURSE PRACTITIONER

## 2022-06-16 PROCEDURE — 85025 COMPLETE CBC W/AUTO DIFF WBC: CPT | Performed by: NURSE PRACTITIONER

## 2022-06-16 PROCEDURE — 85610 PROTHROMBIN TIME: CPT | Performed by: NURSE PRACTITIONER

## 2022-06-16 PROCEDURE — 96372 THER/PROPH/DIAG INJ SC/IM: CPT

## 2022-06-16 PROCEDURE — 97530 THERAPEUTIC ACTIVITIES: CPT

## 2022-06-16 PROCEDURE — 80053 COMPREHEN METABOLIC PANEL: CPT | Performed by: NURSE PRACTITIONER

## 2022-06-16 RX ORDER — POTASSIUM CHLORIDE 1.5 G/1.77G
40 POWDER, FOR SOLUTION ORAL AS NEEDED
Status: DISCONTINUED | OUTPATIENT
Start: 2022-06-16 | End: 2022-06-20 | Stop reason: HOSPADM

## 2022-06-16 RX ORDER — POTASSIUM CHLORIDE 750 MG/1
40 TABLET, FILM COATED, EXTENDED RELEASE ORAL AS NEEDED
Status: DISCONTINUED | OUTPATIENT
Start: 2022-06-16 | End: 2022-06-20 | Stop reason: HOSPADM

## 2022-06-16 RX ADMIN — ROSUVASTATIN CALCIUM 40 MG: 40 TABLET, FILM COATED ORAL at 08:15

## 2022-06-16 RX ADMIN — DEXTROMETHORPHAN 60 MG: 30 SUSPENSION, EXTENDED RELEASE ORAL at 20:16

## 2022-06-16 RX ADMIN — CYCLOSPORINE 1 DROP: 0.5 EMULSION OPHTHALMIC at 08:16

## 2022-06-16 RX ADMIN — LATANOPROST 1 DROP: 50 SOLUTION OPHTHALMIC at 20:15

## 2022-06-16 RX ADMIN — ACETAMINOPHEN 650 MG: 325 TABLET ORAL at 08:24

## 2022-06-16 RX ADMIN — ACETAMINOPHEN 650 MG: 325 TABLET ORAL at 20:14

## 2022-06-16 RX ADMIN — BACLOFEN 10 MG: 10 TABLET ORAL at 20:15

## 2022-06-16 RX ADMIN — POTASSIUM CHLORIDE 40 MEQ: 750 TABLET, EXTENDED RELEASE ORAL at 18:11

## 2022-06-16 RX ADMIN — Medication 10 ML: at 20:14

## 2022-06-16 RX ADMIN — Medication 10 ML: at 08:16

## 2022-06-16 RX ADMIN — ASPIRIN 81 MG: 81 TABLET, COATED ORAL at 08:15

## 2022-06-16 RX ADMIN — TAMSULOSIN HYDROCHLORIDE 0.4 MG: 0.4 CAPSULE ORAL at 20:15

## 2022-06-16 RX ADMIN — POTASSIUM CHLORIDE 40 MEQ: 750 TABLET, EXTENDED RELEASE ORAL at 20:16

## 2022-06-16 RX ADMIN — MIRABEGRON 50 MG: 25 TABLET, FILM COATED, EXTENDED RELEASE ORAL at 08:15

## 2022-06-16 RX ADMIN — METOPROLOL TARTRATE 12.5 MG: 25 TABLET, FILM COATED ORAL at 08:15

## 2022-06-16 RX ADMIN — ENOXAPARIN SODIUM 40 MG: 100 INJECTION SUBCUTANEOUS at 08:15

## 2022-06-16 RX ADMIN — MULTIPLE VITAMINS W/ MINERALS TAB 1 TABLET: TAB at 08:16

## 2022-06-16 RX ADMIN — CYCLOSPORINE 1 DROP: 0.5 EMULSION OPHTHALMIC at 20:15

## 2022-06-16 RX ADMIN — DEXTROMETHORPHAN 60 MG: 30 SUSPENSION, EXTENDED RELEASE ORAL at 08:16

## 2022-06-16 RX ADMIN — METOPROLOL TARTRATE 12.5 MG: 25 TABLET, FILM COATED ORAL at 20:15

## 2022-06-16 NOTE — PLAN OF CARE
Goal Outcome Evaluation:  Plan of Care Reviewed With: patient         Patient up with assist of 2 to chair for most of the shift. Medicated for chronic lower back pain with tylenol. Remains stable at this time

## 2022-06-16 NOTE — PROGRESS NOTES
Name: Amandeep Manriquez Jr. ADMIT: 2022   : 1945  PCP: Eden Molina MD    MRN: [id number] LOS: 2 days   AGE/SEX: 77 y.o. male  ROOM: [facility]     Subjective   Subjective   Feeling a little better today--still just weak. No SOA or CP. Mild dry cough. Tolerating diet. No N/V/D/abd pain. Voiding well.    Review of Systems     as above    Objective   Objective   Vital Signs  Temp:  [96.9 °F (36.1 °C)-98.2 °F (36.8 °C)] 96.9 °F (36.1 °C)  Heart Rate:  [61-70] 63  Resp:  [18] 18  BP: (134-157)/(77-93) 138/80  SpO2:  [92 %-96 %] 96 %  on   ;   Device (Oxygen Therapy): room air  Body mass index is 29.31 kg/m².  Physical Exam  Vitals and nursing note reviewed. Exam conducted with a chaperone present (RN).   Constitutional:       General: He is not in acute distress.     Appearance: He is ill-appearing (chronically). He is not toxic-appearing or diaphoretic.   HENT:      Head: Normocephalic and atraumatic.      Mouth/Throat:      Mouth: Mucous membranes are moist.      Pharynx: Oropharynx is clear.   Eyes:      General: No scleral icterus.     Extraocular Movements: Extraocular movements intact.      Conjunctiva/sclera: Conjunctivae normal.   Cardiovascular:      Rate and Rhythm: Normal rate and regular rhythm.      Pulses: Normal pulses.   Pulmonary:      Effort: Pulmonary effort is normal. No respiratory distress.      Breath sounds: Normal breath sounds. No wheezing or rales.   Abdominal:      General: Bowel sounds are normal. There is no distension.      Palpations: Abdomen is soft.      Tenderness: There is no abdominal tenderness.   Musculoskeletal:         General: No swelling or deformity. Normal range of motion.      Cervical back: Neck supple. No rigidity.   Lymphadenopathy:      Cervical: No cervical adenopathy.   Skin:     General: Skin is warm and dry.      Coloration: Skin is not jaundiced.      Findings: No rash.   Neurological:      General: No focal deficit present.      Mental Status: He  is alert and oriented to person, place, and time. Mental status is at baseline.      Cranial Nerves: No cranial nerve deficit.      Motor: Weakness (generalized) present.      Coordination: Coordination normal.   Psychiatric:         Mood and Affect: Mood normal.         Behavior: Behavior normal.      Comments: Very pleasant, affect flat         Results Review     I reviewed the patient's new clinical results.  Results from last 7 days   Lab Units 06/16/22  1028 06/15/22  1047 06/14/22  0815 06/13/22  0308   WBC 10*3/mm3 6.25 6.53 7.87 8.65   HEMOGLOBIN g/dL 14.0 13.9 13.6 14.6   PLATELETS 10*3/mm3 220 199 182 197     Results from last 7 days   Lab Units 06/16/22  1028 06/15/22  1047 06/14/22  0815 06/13/22  0308   SODIUM mmol/L 140 143 139 141   POTASSIUM mmol/L 3.3* 3.4* 3.3* 3.6   CHLORIDE mmol/L 103 107 106 106   CO2 mmol/L 26.0 25.0 20.0* 22.0   BUN mg/dL 15 14 10 8   CREATININE mg/dL 0.42* 0.37* 0.38* 0.43*   GLUCOSE mg/dL 124* 132* 71 114*   EGFR mL/min/1.73 109.4 113.6 112.7 108.6     Results from last 7 days   Lab Units 06/16/22  1028 06/15/22  1047 06/14/22  0815 06/13/22  0308   ALBUMIN g/dL 3.40* 3.20* 3.50 4.10   BILIRUBIN mg/dL 0.3 0.3 0.4 0.4   ALK PHOS U/L 78 71 77 89   AST (SGOT) U/L 44* 54* 63* 61*   ALT (SGPT) U/L 27 30 28 30     Results from last 7 days   Lab Units 06/16/22  1028 06/15/22  1047 06/14/22  0815 06/13/22  0308   CALCIUM mg/dL 8.7 8.5* 8.3* 8.9   ALBUMIN g/dL 3.40* 3.20* 3.50 4.10   MAGNESIUM mg/dL 2.2 2.0 2.3  --      Results from last 7 days   Lab Units 06/13/22  0308 06/12/22  0959   PROCALCITONIN ng/mL 0.08 0.06   LACTATE mmol/L 1.1 1.4     No results found for: HGBA1C, POCGLU    No radiology results for the last day  Scheduled Medications  aspirin, 81 mg, Oral, Daily  baclofen, 10 mg, Oral, Nightly  cycloSPORINE, 1 drop, Both Eyes, BID  dextromethorphan polistirex ER, 60 mg, Oral, Q12H  enoxaparin, 40 mg, Subcutaneous, Daily  latanoprost, 1 drop, Both Eyes, Nightly  metoprolol  tartrate, 12.5 mg, Oral, BID  Mirabegron ER, 50 mg, Oral, Daily  multivitamin with minerals, 1 tablet, Oral, Daily  rosuvastatin, 40 mg, Oral, Daily  sodium chloride, 10 mL, Intravenous, Q12H  tamsulosin, 0.4 mg, Oral, Nightly    Infusions   Diet  Diet Regular; Cardiac       Assessment/Plan     Active Hospital Problems    Diagnosis  POA   • **COVID-19 [U07.1]  Yes   • Hypokalemia [E87.6]  No   • Generalized weakness [R53.1]  Yes   • History of CVA (cerebrovascular accident) [Z86.73]  Not Applicable   • Benign prostatic hyperplasia without lower urinary tract symptoms [N40.0]  Yes   • 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       77 y.o. male who presented with weakness and was admitted with COVID-19.    COVID-19  - no supplemental oxygen requirement and negative CXR, but given his age and cardiovascular disease he completed a 3 day course of Remdesivir  - following inflammatory markers, LFTs, renal function, and CBC  - ferritin wnl and CRP falling daily  - if becomes hypoxic would start Decadron   - febrile on admission but has not recurred, blood cultures NGTD, CXR neg, PCT wnl x 2--no abx indicated  - symptoms started 6/10, should be able to come out of isolation after 6/20     Generalized Weakness/Falls  - has hx of congential myopathy, weakness exacerbated by COViD  - continue PT    Hypokalemia  - start replacement with protocol, Mg++ level fine     HTN/CAD  - no anginal symptoms, BPs acceptable  - continue metoprolol, ASA, Crestor     BPH  - continue Flomax, voiding well     Hx of CVA  - right cerebellar, from small vessel disease  - on ASA and Crestor  - followed by Dr. Osborn as outpatient        Lovenox 40 mg SC daily for DVT prophylaxis.  Full code confirmed.  Discussed with patient and nursing  staff. D/w wife on phone.  Anticipate discharge to [facility] when bed available and precert obtained--probably early next week. Continue PT and close observation until then.      During all interaction with pt proper PPE was utilized (gown, gloves, mask, goggles, and face shield) and was donned/doffed according to recommendations. Hands were cleaned before and after interaction.      Jonathan Bird MD  [facility]  06/16/22  16:43 EDT

## 2022-06-16 NOTE — PLAN OF CARE
Goal Outcome Evaluation:  Plan of Care Reviewed With: patient        Progress: improving  Outcome Evaluation:     A&O x4. VSS. SR on telemetry. Room air.     Accu-checks.     Accu-max pump applied.       No complaints of pain. No signs of distress.     Will continue to monitor

## 2022-06-16 NOTE — PLAN OF CARE
Goal Outcome Evaluation:  Plan of Care Reviewed With: patient        Progress: no change  Outcome Evaluation: Pt agreeable to PT today. Continues to demonstrate weakness and requires mod/max A x1 to perform sit to stand transfer from chair surface. Pt unable to attempt ambulation at this time due to difficulty maintaining static standing balance. Able to complete seated LE ther ex for strengthening. No new PT concerns, pt will continue to benefit from PT for ongoing strengthening and mobility training. Recommend DC to SNF for subacute rehab when medically stable.

## 2022-06-16 NOTE — THERAPY TREATMENT NOTE
Patient Name: Amandeep Manriquez Jr.  : 1945    MRN: [id number]                              Today's Date: 2022       Admit Date: 2022    Visit Dx:     ICD-10-CM ICD-9-CM   1. COVID-19  U07.1 079.89   2. Generalized weakness  R53.1 780.79     Patient Active Problem List   Diagnosis   • CAD (coronary artery disease), native coronary artery   • Essential hypertension   • Mixed hyperlipidemia   • Congenital myopathy (HCC)   • Arthropathy of right sacroiliac joint   • Osteoarthritis of right knee   • Degenerative lumbar spinal stenosis   • Renal stone   • Bilateral impacted cerumen   • Benign prostatic hyperplasia without lower urinary tract symptoms   • Vertigo   • High risk medication use   • Vitamin D deficiency   • OAB (overactive bladder)   • Rosacea   • Short-term memory loss   • History of CVA (cerebrovascular accident)   • COVID-19   • Generalized weakness   • Hypokalemia     Past Medical History:   Diagnosis Date   • Acute myocardial infarction (HCC)    • 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HCC)    • Arthritis    • BPH (benign prostatic hyperplasia)    • CAD (coronary artery disease), native coronary artery    • Congenital myopathy (HCC)    • DDD (degenerative disc disease), lumbar     with stenosis   • Diabetes mellitus (HCC)    • Dizziness     Chronic Dizziness   • Encounter for Medicare annual wellness exam    • Essential hypertension    • Gastrointestinal hemorrhage    • History of impacted cerumen    • Hyperlipidemia    • Hypertension    • Mixed hyperlipidemia      Past Surgical History:   Procedure Laterality Date   • CARDIAC CATHETERIZATION  2015    30% Left main, 99% mid to distal LAD, 70-80% left circumflex, 80% proximal to mid RCA.   • CATARACT EXTRACTION     • CERVICAL FUSION     • CHOLECYSTECTOMY     • CORONARY ANGIOPLASTY WITH STENT PLACEMENT     • KNEE SURGERY Right    • OTHER SURGICAL HISTORY      percutaneous injection of extremity pseudoaneurysm      General Information      Row Name 06/16/22 1555          Physical Therapy Time and Intention    Document Type therapy note (daily note)  -EE     Mode of Treatment physical therapy;individual therapy  -EE     Row Name 06/16/22 1555          General Information    Existing Precautions/Restrictions fall  -EE     Barriers to Rehab medically complex;previous functional deficit  -EE     Row Name 06/16/22 1555          Cognition    Orientation Status (Cognition) oriented x 3  -EE     Row Name 06/16/22 1555          Safety Issues, Functional Mobility    Impairments Affecting Function (Mobility) balance;coordination;endurance/activity tolerance;postural/trunk control;strength  -EE           User Key  (r) = Recorded By, (t) = Taken By, (c) = Cosigned By    Initials Name Provider Type    EE Ruby Rawls PT Physical Therapist               Mobility     Row Name 06/16/22 1556          Bed Mobility    Comment, (Bed Mobility) not tested; pt sitting up in chair  -EE     Row Name 06/16/22 1556          Sit-Stand Transfer    Sit-Stand [place] (Transfers) moderate assist (50% patient effort);maximum assist (25% patient effort);verbal cues;1 person assist  -EE     Assistive Device (Sit-Stand Transfers) walker, front-wheeled  -EE     Comment, (Sit-Stand Transfer) STS from low chair surface x3; pt fearful of standing with rwx but agreed with encouragement and assurance that PT would brace walker to prevent rolling. Pt requires cues for hand placement and forward weight shift.  -EE     Row Name 06/16/22 1556          Gait/Stairs (Locomotion)    [place] Level (Gait) unable to assess  Pt fatigued with static standing; unable to attempt ambulation at this time  -EE           User Key  (r) = Recorded By, (t) = Taken By, (c) = Cosigned By    Initials Name Provider Type    EE Ruby Rawls PT Physical Therapist               Obj/Interventions     Row Name 06/16/22 1083          Motor Skills    Therapeutic Exercise other (see comments)  seated B ankle pumps,  LAQ x 10 reps each  -EE     Row Name 06/16/22 1558          Balance    Static Standing Balance minimal assist  -EE     Position/Device Used, Standing Balance supported;walker, rolling  -EE     Comment, Balance Static standing x3 trials: 1 min, 1 min, 30 sec. Pt stands with forward flexed posture, needs vc's and tc's for postural correction and glute activation.  -EE           User Key  (r) = Recorded By, (t) = Taken By, (c) = Cosigned By    Initials Name Provider Type    EE Ruby Rawls, PT Physical Therapist               Goals/Plan    No documentation.                Clinical Impression     Row Name 06/16/22 1559          Pain    Pretreatment Pain Rating 0/10 - no pain  -EE     Posttreatment Pain Rating 0/10 - no pain  -EE     Row Name 06/16/22 1559          Plan of Care Review    Plan of Care Reviewed With patient  -EE     Progress no change  -EE     Outcome Evaluation Pt agreeable to PT today. Continues to demonstrate weakness and requires mod/max A x1 to perform sit to stand transfer from chair surface. Pt unable to attempt ambulation at this time due to difficulty maintaining static standing balance. Able to complete seated LE ther ex for strengthening. No new PT concerns, pt will continue to benefit from PT for ongoing strengthening and mobility training. Recommend DC to SNF for subacute rehab when medically stable.  -EE     Row Name 06/16/22 1553          Therapy Assessment/Plan (PT)    Rehab Potential (PT) good, to achieve stated therapy goals  -EE     Criteria for Skilled Interventions Met (PT) yes;meets criteria;skilled treatment is necessary  -EE     Therapy Frequency (PT) 3 times/wk  -EE     Row Name 06/16/22 1559          Positioning and Restraints    Pre-Treatment Position sitting in chair/recliner  -EE     Post Treatment Position chair  -EE     In Chair reclined;call light within reach;encouraged to call for assist;exit alarm on;notified nsg;with family/caregiver;legs elevated  -EE           User Key   (r) = Recorded By, (t) = Taken By, (c) = Cosigned By    Initials Name Provider Type    Ruby Carroll PT Physical Therapist               Outcome Measures     Row Name 06/16/22 1605          How much help from another person do you currently need...    Turning from your back to your side while in flat bed without using bedrails? 2  -EE     Moving from lying on back to sitting on the side of a flat bed without bedrails? 2  -EE     Moving to and from a bed to a chair (including a wheelchair)? 2  -EE     Standing up from a chair using your arms (e.g., wheelchair, bedside chair)? 2  -EE     Climbing 3-5 steps with a railing? 1  -EE     To walk in hospital room? 1  -EE     AM-PAC 6 Clicks Score (PT) 10  -EE     Highest level of mobility 4 --> Transferred to chair/commode  -EE           User Key  (r) = Recorded By, (t) = Taken By, (c) = Cosigned By    Initials Name Provider Type    Ruby Carroll PT Physical Therapist                             Physical Therapy Education                 Title: PT OT SLP Therapies (Done)     Topic: Physical Therapy (Done)     Point: Mobility training (Done)     Learning Progress Summary           Patient Acceptance, E,TB, VU,NR by EE at 6/16/2022 1605    Acceptance, E,TB,D, VU,NR by  at 6/14/2022 1340                   Point: Home exercise program (Done)     Learning Progress Summary           Patient Acceptance, E,TB, VU,NR by EE at 6/16/2022 1605    Acceptance, E,TB,D, VU,NR by  at 6/14/2022 1340                   Point: Body mechanics (Done)     Learning Progress Summary           Patient Acceptance, E,TB, VU,NR by EE at 6/16/2022 1605    Acceptance, E,TB,D, VU,NR by  at 6/14/2022 1340                   Point: Precautions (Done)     Learning Progress Summary           Patient Acceptance, E,TB, VU,NR by EE at 6/16/2022 1605    Acceptance, E,TB,D, VU,NR by  at 6/14/2022 1340                               User Key     Initials Effective Dates Name Provider Type Discipline     [facility] 06/16/21 -  Erica Anderson PT Physical Therapist PT    EE 06/16/21 -  Ruby Rawls PT Physical Therapist PT              PT Recommendation and Plan     Plan of Care Reviewed With: patient  Progress: no change  Outcome Evaluation: Pt agreeable to PT today. Continues to demonstrate weakness and requires mod/max A x1 to perform sit to stand transfer from chair surface. Pt unable to attempt ambulation at this time due to difficulty maintaining static standing balance. Able to complete seated LE ther ex for strengthening. No new PT concerns, pt will continue to benefit from PT for ongoing strengthening and mobility training. Recommend DC to SNF for subacute rehab when medically stable.     Time Calculation:    PT Charges     Row Name 06/16/22 1605             Time Calculation    Start Time 1507  -EE      Stop Time 1530  -EE      Time Calculation (min) 23 min  -EE      PT Received On 06/16/22  -EE      PT - Next Appointment 06/18/22  -EE              Time Calculation- PT    Total Timed Code Minutes- PT 23 minute(s)  -EE              Timed Charges    16900 - PT Therapeutic Exercise Minutes 10  -EE      57898 - PT Therapeutic Activity Minutes 13  -EE              Total Minutes    Timed Charges Total Minutes 23  -EE       Total Minutes 23  -EE            User Key  (r) = Recorded By, (t) = Taken By, (c) = Cosigned By    Initials Name Provider Type    EE Ruby Rawls, MATILDA Physical Therapist              Therapy Charges for Today     Code Description Service Date Service Provider Modifiers Qty    36243590777 HC PT THER PROC EA 15 MIN 6/16/2022 Ruby Rawls, PT GP 1    37731712269 HC PT THERAPEUTIC ACT EA 15 MIN 6/16/2022 Ruby Rawls, PT GP 1          PT G-Codes  Outcome Measure Options: AM-PAC 6 Clicks Daily Activity (OT)  AM-PAC 6 Clicks Score (PT): 10  AM-PAC 6 Clicks Score (OT): 12     Patient was not wearing a face mask during this therapy encounter. Therapist used appropriate personal protective equipment  including gown, eye protection, mask and gloves.  CAPR was worn for PPE. Appropriate PPE was worn during the entire therapy session. Hand hygiene was completed before and after therapy session. Patient is in enhanced droplet precautions.       Ruby Rawls, PT  6/16/2022

## 2022-06-17 LAB
ALBUMIN SERPL-MCNC: 3.3 G/DL (ref 3.5–5.2)
ALBUMIN/GLOB SERPL: 1.3 G/DL
ALP SERPL-CCNC: 79 U/L (ref 39–117)
ALT SERPL W P-5'-P-CCNC: 26 U/L (ref 1–41)
ANION GAP SERPL CALCULATED.3IONS-SCNC: 12 MMOL/L (ref 5–15)
AST SERPL-CCNC: 41 U/L (ref 1–40)
BACTERIA SPEC AEROBE CULT: NORMAL
BACTERIA SPEC AEROBE CULT: NORMAL
BASOPHILS # BLD AUTO: 0.02 10*3/MM3 (ref 0–0.2)
BASOPHILS NFR BLD AUTO: 0.3 % (ref 0–1.5)
BILIRUB SERPL-MCNC: 0.4 MG/DL (ref 0–1.2)
BUN SERPL-MCNC: 13 MG/DL (ref 8–23)
BUN/CREAT SERPL: 31 (ref 7–25)
CALCIUM SPEC-SCNC: 8.6 MG/DL (ref 8.6–10.5)
CHLORIDE SERPL-SCNC: 102 MMOL/L (ref 98–107)
CO2 SERPL-SCNC: 23 MMOL/L (ref 22–29)
CREAT SERPL-MCNC: 0.42 MG/DL (ref 0.76–1.27)
CRP SERPL-MCNC: 1.93 MG/DL (ref 0–0.5)
DEPRECATED RDW RBC AUTO: 40.8 FL (ref 37–54)
EGFRCR SERPLBLD CKD-EPI 2021: 109.4 ML/MIN/1.73
EOSINOPHIL # BLD AUTO: 0.16 10*3/MM3 (ref 0–0.4)
EOSINOPHIL NFR BLD AUTO: 2.5 % (ref 0.3–6.2)
ERYTHROCYTE [DISTWIDTH] IN BLOOD BY AUTOMATED COUNT: 12.4 % (ref 12.3–15.4)
FERRITIN SERPL-MCNC: 150 NG/ML (ref 30–400)
GLOBULIN UR ELPH-MCNC: 2.5 GM/DL
GLUCOSE SERPL-MCNC: 161 MG/DL (ref 65–99)
HCT VFR BLD AUTO: 45.2 % (ref 37.5–51)
HGB BLD-MCNC: 14.7 G/DL (ref 13–17.7)
IMM GRANULOCYTES # BLD AUTO: 0.03 10*3/MM3 (ref 0–0.05)
IMM GRANULOCYTES NFR BLD AUTO: 0.5 % (ref 0–0.5)
LYMPHOCYTES # BLD AUTO: 1.33 10*3/MM3 (ref 0.7–3.1)
LYMPHOCYTES NFR BLD AUTO: 20.8 % (ref 19.6–45.3)
MAGNESIUM SERPL-MCNC: 3 MG/DL (ref 1.6–2.4)
MCH RBC QN AUTO: 29.7 PG (ref 26.6–33)
MCHC RBC AUTO-ENTMCNC: 32.5 G/DL (ref 31.5–35.7)
MCV RBC AUTO: 91.3 FL (ref 79–97)
MONOCYTES # BLD AUTO: 0.44 10*3/MM3 (ref 0.1–0.9)
MONOCYTES NFR BLD AUTO: 6.9 % (ref 5–12)
NEUTROPHILS NFR BLD AUTO: 4.4 10*3/MM3 (ref 1.7–7)
NEUTROPHILS NFR BLD AUTO: 69 % (ref 42.7–76)
NRBC BLD AUTO-RTO: 0 /100 WBC (ref 0–0.2)
PLATELET # BLD AUTO: 229 10*3/MM3 (ref 140–450)
PMV BLD AUTO: 9.5 FL (ref 6–12)
POTASSIUM SERPL-SCNC: 4.2 MMOL/L (ref 3.5–5.2)
POTASSIUM SERPL-SCNC: 4.6 MMOL/L (ref 3.5–5.2)
PROT SERPL-MCNC: 5.8 G/DL (ref 6–8.5)
RBC # BLD AUTO: 4.95 10*6/MM3 (ref 4.14–5.8)
SODIUM SERPL-SCNC: 137 MMOL/L (ref 136–145)
WBC NRBC COR # BLD: 6.38 10*3/MM3 (ref 3.4–10.8)

## 2022-06-17 PROCEDURE — 85025 COMPLETE CBC W/AUTO DIFF WBC: CPT | Performed by: NURSE PRACTITIONER

## 2022-06-17 PROCEDURE — 80053 COMPREHEN METABOLIC PANEL: CPT | Performed by: NURSE PRACTITIONER

## 2022-06-17 PROCEDURE — 83735 ASSAY OF MAGNESIUM: CPT | Performed by: NURSE PRACTITIONER

## 2022-06-17 PROCEDURE — 86140 C-REACTIVE PROTEIN: CPT | Performed by: NURSE PRACTITIONER

## 2022-06-17 PROCEDURE — 25010000002 ENOXAPARIN PER 10 MG: Performed by: NURSE PRACTITIONER

## 2022-06-17 PROCEDURE — 36415 COLL VENOUS BLD VENIPUNCTURE: CPT | Performed by: NURSE PRACTITIONER

## 2022-06-17 PROCEDURE — G0378 HOSPITAL OBSERVATION PER HR: HCPCS

## 2022-06-17 PROCEDURE — 82728 ASSAY OF FERRITIN: CPT | Performed by: HOSPITALIST

## 2022-06-17 PROCEDURE — 84132 ASSAY OF SERUM POTASSIUM: CPT | Performed by: HOSPITALIST

## 2022-06-17 PROCEDURE — 96372 THER/PROPH/DIAG INJ SC/IM: CPT

## 2022-06-17 RX ADMIN — DEXTROMETHORPHAN 60 MG: 30 SUSPENSION, EXTENDED RELEASE ORAL at 21:32

## 2022-06-17 RX ADMIN — MIRABEGRON 50 MG: 25 TABLET, FILM COATED, EXTENDED RELEASE ORAL at 08:10

## 2022-06-17 RX ADMIN — DEXTROMETHORPHAN 60 MG: 30 SUSPENSION, EXTENDED RELEASE ORAL at 08:05

## 2022-06-17 RX ADMIN — LATANOPROST 1 DROP: 50 SOLUTION OPHTHALMIC at 21:34

## 2022-06-17 RX ADMIN — TAMSULOSIN HYDROCHLORIDE 0.4 MG: 0.4 CAPSULE ORAL at 21:33

## 2022-06-17 RX ADMIN — ACETAMINOPHEN 650 MG: 325 TABLET ORAL at 08:10

## 2022-06-17 RX ADMIN — BACLOFEN 10 MG: 10 TABLET ORAL at 21:32

## 2022-06-17 RX ADMIN — ASPIRIN 81 MG: 81 TABLET, COATED ORAL at 08:10

## 2022-06-17 RX ADMIN — CYCLOSPORINE 1 DROP: 0.5 EMULSION OPHTHALMIC at 21:32

## 2022-06-17 RX ADMIN — ROSUVASTATIN CALCIUM 40 MG: 40 TABLET, FILM COATED ORAL at 08:10

## 2022-06-17 RX ADMIN — ENOXAPARIN SODIUM 40 MG: 100 INJECTION SUBCUTANEOUS at 08:05

## 2022-06-17 RX ADMIN — MULTIPLE VITAMINS W/ MINERALS TAB 1 TABLET: TAB at 08:10

## 2022-06-17 RX ADMIN — METOPROLOL TARTRATE 12.5 MG: 25 TABLET, FILM COATED ORAL at 08:10

## 2022-06-17 RX ADMIN — Medication 10 ML: at 08:10

## 2022-06-17 RX ADMIN — METOPROLOL TARTRATE 12.5 MG: 25 TABLET, FILM COATED ORAL at 21:38

## 2022-06-17 RX ADMIN — Medication 10 ML: at 21:33

## 2022-06-17 RX ADMIN — CYCLOSPORINE 1 DROP: 0.5 EMULSION OPHTHALMIC at 08:10

## 2022-06-17 NOTE — PLAN OF CARE
Goal Outcome Evaluation:  Plan of Care Reviewed With: patient      Patient up in the chair for most of the shift. Assist of 2. Remains stable

## 2022-06-17 NOTE — PLAN OF CARE
Goal Outcome Evaluation:  Plan of Care Reviewed With: patient        Progress: no change  Outcome Evaluation:     A&O x4. VSS. SR on telemetry. Room air.     Accu-checks.     PRN tylenol given for chronic back pain.       Resting well at this time. will continue to monitor

## 2022-06-17 NOTE — PROGRESS NOTES
Name: Amandeep Manriquez Jr. ADMIT: 2022   : 1945  PCP: Eden Molina MD    MRN: [id number] LOS: 3 days   AGE/SEX: 77 y.o. male  ROOM: [facility]     Subjective   Subjective   Feeling okay today--still just weak. No SOA or CP. Mild dry cough. Tolerating diet. No N/V/D/abd pain. Voiding well.    Review of Systems     as above    Objective   Objective   Vital Signs  Temp:  [96.4 °F (35.8 °C)-97.4 °F (36.3 °C)] 96.4 °F (35.8 °C)  Heart Rate:  [58-76] 61  Resp:  [18] 18  BP: (138-156)/(80-96) 142/96  SpO2:  [93 %-96 %] 94 %  on   ;   Device (Oxygen Therapy): room air  Body mass index is 29.31 kg/m².  Physical Exam  Vitals and nursing note reviewed. Exam conducted with a chaperone present (CNA).   Constitutional:       General: He is not in acute distress.     Appearance: He is ill-appearing (chronically). He is not toxic-appearing or diaphoretic.   HENT:      Head: Normocephalic and atraumatic.      Mouth/Throat:      Mouth: Mucous membranes are moist.      Pharynx: Oropharynx is clear.   Eyes:      General: No scleral icterus.     Extraocular Movements: Extraocular movements intact.      Conjunctiva/sclera: Conjunctivae normal.   Cardiovascular:      Rate and Rhythm: Normal rate and regular rhythm.      Pulses: Normal pulses.   Pulmonary:      Effort: Pulmonary effort is normal. No respiratory distress.      Breath sounds: Normal breath sounds. No wheezing or rales.   Abdominal:      General: Bowel sounds are normal. There is no distension.      Palpations: Abdomen is soft.      Tenderness: There is no abdominal tenderness.   Musculoskeletal:         General: No swelling or deformity. Normal range of motion.      Cervical back: Neck supple. No rigidity.   Lymphadenopathy:      Cervical: No cervical adenopathy.   Skin:     General: Skin is warm and dry.      Coloration: Skin is not jaundiced.      Findings: No rash.   Neurological:      General: No focal deficit present.      Mental Status: He is alert  and oriented to person, place, and time. Mental status is at baseline.      Cranial Nerves: No cranial nerve deficit.      Motor: Weakness (generalized) present.      Coordination: Coordination normal.   Psychiatric:         Mood and Affect: Mood normal.         Behavior: Behavior normal.      Comments: Very pleasant, affect flat         Results Review     I reviewed the patient's new clinical results.  Results from last 7 days   Lab Units 06/17/22  0911 06/16/22  1028 06/15/22  1047 06/14/22  0815   WBC 10*3/mm3 6.38 6.25 6.53 7.87   HEMOGLOBIN g/dL 14.7 14.0 13.9 13.6   PLATELETS 10*3/mm3 229 220 199 182     Results from last 7 days   Lab Units 06/17/22  0029 06/16/22 1028 06/15/22  1047 06/14/22  0815 06/13/22  0308   SODIUM mmol/L  --  140 143 139 141   POTASSIUM mmol/L 4.6 3.3* 3.4* 3.3* 3.6   CHLORIDE mmol/L  --  103 107 106 106   CO2 mmol/L  --  26.0 25.0 20.0* 22.0   BUN mg/dL  --  15 14 10 8   CREATININE mg/dL  --  0.42* 0.37* 0.38* 0.43*   GLUCOSE mg/dL  --  124* 132* 71 114*   EGFR mL/min/1.73  --  109.4 113.6 112.7 108.6     Results from last 7 days   Lab Units 06/16/22  1028 06/15/22  1047 06/14/22  0815 06/13/22  0308   ALBUMIN g/dL 3.40* 3.20* 3.50 4.10   BILIRUBIN mg/dL 0.3 0.3 0.4 0.4   ALK PHOS U/L 78 71 77 89   AST (SGOT) U/L 44* 54* 63* 61*   ALT (SGPT) U/L 27 30 28 30     Results from last 7 days   Lab Units 06/16/22  1028 06/15/22  1047 06/14/22  0815 06/13/22  0308   CALCIUM mg/dL 8.7 8.5* 8.3* 8.9   ALBUMIN g/dL 3.40* 3.20* 3.50 4.10   MAGNESIUM mg/dL 2.2 2.0 2.3  --      Results from last 7 days   Lab Units 06/13/22  0308 06/12/22  0959   PROCALCITONIN ng/mL 0.08 0.06   LACTATE mmol/L 1.1 1.4     No results found for: HGBA1C, POCGLU    No radiology results for the last day  Scheduled Medications  aspirin, 81 mg, Oral, Daily  baclofen, 10 mg, Oral, Nightly  cycloSPORINE, 1 drop, Both Eyes, BID  dextromethorphan polistirex ER, 60 mg, Oral, Q12H  enoxaparin, 40 mg, Subcutaneous,  Daily  latanoprost, 1 drop, Both Eyes, Nightly  metoprolol tartrate, 12.5 mg, Oral, BID  Mirabegron ER, 50 mg, Oral, Daily  multivitamin with minerals, 1 tablet, Oral, Daily  rosuvastatin, 40 mg, Oral, Daily  sodium chloride, 10 mL, Intravenous, Q12H  tamsulosin, 0.4 mg, Oral, Nightly    Infusions   Diet  Diet Regular; Cardiac       Assessment/Plan     Active Hospital Problems    Diagnosis  POA   • **COVID-19 [U07.1]  Yes   • Hypokalemia [E87.6]  No   • Generalized weakness [R53.1]  Yes   • History of CVA (cerebrovascular accident) [Z86.73]  Not Applicable   • Benign prostatic hyperplasia without lower urinary tract symptoms [N40.0]  Yes   • 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       77 y.o. male who presented with weakness and was admitted with COVID-19.    COVID-19  - no supplemental oxygen requirement and negative CXR, but given his age and cardiovascular disease he completed a 3 day course of Remdesivir  - following inflammatory markers, LFTs, renal function, and CBC  - ferritin wnl and CRP falling daily  - if becomes hypoxic would start Decadron   - febrile on admission but has not recurred, blood cultures NGTD, CXR neg, PCT wnl x 2--no abx indicated  - symptoms started 6/10, should be able to come out of isolation after 6/20     Generalized Weakness/Falls  - has hx of congential myopathy, weakness exacerbated by COViD  - continue PT    Hypokalemia  - continue replacement with protocol as needed, Mg++ level fine     HTN/CAD  - no anginal symptoms, BPs acceptable  - continue metoprolol, ASA, Crestor     BPH  - continue Flomax, voiding well     Hx of CVA  - right cerebellar, from small vessel disease  - on ASA and Crestor  - followed by Dr. Osborn as outpatient        Lovenox 40 mg SC daily for DVT  prophylaxis.  Full code confirmed.  Discussed with patient.  Anticipate discharge to [facility] when bed available and precert obtained--probably early next week. Continue PT and close observation until then.      During all interaction with pt proper PPE was utilized (gown, gloves, mask, goggles, and face shield) and was donned/doffed according to recommendations. Hands were cleaned before and after interaction.      Jonathan Bird MD  [facility]  06/17/22  09:56 EDT

## 2022-06-17 NOTE — CASE MANAGEMENT/SOCIAL WORK
Continued Stay Note  [facility]     Patient Name: Amandeep Manriquez Jr.  MRN: [id number]  Today's Date: 6/17/2022    Admit Date: 6/13/2022     Discharge Plan     Row Name 06/17/22 1125       Plan    Plan [facility] bed Monday pending [employer] M[employer] pre-cert (to be initiated monday morning). Will need transport    Plan Comments Incoming call from [facility] stating [facility] has a bed on Monday. Ferdinand/[facility] stated family has toured the facility and is agreeable. Patient will need a [employer] M[employer] pre-cert to be initiated first thing Monday morning. Patient will need stretcher transport vs wheelchair van transport. SEAMUS SNYDER               Discharge Codes    No documentation.               Expected Discharge Date and Time     Expected Discharge Date Expected Discharge Time    Jun 17, 2022             SEAMUS FRANKEL

## 2022-06-17 NOTE — CASE MANAGEMENT/SOCIAL WORK
Continued Stay Note  [facility]     Patient Name: Amandeep Manriquez Jr.  MRN: [id number]  Today's Date: 6/17/2022    Admit Date: 6/13/2022     Discharge Plan     Row Name 06/17/22 1557       Plan    Plan [facility] bed Monday pending [employer] M[employer] pre-cert (to be initiated monday morning). [facility] w/c van scheduled for 6/20 at 4pm    Plan Comments In anticipation of possible d/c Monday to [facility] (pending [employer] M[employer] pre-cert), CSW scheduled a [facility] w/c van for 6/20 at 4:00PM (res# [id number]). CCP to follow to start [employer] M[employer] pre-cert first thing Monday morning. LILLIAN, JANUSZW               Discharge Codes    No documentation.               Expected Discharge Date and Time     Expected Discharge Date Expected Discharge Time    Jun 20, 2022             SEAMUS FRANKEL

## 2022-06-18 LAB
ALBUMIN SERPL-MCNC: 3.3 G/DL (ref 3.5–5.2)
ALBUMIN/GLOB SERPL: 1.4 G/DL
ALP SERPL-CCNC: 82 U/L (ref 39–117)
ALT SERPL W P-5'-P-CCNC: 33 U/L (ref 1–41)
ANION GAP SERPL CALCULATED.3IONS-SCNC: 9.7 MMOL/L (ref 5–15)
AST SERPL-CCNC: 42 U/L (ref 1–40)
BACTERIA SPEC AEROBE CULT: NORMAL
BACTERIA SPEC AEROBE CULT: NORMAL
BASOPHILS # BLD AUTO: 0.03 10*3/MM3 (ref 0–0.2)
BASOPHILS NFR BLD AUTO: 0.5 % (ref 0–1.5)
BILIRUB SERPL-MCNC: 0.3 MG/DL (ref 0–1.2)
BUN SERPL-MCNC: 13 MG/DL (ref 8–23)
BUN/CREAT SERPL: 35.1 (ref 7–25)
CALCIUM SPEC-SCNC: 8.4 MG/DL (ref 8.6–10.5)
CHLORIDE SERPL-SCNC: 105 MMOL/L (ref 98–107)
CO2 SERPL-SCNC: 25.3 MMOL/L (ref 22–29)
CREAT SERPL-MCNC: 0.37 MG/DL (ref 0.76–1.27)
CRP SERPL-MCNC: 1.29 MG/DL (ref 0–0.5)
DEPRECATED RDW RBC AUTO: 40.6 FL (ref 37–54)
EGFRCR SERPLBLD CKD-EPI 2021: 113.6 ML/MIN/1.73
EOSINOPHIL # BLD AUTO: 0.18 10*3/MM3 (ref 0–0.4)
EOSINOPHIL NFR BLD AUTO: 2.9 % (ref 0.3–6.2)
ERYTHROCYTE [DISTWIDTH] IN BLOOD BY AUTOMATED COUNT: 12.7 % (ref 12.3–15.4)
FERRITIN SERPL-MCNC: 144 NG/ML (ref 30–400)
GLOBULIN UR ELPH-MCNC: 2.4 GM/DL
GLUCOSE SERPL-MCNC: 94 MG/DL (ref 65–99)
HCT VFR BLD AUTO: 41.5 % (ref 37.5–51)
HGB BLD-MCNC: 14.6 G/DL (ref 13–17.7)
IMM GRANULOCYTES # BLD AUTO: 0.06 10*3/MM3 (ref 0–0.05)
IMM GRANULOCYTES NFR BLD AUTO: 1 % (ref 0–0.5)
INR PPP: 1.07 (ref 0.9–1.1)
LDH SERPL-CCNC: 214 U/L (ref 135–225)
LYMPHOCYTES # BLD AUTO: 1.22 10*3/MM3 (ref 0.7–3.1)
LYMPHOCYTES NFR BLD AUTO: 20 % (ref 19.6–45.3)
MAGNESIUM SERPL-MCNC: 2.4 MG/DL (ref 1.6–2.4)
MCH RBC QN AUTO: 31.3 PG (ref 26.6–33)
MCHC RBC AUTO-ENTMCNC: 35.2 G/DL (ref 31.5–35.7)
MCV RBC AUTO: 88.9 FL (ref 79–97)
MONOCYTES # BLD AUTO: 0.68 10*3/MM3 (ref 0.1–0.9)
MONOCYTES NFR BLD AUTO: 11.1 % (ref 5–12)
NEUTROPHILS NFR BLD AUTO: 3.94 10*3/MM3 (ref 1.7–7)
NEUTROPHILS NFR BLD AUTO: 64.5 % (ref 42.7–76)
NRBC BLD AUTO-RTO: 0 /100 WBC (ref 0–0.2)
PLATELET # BLD AUTO: 220 10*3/MM3 (ref 140–450)
PMV BLD AUTO: 9.6 FL (ref 6–12)
POTASSIUM SERPL-SCNC: 4.1 MMOL/L (ref 3.5–5.2)
PROT SERPL-MCNC: 5.7 G/DL (ref 6–8.5)
PROTHROMBIN TIME: 13.8 SECONDS (ref 11.7–14.2)
RBC # BLD AUTO: 4.67 10*6/MM3 (ref 4.14–5.8)
SODIUM SERPL-SCNC: 140 MMOL/L (ref 136–145)
WBC NRBC COR # BLD: 6.11 10*3/MM3 (ref 3.4–10.8)

## 2022-06-18 PROCEDURE — 83735 ASSAY OF MAGNESIUM: CPT | Performed by: NURSE PRACTITIONER

## 2022-06-18 PROCEDURE — G0378 HOSPITAL OBSERVATION PER HR: HCPCS

## 2022-06-18 PROCEDURE — 82728 ASSAY OF FERRITIN: CPT | Performed by: HOSPITALIST

## 2022-06-18 PROCEDURE — 85025 COMPLETE CBC W/AUTO DIFF WBC: CPT | Performed by: NURSE PRACTITIONER

## 2022-06-18 PROCEDURE — 83615 LACTATE (LD) (LDH) ENZYME: CPT | Performed by: NURSE PRACTITIONER

## 2022-06-18 PROCEDURE — 80053 COMPREHEN METABOLIC PANEL: CPT | Performed by: NURSE PRACTITIONER

## 2022-06-18 PROCEDURE — 86140 C-REACTIVE PROTEIN: CPT | Performed by: NURSE PRACTITIONER

## 2022-06-18 PROCEDURE — 36415 COLL VENOUS BLD VENIPUNCTURE: CPT | Performed by: NURSE PRACTITIONER

## 2022-06-18 PROCEDURE — 25010000002 ENOXAPARIN PER 10 MG: Performed by: NURSE PRACTITIONER

## 2022-06-18 PROCEDURE — 97530 THERAPEUTIC ACTIVITIES: CPT

## 2022-06-18 PROCEDURE — 96372 THER/PROPH/DIAG INJ SC/IM: CPT

## 2022-06-18 PROCEDURE — 85610 PROTHROMBIN TIME: CPT | Performed by: NURSE PRACTITIONER

## 2022-06-18 RX ADMIN — ENOXAPARIN SODIUM 40 MG: 100 INJECTION SUBCUTANEOUS at 10:11

## 2022-06-18 RX ADMIN — DEXTROMETHORPHAN 60 MG: 30 SUSPENSION, EXTENDED RELEASE ORAL at 10:11

## 2022-06-18 RX ADMIN — ROSUVASTATIN CALCIUM 40 MG: 40 TABLET, FILM COATED ORAL at 10:11

## 2022-06-18 RX ADMIN — LATANOPROST 1 DROP: 50 SOLUTION OPHTHALMIC at 21:15

## 2022-06-18 RX ADMIN — CYCLOSPORINE 1 DROP: 0.5 EMULSION OPHTHALMIC at 21:14

## 2022-06-18 RX ADMIN — CYCLOSPORINE 1 DROP: 0.5 EMULSION OPHTHALMIC at 10:11

## 2022-06-18 RX ADMIN — ASPIRIN 81 MG: 81 TABLET, COATED ORAL at 10:11

## 2022-06-18 RX ADMIN — METOPROLOL TARTRATE 12.5 MG: 25 TABLET, FILM COATED ORAL at 10:11

## 2022-06-18 RX ADMIN — TAMSULOSIN HYDROCHLORIDE 0.4 MG: 0.4 CAPSULE ORAL at 21:16

## 2022-06-18 RX ADMIN — Medication 10 ML: at 10:11

## 2022-06-18 RX ADMIN — BACLOFEN 10 MG: 10 TABLET ORAL at 21:16

## 2022-06-18 RX ADMIN — MIRABEGRON 50 MG: 25 TABLET, FILM COATED, EXTENDED RELEASE ORAL at 10:11

## 2022-06-18 RX ADMIN — Medication 10 ML: at 21:17

## 2022-06-18 RX ADMIN — MULTIPLE VITAMINS W/ MINERALS TAB 1 TABLET: TAB at 10:11

## 2022-06-18 RX ADMIN — METOPROLOL TARTRATE 12.5 MG: 25 TABLET, FILM COATED ORAL at 21:15

## 2022-06-18 RX ADMIN — DEXTROMETHORPHAN 60 MG: 30 SUSPENSION, EXTENDED RELEASE ORAL at 21:17

## 2022-06-18 NOTE — THERAPY PROGRESS REPORT/RE-CERT
Patient Name: Amandeep Manriquez Jr.  : 1945    MRN: [id number]                              Today's Date: 2022       Admit Date: 2022    Visit Dx:     ICD-10-CM ICD-9-CM   1. COVID-19  U07.1 079.89   2. Generalized weakness  R53.1 780.79     Patient Active Problem List   Diagnosis   • CAD (coronary artery disease), native coronary artery   • Essential hypertension   • Mixed hyperlipidemia   • Congenital myopathy (HCC)   • Arthropathy of right sacroiliac joint   • Osteoarthritis of right knee   • Degenerative lumbar spinal stenosis   • Renal stone   • Bilateral impacted cerumen   • Benign prostatic hyperplasia without lower urinary tract symptoms   • Vertigo   • High risk medication use   • Vitamin D deficiency   • OAB (overactive bladder)   • Rosacea   • Short-term memory loss   • History of CVA (cerebrovascular accident)   • COVID-19   • Generalized weakness   • Hypokalemia     Past Medical History:   Diagnosis Date   • Acute myocardial infarction (HCC)    • 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HCC)    • Arthritis    • BPH (benign prostatic hyperplasia)    • CAD (coronary artery disease), native coronary artery    • Congenital myopathy (HCC)    • DDD (degenerative disc disease), lumbar     with stenosis   • Diabetes mellitus (HCC)    • Dizziness     Chronic Dizziness   • Encounter for Medicare annual wellness exam    • Essential hypertension    • Gastrointestinal hemorrhage    • History of impacted cerumen    • Hyperlipidemia    • Hypertension    • Mixed hyperlipidemia      Past Surgical History:   Procedure Laterality Date   • CARDIAC CATHETERIZATION  2015    30% Left main, 99% mid to distal LAD, 70-80% left circumflex, 80% proximal to mid RCA.   • CATARACT EXTRACTION     • CERVICAL FUSION     • CHOLECYSTECTOMY     • CORONARY ANGIOPLASTY WITH STENT PLACEMENT     • KNEE SURGERY Right    • OTHER SURGICAL HISTORY      percutaneous injection of extremity pseudoaneurysm      General Information      Row Name 06/18/22 1416          Physical Therapy Time and Intention    Document Type therapy note (daily note)  -CW     Mode of Treatment physical therapy;individual therapy  -CW     Row Name 06/18/22 1416          General Information    Existing Precautions/Restrictions fall  -CW     Row Name 06/18/22 1416          Cognition    Orientation Status (Cognition) oriented x 3  -CW     Row Name 06/18/22 1416          Safety Issues, Functional Mobility    Impairments Affecting Function (Mobility) balance;coordination;endurance/activity tolerance;postural/trunk control;strength  -CW           User Key  (r) = Recorded By, (t) = Taken By, (c) = Cosigned By    Initials Name Provider Type    CW Finn Lozada P, PTA Physical Therapist Assistant               Mobility     Row Name 06/18/22 1416          Bed Mobility    Bed Mobility supine-sit;sit-supine  -CW     Supine-Sit [place] (Bed Mobility) verbal cues;nonverbal cues (demo/gesture);moderate assist (50% patient effort)  -CW     Sit-Supine [place] (Bed Mobility) verbal cues;nonverbal cues (demo/gesture);moderate assist (50% patient effort)  -CW     Assistive Device (Bed Mobility) bed rails;head of bed elevated  -CW     Row Name 06/18/22 1416          Sit-Stand Transfer    Sit-Stand [place] (Transfers) moderate assist (50% patient effort);maximum assist (25% patient effort);verbal cues;1 person assist  -CW     Assistive Device (Sit-Stand Transfers) walker, front-wheeled  -CW     Comment, (Sit-Stand Transfer) 3 small steps to chair from bed  -CW     Row Name 06/18/22 1416          Gait/Stairs (Locomotion)    [place] Level (Gait) moderate assist (50% patient effort);2 person assist  Pt fatigued with static standing; unable to attempt ambulation at this time  -CW     Assistive Device (Gait) other (see comments)  HHA  -CW     Distance in Feet (Gait) 5' to chair from bed  -CW           User Key  (r) = Recorded By, (t) = Taken By, (c) = Cosigned By     Initials Name Provider Type    CW Finn Lozada PTA Physical Therapist Assistant               Obj/Interventions    No documentation.                Goals/Plan    No documentation.                Clinical Impression     Row Name 06/18/22 1418          Pain    Pain Intervention(s) Medication (See MAR);Repositioned;Ambulation/increased activity  -CW     Row Name 06/18/22 1418          Plan of Care Review    Plan of Care Reviewed With patient  -CW     Progress improving  -CW     Outcome Evaluation Pt felt stronger today and able to transfer to chair from bed with Mod A x2  -CW     Row Name 06/18/22 1418          Therapy Assessment/Plan (PT)    Rehab Potential (PT) good, to achieve stated therapy goals  -CW     Criteria for Skilled Interventions Met (PT) yes;meets criteria;skilled treatment is necessary  -CW     Therapy Frequency (PT) 3 times/wk  -CW     Row Name 06/18/22 1418          Positioning and Restraints    Pre-Treatment Position in bed  -CW     Post Treatment Position chair  -CW     In Chair notified nsg;reclined;call light within reach;encouraged to call for assist;exit alarm on;with family/caregiver  -CW           User Key  (r) = Recorded By, (t) = Taken By, (c) = Cosigned By    Initials Name Provider Type    CW Finn Lozada, DICKSON Physical Therapist Assistant               Outcome Measures     Row Name 06/18/22 1419          How much help from another person do you currently need...    Turning from your back to your side while in flat bed without using bedrails? 3  -CW     Moving from lying on back to sitting on the side of a flat bed without bedrails? 3  -CW     Moving to and from a bed to a chair (including a wheelchair)? 2  -CW     Standing up from a chair using your arms (e.g., wheelchair, bedside chair)? 2  -CW     Climbing 3-5 steps with a railing? 1  -CW     To walk in hospital room? 2  -CW     AM-PAC 6 Clicks Score (PT) 13  -CW     Highest level of mobility 4 --> Transferred to  chair/commode  -CW     Row Name 06/18/22 1419          Functional Assessment    Outcome Measure Options AM-PAC 6 Clicks Basic Mobility (PT)  -CW           User Key  (r) = Recorded By, (t) = Taken By, (c) = Cosigned By    Initials Name Provider Type    CW Finn Lozada, PTA Physical Therapist Assistant                             Physical Therapy Education                 Title: PT OT SLP Therapies (Done)     Topic: Physical Therapy (Done)     Point: Mobility training (Done)     Learning Progress Summary           Patient Acceptance, E,TB, VU,NR by  at 6/16/2022 1605    Acceptance, E,TB,D, VU,NR by  at 6/14/2022 1340                   Point: Home exercise program (Done)     Learning Progress Summary           Patient Acceptance, E,TB, VU,NR by  at 6/16/2022 1605    Acceptance, E,TB,D, VU,NR by  at 6/14/2022 1340                   Point: Body mechanics (Done)     Learning Progress Summary           Patient Acceptance, E,TB, VU,NR by  at 6/16/2022 1605    Acceptance, E,TB,D, VU,NR by  at 6/14/2022 1340                   Point: Precautions (Done)     Learning Progress Summary           Patient Acceptance, E,TB, VU,NR by  at 6/16/2022 1605    Acceptance, E,TB,D, VU,NR by  at 6/14/2022 1340                               User Key     Initials Effective Dates Name Provider Type Discipline     06/16/21 -  Erica Anderson, PT Physical Therapist PT     06/16/21 -  Ruby Rawls PT Physical Therapist PT              PT Recommendation and Plan     Plan of Care Reviewed With: patient  Progress: improving  Outcome Evaluation: Pt felt stronger today and able to transfer to chair from bed with Mod A x2     Time Calculation:    PT Charges     Row Name 06/18/22 1419             Time Calculation    Start Time 1402  -CW      Stop Time 1419  -CW      Time Calculation (min) 17 min  -CW      PT Received On 06/18/22  -CW      PT - Next Appointment 06/20/22  -CW              Timed Charges    17966 - PT  Therapeutic Activity Minutes 17  -CW              Total Minutes    Timed Charges Total Minutes 17  -CW       Total Minutes 17  -CW            User Key  (r) = Recorded By, (t) = Taken By, (c) = Cosigned By    Initials Name Provider Type    CW Finn Lozada PTA Physical Therapist Assistant              Therapy Charges for Today     Code Description Service Date Service Provider Modifiers Qty    75884267201 HC PT THERAPEUTIC ACT EA 15 MIN 6/18/2022 Finn Lozada PTA GP 1          PT G-Codes  Outcome Measure Options: AM-PAC 6 Clicks Basic Mobility (PT)  AM-PAC 6 Clicks Score (PT): 13  AM-PAC 6 Clicks Score (OT): 12    Finn Lozada PTA  6/18/2022

## 2022-06-18 NOTE — PLAN OF CARE
Goal Outcome Evaluation:              Outcome Evaluation: Patient is a/o x3, confused on time, room air, NSR, calm & coopertive this shift.  Pt has been compliant with medications and care, no new concerns noted.  Medication was given as ordered.  See v/s and labs.

## 2022-06-18 NOTE — PROGRESS NOTES
Name: Amandeep Manriquez Jr. ADMIT: 2022   : 1945  PCP: Eden Molina MD    MRN: [id number] LOS: 3 days   AGE/SEX: 77 y.o. male  ROOM: [facility]     Subjective   Subjective   Feeling okay today--still just weak. No SOA or CP. Mild dry cough. Tolerating diet. No N/V/D/abd pain. Voiding well.    Review of Systems     as above    Objective   Objective   Vital Signs  Temp:  [96.8 °F (36 °C)-97.6 °F (36.4 °C)] 97.5 °F (36.4 °C)  Heart Rate:  [64-73] 65  Resp:  [18] 18  BP: (110-160)/(72-97) 127/72  SpO2:  [94 %-96 %] 96 %  on  Flow (L/min):  [2] 2;   Device (Oxygen Therapy): room air  Body mass index is 29.31 kg/m².  Physical Exam  Vitals and nursing note reviewed.   Constitutional:       General: He is not in acute distress.     Appearance: He is ill-appearing (chronically). He is not toxic-appearing or diaphoretic.   HENT:      Head: Normocephalic and atraumatic.      Mouth/Throat:      Mouth: Mucous membranes are moist.      Pharynx: Oropharynx is clear.   Eyes:      General: No scleral icterus.     Extraocular Movements: Extraocular movements intact.      Conjunctiva/sclera: Conjunctivae normal.   Cardiovascular:      Rate and Rhythm: Normal rate and regular rhythm.      Pulses: Normal pulses.   Pulmonary:      Effort: Pulmonary effort is normal. No respiratory distress.      Breath sounds: Normal breath sounds. No wheezing or rales.   Abdominal:      General: Bowel sounds are normal. There is no distension.      Palpations: Abdomen is soft.      Tenderness: There is no abdominal tenderness.   Musculoskeletal:         General: No swelling or deformity. Normal range of motion.      Cervical back: Neck supple. No rigidity.   Lymphadenopathy:      Cervical: No cervical adenopathy.   Skin:     General: Skin is warm and dry.      Coloration: Skin is not jaundiced.      Findings: No rash.   Neurological:      General: No focal deficit present.      Mental Status: He is alert and oriented to person, place,  and time. Mental status is at baseline.      Cranial Nerves: No cranial nerve deficit.      Motor: Weakness (generalized) present.      Coordination: Coordination normal.   Psychiatric:         Mood and Affect: Mood normal.         Behavior: Behavior normal.      Comments: Very pleasant, affect flat         Results Review     I reviewed the patient's new clinical results.  Results from last 7 days   Lab Units 06/18/22  0748 06/17/22  0911 06/16/22  1028 06/15/22  1047   WBC 10*3/mm3 6.11 6.38 6.25 6.53   HEMOGLOBIN g/dL 14.6 14.7 14.0 13.9   PLATELETS 10*3/mm3 220 229 220 199     Results from last 7 days   Lab Units 06/18/22  0748 06/17/22  0911 06/17/22  0029 06/16/22  1028 06/15/22  1047   SODIUM mmol/L 140 137  --  140 143   POTASSIUM mmol/L 4.1 4.2 4.6 3.3* 3.4*   CHLORIDE mmol/L 105 102  --  103 107   CO2 mmol/L 25.3 23.0  --  26.0 25.0   BUN mg/dL 13 13  --  15 14   CREATININE mg/dL 0.37* 0.42*  --  0.42* 0.37*   GLUCOSE mg/dL 94 161*  --  124* 132*   EGFR mL/min/1.73 113.6 109.4  --  109.4 113.6     Results from last 7 days   Lab Units 06/18/22  0748 06/17/22  0911 06/16/22  1028 06/15/22  1047   ALBUMIN g/dL 3.30* 3.30* 3.40* 3.20*   BILIRUBIN mg/dL 0.3 0.4 0.3 0.3   ALK PHOS U/L 82 79 78 71   AST (SGOT) U/L 42* 41* 44* 54*   ALT (SGPT) U/L 33 26 27 30     Results from last 7 days   Lab Units 06/18/22  0748 06/17/22  0911 06/16/22  1028 06/15/22  1047   CALCIUM mg/dL 8.4* 8.6 8.7 8.5*   ALBUMIN g/dL 3.30* 3.30* 3.40* 3.20*   MAGNESIUM mg/dL 2.4 3.0* 2.2 2.0     Results from last 7 days   Lab Units 06/13/22  0308 06/12/22  0959   PROCALCITONIN ng/mL 0.08 0.06   LACTATE mmol/L 1.1 1.4     No results found for: HGBA1C, POCGLU    No radiology results for the last day  Scheduled Medications  aspirin, 81 mg, Oral, Daily  baclofen, 10 mg, Oral, Nightly  cycloSPORINE, 1 drop, Both Eyes, BID  dextromethorphan polistirex ER, 60 mg, Oral, Q12H  enoxaparin, 40 mg, Subcutaneous, Daily  latanoprost, 1 drop, Both Eyes,  Nightly  metoprolol tartrate, 12.5 mg, Oral, BID  Mirabegron ER, 50 mg, Oral, Daily  multivitamin with minerals, 1 tablet, Oral, Daily  rosuvastatin, 40 mg, Oral, Daily  sodium chloride, 10 mL, Intravenous, Q12H  tamsulosin, 0.4 mg, Oral, Nightly    Infusions   Diet  Diet Regular; Cardiac       Assessment/Plan     Active Hospital Problems    Diagnosis  POA   • **COVID-19 [U07.1]  Yes   • Hypokalemia [E87.6]  No   • Generalized weakness [R53.1]  Yes   • History of CVA (cerebrovascular accident) [Z86.73]  Not Applicable   • Benign prostatic hyperplasia without lower urinary tract symptoms [N40.0]  Yes   • 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       77 y.o. male who presented with weakness and was admitted with COVID-19.    COViD-19  - no supplemental oxygen requirement (100% on RA today) and negative CXR, but given his age and cardiovascular disease he completed a 3 day course of Remdesivir  - following inflammatory markers, LFTs, renal function, and CBC  - ferritin wnl and CRP falling daily  - if becomes hypoxic would start Decadron   - febrile on admission but has not recurred, blood cultures NGTD, CXR neg, PCT wnl x 2--no abx indicated  - symptoms started 6/10, should be able to come out of isolation after 6/20     Generalized Weakness/Falls  - has hx of congential myopathy, weakness exacerbated by COViD  - continue PT    Hypokalemia  - continue replacement with protocol as needed, Mg++ level fine     HTN/CAD  - no anginal symptoms, BPs acceptable  - continue metoprolol, ASA, Crestor     BPH  - continue Flomax, voiding well     Hx of CVA  - right cerebellar, from small vessel disease  - on ASA and Crestor  - followed by Dr. Osborn as outpatient        Lovenox 40 mg SC daily for DVT prophylaxis.  Full code  confirmed.  Discussed with patient.  Anticipate discharge to [facility] when bed available and precert obtained--hopefully Monday. Continue PT and close observation until then.      During all interaction with pt proper PPE was utilized (gown, gloves, mask, goggles, and face shield) and was donned/doffed according to recommendations. Hands were cleaned before and after interaction.      Jonathan Bird MD  [facility]  06/18/22  12:07 EDT

## 2022-06-18 NOTE — PLAN OF CARE
Goal Outcome Evaluation:  Plan of Care Reviewed With: patient        Progress: improving  Outcome Evaluation: Pt felt stronger today and able to transfer to chair from bed with Mod A x2      Proper PPE was used and hand washing was done before and after

## 2022-06-19 LAB
ALBUMIN SERPL-MCNC: 3.5 G/DL (ref 3.5–5.2)
ALBUMIN/GLOB SERPL: 1.3 G/DL
ALP SERPL-CCNC: 91 U/L (ref 39–117)
ALT SERPL W P-5'-P-CCNC: 38 U/L (ref 1–41)
ANION GAP SERPL CALCULATED.3IONS-SCNC: 10.6 MMOL/L (ref 5–15)
AST SERPL-CCNC: 48 U/L (ref 1–40)
BILIRUB SERPL-MCNC: 0.4 MG/DL (ref 0–1.2)
BUN SERPL-MCNC: 14 MG/DL (ref 8–23)
BUN/CREAT SERPL: 29.8 (ref 7–25)
CALCIUM SPEC-SCNC: 9.3 MG/DL (ref 8.6–10.5)
CHLORIDE SERPL-SCNC: 103 MMOL/L (ref 98–107)
CO2 SERPL-SCNC: 28.4 MMOL/L (ref 22–29)
CREAT SERPL-MCNC: 0.47 MG/DL (ref 0.76–1.27)
CRP SERPL-MCNC: 0.8 MG/DL (ref 0–0.5)
DEPRECATED RDW RBC AUTO: 43 FL (ref 37–54)
EGFRCR SERPLBLD CKD-EPI 2021: 105.7 ML/MIN/1.73
ERYTHROCYTE [DISTWIDTH] IN BLOOD BY AUTOMATED COUNT: 12.6 % (ref 12.3–15.4)
FERRITIN SERPL-MCNC: 165 NG/ML (ref 30–400)
GLOBULIN UR ELPH-MCNC: 2.6 GM/DL
GLUCOSE SERPL-MCNC: 93 MG/DL (ref 65–99)
HCT VFR BLD AUTO: 46.2 % (ref 37.5–51)
HGB BLD-MCNC: 14.8 G/DL (ref 13–17.7)
MAGNESIUM SERPL-MCNC: 2.3 MG/DL (ref 1.6–2.4)
MCH RBC QN AUTO: 29.7 PG (ref 26.6–33)
MCHC RBC AUTO-ENTMCNC: 32 G/DL (ref 31.5–35.7)
MCV RBC AUTO: 92.8 FL (ref 79–97)
PLATELET # BLD AUTO: 238 10*3/MM3 (ref 140–450)
PMV BLD AUTO: 9.7 FL (ref 6–12)
POTASSIUM SERPL-SCNC: 4.6 MMOL/L (ref 3.5–5.2)
PROT SERPL-MCNC: 6.1 G/DL (ref 6–8.5)
RBC # BLD AUTO: 4.98 10*6/MM3 (ref 4.14–5.8)
SODIUM SERPL-SCNC: 142 MMOL/L (ref 136–145)
WBC NRBC COR # BLD: 7.78 10*3/MM3 (ref 3.4–10.8)

## 2022-06-19 PROCEDURE — 80053 COMPREHEN METABOLIC PANEL: CPT | Performed by: HOSPITALIST

## 2022-06-19 PROCEDURE — 83735 ASSAY OF MAGNESIUM: CPT | Performed by: HOSPITALIST

## 2022-06-19 PROCEDURE — G0378 HOSPITAL OBSERVATION PER HR: HCPCS

## 2022-06-19 PROCEDURE — 82728 ASSAY OF FERRITIN: CPT | Performed by: HOSPITALIST

## 2022-06-19 PROCEDURE — 85027 COMPLETE CBC AUTOMATED: CPT | Performed by: HOSPITALIST

## 2022-06-19 PROCEDURE — 96372 THER/PROPH/DIAG INJ SC/IM: CPT

## 2022-06-19 PROCEDURE — 86140 C-REACTIVE PROTEIN: CPT | Performed by: HOSPITALIST

## 2022-06-19 PROCEDURE — 25010000002 ENOXAPARIN PER 10 MG: Performed by: NURSE PRACTITIONER

## 2022-06-19 RX ADMIN — CYCLOSPORINE 1 DROP: 0.5 EMULSION OPHTHALMIC at 21:17

## 2022-06-19 RX ADMIN — ROSUVASTATIN CALCIUM 40 MG: 40 TABLET, FILM COATED ORAL at 09:54

## 2022-06-19 RX ADMIN — MIRABEGRON 50 MG: 25 TABLET, FILM COATED, EXTENDED RELEASE ORAL at 09:54

## 2022-06-19 RX ADMIN — DEXTROMETHORPHAN 60 MG: 30 SUSPENSION, EXTENDED RELEASE ORAL at 21:16

## 2022-06-19 RX ADMIN — ACETAMINOPHEN 650 MG: 325 TABLET ORAL at 10:06

## 2022-06-19 RX ADMIN — METOPROLOL TARTRATE 12.5 MG: 25 TABLET, FILM COATED ORAL at 21:16

## 2022-06-19 RX ADMIN — DEXTROMETHORPHAN 60 MG: 30 SUSPENSION, EXTENDED RELEASE ORAL at 09:54

## 2022-06-19 RX ADMIN — BACLOFEN 10 MG: 10 TABLET ORAL at 21:16

## 2022-06-19 RX ADMIN — ENOXAPARIN SODIUM 40 MG: 100 INJECTION SUBCUTANEOUS at 09:54

## 2022-06-19 RX ADMIN — TAMSULOSIN HYDROCHLORIDE 0.4 MG: 0.4 CAPSULE ORAL at 21:16

## 2022-06-19 RX ADMIN — METOPROLOL TARTRATE 12.5 MG: 25 TABLET, FILM COATED ORAL at 09:54

## 2022-06-19 RX ADMIN — MULTIPLE VITAMINS W/ MINERALS TAB 1 TABLET: TAB at 09:54

## 2022-06-19 RX ADMIN — Medication 10 ML: at 21:16

## 2022-06-19 RX ADMIN — ASPIRIN 81 MG: 81 TABLET, COATED ORAL at 09:54

## 2022-06-19 RX ADMIN — CYCLOSPORINE 1 DROP: 0.5 EMULSION OPHTHALMIC at 09:54

## 2022-06-19 RX ADMIN — Medication 10 ML: at 09:54

## 2022-06-19 RX ADMIN — LATANOPROST 1 DROP: 50 SOLUTION OPHTHALMIC at 21:16

## 2022-06-19 NOTE — PLAN OF CARE
Goal Outcome Evaluation:              Outcome Evaluation: Patient a/o x 4, room air, uses urinal with assist.  pt appears in better spirits this shift and attempting to be more independant.  pt states he is feeling better and is happy he got up in chair with PT earlier today.  pt was complaint with care and medication was given as ordered.  See v/s and labs.

## 2022-06-19 NOTE — PROGRESS NOTES
Name: Amandeep Manriquez Jr. ADMIT: 2022   : 1945  PCP: Eden Molina MD    MRN: [id number] LOS: 3 days   AGE/SEX: 77 y.o. male  ROOM: [facility]     Subjective   Subjective   Feeling okay today--still just weak. No SOA or CP. Mild dry cough. Tolerating diet. No N/V/D/abd pain. Voiding well. O2 sats dropped into high 80s this AM while sleeping. NC O2 placed with return of normal sats. Pt is a mouth breather though--I think it was just bc they woke him up. I turned off O2 when I entered room and he remained >95% the entire interaction on RA.    Review of Systems   Genitourinary: Negative for hematuria.      as above    Objective   Objective   Vital Signs  Temp:  [96.6 °F (35.9 °C)-97.6 °F (36.4 °C)] 97.6 °F (36.4 °C)  Heart Rate:  [65-75] 65  Resp:  [18] 18  BP: (127-170)/(78-87) 170/85  SpO2:  [92 %-95 %] 92 %  on   ;   Device (Oxygen Therapy): room air  Body mass index is 29.31 kg/m².  Physical Exam  Vitals and nursing note reviewed. Exam conducted with a chaperone present (RN).   Constitutional:       General: He is not in acute distress.     Appearance: He is ill-appearing (chronically). He is not toxic-appearing or diaphoretic.   HENT:      Head: Normocephalic and atraumatic.      Mouth/Throat:      Mouth: Mucous membranes are moist.      Pharynx: Oropharynx is clear.   Eyes:      General: No scleral icterus.     Extraocular Movements: Extraocular movements intact.      Conjunctiva/sclera: Conjunctivae normal.   Cardiovascular:      Rate and Rhythm: Normal rate and regular rhythm.      Pulses: Normal pulses.   Pulmonary:      Effort: Pulmonary effort is normal. No respiratory distress.      Breath sounds: Normal breath sounds. No wheezing or rales.   Abdominal:      General: Bowel sounds are normal. There is no distension.      Palpations: Abdomen is soft.      Tenderness: There is no abdominal tenderness.   Musculoskeletal:         General: No swelling or deformity. Normal range of motion.       Cervical back: Neck supple. No rigidity.   Lymphadenopathy:      Cervical: No cervical adenopathy.   Skin:     General: Skin is warm and dry.      Coloration: Skin is not jaundiced.      Findings: No rash.   Neurological:      General: No focal deficit present.      Mental Status: He is alert and oriented to person, place, and time. Mental status is at baseline.      Cranial Nerves: No cranial nerve deficit.      Motor: Weakness (generalized) present.      Coordination: Coordination normal.   Psychiatric:         Mood and Affect: Mood normal.         Behavior: Behavior normal.      Comments: Very pleasant, affect flat         Results Review     I reviewed the patient's new clinical results.  Results from last 7 days   Lab Units 06/19/22  0732 06/18/22  0748 06/17/22  0911 06/16/22  1028   WBC 10*3/mm3 7.78 6.11 6.38 6.25   HEMOGLOBIN g/dL 14.8 14.6 14.7 14.0   PLATELETS 10*3/mm3 238 220 229 220     Results from last 7 days   Lab Units 06/19/22  0732 06/18/22  0748 06/17/22  0911 06/17/22  0029 06/16/22  1028   SODIUM mmol/L 142 140 137  --  140   POTASSIUM mmol/L 4.6 4.1 4.2 4.6 3.3*   CHLORIDE mmol/L 103 105 102  --  103   CO2 mmol/L 28.4 25.3 23.0  --  26.0   BUN mg/dL 14 13 13  --  15   CREATININE mg/dL 0.47* 0.37* 0.42*  --  0.42*   GLUCOSE mg/dL 93 94 161*  --  124*   EGFR mL/min/1.73 105.7 113.6 109.4  --  109.4     Results from last 7 days   Lab Units 06/19/22  0732 06/18/22  0748 06/17/22  0911 06/16/22  1028   ALBUMIN g/dL 3.50 3.30* 3.30* 3.40*   BILIRUBIN mg/dL 0.4 0.3 0.4 0.3   ALK PHOS U/L 91 82 79 78   AST (SGOT) U/L 48* 42* 41* 44*   ALT (SGPT) U/L 38 33 26 27     Results from last 7 days   Lab Units 06/19/22  0732 06/18/22  0748 06/17/22  0911 06/16/22  1028   CALCIUM mg/dL 9.3 8.4* 8.6 8.7   ALBUMIN g/dL 3.50 3.30* 3.30* 3.40*   MAGNESIUM mg/dL 2.3 2.4 3.0* 2.2     Results from last 7 days   Lab Units 06/13/22  0308 06/12/22  0959   PROCALCITONIN ng/mL 0.08 0.06   LACTATE mmol/L 1.1 1.4     No  results found for: HGBA1C, POCGLU    No radiology results for the last day  Scheduled Medications  aspirin, 81 mg, Oral, Daily  baclofen, 10 mg, Oral, Nightly  cycloSPORINE, 1 drop, Both Eyes, BID  dextromethorphan polistirex ER, 60 mg, Oral, Q12H  enoxaparin, 40 mg, Subcutaneous, Daily  latanoprost, 1 drop, Both Eyes, Nightly  metoprolol tartrate, 12.5 mg, Oral, BID  Mirabegron ER, 50 mg, Oral, Daily  multivitamin with minerals, 1 tablet, Oral, Daily  rosuvastatin, 40 mg, Oral, Daily  sodium chloride, 10 mL, Intravenous, Q12H  tamsulosin, 0.4 mg, Oral, Nightly    Infusions   Diet  Diet Regular; Cardiac       Assessment/Plan     Active Hospital Problems    Diagnosis  POA   • **COVID-19 [U07.1]  Yes   • Hypokalemia [E87.6]  No   • Generalized weakness [R53.1]  Yes   • History of CVA (cerebrovascular accident) [Z86.73]  Not Applicable   • Benign prostatic hyperplasia without lower urinary tract symptoms [N40.0]  Yes   • 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       77 y.o. male who presented with weakness and was admitted with COVID-19.    COViD-19  - no supplemental oxygen requirement and negative CXR, but given his age and cardiovascular disease he completed a 3 day course of Remdesivir  - started IS today  - following inflammatory markers, LFTs, renal function, and CBC  - ferritin wnl and CRP falling daily  - if becomes hypoxic would start Decadron   - febrile on admission but has not recurred, blood cultures NGTD, CXR neg, PCT wnl x 2--no abx indicated  - symptoms started 6/10, should be able to come out of isolation after 6/20     Generalized Weakness/Falls  - has hx of congential myopathy, weakness exacerbated by COViD  - continue PT    Hypokalemia  - continue replacement with protocol as needed, K+  wnl today, Mg++ level fine     HTN/CAD  - no anginal symptoms, BPs acceptable if a little robust in the evenings  - continue metoprolol, ASA, Crestor     BPH  - continue Flomax, voiding well     Hx of CVA  - right cerebellar, from small vessel disease  - on ASA and Crestor  - followed by Dr. Osborn as outpatient        Lovenox 40 mg SC daily for DVT prophylaxis.  Full code confirmed.  Discussed with patient.  Anticipate discharge to [facility] when bed available and precert obtained--hopefully tomorrow. Continue PT and close observation until then.      During all interaction with pt proper PPE was utilized (gown, gloves, mask, goggles, and face shield) and was donned/doffed according to recommendations. Hands were cleaned before and after interaction.      Jonathan Bird MD  [facility]  06/19/22  09:50 EDT

## 2022-06-20 VITALS
HEIGHT: 65 IN | BODY MASS INDEX: 29.35 KG/M2 | TEMPERATURE: 97.8 F | SYSTOLIC BLOOD PRESSURE: 141 MMHG | RESPIRATION RATE: 18 BRPM | HEART RATE: 71 BPM | OXYGEN SATURATION: 91 % | DIASTOLIC BLOOD PRESSURE: 84 MMHG | WEIGHT: 176.15 LBS

## 2022-06-20 LAB
ALBUMIN SERPL-MCNC: 3.6 G/DL (ref 3.5–5.2)
ALBUMIN/GLOB SERPL: 1.3 G/DL
ALP SERPL-CCNC: 95 U/L (ref 39–117)
ALT SERPL W P-5'-P-CCNC: 37 U/L (ref 1–41)
ANION GAP SERPL CALCULATED.3IONS-SCNC: 9.6 MMOL/L (ref 5–15)
AST SERPL-CCNC: 46 U/L (ref 1–40)
BILIRUB SERPL-MCNC: 0.4 MG/DL (ref 0–1.2)
BUN SERPL-MCNC: 13 MG/DL (ref 8–23)
BUN/CREAT SERPL: 27.7 (ref 7–25)
CALCIUM SPEC-SCNC: 9.2 MG/DL (ref 8.6–10.5)
CHLORIDE SERPL-SCNC: 103 MMOL/L (ref 98–107)
CO2 SERPL-SCNC: 27.4 MMOL/L (ref 22–29)
CREAT SERPL-MCNC: 0.47 MG/DL (ref 0.76–1.27)
CRP SERPL-MCNC: 0.53 MG/DL (ref 0–0.5)
DEPRECATED RDW RBC AUTO: 42.9 FL (ref 37–54)
EGFRCR SERPLBLD CKD-EPI 2021: 105.7 ML/MIN/1.73
ERYTHROCYTE [DISTWIDTH] IN BLOOD BY AUTOMATED COUNT: 12.8 % (ref 12.3–15.4)
FERRITIN SERPL-MCNC: 237 NG/ML (ref 30–400)
GLOBULIN UR ELPH-MCNC: 2.7 GM/DL
GLUCOSE SERPL-MCNC: 96 MG/DL (ref 65–99)
HCT VFR BLD AUTO: 48.3 % (ref 37.5–51)
HGB BLD-MCNC: 15.5 G/DL (ref 13–17.7)
INR PPP: 0.99 (ref 0.9–1.1)
MAGNESIUM SERPL-MCNC: 2.3 MG/DL (ref 1.6–2.4)
MCH RBC QN AUTO: 29.8 PG (ref 26.6–33)
MCHC RBC AUTO-ENTMCNC: 32.1 G/DL (ref 31.5–35.7)
MCV RBC AUTO: 92.7 FL (ref 79–97)
PLATELET # BLD AUTO: 270 10*3/MM3 (ref 140–450)
PMV BLD AUTO: 9.3 FL (ref 6–12)
POTASSIUM SERPL-SCNC: 4.3 MMOL/L (ref 3.5–5.2)
PROT SERPL-MCNC: 6.3 G/DL (ref 6–8.5)
PROTHROMBIN TIME: 13 SECONDS (ref 11.7–14.2)
RBC # BLD AUTO: 5.21 10*6/MM3 (ref 4.14–5.8)
SODIUM SERPL-SCNC: 140 MMOL/L (ref 136–145)
WBC NRBC COR # BLD: 7.82 10*3/MM3 (ref 3.4–10.8)

## 2022-06-20 PROCEDURE — 25010000002 ENOXAPARIN PER 10 MG: Performed by: NURSE PRACTITIONER

## 2022-06-20 PROCEDURE — 96372 THER/PROPH/DIAG INJ SC/IM: CPT

## 2022-06-20 PROCEDURE — 86140 C-REACTIVE PROTEIN: CPT | Performed by: HOSPITALIST

## 2022-06-20 PROCEDURE — 85610 PROTHROMBIN TIME: CPT | Performed by: NURSE PRACTITIONER

## 2022-06-20 PROCEDURE — 36415 COLL VENOUS BLD VENIPUNCTURE: CPT | Performed by: NURSE PRACTITIONER

## 2022-06-20 PROCEDURE — 83735 ASSAY OF MAGNESIUM: CPT | Performed by: HOSPITALIST

## 2022-06-20 PROCEDURE — 85027 COMPLETE CBC AUTOMATED: CPT | Performed by: HOSPITALIST

## 2022-06-20 PROCEDURE — 80053 COMPREHEN METABOLIC PANEL: CPT | Performed by: HOSPITALIST

## 2022-06-20 PROCEDURE — 82728 ASSAY OF FERRITIN: CPT | Performed by: HOSPITALIST

## 2022-06-20 PROCEDURE — G0378 HOSPITAL OBSERVATION PER HR: HCPCS

## 2022-06-20 RX ORDER — BACLOFEN 10 MG/1
10 TABLET ORAL NIGHTLY
Start: 2022-06-20

## 2022-06-20 RX ORDER — ACETAMINOPHEN 325 MG/1
650 TABLET ORAL EVERY 4 HOURS PRN
Start: 2022-06-20

## 2022-06-20 RX ORDER — HYDROCODONE BITARTRATE AND ACETAMINOPHEN 5; 325 MG/1; MG/1
1 TABLET ORAL EVERY 8 HOURS PRN
Qty: 10 TABLET | Refills: 0 | Status: SHIPPED | OUTPATIENT
Start: 2022-06-20

## 2022-06-20 RX ADMIN — MULTIPLE VITAMINS W/ MINERALS TAB 1 TABLET: TAB at 09:29

## 2022-06-20 RX ADMIN — MIRABEGRON 50 MG: 25 TABLET, FILM COATED, EXTENDED RELEASE ORAL at 09:29

## 2022-06-20 RX ADMIN — ASPIRIN 81 MG: 81 TABLET, COATED ORAL at 09:33

## 2022-06-20 RX ADMIN — ROSUVASTATIN CALCIUM 40 MG: 40 TABLET, FILM COATED ORAL at 09:29

## 2022-06-20 RX ADMIN — Medication 10 ML: at 09:30

## 2022-06-20 RX ADMIN — DEXTROMETHORPHAN 60 MG: 30 SUSPENSION, EXTENDED RELEASE ORAL at 09:30

## 2022-06-20 RX ADMIN — METOPROLOL TARTRATE 12.5 MG: 25 TABLET, FILM COATED ORAL at 09:29

## 2022-06-20 RX ADMIN — CYCLOSPORINE 1 DROP: 0.5 EMULSION OPHTHALMIC at 09:30

## 2022-06-20 RX ADMIN — ENOXAPARIN SODIUM 40 MG: 100 INJECTION SUBCUTANEOUS at 09:29

## 2022-06-20 NOTE — CASE MANAGEMENT/SOCIAL WORK
Continued Stay Note  [facility]     Patient Name: Amandeep Manriquez Jr.  MRN: [id number]  Today's Date: 6/20/2022    Admit Date: 6/13/2022     Discharge Plan     Row Name 06/20/22 1356       Plan    Plan [facility]    Patient/Family in Agreement with Plan yes    Plan Comments Spoke with [employer] rep, precert obtained for SNF at [facility], confirmed with Ferdinand/Signature. Notfied MD, mansoor w/c oleg set up for 1600 today. Spoke with pt wife answered all questions. pkt given to RN.               Discharge Codes    No documentation.               Expected Discharge Date and Time     Expected Discharge Date Expected Discharge Time    Jun 20, 2022             Emerita Jim RN

## 2022-06-20 NOTE — DISCHARGE SUMMARY
Patient Name: Amandeep Manriquez Jr.  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        Hospital Course     77 y.o. male who presented with weakness and was admitted with COVID-19. Please see below for details of admission by problem:     COViD-19  - no supplemental oxygen requirement and negative CXR, but given his age and cardiovascular disease he completed a 3 day course of Remdesivir  - continue IS use  - following inflammatory markers, LFTs, renal function, and CBC  - ferritin wnl and CRP falling daily  - if becomes hypoxic would start Decadron   - febrile on admission but has not recurred, blood cultures NGTD, CXR neg, PCT wnl x 2--no abx indicated  - symptoms started 6/10, should be able to come out of isolation after      Generalized Weakness/Falls  - has hx of congential myopathy, weakness exacerbated by COViD  - continue PT at facility after dc     Hypokalemia  - continue replacement with protocol as needed, K+ wnl today, Mg++ level fine  - follow periodically at facility per medical staff there     HTN/CAD  - no anginal symptoms, BPs acceptable if a little robust in the evenings  - continue metoprolol, ASA, Crestor     BPH  - continue Flomax, voiding well here     Hx of  CVA  - right cerebellar, from small vessel disease  - on ASA and Crestor  - followed by Dr. Osborn as outpatient        Lovenox 40 mg SC daily for DVT prophylaxis while here.  Full code confirmed.  Discussed with patient, wife, RN, and CCP.  Anticipate discharge to SNF when bed available and precert obtained--hopefully later today.         During all interaction with pt proper PPE was utilized (gown, gloves, mask, goggles, and face shield) and was donned/doffed according to recommendations. Hands were cleaned before and after interaction.          Day of Discharge     Subjective:  Feeling okay again today--still just weak. No SOA or CP. Mild dry cough. Tolerating diet. No N/V/D/abd pain. Voiding well.     Physical Exam:  Temp:  [96.8 °F (36 °C)-97.8 °F (36.6 °C)] 97.8 °F (36.6 °C)  Heart Rate:  [53-78] 76  Resp:  [18] 18  BP: (126-167)/(76-89) 130/89  Body mass index is 29.31 kg/m².  Physical Exam  Vitals and nursing note reviewed. Exam conducted with a chaperone present (Wife).   Constitutional:       General: He is not in acute distress.     Appearance: He is ill-appearing (chronically). He is not toxic-appearing or diaphoretic.   Cardiovascular:      Rate and Rhythm: Normal rate and regular rhythm.      Pulses: Normal pulses.   Pulmonary:      Effort: Pulmonary effort is normal. No respiratory distress.      Breath sounds: Normal breath sounds. No wheezing or rales.   Abdominal:      General: Bowel sounds are normal. There is no distension.      Palpations: Abdomen is soft.      Tenderness: There is no abdominal tenderness.   Musculoskeletal:         General: chronic doughy edema of feet. Normal range of motion.      Cervical back: Neck supple. No rigidity.   Skin:     General: Skin is warm and dry.      Mental Status: He is alert and oriented to person, place, and time. Mental status is at baseline.      Motor: Weakness (generalized) present.   Psychiatric:         Mood and Affect: Mood normal.         Behavior:  Behavior normal.      Comments: Very pleasant, affect flat         Consultants     Consult Orders (all) (From admission, onward)     Start     Ordered    06/13/22 0736  Inpatient Case Management  Consult  Once        Provider:  (Not yet assigned)    06/13/22 0737    06/13/22 0444  [facility] (on-call MD unless specified) Details  Once        Specialty:  Hospitalist  Provider:  (Not yet assigned)    06/13/22 0443    06/13/22 0444  [facility] (on-call MD unless specified) Details  Once        Specialty:  Hospitalist  Provider:  (Not yet assigned)    06/13/22 0443              Procedures     * Surgery not found *      Imaging Results (All)     Procedure Component Value Units Date/Time    XR Chest 1 View [[id number]] Collected: 06/13/22 0314     Updated: 06/13/22 0318    Narrative:      SINGLE VIEW OF THE CHEST     HISTORY: COVID     COMPARISON: 06/12/2022     FINDINGS:  Heart size is within normal limits for technique. There is some  calcification of the aorta. No pneumothorax or pleural effusion is seen.  No acute infiltrates are identified.       Impression:      No acute findings.     This report was finalized on 6/13/2022 3:15 AM by Dr. Elke Fernandez M.D.           Results for orders placed during the hospital encounter of 05/08/18    Duplex carotid ultrasound CAR    Interpretation Summary  · Proximal right internal carotid artery plaque without significant stenosis.  · Proximal left internal carotid artery plaque without significant stenosis.      Pertinent Labs     Results from last 7 days   Lab Units 06/20/22  0825 06/19/22  0732 06/18/22  0748 06/17/22  0911   WBC 10*3/mm3 7.82 7.78 6.11 6.38   HEMOGLOBIN g/dL 15.5 14.8 14.6 14.7   PLATELETS 10*3/mm3 270 238 220 229     Results from last 7 days   Lab Units 06/20/22  0825 06/19/22  0732 06/18/22  0748 06/17/22  0911   SODIUM mmol/L 140 142 140 137   POTASSIUM mmol/L 4.3 4.6 4.1 4.2   CHLORIDE mmol/L 103 103 105 102   CO2 mmol/L 27.4 28.4 25.3 23.0   BUN  mg/dL 13 14 13 13   CREATININE mg/dL 0.47* 0.47* 0.37* 0.42*   GLUCOSE mg/dL 96 93 94 161*   EGFR mL/min/1.73 105.7 105.7 113.6 109.4     Results from last 7 days   Lab Units 06/20/22  0825 06/19/22  0732 06/18/22  0748 06/17/22  0911   ALBUMIN g/dL 3.60 3.50 3.30* 3.30*   BILIRUBIN mg/dL 0.4 0.4 0.3 0.4   ALK PHOS U/L 95 91 82 79   AST (SGOT) U/L 46* 48* 42* 41*   ALT (SGPT) U/L 37 38 33 26     Results from last 7 days   Lab Units 06/20/22  0825 06/19/22  0732 06/18/22  0748 06/17/22  0911   CALCIUM mg/dL 9.2 9.3 8.4* 8.6   ALBUMIN g/dL 3.60 3.50 3.30* 3.30*   MAGNESIUM mg/dL 2.3 2.3 2.4 3.0*       Results from last 7 days   Lab Units 06/16/22  1028 06/14/22  0815   D DIMER QUANT MCGFEU/mL 0.77* 0.67*           Invalid input(s): LDLCALC          Test Results Pending at Discharge       Discharge Details        Discharge Medications      Changes to Medications      Instructions Start Date   acetaminophen 325 MG tablet  Commonly known as: TYLENOL  What changed:   · medication strength  · how much to take  · when to take this  · reasons to take this   650 mg, Oral, Every 4 Hours PRN      baclofen 10 MG tablet  Commonly known as: LIORESAL  What changed: when to take this   10 mg, Oral, Nightly         Continue These Medications      Instructions Start Date   aspirin 81 MG tablet   1 tablet, Oral, Daily      cycloSPORINE 0.05 % ophthalmic emulsion  Commonly known as: RESTASIS   1 drop, 2 Times Daily      HYDROcodone-acetaminophen 5-325 MG per tablet  Commonly known as: NORCO   1 tablet, Oral, Every 8 Hours PRN, For pain      latanoprost 0.005 % ophthalmic solution  Commonly known as: XALATAN   1 drop, Both Eyes, Nightly      metoprolol tartrate 25 MG tablet  Commonly known as: LOPRESSOR   12.5 mg, Oral, 2 Times Daily      Myrbetriq 50 MG tablet sustained-release 24 hour 24 hr tablet  Generic drug: Mirabegron ER   TAKE ONE TABLET BY MOUTH DAILY      nitroglycerin 0.4 MG SL tablet  Commonly known as: NITROSTAT   1 tablet,  Sublingual, For chest pain.  If pain remains after 5 minutes, call 911 and repeat dose.  Max 3 tabs in 15 minutes.      PRESERVISION AREDS 2+MULTI VIT PO   1 tablet, Oral, Daily      rosuvastatin 40 MG tablet  Commonly known as: CRESTOR   TAKE ONE TABLET BY MOUTH DAILY      tamsulosin 0.4 MG capsule 24 hr capsule  Commonly known as: FLOMAX   0.4 mg, Oral, Nightly             Allergies   Allergen Reactions   • Iodinated Diagnostic Agents Hives   • Ampicillin Diarrhea       Discharge Disposition:  Skilled Nursing Facility (DC - External)      Discharge Diet:  Diet Order   Procedures   • Diet Regular; Cardiac       Discharge Activity:   WBAT, up with assistance only    CODE STATUS:    Code Status and Medical Interventions:   Ordered at: 06/13/22 0513     Code Status (Patient has no pulse and is not breathing):    CPR (Attempt to Resuscitate)     Medical Interventions (Patient has pulse or is breathing):    Full Support       No future appointments.  Additional Instructions for the Follow-ups that You Need to Schedule     Discharge Follow-up with PCP   As directed       Currently Documented PCP:    Eden Molina MD    PCP Phone Number:    [phone]     Follow Up Details: Dr. Molina (PCP) after dc from facility         Discharge Follow-up with Specified Provider: Medical Staff at facility; 1 Day   As directed      To: Medical Staff at facility    Follow Up: 1 Day            Contact information for follow-up providers     Eden Molina MD .    Specialty: Family Medicine  Why: Dr. Molina (PCP) after dc from facility  Contact information:  [address] [place] [id number]  [phone]                   Contact information for after-discharge care     Destination     [facility] .    Service: Skilled Nursing  Contact information:  [address] [facility] [address] [phone]                             Additional Instructions for the Follow-ups that You Need  to Schedule     Discharge Follow-up with PCP   As directed       Currently Documented PCP:    Eden Molina MD    PCP Phone Number:    [phone]     Follow Up Details: Dr. Molina (PCP) after dc from facility         Discharge Follow-up with Specified Provider: Medical Staff at facility; 1 Day   As directed      To: Medical Staff at facility    Follow Up: 1 Day           Time Spent on Discharge:  Greater than 30 minutes      Jonathan Bird MD  [facility]  06/20/22  13:32 EDT

## 2022-06-21 NOTE — CASE MANAGEMENT/SOCIAL WORK
Case Management Discharge Note      Final Note: Sp cortez via [facility]         Selected Continued Care - Discharged on 6/20/2022 Admission date: 6/13/2022 - Discharge disposition: Skilled Nursing Facility (DC - External)    Destination Coordination complete.    Service Provider Selected Services Address Phone Fax Patient Preferred    [facility]  Skilled Nursing [address] [place] [id number] [phone] --          Durable Medical Equipment    No services have been selected for the patient.              Dialysis/Infusion    No services have been selected for the patient.              Home Medical Care    No services have been selected for the patient.              Therapy    No services have been selected for the patient.              Community Resources    No services have been selected for the patient.              Community & DME    No services have been selected for the patient.                  Transportation Services  [facility]    Final Discharge Disposition Code: 03 - skilled nursing facility (SNF)

## 2022-07-04 ENCOUNTER — READMISSION MANAGEMENT (OUTPATIENT)
Dept: CALL CENTER | Facility: HOSPITAL | Age: 77
End: 2022-07-04

## 2022-07-04 NOTE — OUTREACH NOTE
Prep Survey    Flowsheet Row Responses   [facility] facility patient discharged from? Non-BH   Is LACE score < 7 ? Non-BH Discharge   Emergency Room discharge w/ pulse ox? No   Eligibility [facility]   Date of Discharge 07/02/22   Discharge diagnosis Unavailable    Does the patient have one of the following disease processes/diagnoses(primary or secondary)? Other   Prep survey completed? Yes          STEVIE LABOY - Registered Nurse

## 2022-07-05 ENCOUNTER — TRANSITIONAL CARE MANAGEMENT TELEPHONE ENCOUNTER (OUTPATIENT)
Dept: CALL CENTER | Facility: HOSPITAL | Age: 77
End: 2022-07-05

## 2022-07-05 NOTE — OUTREACH NOTE
Call Center TCM Note    Flowsheet Row Responses   [facility] patient discharged from? Non-BH   Does the patient have one of the following disease processes/diagnoses(primary or secondary)? Other   TCM attempt successful? No   Unsuccessful attempts Attempt 1          Desiree Mayers MA    7/5/2022, 12:00 EDT

## 2022-07-05 NOTE — OUTREACH NOTE
Call Center TCM Note    Flowsheet Row Responses   [facility] patient discharged from? Non-   Does the patient have one of the following disease processes/diagnoses(primary or secondary)? Other   TCM attempt successful? Yes   General alerts for this patient ED visit-sent home with pulse oximeter   Wrap up additional comments Pt doing very well per wife. OF NOTE: Pt is no longer pt of  Dr Molina. [facility] [place]          Desiree Mayers MA    7/5/2022, 16:30 EDT

## 2025-02-09 ENCOUNTER — APPOINTMENT (OUTPATIENT)
Dept: GENERAL RADIOLOGY | Facility: HOSPITAL | Age: 80
DRG: 660 | End: 2025-02-09
Payer: MEDICARE

## 2025-02-09 ENCOUNTER — ANESTHESIA EVENT (OUTPATIENT)
Dept: PERIOP | Facility: HOSPITAL | Age: 80
End: 2025-02-09
Payer: MEDICARE

## 2025-02-09 ENCOUNTER — APPOINTMENT (OUTPATIENT)
Dept: CT IMAGING | Facility: HOSPITAL | Age: 80
DRG: 660 | End: 2025-02-09
Payer: MEDICARE

## 2025-02-09 ENCOUNTER — HOSPITAL ENCOUNTER (INPATIENT)
Facility: HOSPITAL | Age: 80
LOS: 9 days | Discharge: HOME HEALTH | DRG: 660 | End: 2025-02-21
Attending: STUDENT IN AN ORGANIZED HEALTH CARE EDUCATION/TRAINING PROGRAM | Admitting: STUDENT IN AN ORGANIZED HEALTH CARE EDUCATION/TRAINING PROGRAM
Payer: MEDICARE

## 2025-02-09 ENCOUNTER — ANESTHESIA (OUTPATIENT)
Dept: PERIOP | Facility: HOSPITAL | Age: 80
End: 2025-02-09
Payer: MEDICARE

## 2025-02-09 DIAGNOSIS — G71.20: ICD-10-CM

## 2025-02-09 DIAGNOSIS — R53.1 GENERALIZED WEAKNESS: ICD-10-CM

## 2025-02-09 DIAGNOSIS — D72.829 LEUKOCYTOSIS, UNSPECIFIED TYPE: ICD-10-CM

## 2025-02-09 DIAGNOSIS — N20.1 URETEROLITHIASIS: ICD-10-CM

## 2025-02-09 DIAGNOSIS — M48.061 DEGENERATIVE LUMBAR SPINAL STENOSIS: ICD-10-CM

## 2025-02-09 DIAGNOSIS — I10 HYPERTENSION, UNSPECIFIED TYPE: ICD-10-CM

## 2025-02-09 DIAGNOSIS — Z86.73 HISTORY OF CVA (CEREBROVASCULAR ACCIDENT): ICD-10-CM

## 2025-02-09 DIAGNOSIS — N20.1 URETERAL CALCULUS: Primary | ICD-10-CM

## 2025-02-09 DIAGNOSIS — M47.818 ARTHROPATHY OF RIGHT SACROILIAC JOINT: ICD-10-CM

## 2025-02-09 LAB
ALBUMIN SERPL-MCNC: 3.8 G/DL (ref 3.5–5.2)
ALBUMIN/GLOB SERPL: 1.4 G/DL
ALP SERPL-CCNC: 107 U/L (ref 39–117)
ALT SERPL W P-5'-P-CCNC: 18 U/L (ref 1–41)
ANION GAP SERPL CALCULATED.3IONS-SCNC: 12.4 MMOL/L (ref 5–15)
AST SERPL-CCNC: 21 U/L (ref 1–40)
BACTERIA UR QL AUTO: ABNORMAL /HPF
BASOPHILS # BLD AUTO: 0.05 10*3/MM3 (ref 0–0.2)
BASOPHILS NFR BLD AUTO: 0.3 % (ref 0–1.5)
BILIRUB SERPL-MCNC: 0.4 MG/DL (ref 0–1.2)
BILIRUB UR QL STRIP: NEGATIVE
BUN SERPL-MCNC: 14 MG/DL (ref 8–23)
BUN/CREAT SERPL: 31.8 (ref 7–25)
CALCIUM SPEC-SCNC: 9.3 MG/DL (ref 8.6–10.5)
CHLORIDE SERPL-SCNC: 104 MMOL/L (ref 98–107)
CLARITY UR: CLEAR
CO2 SERPL-SCNC: 24.6 MMOL/L (ref 22–29)
COLOR UR: YELLOW
CREAT SERPL-MCNC: 0.44 MG/DL (ref 0.76–1.27)
D-LACTATE SERPL-SCNC: 1.5 MMOL/L (ref 0.5–2)
DEPRECATED RDW RBC AUTO: 44.9 FL (ref 37–54)
EGFRCR SERPLBLD CKD-EPI 2021: 105.8 ML/MIN/1.73
EOSINOPHIL # BLD AUTO: 0.06 10*3/MM3 (ref 0–0.4)
EOSINOPHIL NFR BLD AUTO: 0.4 % (ref 0.3–6.2)
ERYTHROCYTE [DISTWIDTH] IN BLOOD BY AUTOMATED COUNT: 12.7 % (ref 12.3–15.4)
GLOBULIN UR ELPH-MCNC: 2.7 GM/DL
GLUCOSE BLDC GLUCOMTR-MCNC: 113 MG/DL (ref 70–130)
GLUCOSE SERPL-MCNC: 158 MG/DL (ref 65–99)
GLUCOSE UR STRIP-MCNC: NEGATIVE MG/DL
HCT VFR BLD AUTO: 49.4 % (ref 37.5–51)
HGB BLD-MCNC: 15.2 G/DL (ref 13–17.7)
HGB UR QL STRIP.AUTO: ABNORMAL
HOLD SPECIMEN: NORMAL
HOLD SPECIMEN: NORMAL
HYALINE CASTS UR QL AUTO: ABNORMAL /LPF
IMM GRANULOCYTES # BLD AUTO: 0.06 10*3/MM3 (ref 0–0.05)
IMM GRANULOCYTES NFR BLD AUTO: 0.4 % (ref 0–0.5)
KETONES UR QL STRIP: ABNORMAL
LEUKOCYTE ESTERASE UR QL STRIP.AUTO: ABNORMAL
LIPASE SERPL-CCNC: 38 U/L (ref 13–60)
LYMPHOCYTES # BLD AUTO: 1.41 10*3/MM3 (ref 0.7–3.1)
LYMPHOCYTES NFR BLD AUTO: 9.8 % (ref 19.6–45.3)
MCH RBC QN AUTO: 29.3 PG (ref 26.6–33)
MCHC RBC AUTO-ENTMCNC: 30.8 G/DL (ref 31.5–35.7)
MCV RBC AUTO: 95.2 FL (ref 79–97)
MONOCYTES # BLD AUTO: 0.99 10*3/MM3 (ref 0.1–0.9)
MONOCYTES NFR BLD AUTO: 6.9 % (ref 5–12)
NEUTROPHILS NFR BLD AUTO: 11.84 10*3/MM3 (ref 1.7–7)
NEUTROPHILS NFR BLD AUTO: 82.2 % (ref 42.7–76)
NITRITE UR QL STRIP: NEGATIVE
NRBC BLD AUTO-RTO: 0 /100 WBC (ref 0–0.2)
PH UR STRIP.AUTO: 6.5 [PH] (ref 5–8)
PLATELET # BLD AUTO: 204 10*3/MM3 (ref 140–450)
PMV BLD AUTO: 9.6 FL (ref 6–12)
POTASSIUM SERPL-SCNC: 3.8 MMOL/L (ref 3.5–5.2)
PROCALCITONIN SERPL-MCNC: 0.03 NG/ML (ref 0–0.25)
PROT SERPL-MCNC: 6.5 G/DL (ref 6–8.5)
PROT UR QL STRIP: ABNORMAL
QT INTERVAL: 348 MS
QTC INTERVAL: 425 MS
RBC # BLD AUTO: 5.19 10*6/MM3 (ref 4.14–5.8)
RBC # UR STRIP: ABNORMAL /HPF
REF LAB TEST METHOD: ABNORMAL
SODIUM SERPL-SCNC: 141 MMOL/L (ref 136–145)
SP GR UR STRIP: 1.01 (ref 1–1.03)
SQUAMOUS #/AREA URNS HPF: ABNORMAL /HPF
UROBILINOGEN UR QL STRIP: ABNORMAL
WBC # UR STRIP: ABNORMAL /HPF
WBC NRBC COR # BLD AUTO: 14.41 10*3/MM3 (ref 3.4–10.8)
WHOLE BLOOD HOLD COAG: NORMAL
WHOLE BLOOD HOLD SPECIMEN: NORMAL

## 2025-02-09 PROCEDURE — 0T778DZ DILATION OF LEFT URETER WITH INTRALUMINAL DEVICE, VIA NATURAL OR ARTIFICIAL OPENING ENDOSCOPIC: ICD-10-PCS | Performed by: UROLOGY

## 2025-02-09 PROCEDURE — C2617 STENT, NON-COR, TEM W/O DEL: HCPCS | Performed by: UROLOGY

## 2025-02-09 PROCEDURE — 25010000002 FENTANYL CITRATE (PF) 50 MCG/ML SOLUTION: Performed by: NURSE ANESTHETIST, CERTIFIED REGISTERED

## 2025-02-09 PROCEDURE — C1769 GUIDE WIRE: HCPCS | Performed by: UROLOGY

## 2025-02-09 PROCEDURE — 25810000003 LACTATED RINGERS PER 1000 ML: Performed by: ANESTHESIOLOGY

## 2025-02-09 PROCEDURE — 99285 EMERGENCY DEPT VISIT HI MDM: CPT

## 2025-02-09 PROCEDURE — 25010000002 MORPHINE PER 10 MG: Performed by: EMERGENCY MEDICINE

## 2025-02-09 PROCEDURE — 84145 PROCALCITONIN (PCT): CPT | Performed by: STUDENT IN AN ORGANIZED HEALTH CARE EDUCATION/TRAINING PROGRAM

## 2025-02-09 PROCEDURE — 85025 COMPLETE CBC W/AUTO DIFF WBC: CPT | Performed by: STUDENT IN AN ORGANIZED HEALTH CARE EDUCATION/TRAINING PROGRAM

## 2025-02-09 PROCEDURE — G0378 HOSPITAL OBSERVATION PER HR: HCPCS

## 2025-02-09 PROCEDURE — 25010000002 PROPOFOL 10 MG/ML EMULSION: Performed by: NURSE ANESTHETIST, CERTIFIED REGISTERED

## 2025-02-09 PROCEDURE — 25010000002 ONDANSETRON PER 1 MG: Performed by: PHYSICIAN ASSISTANT

## 2025-02-09 PROCEDURE — C2627 CATH, SUPRAPUBIC/CYSTOSCOPIC: HCPCS | Performed by: UROLOGY

## 2025-02-09 PROCEDURE — 93005 ELECTROCARDIOGRAM TRACING: CPT | Performed by: UROLOGY

## 2025-02-09 PROCEDURE — 76000 FLUOROSCOPY <1 HR PHYS/QHP: CPT

## 2025-02-09 PROCEDURE — 25810000003 LACTATED RINGERS PER 1000 ML: Performed by: NURSE ANESTHETIST, CERTIFIED REGISTERED

## 2025-02-09 PROCEDURE — 36415 COLL VENOUS BLD VENIPUNCTURE: CPT | Performed by: UROLOGY

## 2025-02-09 PROCEDURE — 81001 URINALYSIS AUTO W/SCOPE: CPT | Performed by: STUDENT IN AN ORGANIZED HEALTH CARE EDUCATION/TRAINING PROGRAM

## 2025-02-09 PROCEDURE — 83690 ASSAY OF LIPASE: CPT | Performed by: STUDENT IN AN ORGANIZED HEALTH CARE EDUCATION/TRAINING PROGRAM

## 2025-02-09 PROCEDURE — 80053 COMPREHEN METABOLIC PANEL: CPT | Performed by: STUDENT IN AN ORGANIZED HEALTH CARE EDUCATION/TRAINING PROGRAM

## 2025-02-09 PROCEDURE — 74176 CT ABD & PELVIS W/O CONTRAST: CPT

## 2025-02-09 PROCEDURE — 25010000002 ESMOLOL 100 MG/10ML SOLUTION: Performed by: NURSE ANESTHETIST, CERTIFIED REGISTERED

## 2025-02-09 PROCEDURE — 93010 ELECTROCARDIOGRAM REPORT: CPT | Performed by: INTERNAL MEDICINE

## 2025-02-09 PROCEDURE — 25010000002 LABETALOL 5 MG/ML SOLUTION: Performed by: NURSE ANESTHETIST, CERTIFIED REGISTERED

## 2025-02-09 PROCEDURE — 87040 BLOOD CULTURE FOR BACTERIA: CPT | Performed by: UROLOGY

## 2025-02-09 PROCEDURE — 25010000002 CEFTRIAXONE PER 250 MG: Performed by: PHYSICIAN ASSISTANT

## 2025-02-09 PROCEDURE — 87040 BLOOD CULTURE FOR BACTERIA: CPT | Performed by: PHYSICIAN ASSISTANT

## 2025-02-09 PROCEDURE — 25010000002 HYDROMORPHONE PER 4 MG: Performed by: NURSE ANESTHETIST, CERTIFIED REGISTERED

## 2025-02-09 PROCEDURE — 82948 REAGENT STRIP/BLOOD GLUCOSE: CPT

## 2025-02-09 PROCEDURE — 0T7D8ZZ DILATION OF URETHRA, VIA NATURAL OR ARTIFICIAL OPENING ENDOSCOPIC: ICD-10-PCS | Performed by: UROLOGY

## 2025-02-09 PROCEDURE — 83605 ASSAY OF LACTIC ACID: CPT | Performed by: STUDENT IN AN ORGANIZED HEALTH CARE EDUCATION/TRAINING PROGRAM

## 2025-02-09 DEVICE — URETERAL STENT
Type: IMPLANTABLE DEVICE | Site: URETER | Status: FUNCTIONAL
Brand: CONTOUR™

## 2025-02-09 RX ORDER — ATROPINE SULFATE 0.4 MG/ML
0.4 INJECTION, SOLUTION INTRAMUSCULAR; INTRAVENOUS; SUBCUTANEOUS ONCE AS NEEDED
Status: DISCONTINUED | OUTPATIENT
Start: 2025-02-09 | End: 2025-02-09 | Stop reason: HOSPADM

## 2025-02-09 RX ORDER — MORPHINE SULFATE 2 MG/ML
4 INJECTION, SOLUTION INTRAMUSCULAR; INTRAVENOUS ONCE
Status: COMPLETED | OUTPATIENT
Start: 2025-02-09 | End: 2025-02-09

## 2025-02-09 RX ORDER — NALOXONE HCL 0.4 MG/ML
0.4 VIAL (ML) INJECTION
Status: DISCONTINUED | OUTPATIENT
Start: 2025-02-09 | End: 2025-02-21 | Stop reason: HOSPADM

## 2025-02-09 RX ORDER — HYDRALAZINE HYDROCHLORIDE 20 MG/ML
5 INJECTION INTRAMUSCULAR; INTRAVENOUS
Status: DISCONTINUED | OUTPATIENT
Start: 2025-02-09 | End: 2025-02-09 | Stop reason: HOSPADM

## 2025-02-09 RX ORDER — PROMETHAZINE HYDROCHLORIDE 25 MG/1
25 TABLET ORAL ONCE AS NEEDED
Status: DISCONTINUED | OUTPATIENT
Start: 2025-02-09 | End: 2025-02-09 | Stop reason: HOSPADM

## 2025-02-09 RX ORDER — PROMETHAZINE HYDROCHLORIDE 25 MG/1
25 SUPPOSITORY RECTAL ONCE AS NEEDED
Status: DISCONTINUED | OUTPATIENT
Start: 2025-02-09 | End: 2025-02-09 | Stop reason: HOSPADM

## 2025-02-09 RX ORDER — SODIUM CHLORIDE, SODIUM LACTATE, POTASSIUM CHLORIDE, CALCIUM CHLORIDE 600; 310; 30; 20 MG/100ML; MG/100ML; MG/100ML; MG/100ML
INJECTION, SOLUTION INTRAVENOUS CONTINUOUS PRN
Status: DISCONTINUED | OUTPATIENT
Start: 2025-02-09 | End: 2025-02-09 | Stop reason: SURG

## 2025-02-09 RX ORDER — ONDANSETRON 2 MG/ML
4 INJECTION INTRAMUSCULAR; INTRAVENOUS ONCE
Status: COMPLETED | OUTPATIENT
Start: 2025-02-09 | End: 2025-02-09

## 2025-02-09 RX ORDER — HYDROCODONE BITARTRATE AND ACETAMINOPHEN 5; 325 MG/1; MG/1
1 TABLET ORAL EVERY 4 HOURS PRN
Status: DISPENSED | OUTPATIENT
Start: 2025-02-09 | End: 2025-02-14

## 2025-02-09 RX ORDER — NITROFURANTOIN 25; 75 MG/1; MG/1
100 CAPSULE ORAL DAILY
Qty: 14 CAPSULE | Refills: 0 | Status: SHIPPED | OUTPATIENT
Start: 2025-02-09

## 2025-02-09 RX ORDER — HYDROCODONE BITARTRATE AND ACETAMINOPHEN 7.5; 325 MG/1; MG/1
1 TABLET ORAL EVERY 4 HOURS PRN
Status: DISCONTINUED | OUTPATIENT
Start: 2025-02-09 | End: 2025-02-09 | Stop reason: HOSPADM

## 2025-02-09 RX ORDER — FLUMAZENIL 0.1 MG/ML
0.2 INJECTION INTRAVENOUS AS NEEDED
Status: DISCONTINUED | OUTPATIENT
Start: 2025-02-09 | End: 2025-02-09 | Stop reason: HOSPADM

## 2025-02-09 RX ORDER — FENTANYL CITRATE 50 UG/ML
50 INJECTION, SOLUTION INTRAMUSCULAR; INTRAVENOUS ONCE AS NEEDED
Status: DISCONTINUED | OUTPATIENT
Start: 2025-02-09 | End: 2025-02-09 | Stop reason: HOSPADM

## 2025-02-09 RX ORDER — HYDROMORPHONE HYDROCHLORIDE 1 MG/ML
0.25 INJECTION, SOLUTION INTRAMUSCULAR; INTRAVENOUS; SUBCUTANEOUS
Status: DISCONTINUED | OUTPATIENT
Start: 2025-02-09 | End: 2025-02-09 | Stop reason: HOSPADM

## 2025-02-09 RX ORDER — EPHEDRINE SULFATE 50 MG/ML
5 INJECTION, SOLUTION INTRAVENOUS ONCE AS NEEDED
Status: DISCONTINUED | OUTPATIENT
Start: 2025-02-09 | End: 2025-02-09 | Stop reason: HOSPADM

## 2025-02-09 RX ORDER — ONDANSETRON 2 MG/ML
4 INJECTION INTRAMUSCULAR; INTRAVENOUS ONCE AS NEEDED
Status: DISCONTINUED | OUTPATIENT
Start: 2025-02-09 | End: 2025-02-09 | Stop reason: HOSPADM

## 2025-02-09 RX ORDER — SODIUM CHLORIDE 0.9 % (FLUSH) 0.9 %
3 SYRINGE (ML) INJECTION EVERY 12 HOURS SCHEDULED
Status: DISCONTINUED | OUTPATIENT
Start: 2025-02-09 | End: 2025-02-09 | Stop reason: HOSPADM

## 2025-02-09 RX ORDER — IPRATROPIUM BROMIDE AND ALBUTEROL SULFATE 2.5; .5 MG/3ML; MG/3ML
3 SOLUTION RESPIRATORY (INHALATION) ONCE AS NEEDED
Status: DISCONTINUED | OUTPATIENT
Start: 2025-02-09 | End: 2025-02-09 | Stop reason: HOSPADM

## 2025-02-09 RX ORDER — LABETALOL HYDROCHLORIDE 5 MG/ML
10 INJECTION, SOLUTION INTRAVENOUS
Status: DISCONTINUED | OUTPATIENT
Start: 2025-02-09 | End: 2025-02-21 | Stop reason: HOSPADM

## 2025-02-09 RX ORDER — SODIUM CHLORIDE 0.9 % (FLUSH) 0.9 %
3-10 SYRINGE (ML) INJECTION AS NEEDED
Status: DISCONTINUED | OUTPATIENT
Start: 2025-02-09 | End: 2025-02-09 | Stop reason: HOSPADM

## 2025-02-09 RX ORDER — PHENAZOPYRIDINE HYDROCHLORIDE 100 MG/1
200 TABLET, FILM COATED ORAL
Status: DISCONTINUED | OUTPATIENT
Start: 2025-02-09 | End: 2025-02-19

## 2025-02-09 RX ORDER — CYCLOSPORINE 0.5 MG/ML
1 EMULSION OPHTHALMIC 2 TIMES DAILY
Status: DISCONTINUED | OUTPATIENT
Start: 2025-02-09 | End: 2025-02-21 | Stop reason: HOSPADM

## 2025-02-09 RX ORDER — PROPOFOL 10 MG/ML
VIAL (ML) INTRAVENOUS AS NEEDED
Status: DISCONTINUED | OUTPATIENT
Start: 2025-02-09 | End: 2025-02-09 | Stop reason: SURG

## 2025-02-09 RX ORDER — BISACODYL 5 MG/1
5 TABLET, DELAYED RELEASE ORAL DAILY PRN
Status: DISCONTINUED | OUTPATIENT
Start: 2025-02-09 | End: 2025-02-21 | Stop reason: HOSPADM

## 2025-02-09 RX ORDER — HYDROMORPHONE HYDROCHLORIDE 1 MG/ML
0.5 INJECTION, SOLUTION INTRAMUSCULAR; INTRAVENOUS; SUBCUTANEOUS
Status: ACTIVE | OUTPATIENT
Start: 2025-02-09 | End: 2025-02-14

## 2025-02-09 RX ORDER — ONDANSETRON 4 MG/1
4 TABLET, ORALLY DISINTEGRATING ORAL EVERY 6 HOURS PRN
Status: DISCONTINUED | OUTPATIENT
Start: 2025-02-09 | End: 2025-02-21 | Stop reason: HOSPADM

## 2025-02-09 RX ORDER — LIDOCAINE HYDROCHLORIDE 10 MG/ML
0.5 INJECTION, SOLUTION INFILTRATION; PERINEURAL ONCE AS NEEDED
Status: DISCONTINUED | OUTPATIENT
Start: 2025-02-09 | End: 2025-02-09 | Stop reason: HOSPADM

## 2025-02-09 RX ORDER — LABETALOL HYDROCHLORIDE 5 MG/ML
5 INJECTION, SOLUTION INTRAVENOUS
Status: DISCONTINUED | OUTPATIENT
Start: 2025-02-09 | End: 2025-02-09 | Stop reason: HOSPADM

## 2025-02-09 RX ORDER — FENTANYL CITRATE 50 UG/ML
25 INJECTION, SOLUTION INTRAMUSCULAR; INTRAVENOUS
Status: DISCONTINUED | OUTPATIENT
Start: 2025-02-09 | End: 2025-02-09 | Stop reason: HOSPADM

## 2025-02-09 RX ORDER — ONDANSETRON 2 MG/ML
4 INJECTION INTRAMUSCULAR; INTRAVENOUS EVERY 6 HOURS PRN
Status: DISCONTINUED | OUTPATIENT
Start: 2025-02-09 | End: 2025-02-21 | Stop reason: HOSPADM

## 2025-02-09 RX ORDER — BACLOFEN 10 MG/1
10 TABLET ORAL NIGHTLY
Status: DISCONTINUED | OUTPATIENT
Start: 2025-02-09 | End: 2025-02-21 | Stop reason: HOSPADM

## 2025-02-09 RX ORDER — FAMOTIDINE 10 MG/ML
20 INJECTION, SOLUTION INTRAVENOUS ONCE
Status: COMPLETED | OUTPATIENT
Start: 2025-02-09 | End: 2025-02-09

## 2025-02-09 RX ORDER — SODIUM CHLORIDE 0.9 % (FLUSH) 0.9 %
10 SYRINGE (ML) INJECTION AS NEEDED
Status: DISCONTINUED | OUTPATIENT
Start: 2025-02-09 | End: 2025-02-21 | Stop reason: HOSPADM

## 2025-02-09 RX ORDER — AMOXICILLIN 250 MG
2 CAPSULE ORAL 2 TIMES DAILY
Status: DISCONTINUED | OUTPATIENT
Start: 2025-02-09 | End: 2025-02-21 | Stop reason: HOSPADM

## 2025-02-09 RX ORDER — FENTANYL CITRATE 50 UG/ML
INJECTION, SOLUTION INTRAMUSCULAR; INTRAVENOUS AS NEEDED
Status: DISCONTINUED | OUTPATIENT
Start: 2025-02-09 | End: 2025-02-09 | Stop reason: SURG

## 2025-02-09 RX ORDER — LATANOPROST 50 UG/ML
1 SOLUTION/ DROPS OPHTHALMIC NIGHTLY
Status: DISCONTINUED | OUTPATIENT
Start: 2025-02-09 | End: 2025-02-21 | Stop reason: HOSPADM

## 2025-02-09 RX ORDER — SODIUM CHLORIDE, SODIUM LACTATE, POTASSIUM CHLORIDE, CALCIUM CHLORIDE 600; 310; 30; 20 MG/100ML; MG/100ML; MG/100ML; MG/100ML
9 INJECTION, SOLUTION INTRAVENOUS CONTINUOUS
Status: ACTIVE | OUTPATIENT
Start: 2025-02-09 | End: 2025-02-10

## 2025-02-09 RX ORDER — ACETAMINOPHEN 325 MG/1
650 TABLET ORAL EVERY 6 HOURS PRN
Status: DISCONTINUED | OUTPATIENT
Start: 2025-02-09 | End: 2025-02-21 | Stop reason: HOSPADM

## 2025-02-09 RX ORDER — ESMOLOL HYDROCHLORIDE 10 MG/ML
INJECTION INTRAVENOUS AS NEEDED
Status: DISCONTINUED | OUTPATIENT
Start: 2025-02-09 | End: 2025-02-09 | Stop reason: SURG

## 2025-02-09 RX ORDER — BISACODYL 10 MG
10 SUPPOSITORY, RECTAL RECTAL DAILY PRN
Status: DISCONTINUED | OUTPATIENT
Start: 2025-02-09 | End: 2025-02-21 | Stop reason: HOSPADM

## 2025-02-09 RX ORDER — NALOXONE HCL 0.4 MG/ML
0.2 VIAL (ML) INJECTION AS NEEDED
Status: DISCONTINUED | OUTPATIENT
Start: 2025-02-09 | End: 2025-02-09 | Stop reason: HOSPADM

## 2025-02-09 RX ORDER — POLYETHYLENE GLYCOL 3350 17 G/17G
17 POWDER, FOR SOLUTION ORAL DAILY PRN
Status: DISCONTINUED | OUTPATIENT
Start: 2025-02-09 | End: 2025-02-21 | Stop reason: HOSPADM

## 2025-02-09 RX ORDER — DIPHENHYDRAMINE HYDROCHLORIDE 50 MG/ML
12.5 INJECTION INTRAMUSCULAR; INTRAVENOUS
Status: DISCONTINUED | OUTPATIENT
Start: 2025-02-09 | End: 2025-02-09 | Stop reason: HOSPADM

## 2025-02-09 RX ORDER — HYDROCODONE BITARTRATE AND ACETAMINOPHEN 5; 325 MG/1; MG/1
1 TABLET ORAL ONCE AS NEEDED
Status: DISCONTINUED | OUTPATIENT
Start: 2025-02-09 | End: 2025-02-09 | Stop reason: HOSPADM

## 2025-02-09 RX ORDER — TAMSULOSIN HYDROCHLORIDE 0.4 MG/1
0.4 CAPSULE ORAL NIGHTLY
Status: DISCONTINUED | OUTPATIENT
Start: 2025-02-09 | End: 2025-02-21 | Stop reason: HOSPADM

## 2025-02-09 RX ADMIN — HYDROMORPHONE HYDROCHLORIDE 0.25 MG: 1 INJECTION, SOLUTION INTRAMUSCULAR; INTRAVENOUS; SUBCUTANEOUS at 13:06

## 2025-02-09 RX ADMIN — SODIUM CHLORIDE, POTASSIUM CHLORIDE, SODIUM LACTATE AND CALCIUM CHLORIDE 9 ML/HR: 600; 310; 30; 20 INJECTION, SOLUTION INTRAVENOUS at 09:55

## 2025-02-09 RX ADMIN — PROPOFOL INJECTABLE EMULSION 50 MG: 10 INJECTION, EMULSION INTRAVENOUS at 11:50

## 2025-02-09 RX ADMIN — PROPOFOL 150 MCG/KG/MIN: 10 INJECTION, EMULSION INTRAVENOUS at 11:50

## 2025-02-09 RX ADMIN — ESMOLOL HYDROCHLORIDE 50 MG: 100 INJECTION, SOLUTION INTRAVENOUS at 12:07

## 2025-02-09 RX ADMIN — LATANOPROST 1 DROP: 50 SOLUTION/ DROPS OPHTHALMIC at 21:56

## 2025-02-09 RX ADMIN — FENTANYL CITRATE 25 MCG: 50 INJECTION, SOLUTION INTRAMUSCULAR; INTRAVENOUS at 12:18

## 2025-02-09 RX ADMIN — HYDROMORPHONE HYDROCHLORIDE 0.25 MG: 1 INJECTION, SOLUTION INTRAMUSCULAR; INTRAVENOUS; SUBCUTANEOUS at 12:31

## 2025-02-09 RX ADMIN — ESMOLOL HYDROCHLORIDE 30 MG: 100 INJECTION, SOLUTION INTRAVENOUS at 11:55

## 2025-02-09 RX ADMIN — HYDROCODONE BITARTRATE AND ACETAMINOPHEN 1 TABLET: 5; 325 TABLET ORAL at 18:10

## 2025-02-09 RX ADMIN — CEFTRIAXONE 2000 MG: 2 INJECTION, POWDER, FOR SOLUTION INTRAMUSCULAR; INTRAVENOUS at 08:31

## 2025-02-09 RX ADMIN — FAMOTIDINE 20 MG: 10 INJECTION INTRAVENOUS at 09:55

## 2025-02-09 RX ADMIN — CYCLOSPORINE 1 DROP: 0.5 EMULSION OPHTHALMIC at 21:56

## 2025-02-09 RX ADMIN — LABETALOL HYDROCHLORIDE 5 MG: 5 INJECTION, SOLUTION INTRAVENOUS at 12:36

## 2025-02-09 RX ADMIN — HYDROMORPHONE HYDROCHLORIDE 0.25 MG: 1 INJECTION, SOLUTION INTRAMUSCULAR; INTRAVENOUS; SUBCUTANEOUS at 12:37

## 2025-02-09 RX ADMIN — FENTANYL CITRATE 25 MCG: 50 INJECTION, SOLUTION INTRAMUSCULAR; INTRAVENOUS at 13:06

## 2025-02-09 RX ADMIN — SENNOSIDES AND DOCUSATE SODIUM 2 TABLET: 50; 8.6 TABLET ORAL at 21:55

## 2025-02-09 RX ADMIN — BACLOFEN 10 MG: 10 TABLET ORAL at 21:55

## 2025-02-09 RX ADMIN — METOPROLOL TARTRATE 5 MG: 1 INJECTION, SOLUTION INTRAVENOUS at 08:37

## 2025-02-09 RX ADMIN — TAMSULOSIN HYDROCHLORIDE 0.4 MG: 0.4 CAPSULE ORAL at 21:55

## 2025-02-09 RX ADMIN — PHENAZOPYRIDINE 200 MG: 200 TABLET ORAL at 12:31

## 2025-02-09 RX ADMIN — MORPHINE SULFATE 4 MG: 2 INJECTION, SOLUTION INTRAMUSCULAR; INTRAVENOUS at 06:09

## 2025-02-09 RX ADMIN — FENTANYL CITRATE 25 MCG: 50 INJECTION, SOLUTION INTRAMUSCULAR; INTRAVENOUS at 12:06

## 2025-02-09 RX ADMIN — HYDROMORPHONE HYDROCHLORIDE 0.25 MG: 1 INJECTION, SOLUTION INTRAMUSCULAR; INTRAVENOUS; SUBCUTANEOUS at 12:50

## 2025-02-09 RX ADMIN — ONDANSETRON 4 MG: 2 INJECTION, SOLUTION INTRAMUSCULAR; INTRAVENOUS at 06:07

## 2025-02-09 RX ADMIN — PHENAZOPYRIDINE 200 MG: 200 TABLET ORAL at 18:10

## 2025-02-09 RX ADMIN — FENTANYL CITRATE 25 MCG: 50 INJECTION, SOLUTION INTRAMUSCULAR; INTRAVENOUS at 12:50

## 2025-02-09 RX ADMIN — SODIUM CHLORIDE, POTASSIUM CHLORIDE, SODIUM LACTATE AND CALCIUM CHLORIDE: 600; 310; 30; 20 INJECTION, SOLUTION INTRAVENOUS at 11:46

## 2025-02-09 NOTE — PROGRESS NOTES
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  Patient will have bleeding around the Davis catheter.  My office will call to schedule follow-up in approximately 10 days for catheter removal.  Will then plan left-sided stone surgery.  Call if any questions.

## 2025-02-09 NOTE — ANESTHESIA PROCEDURE NOTES
Airway  Urgency: elective    Date/Time: 2/9/2025 11:52 AM  Difficult airway    General Information and Staff    Patient location during procedure: OR    Indications and Patient Condition  Indications for airway management: airway protection    Preoxygenated: yes  Mask difficulty assessment: 0 - not attempted    Final Airway Details  Final airway type: supraglottic airway      Successful airway: classic  Size 5     Number of attempts at approach: 1  Assessment: lips, teeth, and gum same as pre-op and atraumatic intubation

## 2025-02-09 NOTE — ED PROVIDER NOTES
EMERGENCY DEPARTMENT ENCOUNTER      Room Number:  07/07  PCP: Provider, No Known  Independent Historians: Patient  Patient Care Team:  Provider, No Known as PCP - General       HPI:  Chief Complaint: Flank pain    A complete HPI/ROS/PMH/PSH/SH/FH are unobtainable due to: None    Chronic or social conditions impacting patient care (Social Determinants of Health): None      Context: Amandeep Manriquez Jr. is a 80 y.o. male with a PMH significant for acute myocardial infarction, diabetes, BPH, hyperlipidemia, CAD, hypertension who presents to the ED c/o acute left lower quadrant abdominal pain which seems to radiate towards the left sided lower back for the past couple of hours.  Fairly abrupt onset of symptoms.  No obvious provocative or palliative activity.  No urinary symptoms.  She does report a history of kidney stones which felt similar to this.  No fever, vomiting.      Upon review of prior external notes (non-ED) -and- Review of prior external test results outside of this encounter it appears the patient was evaluated in the office with family medicine for lumbar spinal stenosis on 2/7/2022.  The patient had a normal ferritin and CMP on 6/19/2022.      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    Arthropathy of right sacroiliac joint 11/29/2018    Osteoarthritis of right knee 12/27/2012    Degenerative lumbar spinal stenosis 02/05/2020    Renal stone 02/05/2020    Bilateral impacted cerumen 02/05/2020    Benign prostatic hyperplasia without lower urinary tract symptoms 02/05/2020    Vertigo 02/05/2020    High risk medication use 08/17/2020    Vitamin D deficiency 08/17/2020    OAB (overactive bladder) 04/06/2021    Rosacea 07/09/2021    Short-term memory loss 10/19/2021    History of CVA (cerebrovascular accident) 02/07/2022    COVID-19  06/13/2022    Generalized weakness 06/13/2022    Hypokalemia 06/15/2022     Resolved Ambulatory Problems     Diagnosis Date Noted    Acute myocardial infarction     Right hip pain 11/29/2018    Myopathy, congenital 12/03/2013    Vascular dementia without behavioral disturbance 02/07/2022     Past Medical History:   Diagnosis Date    Aneurysm of right femoral artery     Arthritis     BPH (benign prostatic hyperplasia)     DDD (degenerative disc disease), lumbar     Diabetes mellitus     Dizziness     Encounter for Medicare annual wellness exam     Gastrointestinal hemorrhage     History of impacted cerumen     Hyperlipidemia     Hypertension     Sleep apnea          PAST SURGICAL HISTORY  Past Surgical History:   Procedure Laterality Date    CARDIAC CATHETERIZATION  06/2015    30% Left main, 99% mid to distal LAD, 70-80% left circumflex, 80% proximal to mid RCA.    CATARACT EXTRACTION      CERVICAL FUSION      CHOLECYSTECTOMY      CORONARY ANGIOPLASTY WITH STENT PLACEMENT      KNEE SURGERY Right     OTHER SURGICAL HISTORY      percutaneous injection of extremity pseudoaneurysm         FAMILY HISTORY  Family History   Problem Relation Age of Onset    Coronary artery disease Mother     Heart disease Mother     Heart attack Mother 68    Cancer Father         bladder    Heart disease Father         CHF    No Known Problems Other          SOCIAL HISTORY  Social History     Socioeconomic History    Marital status:    Tobacco Use    Smoking status: Every Day     Types: Cigars    Smokeless tobacco: Never    Tobacco comments:     caffeine use   Substance and Sexual Activity    Alcohol use: Yes     Comment: social    Drug use: Never    Sexual activity: Never         ALLERGIES  Iodinated contrast media and Ampicillin      REVIEW OF SYSTEMS  Included in HPI  All systems reviewed and negative except for those discussed in HPI.      PHYSICAL EXAM    I have reviewed the triage vital signs and nursing notes.    ED Triage  Vitals [02/09/25 0411]   Temp Heart Rate Resp BP SpO2   98 °F (36.7 °C) 63 18 (!) 187/99 96 %      Temp src Heart Rate Source Patient Position BP Location FiO2 (%)   -- -- -- -- --       Physical Exam    Vital signs and nursing notes reviewed.      PPE: I wore a surgical mask throughout my encounters with the pt. I performed hand hygiene on entry into the pt room and upon exit.     LAB RESULTS  Recent Results (from the past 24 hours)   Comprehensive Metabolic Panel    Collection Time: 02/09/25  4:46 AM    Specimen: Blood   Result Value Ref Range    Glucose 158 (H) 65 - 99 mg/dL    BUN 14 8 - 23 mg/dL    Creatinine 0.44 (L) 0.76 - 1.27 mg/dL    Sodium 141 136 - 145 mmol/L    Potassium 3.8 3.5 - 5.2 mmol/L    Chloride 104 98 - 107 mmol/L    CO2 24.6 22.0 - 29.0 mmol/L    Calcium 9.3 8.6 - 10.5 mg/dL    Total Protein 6.5 6.0 - 8.5 g/dL    Albumin 3.8 3.5 - 5.2 g/dL    ALT (SGPT) 18 1 - 41 U/L    AST (SGOT) 21 1 - 40 U/L    Alkaline Phosphatase 107 39 - 117 U/L    Total Bilirubin 0.4 0.0 - 1.2 mg/dL    Globulin 2.7 gm/dL    A/G Ratio 1.4 g/dL    BUN/Creatinine Ratio 31.8 (H) 7.0 - 25.0    Anion Gap 12.4 5.0 - 15.0 mmol/L    eGFR 105.8 >60.0 mL/min/1.73   Lipase    Collection Time: 02/09/25  4:46 AM    Specimen: Blood   Result Value Ref Range    Lipase 38 13 - 60 U/L   Lactic Acid, Plasma    Collection Time: 02/09/25  4:46 AM    Specimen: Blood   Result Value Ref Range    Lactate 1.5 0.5 - 2.0 mmol/L   Green Top (Gel)    Collection Time: 02/09/25  4:46 AM   Result Value Ref Range    Extra Tube Hold for add-ons.    Lavender Top    Collection Time: 02/09/25  4:46 AM   Result Value Ref Range    Extra Tube hold for add-on    Gold Top - SST    Collection Time: 02/09/25  4:46 AM   Result Value Ref Range    Extra Tube Hold for add-ons.    Light Blue Top    Collection Time: 02/09/25  4:46 AM   Result Value Ref Range    Extra Tube Hold for add-ons.    CBC Auto Differential    Collection Time: 02/09/25  4:46 AM    Specimen: Blood    Result Value Ref Range    WBC 14.41 (H) 3.40 - 10.80 10*3/mm3    RBC 5.19 4.14 - 5.80 10*6/mm3    Hemoglobin 15.2 13.0 - 17.7 g/dL    Hematocrit 49.4 37.5 - 51.0 %    MCV 95.2 79.0 - 97.0 fL    MCH 29.3 26.6 - 33.0 pg    MCHC 30.8 (L) 31.5 - 35.7 g/dL    RDW 12.7 12.3 - 15.4 %    RDW-SD 44.9 37.0 - 54.0 fl    MPV 9.6 6.0 - 12.0 fL    Platelets 204 140 - 450 10*3/mm3    Neutrophil % 82.2 (H) 42.7 - 76.0 %    Lymphocyte % 9.8 (L) 19.6 - 45.3 %    Monocyte % 6.9 5.0 - 12.0 %    Eosinophil % 0.4 0.3 - 6.2 %    Basophil % 0.3 0.0 - 1.5 %    Immature Grans % 0.4 0.0 - 0.5 %    Neutrophils, Absolute 11.84 (H) 1.70 - 7.00 10*3/mm3    Lymphocytes, Absolute 1.41 0.70 - 3.10 10*3/mm3    Monocytes, Absolute 0.99 (H) 0.10 - 0.90 10*3/mm3    Eosinophils, Absolute 0.06 0.00 - 0.40 10*3/mm3    Basophils, Absolute 0.05 0.00 - 0.20 10*3/mm3    Immature Grans, Absolute 0.06 (H) 0.00 - 0.05 10*3/mm3    nRBC 0.0 0.0 - 0.2 /100 WBC   Urinalysis With Microscopic If Indicated (No Culture) - Urine, Clean Catch    Collection Time: 02/09/25  7:43 AM    Specimen: Urine, Clean Catch   Result Value Ref Range    Color, UA Yellow Yellow, Straw    Appearance, UA Clear Clear    pH, UA 6.5 5.0 - 8.0    Specific Gravity, UA 1.015 1.005 - 1.030    Glucose, UA Negative Negative    Ketones, UA 40 mg/dL (2+) (A) Negative    Bilirubin, UA Negative Negative    Blood, UA Moderate (2+) (A) Negative    Protein, UA 30 mg/dL (1+) (A) Negative    Leuk Esterase, UA Trace (A) Negative    Nitrite, UA Negative Negative    Urobilinogen, UA 0.2 E.U./dL 0.2 - 1.0 E.U./dL   Urinalysis, Microscopic Only - Urine, Clean Catch    Collection Time: 02/09/25  7:43 AM    Specimen: Urine, Clean Catch   Result Value Ref Range    RBC, UA Too Numerous to Count (A) None Seen, 0-2 /HPF    WBC, UA 3-5 (A) None Seen, 0-2 /HPF    Bacteria, UA None Seen None Seen /HPF    Squamous Epithelial Cells, UA 0-2 None Seen, 0-2 /HPF    Hyaline Casts, UA 0-2 None Seen /LPF    Methodology  Automated Microscopy    ECG 12 Lead Pre-Op / Pre-Procedure    Collection Time: 02/09/25  8:28 AM   Result Value Ref Range    QT Interval 348 ms    QTC Interval 425 ms         RADIOLOGY  CT Abdomen Pelvis Without Contrast    Result Date: 2/9/2025  CT ABDOMEN PELVIS WO CONTRAST-  INDICATIONS: Left flank pain  TECHNIQUE: Radiation dose reduction techniques were utilized, including automated exposure control and exposure modulation based on body size. Unenhanced ABDOMEN AND PELVIS CT  COMPARISON: 1/22/2020  FINDINGS:  At the mid left ureter, a 1.0 x 1.2 cm stone is present with moderate left hydronephrosis. Increased stranding around the left kidney suggests inflammation/infection. Nonobstructive stones are seen in the kidneys, as large as 1.2 cm on the left, 1.3 cm on the right. No right hydronephrosis. Left renal cyst is again demonstrated.  Otherwise unremarkable appearance of the liver, spleen, adrenal glands, pancreas, kidneys, bladder.  No bowel obstruction or abnormal bowel thickening is identified. The appendix does not appear inflamed. Colonic diverticula are seen that do not appear inflamed.  No free intraperitoneal gas or free fluid.  Scattered small mesenteric and para-aortic lymph nodes are seen that are not significant by size criteria.  Abdominal aorta is not aneurysmal. Aortic and other arterial calcifications are present.  The lung bases show granulomatous disease, atelectasis. Reticulonodular opacity in the right middle lobe suggests inflammatory/infectious etiology, for example axial image 10, clinical correlation and CT follow-up advised to exclude any possibility of an underlying lesion. Largest nodular density in this region measures 6 mm on axial image 12.  Degenerative changes are seen in the spine. No acute fracture is identified.          1. A 1.2 cm stone at the mid left ureter, with moderate left hydronephrosis, and increased stranding around the left kidney, suggesting  inflammation/infection. Bilateral nonobstructive nephrolithiasis.  2. Colonic diverticulosis. No acute inflammatory process of bowel is identified.  3. Right middle lobe pulmonary reticulonodular opacities suggest inflammatory/infectious etiology, clinical correlation and CT follow-up recommended.  This report was finalized on 2/9/2025 7:05 AM by Dr. Wilton Richardson M.D on Workstation: [id number]         MEDICATIONS GIVEN IN ER  Medications   sodium chloride 0.9 % flush 10 mL (has no administration in time range)   cefTRIAXone (ROCEPHIN) 2,000 mg in sodium chloride 0.9 % 100 mL MBP (2,000 mg Intravenous New Bag 2/9/25 0831)   metoprolol tartrate (LOPRESSOR) injection 5 mg (has no administration in time range)   ondansetron (ZOFRAN) injection 4 mg (4 mg Intravenous Given 2/9/25 0607)   morphine injection 4 mg (4 mg Intravenous Given 2/9/25 0609)         ORDERS PLACED DURING THIS VISIT:  Orders Placed This Encounter   Procedures    Blood Culture - Blood,    Blood Culture - Blood,    CT Abdomen Pelvis Without Contrast    [place] Draw    Comprehensive Metabolic Panel    Lipase    Urinalysis With Microscopic If Indicated (No Culture) - Urine, Clean Catch    Lactic Acid, Plasma    CBC Auto Differential    Urinalysis, Microscopic Only - Urine, Clean Catch    NPO Diet NPO Type: Strict NPO    Undress & Gown    [facility] (on-call MD unless specified) Details    ECG 12 Lead Pre-Op / Pre-Procedure    Insert Peripheral IV    Initiate Observation Status    CBC & Differential    Green Top (Gel)    Lavender Top    Gold Top - SST    Light Blue Top         OUTPATIENT MEDICATION MANAGEMENT:  Current Facility-Administered Medications Ordered in Epic   Medication Dose Route Frequency Provider Last Rate Last Admin    cefTRIAXone (ROCEPHIN) 2,000 mg in sodium chloride 0.9 % 100 mL MBP  2,000 mg Intravenous Once Cosmo Coughlin  mL/hr at 02/09/25 0831 2,000 mg at 02/09/25 0831    metoprolol tartrate (LOPRESSOR) injection 5 mg  5 mg  Intravenous Once Cosmo Coughlin PA        sodium chloride 0.9 % flush 10 mL  10 mL Intravenous PRN Owen James MD         Current Outpatient Medications Ordered in Epic   Medication Sig Dispense Refill    acetaminophen (TYLENOL) 325 MG tablet Take 2 tablets by mouth Every 4 (Four) Hours As Needed for Mild Pain .      aspirin 81 MG tablet Take 1 tablet by mouth daily.      baclofen (LIORESAL) 10 MG tablet Take 1 tablet by mouth Every Night.      cycloSPORINE (RESTASIS) 0.05 % ophthalmic emulsion 1 drop 2 (Two) Times a Day.      HYDROcodone-acetaminophen (NORCO) 5-325 MG per tablet Take 1 tablet by mouth Every 8 (Eight) Hours As Needed for Moderate Pain . For pain 10 tablet 0    latanoprost (XALATAN) 0.005 % ophthalmic solution Administer 1 drop to both eyes Every Night.      metoprolol tartrate (LOPRESSOR) 25 MG tablet Take 0.5 tablets by mouth 2 (Two) Times a Day.      Multiple Vitamins-Minerals (PRESERVISION AREDS 2+MULTI VIT PO) Take 1 tablet by mouth Daily.      Myrbetriq 50 MG tablet sustained-release 24 hour 24 hr tablet TAKE ONE TABLET BY MOUTH DAILY 30 tablet 2    nitroglycerin (NITROSTAT) 0.4 MG SL tablet Place 1 tablet under the tongue. For chest pain.  If pain remains after 5 minutes, call 911 and repeat dose.  Max 3 tabs in 15 minutes.      rosuvastatin (CRESTOR) 40 MG tablet TAKE ONE TABLET BY MOUTH DAILY 90 tablet 1    tamsulosin (FLOMAX) 0.4 MG capsule 24 hr capsule Take 1 capsule by mouth Every Night. 90 capsule 1              PROGRESS, DATA ANALYSIS, CONSULTS, AND MEDICAL DECISION MAKING  All labs have been independently interpreted by me.  All radiology studies have been reviewed by me. All EKG's have been independently viewed and interpreted by me.  Discussion below represents my analysis of pertinent findings related to patient's condition, differential diagnosis, treatment plan and final disposition.    Patient presentation and evaluation most consistent with large left-sided mid  ureteral stone requiring operative management in the hospital today.  Patient agreeable with all questions answered.    DIFFERENTIAL DIAGNOSIS INCLUDE BUT NOT LIMITED TO:     Differential diagnosis includes but is not limited to:  - Hepatobiliary pathology such as cholecystitis, cholangitis, and symptomatic cholelithiasis  - Pancreatitis  - Dyspepsia  - Small bowel obstruction  - Appendicitis  - Diverticulitis  - UTI including pyelonephritis  - Ureteral stone  - Zoster  - Colitis, including infectious and ischemic  - Atypical ACS      Clinical Scores: N/A=      ED Course as of 02/09/25 0834   Sun Feb 09, 2025   0538 First look: Patient presents to emergency department with left flank pain that started at 0200 this morning.  Endorses history of kidney stone.  States he has had hot flashes and chills.  No reported fever, vomiting, difficulty urinating.    Workup initiated in triage with labs and urinalysis.  Will add on CT of the abdomen and pelvis and treat patient's pain.  Will defer additional workup to oncoming provider. [MP]   0558 WBC(!): 14.41 [DC]   0750 BP(!): 215/102  Asymptomatic and has not taken his morning medications. [DC]   0804 I discussed the case with MD Frances with Urology at this time regarding the patient.  I discussed work-up, results, concerns.  I discussed the consulting provider's desire for admission to the medicine service and agrees to consult.  Likely operative management today.   [DC]   0833 I discussed the case with MD Ric with [facility] at this time regarding the patient.  I discussed work-up, results, concerns.  I discussed the consulting provider's desire for med surg admit.    [DC]      ED Course User Index  [DC] Cosmo Coughlin PA  [MP] Natividad Kyle, PA-C            0805 I rechecked the patient.  I discussed the patient's labs, radiology findings (including all incidental findings), diagnosis, and plan for admission. The patient understands and agrees with the plan.      AS  OF 08:34 EST VITALS:    BP - (!) 215/102  HR - 81  TEMP - 98 °F (36.7 °C)  O2 SATS - 94%    COMPLEXITY OF CARE  The patient requires admission.      DIAGNOSIS  Final diagnoses:   Ureteral calculus   Hypertension, unspecified type   Leukocytosis, unspecified type         DISPOSITION  ED Disposition       ED Disposition   Decision to Admit    Condition   --    Comment   Level of Care: Med/Surg [1]   Diagnosis: Ureterolithiasis [153816]   Admitting Physician: ASHLEY SPENCER [[id number]]   Attending Physician: ASHLEY SPENCER [[id number]]                  Please note that portions of this document were completed with a voice recognition program.    Note Disclaimer: At [facility], we believe that sharing information builds trust and better relationships. You are receiving this note because you recently visited [facility]. It is possible you will see health information before a provider has talked with you about it. This kind of information can be easy to misunderstand. To help you fully understand what it means for your health, we urge you to discuss this note with your provider.         Cosmo Coughlin PA  02/09/25 0883

## 2025-02-09 NOTE — ED NOTES
Nursing report ED to floor  Amandeep Manriquez JrFortino  80 y.o.  male    HPI :  HPI  Stated Reason for Visit: Pt to ED from home via EMS with complaints of LLQ abdominal pain radiating to lower back x2 hours. Pt hx kidney stones. Pt denies difficulty or pain urinating.  History Obtained From: patient    Chief Complaint  Chief Complaint   Patient presents with    Flank Pain       Admitting doctor:   Rashawn Larios MD    Admitting diagnosis:   The primary encounter diagnosis was Ureteral calculus. Diagnoses of Hypertension, unspecified type and Leukocytosis, unspecified type were also pertinent to this visit.    Code status:   Current Code Status       Date Active Code Status Order ID Comments User Context       Prior            Allergies:   Iodinated contrast media and Ampicillin    Isolation:   No active isolations    Intake and Output    Intake/Output Summary (Last 24 hours) at 2/9/2025 0841  Last data filed at 2/9/2025 0700  Gross per 24 hour   Intake --   Output 100 ml   Net -100 ml       Weight:   There were no vitals filed for this visit.    Most recent vitals:   Vitals:    02/09/25 0711 02/09/25 0712 02/09/25 0725 02/09/25 0837   BP: (!) 215/102   (!) 215/108   Pulse: 77 84 81 95   Resp:       Temp:       SpO2:  94% 94%        Active LDAs/IV Access:   Lines, Drains & Airways       Active LDAs       Name Placement date Placement time Site Days    Peripheral IV 02/09/25 0440 Right Antecubital 02/09/25  0440  Antecubital  less than 1                    Labs (abnormal labs have a star):   Labs Reviewed   COMPREHENSIVE METABOLIC PANEL - Abnormal; Notable for the following components:       Result Value    Glucose 158 (*)     Creatinine 0.44 (*)     BUN/Creatinine Ratio 31.8 (*)     All other components within normal limits    Narrative:     GFR Categories in Chronic Kidney Disease (CKD)      GFR Category          GFR (mL/min/1.73)    Interpretation  G1                     90 or greater         Normal or high  (1)  G2                      60-89                Mild decrease (1)  G3a                   45-59                Mild to moderate decrease  G3b                   30-44                Moderate to severe decrease  G4                    15-29                Severe decrease  G5                    14 or less           Kidney failure          (1)In the absence of evidence of kidney disease, neither GFR category G1 or G2 fulfill the criteria for CKD.    eGFR calculation 2021 CKD-EPI creatinine equation, which does not include race as a factor   URINALYSIS W/ MICROSCOPIC IF INDICATED (NO CULTURE) - Abnormal; Notable for the following components:    Ketones, UA 40 mg/dL (2+) (*)     Blood, UA Moderate (2+) (*)     Protein, UA 30 mg/dL (1+) (*)     Leuk Esterase, UA Trace (*)     All other components within normal limits   CBC WITH AUTO DIFFERENTIAL - Abnormal; Notable for the following components:    WBC 14.41 (*)     MCHC 30.8 (*)     Neutrophil % 82.2 (*)     Lymphocyte % 9.8 (*)     Neutrophils, Absolute 11.84 (*)     Monocytes, Absolute 0.99 (*)     Immature Grans, Absolute 0.06 (*)     All other components within normal limits   URINALYSIS, MICROSCOPIC ONLY - Abnormal; Notable for the following components:    RBC, UA Too Numerous to Count (*)     WBC, UA 3-5 (*)     All other components within normal limits   LIPASE - Normal   LACTIC ACID, PLASMA - Normal   BLOOD CULTURE   BLOOD CULTURE   RAINBOW DRAW    Narrative:     The following orders were created for panel order [place] Draw.  Procedure                               Abnormality         Status                     ---------                               -----------         ------                     Green Top (Gel)[[id number]]                                  Final result               Lavender Top[[id number]]                                     Final result               Gold Top - SST[[id number]]                                   Final result               Light Blue  Top[[id number]]                                   Final result                 Please view results for these tests on the individual orders.   CBC AND DIFFERENTIAL    Narrative:     The following orders were created for panel order CBC & Differential.  Procedure                               Abnormality         Status                     ---------                               -----------         ------                     CBC Auto Differential[[id number]]        Abnormal            Final result                 Please view results for these tests on the individual orders.   GREEN TOP   LAVENDER TOP   GOLD TOP - SST   LIGHT BLUE TOP       EKG:   ECG 12 Lead Pre-Op / Pre-Procedure   Preliminary Result   HEART RATE=89  bpm   RR Oidntoat=260  ms   VA Ztjomcvn=382  ms   P Horizontal Axis=-4  deg   P Front Axis=42  deg   QRSD Qpuwrbqo=128  ms   QT Lcyygfmq=030  ms   WQeU=820  ms   QRS Axis=42  deg   T Wave Axis=28  deg   - BORDERLINE ECG -   Sinus rhythm   Probable left atrial enlargement   Date and Time of Study:2025-02-09 08:28:52          Meds given in ED:   Medications   sodium chloride 0.9 % flush 10 mL (has no administration in time range)   cefTRIAXone (ROCEPHIN) 2,000 mg in sodium chloride 0.9 % 100 mL MBP (2,000 mg Intravenous New Bag 2/9/25 0831)   ondansetron (ZOFRAN) injection 4 mg (4 mg Intravenous Given 2/9/25 0607)   morphine injection 4 mg (4 mg Intravenous Given 2/9/25 0609)   metoprolol tartrate (LOPRESSOR) injection 5 mg (5 mg Intravenous Given 2/9/25 0837)       Imaging results:  CT Abdomen Pelvis Without Contrast    Result Date: 2/9/2025    1. A 1.2 cm stone at the mid left ureter, with moderate left hydronephrosis, and increased stranding around the left kidney, suggesting inflammation/infection. Bilateral nonobstructive nephrolithiasis.  2. Colonic diverticulosis. No acute inflammatory process of bowel is identified.  3. Right middle lobe pulmonary reticulonodular opacities suggest  inflammatory/infectious etiology, clinical correlation and CT follow-up recommended.  This report was finalized on 2/9/2025 7:05 AM by Dr. Wilton Richardson M.D on Workstation: [id number]       Ambulatory status:   - assistx1    Social issues:   Social History     Socioeconomic History    Marital status:    Tobacco Use    Smoking status: Every Day     Types: Cigars    Smokeless tobacco: Never    Tobacco comments:     caffeine use   Substance and Sexual Activity    Alcohol use: Yes     Comment: social    Drug use: Never    Sexual activity: Never       Peripheral Neurovascular  Peripheral Neurovascular (Adult)  Peripheral Neurovascular WDL: WDL    Neuro Cognitive  Neuro Cognitive (Adult)  Cognitive/Neuro/Behavioral WDL: WDL, orientation, speech  Orientation: oriented x 4  Speech: logical, clear    Learning  Learning Assessment  Learning Readiness and Ability: no barriers identified    Respiratory  Respiratory WDL  Respiratory WDL: WDL    Abdominal Pain       Pain Assessments  Pain (Adult)  (0-10) Pain Rating: Rest: 5  (0-10) Pain Rating: Activity: 5  Pain Location: flank  Pain Side/Orientation: left  Pain Description: intermittent  Response to Pain Interventions: nonverbal indicators absent/decreased    NIH Stroke Scale       Shefali Jefferson RN  02/09/25 08:41 EST

## 2025-02-09 NOTE — OP NOTE
Operative Note      Amandeep Manriquez Jr.  80 y.o.  1945  male  [id number]      2/9/2025  Surgeons and Role:     * Alvino Daniels MD - Primary   Pre-operative Diagnosis: Left ureteral stone  Bilateral nonobstructing renal stones  Urinary tract infection    Post-operative Diagnosis: Same  Bulbar urethral stricture    Complications:None    History: This is an 80-year-old male former patient of my partner Dr. Balderrama.  He has a history of stone disease.  He presented to the emergency room at [facility] [place] with pain.  He was found to have an obstructing 10 mm left ureteral stone.  He had multiple nonobstructing left renal stones.  He had a large right lower pole renal stone.  He was taken to the operating room emergently to have a cystoscopy and a stent placement.  He had a urinary tract infection.  During the procedure he was found to have a bulbar stricture.  It was dilated to 20 Nepali and a Davis catheter was left in place.  He has multiple medical comorbidities and his wife details difficult intubation in the past.      Description of procedure:    After consent was obtained the patient was taken to the operating room and placed supine on the operating room table.  He was placed in the dorsolithotomy position.  Prepped and draped in the standard fashion.  Cystoscope was passed per his urethra.  He had a bulbar urethral stricture.  I had to pass a wire through the stricture.  I sequentially dilated with S dilators to 20 Nepali.  I was then able to pass the rigid cystoscope into the bladder.  He had no evidence of BPH.  The left ureteral orifice was cannulated with a wire.  And I positioned a 626 double-J stent with good coil in the renal pelvis and the bladder.  I placed a 20 Nepali [place] tip catheter over a wire.  He was extubated in the operating room taken recovery in stable.  Procedure(s) and Anesthesia Type:     * CYSTOSCOPY URETERAL CATHETER/STENT INSERTION - General  Left  Dilation of bulbar  stricture  Complex Davis catheter placement    Specimens: None      Estimated Blood Loss:    Minimal  Alvino Daniels MD  2/9/2025

## 2025-02-09 NOTE — ED PROVIDER NOTES
EMERGENCY DEPARTMENT MD ATTESTATION NOTE    Room Number:  07/07  PCP: Provider, No Known  Independent Historians: Patient and Family    HPI:    Context: Amandeep Manriquez Jr. is a 80 y.o. male with a medical history of CAD, diabetes, HLD, HTN who presents to the ED c/o acute left lower quadrant abdominal pain.  Symptoms been occurring over the last several hours.  Pain radiates into the back.        PHYSICAL EXAM    I have reviewed the triage vital signs and nursing notes.    ED Triage Vitals [02/09/25 0411]   Temp Heart Rate Resp BP SpO2   98 °F (36.7 °C) 63 18 (!) 187/99 96 %      Temp src Heart Rate Source Patient Position BP Location FiO2 (%)   -- -- -- -- --       Physical Exam  GENERAL: alert, no acute distress  SKIN: Warm, dry  HENT: Normocephalic, atraumatic  EYES: no scleral icterus  CV: regular rhythm, regular rate  RESPIRATORY: normal effort, lungs clear  ABDOMEN: soft, nontender, nondistended  MUSCULOSKELETAL: no deformity  NEURO: alert, moves all extremities, follows commands            MEDICATIONS GIVEN IN ER  Medications   sodium chloride 0.9 % flush 10 mL (has no administration in time range)   cefTRIAXone (ROCEPHIN) 2,000 mg in sodium chloride 0.9 % 100 mL MBP (2,000 mg Intravenous New Bag 2/9/25 0831)   ondansetron (ZOFRAN) injection 4 mg (4 mg Intravenous Given 2/9/25 0607)   morphine injection 4 mg (4 mg Intravenous Given 2/9/25 0609)   metoprolol tartrate (LOPRESSOR) injection 5 mg (5 mg Intravenous Given 2/9/25 0837)         ORDERS PLACED DURING THIS VISIT:  Orders Placed This Encounter   Procedures    Blood Culture - Blood,    Blood Culture - Blood,    CT Abdomen Pelvis Without Contrast    [place] Draw    Comprehensive Metabolic Panel    Lipase    Urinalysis With Microscopic If Indicated (No Culture) - Urine, Clean Catch    Lactic Acid, Plasma    CBC Auto Differential    Urinalysis, Microscopic Only - Urine, Clean Catch    NPO Diet NPO Type: Strict NPO    Undress & Gown    LHA (on-call MD  unless specified) Details    ECG 12 Lead Pre-Op / Pre-Procedure    Insert Peripheral IV    Initiate Observation Status    CBC & Differential    Green Top (Gel)    Lavender Top    Gold Top - SST    Light Blue Top         PROCEDURES  Procedures            PROGRESS, DATA ANALYSIS, CONSULTS, AND MEDICAL DECISION MAKING  All labs have been independently interpreted by me.  All radiology studies have been reviewed by me. All EKG's have been independently viewed and interpreted by me.  Discussion below represents my analysis of pertinent findings related to patient's condition, differential diagnosis, treatment plan and final disposition.    Differential diagnosis includes but is not limited to UTI, nephrolithiasis, volvulus, mesenteric ischemia, diverticulitis, ureteral calculus, colitis, gastroenteritis.    Clinical Scores:                                     ED Course as of 02/09/25 0843   Sun Feb 09, 2025   0538 First look: Patient presents to emergency department with left flank pain that started at 0200 this morning.  Endorses history of kidney stone.  States he has had hot flashes and chills.  No reported fever, vomiting, difficulty urinating.    Workup initiated in triage with labs and urinalysis.  Will add on CT of the abdomen and pelvis and treat patient's pain.  Will defer additional workup to oncoming provider. [MP]   0558 WBC(!): 14.41 [DC]   0750 BP(!): 215/102  Asymptomatic and has not taken his morning medications. [DC]   0804 I discussed the case with MD Frances with Urology at this time regarding the patient.  I discussed work-up, results, concerns.  I discussed the consulting provider's desire for admission to the medicine service and agrees to consult.  Likely operative management today.   [DC]   0892 I discussed the case with MD Ric with [facility] at this time regarding the patient.  I discussed work-up, results, concerns.  I discussed the consulting provider's desire for med surg admit.    [DC]       ED Course User Index  [DC] Cosmo Coughlin PA  [MP] Natividad Kyle, JEFF       MDM: 80-year-old male presenting for evaluation of lower quadrant pain.  Laboratory evaluation notable for hematuria.  Radiological evaluation demonstrates 1.2 cm stone in the mid left ureter.  Urology consulted.  Likely OR intervention today.  Admit to hospitalist.      COMPLEXITY OF CARE  The patient requires admission.    Please note that portions of this document were completed with a voice recognition program.    Note Disclaimer: At [facility], we believe that sharing information builds trust and better relationships. You are receiving this note because you recently visited [facility]. It is possible you will see health information before a provider has talked with you about it. This kind of information can be easy to misunderstand. To help you fully understand what it means for your health, we urge you to discuss this note with your provider.         Owen James MD  02/09/25 0866

## 2025-02-09 NOTE — ANESTHESIA PREPROCEDURE EVALUATION
Anesthesia Evaluation     NPO Solid Status: > 8 hours  NPO Liquid Status: > 2 hours           Airway   Mallampati: III  TM distance: >3 FB  Narrow palate and Small opening  Dental - normal exam     Pulmonary    (+) a smoker Current,sleep apnea  Cardiovascular     Rhythm: regular  Rate: normal    (+) hypertension, past MI , CAD, hyperlipidemia      Neuro/Psych  (+) dizziness/light headedness  GI/Hepatic/Renal/Endo    (+) GI bleeding , renal disease- stones, diabetes mellitus    Musculoskeletal     Abdominal    Substance History      OB/GYN          Other   arthritis,                   Anesthesia Plan    ASA 4     general   total IV anesthesia  (Wife and patient are poor historians regarding pt's history of difficult intubation, however it sounds like the first anesthesiologist said his airway anatomy was unexpectedly abnormal, and on a subsequent anesthetic it sounds like a nasal fiberoptic was attempted with much difficulty. I discussed with wife that I think it may be very difficult/traumatic to intubate him and thus we should probably just maintain spontaneous ventilation as much as possible and utilize LMA in spite of his recent nausea given the fact that we expect a very short procedure. This would obviously be a very different conversation if we expected a longer or more involved procedure. Also in light of this congenital myopathy of unknown significance, I think we should utilize TIVA and avoid succinylcholine. Will flush machine as precaution. )  intravenous induction     Anesthetic plan, risks, benefits, and alternatives have been provided, discussed and informed consent has been obtained with: patient.      CODE STATUS:    Level Of Support Discussed With: Patient  Code Status (Patient has no pulse and is not breathing): CPR (Attempt to Resuscitate)  Medical Interventions (Patient has pulse or is breathing): Full

## 2025-02-09 NOTE — PLAN OF CARE
Goal Outcome Evaluation:  Plan of Care Reviewed With: patient, spouse           Outcome Evaluation: admitted from PACU after cysto with catheter and stent placement, a&o x4 with intermittent confusion per wife at bedside, sleepy throughout shift, hx of congenital myopathy, poor vision d/t macular degeneration, mostly wheelchair bound but can stand and pivot a few steps with assistance, needs assistance with ADLs, [place]--right hearing aid in place, khan catheter in place with orange/bloody urine, denies pain, c/o discomfort at penis

## 2025-02-09 NOTE — ANESTHESIA POSTPROCEDURE EVALUATION
Patient: Amandeep Manriquez Jr.    Procedure Summary       Date: 02/09/25 Room / Location: [facility] OR 01 / [facility] MAIN OR    Anesthesia Start: 1146 Anesthesia Stop: 1223    Procedure: CYSTOSCOPY URETERAL CATHETER/STENT INSERTION (Left) Diagnosis:     Surgeons: Alvino Daniels MD Provider: Annel Hooker MD    Anesthesia Type: general ASA Status: 4            Anesthesia Type: general    Vitals  Vitals Value Taken Time   /77 02/09/25 1315   Temp 36.6 °C (97.9 °F) 02/09/25 1315   Pulse 94 02/09/25 1317   Resp 14 02/09/25 1315   SpO2 99 % 02/09/25 1317   Vitals shown include unfiled device data.        Post Anesthesia Care and Evaluation    Patient location during evaluation: bedside  Patient participation: complete - patient participated  Level of consciousness: awake  Pain management: adequate    Airway patency: patent  Anesthetic complications: No anesthetic complications    Cardiovascular status: acceptable  Respiratory status: acceptable  Hydration status: acceptable    Comments: /77   Pulse 95   Temp 36.6 °C (97.9 °F) (Oral)   Resp 14   Wt 79.4 kg (175 lb)   SpO2 97%   BMI 29.12 kg/m²

## 2025-02-09 NOTE — CONSULTS
Urology Consult  Patient Identification:  Name: Amandeep Manriquez Jr.  Age: 80 y.o.  Sex: male  : 1945  MRN: [id number]   Chief Complaint: stone   History of Present Illness:     Stone - left 10mm midureteral , gelacio non-obs renal , stent placed 25    2. Uti - wbc 14 , ua+    3. Multiple medical problems - cva hx, cad, htn, hld, dm, htn , hld, sandrine, chronic anticoagulation    4. Difficult intubation in the past     5. Bulbar urethral stricture - dilated in the OR 25         Ct independently viewed - left 10mm ureter, left renal x 3 (mp8mm, LP 10mm,5mm) right LP 15mm          Problem List:  Active Hospital Problems    Diagnosis  POA    **Ureterolithiasis [N20.1]  Yes    Leukocytosis [D72.829]  Yes    History of CVA (cerebrovascular accident) [Z86.73]  Not Applicable    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     Past Medical History:  Past Medical History:   Diagnosis Date    Acute myocardial infarction     Aneurysm of right femoral artery     Arthritis     BPH (benign prostatic hyperplasia)     CAD (coronary artery disease), native coronary artery     Congenital myopathy     DDD (degenerative disc disease), lumbar     with stenosis    Diabetes mellitus     Dizziness     Chronic Dizziness    Encounter for Medicare annual wellness exam     Essential hypertension     Gastrointestinal hemorrhage     Hard to intubate     History of impacted cerumen     Hyperlipidemia     Hypertension     Mixed hyperlipidemia     Sleep apnea      Past Surgical History:  Past Surgical History:   Procedure Laterality Date    CARDIAC CATHETERIZATION  2015    30% Left main, 99% mid to distal LAD, 70-80% left circumflex, 80% proximal to mid RCA.    CATARACT EXTRACTION      CERVICAL FUSION      CHOLECYSTECTOMY      CORONARY  ANGIOPLASTY WITH STENT PLACEMENT      KNEE SURGERY Right     OTHER SURGICAL HISTORY      percutaneous injection of extremity pseudoaneurysm      Home Meds:  Medications Prior to Admission   Medication Sig Dispense Refill Last Dose/Taking    metoprolol tartrate (LOPRESSOR) 25 MG tablet Take 0.5 tablets by mouth 2 (Two) Times a Day.   2/9/2025 at  8:37 AM    acetaminophen (TYLENOL) 325 MG tablet Take 2 tablets by mouth Every 4 (Four) Hours As Needed for Mild Pain .       aspirin 81 MG tablet Take 1 tablet by mouth daily.       baclofen (LIORESAL) 10 MG tablet Take 1 tablet by mouth Every Night.       cycloSPORINE (RESTASIS) 0.05 % ophthalmic emulsion 1 drop 2 (Two) Times a Day.       HYDROcodone-acetaminophen (NORCO) 5-325 MG per tablet Take 1 tablet by mouth Every 8 (Eight) Hours As Needed for Moderate Pain . For pain 10 tablet 0     latanoprost (XALATAN) 0.005 % ophthalmic solution Administer 1 drop to both eyes Every Night.       Multiple Vitamins-Minerals (PRESERVISION AREDS 2+MULTI VIT PO) Take 1 tablet by mouth Daily.       Myrbetriq 50 MG tablet sustained-release 24 hour 24 hr tablet TAKE ONE TABLET BY MOUTH DAILY 30 tablet 2     nitroglycerin (NITROSTAT) 0.4 MG SL tablet Place 1 tablet under the tongue. For chest pain.  If pain remains after 5 minutes, call 911 and repeat dose.  Max 3 tabs in 15 minutes.       rosuvastatin (CRESTOR) 40 MG tablet TAKE ONE TABLET BY MOUTH DAILY 90 tablet 1     tamsulosin (FLOMAX) 0.4 MG capsule 24 hr capsule Take 1 capsule by mouth Every Night. 90 capsule 1      Current Meds:     Current Facility-Administered Medications:     [Transfer Hold] acetaminophen (TYLENOL) tablet 650 mg, 650 mg, Oral, Q6H PRN, Rashawn Larios MD    baclofen (LIORESAL) tablet 10 mg, 10 mg, Oral, Nightly, Rashawn Larios MD    sennosides-docusate (PERICOLACE) 8.6-50 MG per tablet 2 tablet, 2 tablet, Oral, BID **AND** polyethylene glycol (MIRALAX) packet 17 g, 17 g, Oral, Daily PRN **AND**  bisacodyl (DULCOLAX) EC tablet 5 mg, 5 mg, Oral, Daily PRN **AND** bisacodyl (DULCOLAX) suppository 10 mg, 10 mg, Rectal, Daily PRN, Rashawn Larios MD    [START ON 2/10/2025] cefTRIAXone (ROCEPHIN) 1,000 mg in sodium chloride 0.9 % 100 mL MBP, 1,000 mg, Intravenous, Q24H, Rashawn Larios MD    cycloSPORINE (RESTASIS) 0.05 % ophthalmic emulsion 1 drop, 1 drop, Both Eyes, BID, Rashawn Larios MD    fentaNYL citrate (PF) (SUBLIMAZE) injection 50 mcg, 50 mcg, Intravenous, Once PRN, Annel Hooker MD    [Transfer Hold] HYDROcodone-acetaminophen (NORCO) 5-325 MG per tablet 1 tablet, 1 tablet, Oral, Q4H PRN, Rashawn Larios MD    HYDROmorphone (DILAUDID) injection 0.5 mg, 0.5 mg, Intravenous, Q2H PRN **AND** naloxone (NARCAN) injection 0.4 mg, 0.4 mg, Intravenous, Q5 Min PRN, Rashawn Larios MD    labetalol (NORMODYNE,TRANDATE) injection 10 mg, 10 mg, Intravenous, Q1H PRN, Rashawn Larios MD    lactated ringers infusion, 9 mL/hr, Intravenous, Continuous, Annel Hooker MD, Last Rate: 9 mL/hr at 02/09/25 0955, 9 mL/hr at 02/09/25 0955    latanoprost (XALATAN) 0.005 % ophthalmic solution 1 drop, 1 drop, Both Eyes, Nightly, Rashawn Larios MD    lidocaine (XYLOCAINE) 1 % injection 0.5 mL, 0.5 mL, Intradermal, Once PRN, Annel Hooker MD    ondansetron ODT (ZOFRAN-ODT) disintegrating tablet 4 mg, 4 mg, Oral, Q6H PRN **OR** ondansetron (ZOFRAN) injection 4 mg, 4 mg, Intravenous, Q6H PRN, Rashawn Larios MD    sodium chloride 0.9 % flush 10 mL, 10 mL, Intravenous, PRN, Rashawn Larios MD    sodium chloride 0.9 % flush 3 mL, 3 mL, Intravenous, Q12H, Annel Hooker MD    sodium chloride 0.9 % flush 3-10 mL, 3-10 mL, Intravenous, PRN, Annel Hooker MD    tamsulosin (FLOMAX) 24 hr capsule 0.4 mg, 0.4 mg, Oral, Nightly, Rashawn Larios MD  Allergies:  Allergies   Allergen Reactions    Iodinated Contrast Media Hives    Ampicillin Diarrhea      Immunizations:  Immunization History   Administered Date(s) Administered    COVID-19 (PFIZER) 12YRS+ (COMIRNATY) 2023, 2024    COVID-19 (PFIZER) BIVALENT 12+YRS 2022    COVID-19 (PFIZER) Purple Cap Monovalent 2021, 2021, 10/01/2021    Covid-19 (Pfizer) Gray Cap Monovalent 2022    Shingrix 2019    Tdap 10/02/2019     Social History:   Social History     Tobacco Use    Smoking status: Every Day     Types: Cigars    Smokeless tobacco: Never    Tobacco comments:     caffeine use   Substance Use Topics    Alcohol use: Yes     Comment: social      Family History:  Family History   Problem Relation Age of Onset    Coronary artery disease Mother     Heart disease Mother     Heart attack Mother 68    Cancer Father         bladder    Heart disease Father         CHF    No Known Problems Other       Review of Systems  negative 12 point system review except:  Objective:  tMax 24 hrs: Temp (24hrs), Av.9 °F (36.6 °C), Min:97.8 °F (36.6 °C), Max:98 °F (36.7 °C)    Vitals Ranges:   Temp:  [97.8 °F (36.6 °C)-98 °F (36.7 °C)] 97.8 °F (36.6 °C)  Heart Rate:  [63-95] 84  Resp:  [16-18] 16  BP: (173-215)/() 173/90  Intake and Output Last 3 Shifts:   I/O last 3 completed shifts:  In: -   Out: 100 [Urine:100]  Exam:  /90 (BP Location: Left arm, Patient Position: Lying)   Pulse 84   Temp 97.8 °F (36.6 °C) (Oral)   Resp 16   SpO2 96%       General Appearance: Alert, cooperative, no distress, appears stated age   Head: Normocephalic, without obvious abnormality, atraumatic   ENT: Normal hearing, external inspection, ears and nose   Eyes: Normal pupils/irises, external inspection, conjunctivae, eyelids   Back: No CVA tenderness   Respiratory: Normal effort, palpation, auscultation   CV: Normal auscultation, carotid pulses, no edema   Abdomen: No hernia, soft, nontender, no masses   :    Musculoskeletal: Normal extremities, nails, digits   Skin: Normal skin color, texture, no  rashes or lesion   Neurologic/psych: Normal orientation, mood, affect           Data Review:  CBC:   Results from last 7 days   Lab Units 02/09/25  0446   WBC 10*3/mm3 14.41*   RBC 10*6/mm3 5.19     BMP:   Results from last 7 days   Lab Units 02/09/25  0446   GLUCOSE mg/dL 158*   CO2 mmol/L 24.6   BUN mg/dL 14   CREATININE mg/dL 0.44*   CALCIUM mg/dL 9.3      Assessment:    Ureterolithiasis    CAD (coronary artery disease), native coronary artery    Essential hypertension    Mixed hyperlipidemia    Congenital myopathy    History of CVA (cerebrovascular accident)    Leukocytosis          Plan:  Emergent cysto and left stent placement       Alvino Daniels MD  2/9/2025

## 2025-02-09 NOTE — H&P
Patient Name:  Amandeep Manriquez Jr.  YOB: 1945  MRN:  [id number]  Admit Date:  2/9/2025  Patient Care Team:  Provider, No Known as PCP - General      Subjective   History Present Illness     Chief Complaint   Patient presents with    Flank Pain       Mr. Manriquez is a 80 y.o. male with CAD, HTN, HLD, history CVA, recurrent kidney stones presenting with LLQ pain.  Pain started about 2 AM this morning, with radiation towards the left flank.  He states that this is consistent with pain from prior kidney stones.  No fevers or chills.  No nausea, no vomiting.  Denies urgency, hesitancy, frequency or dysuria.    Review of Systems   Constitutional:  Negative for chills and fatigue.   Respiratory:  Negative for shortness of breath.    Cardiovascular:  Negative for chest pain and palpitations.   Gastrointestinal:  Positive for abdominal pain. Negative for constipation, diarrhea, nausea and vomiting.   Genitourinary:  Positive for flank pain. Negative for dysuria, frequency, hematuria and urgency.   Neurological:  Negative for weakness and headaches.        Personal History     Past Medical History:   Diagnosis Date    Acute myocardial infarction     Aneurysm of right femoral artery     Arthritis     BPH (benign prostatic hyperplasia)     CAD (coronary artery disease), native coronary artery     Congenital myopathy     DDD (degenerative disc disease), lumbar     with stenosis    Diabetes mellitus     Dizziness     Chronic Dizziness    Encounter for Medicare annual wellness exam     Essential hypertension     Gastrointestinal hemorrhage     History of impacted cerumen     Hyperlipidemia     Hypertension     Mixed hyperlipidemia     Sleep apnea      Past Surgical History:   Procedure Laterality Date    CARDIAC CATHETERIZATION  06/2015    30% Left main, 99% mid to distal LAD, 70-80% left circumflex, 80% proximal to mid RCA.    CATARACT EXTRACTION      CERVICAL FUSION      CHOLECYSTECTOMY      CORONARY  ANGIOPLASTY WITH STENT PLACEMENT      KNEE SURGERY Right     OTHER SURGICAL HISTORY      percutaneous injection of extremity pseudoaneurysm     Family History   Problem Relation Age of Onset    Coronary artery disease Mother     Heart disease Mother     Heart attack Mother 68    Cancer Father         bladder    Heart disease Father         CHF    No Known Problems Other      Social History     Tobacco Use    Smoking status: Every Day     Types: Cigars    Smokeless tobacco: Never    Tobacco comments:     caffeine use   Substance Use Topics    Alcohol use: Yes     Comment: social    Drug use: Never     No current facility-administered medications on file prior to encounter.     Current Outpatient Medications on File Prior to Encounter   Medication Sig Dispense Refill    acetaminophen (TYLENOL) 325 MG tablet Take 2 tablets by mouth Every 4 (Four) Hours As Needed for Mild Pain .      aspirin 81 MG tablet Take 1 tablet by mouth daily.      baclofen (LIORESAL) 10 MG tablet Take 1 tablet by mouth Every Night.      cycloSPORINE (RESTASIS) 0.05 % ophthalmic emulsion 1 drop 2 (Two) Times a Day.      HYDROcodone-acetaminophen (NORCO) 5-325 MG per tablet Take 1 tablet by mouth Every 8 (Eight) Hours As Needed for Moderate Pain . For pain 10 tablet 0    latanoprost (XALATAN) 0.005 % ophthalmic solution Administer 1 drop to both eyes Every Night.      metoprolol tartrate (LOPRESSOR) 25 MG tablet Take 0.5 tablets by mouth 2 (Two) Times a Day.      Multiple Vitamins-Minerals (PRESERVISION AREDS 2+MULTI VIT PO) Take 1 tablet by mouth Daily.      Myrbetriq 50 MG tablet sustained-release 24 hour 24 hr tablet TAKE ONE TABLET BY MOUTH DAILY 30 tablet 2    nitroglycerin (NITROSTAT) 0.4 MG SL tablet Place 1 tablet under the tongue. For chest pain.  If pain remains after 5 minutes, call 911 and repeat dose.  Max 3 tabs in 15 minutes.      rosuvastatin (CRESTOR) 40 MG tablet TAKE ONE TABLET BY MOUTH DAILY 90 tablet 1    tamsulosin (FLOMAX)  0.4 MG capsule 24 hr capsule Take 1 capsule by mouth Every Night. 90 capsule 1     Allergies   Allergen Reactions    Iodinated Contrast Media Hives    Ampicillin Diarrhea       Objective    Objective     Vital Signs  Temp:  [98 °F (36.7 °C)] 98 °F (36.7 °C)  Heart Rate:  [63-95] 95  Resp:  [18] 18  BP: (187-215)/() 215/108  SpO2:  [94 %-96 %] 94 %  on   ;   Device (Oxygen Therapy): room air  There is no height or weight on file to calculate BMI.    Physical Exam  Constitutional:       Appearance: He is ill-appearing.      Comments: Hard of hearing   Cardiovascular:      Rate and Rhythm: Normal rate.   Pulmonary:      Effort: Pulmonary effort is normal. No respiratory distress.      Breath sounds: No stridor.   Abdominal:      Tenderness: There is abdominal tenderness. There is no right CVA tenderness or left CVA tenderness.   Skin:     Coloration: Skin is not pale.   Neurological:      Mental Status: He is alert and oriented to person, place, and time.         Results Review:    I have personally reviewed:  [x]  Laboratory  [x]  Old records  [x]  Radiology   []  EKG/Telemetry   [x]  Microbiology    [x]  Cardiology/Vascular   []  Pathology     Lab Results (last 24 hours)       Procedure Component Value Units Date/Time    CBC & Differential [[id number]]  (Abnormal) Collected: 02/09/25 0446    Specimen: Blood Updated: 02/09/25 0459    Narrative:      The following orders were created for panel order CBC & Differential.  Procedure                               Abnormality         Status                     ---------                               -----------         ------                     CBC Auto Differential[[id number]]        Abnormal            Final result                 Please view results for these tests on the individual orders.    Comprehensive Metabolic Panel [[id number]]  (Abnormal) Collected: 02/09/25 0446    Specimen: Blood Updated: 02/09/25 0514     Glucose 158 mg/dL      BUN 14 mg/dL       Creatinine 0.44 mg/dL      Sodium 141 mmol/L      Potassium 3.8 mmol/L      Chloride 104 mmol/L      CO2 24.6 mmol/L      Calcium 9.3 mg/dL      Total Protein 6.5 g/dL      Albumin 3.8 g/dL      ALT (SGPT) 18 U/L      AST (SGOT) 21 U/L      Alkaline Phosphatase 107 U/L      Total Bilirubin 0.4 mg/dL      Globulin 2.7 gm/dL      A/G Ratio 1.4 g/dL      BUN/Creatinine Ratio 31.8     Anion Gap 12.4 mmol/L      eGFR 105.8 mL/min/1.73     Narrative:      GFR Categories in Chronic Kidney Disease (CKD)      GFR Category          GFR (mL/min/1.73)    Interpretation  G1                     90 or greater         Normal or high (1)  G2                      60-89                Mild decrease (1)  G3a                   45-59                Mild to moderate decrease  G3b                   30-44                Moderate to severe decrease  G4                    15-29                Severe decrease  G5                    14 or less           Kidney failure          (1)In the absence of evidence of kidney disease, neither GFR category G1 or G2 fulfill the criteria for CKD.    eGFR calculation 2021 CKD-EPI creatinine equation, which does not include race as a factor    Lipase [[id number]]  (Normal) Collected: 02/09/25 0446    Specimen: Blood Updated: 02/09/25 0514     Lipase 38 U/L     Lactic Acid, Plasma [[id number]]  (Normal) Collected: 02/09/25 0446    Specimen: Blood Updated: 02/09/25 0515     Lactate 1.5 mmol/L     CBC Auto Differential [[id number]]  (Abnormal) Collected: 02/09/25 0446    Specimen: Blood Updated: 02/09/25 0459     WBC 14.41 10*3/mm3      RBC 5.19 10*6/mm3      Hemoglobin 15.2 g/dL      Hematocrit 49.4 %      MCV 95.2 fL      MCH 29.3 pg      MCHC 30.8 g/dL      RDW 12.7 %      RDW-SD 44.9 fl      MPV 9.6 fL      Platelets 204 10*3/mm3      Neutrophil % 82.2 %      Lymphocyte % 9.8 %      Monocyte % 6.9 %      Eosinophil % 0.4 %      Basophil % 0.3 %      Immature Grans % 0.4 %      Neutrophils, Absolute 11.84  10*3/mm3      Lymphocytes, Absolute 1.41 10*3/mm3      Monocytes, Absolute 0.99 10*3/mm3      Eosinophils, Absolute 0.06 10*3/mm3      Basophils, Absolute 0.05 10*3/mm3      Immature Grans, Absolute 0.06 10*3/mm3      nRBC 0.0 /100 WBC     Urinalysis With Microscopic If Indicated (No Culture) - Urine, Clean Catch [[id number]]  (Abnormal) Collected: 02/09/25 0743    Specimen: Urine, Clean Catch Updated: 02/09/25 0802     Color, UA Yellow     Appearance, UA Clear     pH, UA 6.5     Specific Gravity, UA 1.015     Glucose, UA Negative     Ketones, UA 40 mg/dL (2+)     Bilirubin, UA Negative     Blood, UA Moderate (2+)     Protein, UA 30 mg/dL (1+)     Leuk Esterase, UA Trace     Nitrite, UA Negative     Urobilinogen, UA 0.2 E.U./dL    Urinalysis, Microscopic Only - Urine, Clean Catch [[id number]]  (Abnormal) Collected: 02/09/25 0743    Specimen: Urine, Clean Catch Updated: 02/09/25 0802     RBC, UA Too Numerous to Count /HPF      WBC, UA 3-5 /HPF      Bacteria, UA None Seen /HPF      Squamous Epithelial Cells, UA 0-2 /HPF      Hyaline Casts, UA 0-2 /LPF      Methodology Automated Microscopy    Blood Culture - Blood, Arm, Left [[id number]] Collected: 02/09/25 0808    Specimen: Blood from Arm, Left Updated: 02/09/25 0822            Imaging Results (Last 24 Hours)       Procedure Component Value Units Date/Time    CT Abdomen Pelvis Without Contrast [[id number]] Collected: 02/09/25 0654     Updated: 02/09/25 0708    Narrative:      CT ABDOMEN PELVIS WO CONTRAST-     INDICATIONS: Left flank pain     TECHNIQUE: Radiation dose reduction techniques were utilized, including  automated exposure control and exposure modulation based on body size.  Unenhanced ABDOMEN AND PELVIS CT     COMPARISON: 1/22/2020     FINDINGS:     At the mid left ureter, a 1.0 x 1.2 cm stone is present with moderate  left hydronephrosis. Increased stranding around the left kidney suggests  inflammation/infection. Nonobstructive stones are seen in the  kidneys,  as large as 1.2 cm on the left, 1.3 cm on the right. No right  hydronephrosis. Left renal cyst is again demonstrated.     Otherwise unremarkable appearance of the liver, spleen, adrenal glands,  pancreas, kidneys, bladder.     No bowel obstruction or abnormal bowel thickening is identified. The  appendix does not appear inflamed. Colonic diverticula are seen that do  not appear inflamed.     No free intraperitoneal gas or free fluid.     Scattered small mesenteric and para-aortic lymph nodes are seen that are  not significant by size criteria.     Abdominal aorta is not aneurysmal. Aortic and other arterial  calcifications are present.     The lung bases show granulomatous disease, atelectasis. Reticulonodular  opacity in the right middle lobe suggests inflammatory/infectious  etiology, for example axial image 10, clinical correlation and CT  follow-up advised to exclude any possibility of an underlying lesion.  Largest nodular density in this region measures 6 mm on axial image 12.     Degenerative changes are seen in the spine. No acute fracture is  identified.             Impression:            1. A 1.2 cm stone at the mid left ureter, with moderate left  hydronephrosis, and increased stranding around the left kidney,  suggesting inflammation/infection. Bilateral nonobstructive  nephrolithiasis.     2. Colonic diverticulosis. No acute inflammatory process of bowel is  identified.     3. Right middle lobe pulmonary reticulonodular opacities suggest  inflammatory/infectious etiology, clinical correlation and CT follow-up  recommended.     This report was finalized on 2/9/2025 7:05 AM by Dr. Wilton Richardson M.D on Workstation: [id number]                   ECG 12 Lead Pre-Op / Pre-Procedure   Preliminary Result   HEART RATE=89  bpm   RR Kygfessf=689  ms   LA Losdmrah=250  ms   P Horizontal Axis=-4  deg   P Front Axis=42  deg   QRSD Nzhtcqzr=651  ms   QT Mcpxoonu=592  ms   PSrP=029  ms   QRS Axis=42  deg    T Wave Axis=28  deg   - BORDERLINE ECG -   Sinus rhythm   Probable left atrial enlargement   Date and Time of Study:2025-02-09 08:28:52           Assessment/Plan     Active Hospital Problems    Diagnosis  POA    **Ureterolithiasis [N20.1]  Yes    Leukocytosis [D72.829]  Yes    History of CVA (cerebrovascular accident) [Z86.73]  Not Applicable    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       Mr. Manriquez is a 80 y.o. male with CAD, HTN, HLD, history CVA, recurrent kidney stones presenting with LLQ pain.    Left ureteral stone  -CT with 1.2 cm stone mid left ureter, moderate left hydronephrosis, increase stranding around left kidney  -Urology consulted-plan for OR today  -add on u cx  -Received Rocephin in ED, will continue     Hx congential myopathy  -PT/OT eval post op     Right middle lobe reticulonodular opacities  -Seen on CT, suggestive of inflammatory versus infectious etiology; repeat CT in about 3 months to ensure clearing (around 5/2025)    HTN  CAD  -resume metoprolol, ASA, Crestor post-op     BPH  -continue Flomax     Hx of CVA  -right cerebellar, from small vessel disease  -on ASA and Crestor    I discussed the patient's findings and my recommendations with patient, family, and ED provider.    VTE Prophylaxis - SCDs.  Code Status - Full code.       Rashawn Larios MD  [facility]  02/09/25  08:49 EST

## 2025-02-10 LAB
ANION GAP SERPL CALCULATED.3IONS-SCNC: 9.8 MMOL/L (ref 5–15)
BUN SERPL-MCNC: 8 MG/DL (ref 8–23)
BUN/CREAT SERPL: 17.8 (ref 7–25)
CALCIUM SPEC-SCNC: 9.3 MG/DL (ref 8.6–10.5)
CHLORIDE SERPL-SCNC: 104 MMOL/L (ref 98–107)
CO2 SERPL-SCNC: 24.2 MMOL/L (ref 22–29)
CREAT SERPL-MCNC: 0.45 MG/DL (ref 0.76–1.27)
DEPRECATED RDW RBC AUTO: 41.8 FL (ref 37–54)
EGFRCR SERPLBLD CKD-EPI 2021: 105.1 ML/MIN/1.73
ERYTHROCYTE [DISTWIDTH] IN BLOOD BY AUTOMATED COUNT: 12.4 % (ref 12.3–15.4)
GLUCOSE SERPL-MCNC: 105 MG/DL (ref 65–99)
HCT VFR BLD AUTO: 43.7 % (ref 37.5–51)
HGB BLD-MCNC: 14.5 G/DL (ref 13–17.7)
MCH RBC QN AUTO: 30.7 PG (ref 26.6–33)
MCHC RBC AUTO-ENTMCNC: 33.2 G/DL (ref 31.5–35.7)
MCV RBC AUTO: 92.4 FL (ref 79–97)
PLATELET # BLD AUTO: 192 10*3/MM3 (ref 140–450)
PMV BLD AUTO: 9.7 FL (ref 6–12)
POTASSIUM SERPL-SCNC: 3.9 MMOL/L (ref 3.5–5.2)
RBC # BLD AUTO: 4.73 10*6/MM3 (ref 4.14–5.8)
SODIUM SERPL-SCNC: 138 MMOL/L (ref 136–145)
WBC NRBC COR # BLD AUTO: 10.05 10*3/MM3 (ref 3.4–10.8)

## 2025-02-10 PROCEDURE — G0378 HOSPITAL OBSERVATION PER HR: HCPCS

## 2025-02-10 PROCEDURE — 25010000002 CEFTRIAXONE PER 250 MG: Performed by: UROLOGY

## 2025-02-10 PROCEDURE — 97166 OT EVAL MOD COMPLEX 45 MIN: CPT

## 2025-02-10 PROCEDURE — 85027 COMPLETE CBC AUTOMATED: CPT | Performed by: UROLOGY

## 2025-02-10 PROCEDURE — 80048 BASIC METABOLIC PNL TOTAL CA: CPT | Performed by: UROLOGY

## 2025-02-10 PROCEDURE — 97530 THERAPEUTIC ACTIVITIES: CPT

## 2025-02-10 RX ORDER — HYDRALAZINE HYDROCHLORIDE 25 MG/1
25 TABLET, FILM COATED ORAL EVERY 6 HOURS PRN
Status: DISCONTINUED | OUTPATIENT
Start: 2025-02-10 | End: 2025-02-21 | Stop reason: HOSPADM

## 2025-02-10 RX ORDER — METOPROLOL TARTRATE 25 MG/1
12.5 TABLET, FILM COATED ORAL 2 TIMES DAILY
Status: DISCONTINUED | OUTPATIENT
Start: 2025-02-10 | End: 2025-02-12

## 2025-02-10 RX ADMIN — HYDROCODONE BITARTRATE AND ACETAMINOPHEN 1 TABLET: 5; 325 TABLET ORAL at 00:00

## 2025-02-10 RX ADMIN — BACLOFEN 10 MG: 10 TABLET ORAL at 20:51

## 2025-02-10 RX ADMIN — METOPROLOL TARTRATE 12.5 MG: 25 TABLET, FILM COATED ORAL at 20:51

## 2025-02-10 RX ADMIN — CYCLOSPORINE 1 DROP: 0.5 EMULSION OPHTHALMIC at 09:11

## 2025-02-10 RX ADMIN — SENNOSIDES AND DOCUSATE SODIUM 2 TABLET: 50; 8.6 TABLET ORAL at 20:51

## 2025-02-10 RX ADMIN — TAMSULOSIN HYDROCHLORIDE 0.4 MG: 0.4 CAPSULE ORAL at 20:51

## 2025-02-10 RX ADMIN — CEFTRIAXONE SODIUM 1000 MG: 1 INJECTION, POWDER, FOR SOLUTION INTRAMUSCULAR; INTRAVENOUS at 09:10

## 2025-02-10 RX ADMIN — LATANOPROST 1 DROP: 50 SOLUTION/ DROPS OPHTHALMIC at 20:52

## 2025-02-10 RX ADMIN — PHENAZOPYRIDINE 200 MG: 200 TABLET ORAL at 18:02

## 2025-02-10 RX ADMIN — SENNOSIDES AND DOCUSATE SODIUM 2 TABLET: 50; 8.6 TABLET ORAL at 09:11

## 2025-02-10 RX ADMIN — PHENAZOPYRIDINE 200 MG: 200 TABLET ORAL at 12:59

## 2025-02-10 RX ADMIN — PHENAZOPYRIDINE 200 MG: 200 TABLET ORAL at 09:11

## 2025-02-10 NOTE — THERAPY EVALUATION
Patient Name: Amandeep aMnriquez Jr.  : 1945    MRN: [id number]                              Today's Date: 2/10/2025       Admit Date: 2025    Visit Dx:     ICD-10-CM ICD-9-CM   1. Ureteral calculus  N20.1 592.1   2. Hypertension, unspecified type  I10 401.9   3. Leukocytosis, unspecified type  D72.829 288.60   4. Ureterolithiasis  N20.1 592.1     Patient Active Problem List   Diagnosis    CAD (coronary artery disease), native coronary artery    Essential hypertension    Mixed hyperlipidemia    Congenital myopathy    Arthropathy of right sacroiliac joint    Osteoarthritis of right knee    Degenerative lumbar spinal stenosis    Renal stone    Bilateral impacted cerumen    Benign prostatic hyperplasia without lower urinary tract symptoms    Vertigo    High risk medication use    Vitamin D deficiency    OAB (overactive bladder)    Rosacea    Short-term memory loss    History of CVA (cerebrovascular accident)    COVID-19    Generalized weakness    Hypokalemia    Ureterolithiasis    Leukocytosis     Past Medical History:   Diagnosis Date    Acute myocardial infarction     Aneurysm of right femoral artery     Arthritis     BPH (benign prostatic hyperplasia)     CAD (coronary artery disease), native coronary artery     Congenital myopathy     DDD (degenerative disc disease), lumbar     with stenosis    Diabetes mellitus     Dizziness     Chronic Dizziness    Encounter for Medicare annual wellness exam     Essential hypertension     Gastrointestinal hemorrhage     Hard to intubate     History of impacted cerumen     Hyperlipidemia     Hypertension     Mixed hyperlipidemia     Sleep apnea      Past Surgical History:   Procedure Laterality Date    CARDIAC CATHETERIZATION  2015    30% Left main, 99% mid to distal LAD, 70-80% left circumflex, 80% proximal to mid RCA.    CATARACT EXTRACTION      CERVICAL FUSION      CHOLECYSTECTOMY      CORONARY ANGIOPLASTY WITH STENT PLACEMENT      CYSTOSCOPY W/ URETERAL STENT  PLACEMENT Left 2/9/2025    Procedure: CYSTOSCOPY URETERAL CATHETER/STENT INSERTION;  Surgeon: Alvino Daniels MD;  Location: [facility];  Service: Urology;  Laterality: Left;    KNEE SURGERY Right     OTHER SURGICAL HISTORY      percutaneous injection of extremity pseudoaneurysm      General Information       Row Name 02/10/25 1526          OT Time and Intention    Document Type evaluation  -MM     Mode of Treatment occupational therapy;individual therapy  -MM     Patient Effort fair  -MM       Row Name 02/10/25 1526          General Information    Patient Profile Reviewed yes  -MM     Prior Level of Function --  wife assist with transfers and ADLs at baseline, assist with STS from all surfaces, uses standard walker, noted decline per spouse over few weeks, mostly w/c bound per spouse report  -MM     Existing Precautions/Restrictions fall  -MM     Barriers to Rehab medically complex;previous functional deficit  -MM       Row Name 02/10/25 1526          Living Environment    People in Home spouse  -MM       Row Name 02/10/25 1526          Cognition    Orientation Status (Cognition) oriented x 3  increased time for processing  -MM       Row Name 02/10/25 1526          Safety Issues/Impairments Affecting Functional Mobility    Safety Issues Affecting Function (Mobility) ability to follow commands;safety precaution awareness;problem-solving;positioning of assistive device;sequencing abilities;insight into deficits/self-awareness;safety precautions follow-through/compliance;judgment  -MM     Impairments Affecting Function (Mobility) balance;endurance/activity tolerance;strength;shortness of breath;range of motion (ROM);pain  -MM               User Key  (r) = Recorded By, (t) = Taken By, (c) = Cosigned By      Initials Name Provider Type    MM Sandy Noriega OT Occupational Therapist                     Mobility/ADL's       Row Name 02/10/25 1528          Bed Mobility    Bed Mobility supine-sit  -MM     Supine-Sit  [place] (Bed Mobility) moderate assist (50% patient effort)  -MM     Comment, (Bed Mobility) [facility] end of session  -MM       Row Name 02/10/25 1528          Transfers    Transfers sit-stand transfer  -MM       Row Name 02/10/25 1528          Bed-Chair Transfer    Bed-Chair [place] (Transfers) moderate assist (50% patient effort);maximum assist (25% patient effort)  -MM     Assistive Device (Bed-Chair Transfers) walker, standard  -MM     Comment, (Bed-Chair Transfer) few steps to chair, poor posture onto rwx, high falls risk  -MM       Row Name 02/10/25 1528          Sit-Stand Transfer    Sit-Stand Sw[place] (Transfers) moderate assist (50% patient effort)  -MM     Assistive Device (Sit-Stand Transfers) walker, standard  -MM     Comment, (Sit-Stand Transfer) multiple attempts to stand with max A prior to coming to full upright stand, poor positioning of walker however pt wanting to complete his way, bed greatly elevated per pt request  -       Row Name 02/10/25 1528          Activities of Daily Living    BADL Assessment/Intervention lower body dressing;toileting  -[facility] Name 02/10/25 1528          Lower Body Dressing Assessment/Training    Comment, (Lower Body Dressing) don shoes total A. spouse assist with task at home  -       Row Name 02/10/25 1528          Toileting Assessment/Training    Comment, (Toileting) khan in place  -               User Key  (r) = Recorded By, (t) = Taken By, (c) = Cosigned By      Initials Name Provider Type     Sandy Noriega OT Occupational Therapist                   Obj/Interventions       Row Name 02/10/25 1529          Sensory Assessment (Somatosensory)    Sensory Assessment (Somatosensory) UE sensation intact  -       Row Name 02/10/25 1529          Vision Assessment/Intervention    Visual Impairment/Limitations WFL  -       Row Name 02/10/25 1529          Range of Motion Comprehensive    Comment, General Range of Motion L shoulder limitations,  previous deficit  -MM       Row Name 02/10/25 1529          Strength Comprehensive (MMT)    General Manual Muscle Testing (MMT) Assessment upper extremity strength deficits identified  -MM     Comment, General Manual Muscle Testing (MMT) Assessment generalized weakness, L shoulder 2-/5, RUE 3-/5  -MM       Row Name 02/10/25 1529          Motor Skills    Motor Skills functional endurance  -MM     Functional Endurance poor  -MM       Row Name 02/10/25 1529          Balance    Balance Assessment sitting static balance;sitting dynamic balance;standing static balance;standing dynamic balance;sit to stand dynamic balance  -MM     Static Sitting Balance contact guard  -MM     Dynamic Sitting Balance minimal assist;contact guard  -MM     Position, Sitting Balance sitting edge of bed;supported  -MM     Sit to Stand Dynamic Balance moderate assist  -MM     Static Standing Balance minimal assist  -MM     Dynamic Standing Balance moderate assist  -MM     Position/Device Used, Standing Balance supported;walker, standard  -MM     Balance Interventions sitting;standing;sit to stand;supported;static;dynamic;moderate challenge;weight shifting activity;occupation based/functional task  -MM     Comment, Balance unsteady, high falls risk  -MM               User Key  (r) = Recorded By, (t) = Taken By, (c) = Cosigned By      Initials Name Provider Type    MM Sandy Noriega OT Occupational Therapist                   Goals/Plan       Row Name 02/10/25 153          Transfer Goal 1 (OT)    Activity/Assistive Device (Transfer Goal 1, OT) sit-to-stand/stand-to-sit;bed-to-chair/chair-to-bed;toilet;commode, bedside without drop arms  -MM     [place] Level/Cues Needed (Transfer Goal 1, OT) minimum assist (75% or more patient effort)  -MM     Time Frame (Transfer Goal 1, OT) short term goal (STG);2 weeks  -MM     Progress/Outcome (Transfer Goal 1, OT) goal ongoing  -MM       [facility] Name 02/10/25 1536          Dressing Goal 1 (OT)     Activity/Device (Dressing Goal 1, OT) lower body dressing  -MM     [place]/Cues Needed (Dressing Goal 1, OT) minimum assist (75% or more patient effort)  -MM     Time Frame (Dressing Goal 1, OT) short term goal (STG);2 weeks  -MM     Progress/Outcome (Dressing Goal 1, OT) goal ongoing  -MM       Row Name 02/10/25 1535          Toileting Goal 1 (OT)    Activity/Device (Toileting Goal 1, OT) toileting skills, all  -MM     [place] Level/Cues Needed (Toileting Goal 1, OT) minimum assist (75% or more patient effort)  -MM     Time Frame (Toileting Goal 1, OT) short term goal (STG);2 weeks  -MM     Progress/Outcome (Toileting Goal 1, OT) goal ongoing  -MM       Row Name 02/10/25 1535          Grooming Goal 1 (OT)    Activity/Device (Grooming Goal 1, OT) grooming skills, all  -MM     [place] (Grooming Goal 1, OT) minimum assist (75% or more patient effort)  -MM     Time Frame (Grooming Goal 1, OT) short term goal (STG);2 weeks  -MM     Progress/Outcome (Grooming Goal 1, OT) goal ongoing  -MM       Row Name 02/10/25 1534          Strength Goal 1 (OT)    Strength Goal 1 (OT) improve BUE strength to 3+/5 to increase (I) with functional transfers required for ADLs  -MM     Time Frame (Strength Goal 1, OT) short term goal (STG);2 weeks  -MM     Progress/Outcome (Strength Goal 1, OT) goal ongoing  -MM       Row Name 02/10/25 3234          Therapy Assessment/Plan (OT)    Planned Therapy Interventions (OT) activity tolerance training;cognitive/visual perception retraining;BADL retraining;neuromuscular control/coordination retraining;patient/caregiver education/training;transfer/mobility retraining;strengthening exercise;ROM/therapeutic exercise;occupation/activity based interventions;functional balance retraining  -MM               User Key  (r) = Recorded By, (t) = Taken By, (c) = Cosigned By      Initials Name Provider Type    Sandy Antony OT Occupational Therapist                   Clinical Impression        Row Name 02/10/25 1531          Pain Assessment    Pre/Posttreatment Pain Comment no c/o pain this date  -MM       Row Name 02/10/25 1531          Plan of Care Review    Plan of Care Reviewed With patient;spouse  -MM     Progress no change  -MM     Outcome Evaluation Pt is an 80 y male admitted frmo home due to hematuria ureterolithiasis. Pt is s/p cystoscopy with stent placement, khan in place, he has hx of congenital myopathy. He is seen this date for OT, noted poor vision due to MD. He lives at home with his spouse who assists with all ADLs and functional transfers at this time, uses standard walker for very short distances and mostly w/c bound per spouse report. Overall noted decline recently, increased assist required by spouse. He presents with impaired activity tolerance, balance, decreased strength, limited ROM, impaired overall safety and (I) with ADLs. He requires increased time for all activity, total A to don shoes, spouse completes this task at home. Pt requires bed to be elevated and multiple attempts at STS prior to coming to full stand with mod A, able to demo very short distance to chair, increased fatigue noted due to heavy reliance on rwx and poor forward flexed posture. He requires max cues for sequenicng, poor carryover at times. Educated spouse on rehab at d/c, spouse reports her goal is to bring pt home with HH referrals. Would rec SNF at this time, pt requires increased assist compared to his reported baseline.  -MM       Row Name 02/10/25 1531          Therapy Assessment/Plan (OT)    Rehab Potential (OT) fair  -MM     Criteria for Skilled Therapeutic Interventions Met (OT) meets criteria;skilled treatment is necessary  -MM     Therapy Frequency (OT) 5 times/wk  -MM       Row Name 02/10/25 1531          Therapy Plan Review/Discharge Plan (OT)    Anticipated Discharge Disposition (OT) skilled nursing facility  -MM       Row Name 02/10/25 1531          Vital Signs    O2 Delivery Pre Treatment  room air  -       Row Name 02/10/25 1531          Positioning and Restraints    Pre-Treatment Position in bed  -MM     Post Treatment Position chair  -MM     In Chair notified nsg;reclined;encouraged to call for assist;exit alarm on;call light within reach;with family/caregiver  -MM               User Key  (r) = Recorded By, (t) = Taken By, (c) = Cosigned By      Initials Name Provider Type    Sandy Antony OT Occupational Therapist                   Outcome Measures       Row Name 02/10/25 1536          How much help from another is currently needed...    Putting on and taking off regular lower body clothing? 1  -MM     Bathing (including washing, rinsing, and drying) 1  -MM     Toileting (which includes using toilet bed pan or urinal) 1  -MM     Putting on and taking off regular upper body clothing 2  -MM     Taking care of personal grooming (such as brushing teeth) 2  -MM     Eating meals 3  -MM     AM-PAC 6 Clicks Score (OT) 10  -MM       Row Name 02/10/25 1446          How much help from another person do you currently need...    Turning from your back to your side while in flat bed without using bedrails? 3  -SV     Moving from lying on back to sitting on the side of a flat bed without bedrails? 2  -SV     Moving to and from a bed to a chair (including a wheelchair)? 2  -SV     Standing up from a chair using your arms (e.g., wheelchair, bedside chair)? 2  -SV     Climbing 3-5 steps with a railing? 1  -SV     To walk in hospital room? 2  -SV     AM-PAC 6 Clicks Score (PT) 12  -SV       Row Name 02/10/25 1536          Modified Wallace Scale    Modified Wallace Scale 4 - Moderately severe disability.  Unable to walk without assistance, and unable to attend to own bodily needs without assistance.  -MM       Row Name 02/10/25 1536          Functional Assessment    Outcome Measure Options AM-PAC 6 Clicks Daily Activity (OT);Modified [place]  -MM               User Key  (r) = Recorded By, (t) = Taken By, (c) =  Cosigned By      Initials Name Provider Type    SV Tram Lloyd, PT Physical Therapist    Sandy Antony OT Occupational Therapist                    Occupational Therapy Education       Title: PT OT SLP Therapies (In Progress)       Topic: Occupational Therapy (In Progress)       Point: ADL training (Done)       Description:   Instruct learner(s) on proper safety adaptation and remediation techniques during self care or transfers.   Instruct in proper use of assistive devices.                  Learning Progress Summary            Patient Acceptance, E, VU,NR by MM at 2/10/2025 1537    Comment: role of OT, d/c rec, transfer training   Family Acceptance, E, VU,NR by MM at 2/10/2025 1537    Comment: role of OT, d/c rec, transfer training                      Point: Home exercise program (Not Started)       Description:   Instruct learner(s) on appropriate technique for monitoring, assisting and/or progressing therapeutic exercises/activities.                  Learner Progress:  Not documented in this visit.              Point: Precautions (Done)       Description:   Instruct learner(s) on prescribed precautions during self-care and functional transfers.                  Learning Progress Summary            Patient Acceptance, E, VU,NR by MM at 2/10/2025 1537    Comment: role of OT, d/c rec, transfer training   Family Acceptance, E, VU,NR by MM at 2/10/2025 1537    Comment: role of OT, d/c rec, transfer training                      Point: Body mechanics (Done)       Description:   Instruct learner(s) on proper positioning and spine alignment during self-care, functional mobility activities and/or exercises.                  Learning Progress Summary            Patient Acceptance, E, VU,NR by MM at 2/10/2025 1537    Comment: role of OT, d/c rec, transfer training   Family Acceptance, E, VU,NR by MM at 2/10/2025 1537    Comment: role of OT, d/c rec, transfer training                                      User Key        Initials Effective Dates Name Provider Type Discipline     05/31/24 -  Sandy Noriega OT Occupational Therapist OT                  OT Recommendation and Plan  Planned Therapy Interventions (OT): activity tolerance training, cognitive/visual perception retraining, BADL retraining, neuromuscular control/coordination retraining, patient/caregiver education/training, transfer/mobility retraining, strengthening exercise, ROM/therapeutic exercise, occupation/activity based interventions, functional balance retraining  Therapy Frequency (OT): 5 times/wk  Plan of Care Review  Plan of Care Reviewed With: patient, spouse  Progress: no change  Outcome Evaluation: Pt is an 80 y male admitted frmo home due to hematuria ureterolithiasis. Pt is s/p cystoscopy with stent placement, khan in place, he has hx of congenital myopathy. He is seen this date for OT, noted poor vision due to MD. He lives at home with his spouse who assists with all ADLs and functional transfers at this time, uses standard walker for very short distances and mostly w/c bound per spouse report. Overall noted decline recently, increased assist required by spouse. He presents with impaired activity tolerance, balance, decreased strength, limited ROM, impaired overall safety and (I) with ADLs. He requires increased time for all activity, total A to don shoes, spouse completes this task at home. Pt requires bed to be elevated and multiple attempts at STS prior to coming to full stand with mod A, able to demo very short distance to chair, increased fatigue noted due to heavy reliance on rwx and poor forward flexed posture. He requires max cues for sequenicng, poor carryover at times. Educated spouse on rehab at d/c, spouse reports her goal is to bring pt home with HH referrals. Would rec SNF at this time, pt requires increased assist compared to his reported baseline.     Time Calculation:   Evaluation Complexity (OT)  Review Occupational  Profile/Medical/Therapy History Complexity: expanded/moderate complexity  Assessment, Occupational Performance/Identification of Deficit Complexity: 5 or more performance deficits  Clinical Decision Making Complexity (OT): detailed assessment/moderate complexity  Overall Complexity of Evaluation (OT): moderate complexity     Time Calculation- OT       Row Name 02/10/25 1539             Time Calculation- OT    OT Start Time 0937  -MM      OT Stop Time 1003  -MM      OT Time Calculation (min) 26 min  -MM      Total Timed Code Minutes- OT 18 minute(s)  -MM      OT Received On 02/10/25  -MM      OT - Next Appointment 02/11/25  -MM         Timed Charges    57014 - OT Therapeutic Activity Minutes 10  -MM      75810 - OT Self Care/Mgmt Minutes 8  -MM         Untimed Charges    OT Eval/Re-eval Minutes 8  -MM         Total Minutes    Timed Charges Total Minutes 18  -MM      Untimed Charges Total Minutes 8  -MM       Total Minutes 26  -MM                User Key  (r) = Recorded By, (t) = Taken By, (c) = Cosigned By      Initials Name Provider Type    MM Sandy Noriega OT Occupational Therapist                  Therapy Charges for Today       Code Description Service Date Service Provider Modifiers Qty    69288164119 HC OT THERAPEUTIC ACT EA 15 MIN 2/10/2025 Sandy Noriega OT GO 1    15306895928 HC OT EVAL MOD COMPLEXITY 3 2/10/2025 Sandy Noriega OT GO 1                 Sandy Noriega OT  2/10/2025

## 2025-02-10 NOTE — NURSING NOTE
Hematuria still present. Pt is disoriented to time. Pt c/o uretal pain and was treated with norco, intervention effective. Pt showed no signs of excessive blood loss. Hypertension during shift, no CP, no SOB. Family still at bedside.

## 2025-02-10 NOTE — PLAN OF CARE
Goal Outcome Evaluation:  Plan of Care Reviewed With: patient, spouse        Progress: no change  Outcome Evaluation: Pt is an 80 y male admitted frmo home due to hematuria ureterolithiasis. Pt is s/p cystoscopy with stent placement, khan in place, he has hx of congenital myopathy. He is seen this date for OT, noted poor vision due to MD. He lives at home with his spouse who assists with all ADLs and functional transfers at this time, uses standard walker for very short distances and mostly w/c bound per spouse report. Overall noted decline recently, increased assist required by spouse. He presents with impaired activity tolerance, balance, decreased strength, limited ROM, impaired overall safety and (I) with ADLs. He requires increased time for all activity, total A to don shoes, spouse completes this task at home. Pt requires bed to be elevated and multiple attempts at STS prior to coming to full stand with mod A, able to demo very short distance to chair, increased fatigue noted due to heavy reliance on rwx and poor forward flexed posture. He requires max cues for sequenicng, poor carryover at times. Educated spouse on rehab at d/c, spouse reports her goal is to bring pt home with HH referrals. Would rec SNF at this time, pt requires increased assist compared to his reported baseline.    Anticipated Discharge Disposition (OT): skilled nursing facility

## 2025-02-10 NOTE — PROGRESS NOTES
Name: Amandeep Manriquez Jr. ADMIT: 2025   : 1945  PCP: Provider, No Known    MRN: [id number] LOS: 0 days   AGE/SEX: 80 y.o. male  ROOM: [facility]   Subjective   Chief Complaint   Patient presents with    Flank Pain     Mr. Manriquez is a 80 y.o. male with CAD, HTN, HLD, history CVA, recurrent kidney stones presenting with LLQ pain. He was found to have obstructing kidney stone and UTI       Cysto and stent with Dr. Daniels on 25    Pain is fair  Gen weakness- working with therapy  S/p IVFs  On abx    ROS  No f/c  No n/v  No cp/palp  No soa/cough    Objective   Vital Signs  Temp:  [97.3 °F (36.3 °C)-98.2 °F (36.8 °C)] 97.7 °F (36.5 °C)  Heart Rate:  [78-99] 91  Resp:  [16-18] 18  BP: (147-165)/(80-94) 164/94  SpO2:  [94 %-96 %] 96 %  on   ;   Device (Oxygen Therapy): room air  Body mass index is 30.04 kg/m².    Physical Exam  HENT:      Head: Normocephalic and atraumatic.   Eyes:      General: No scleral icterus.  Cardiovascular:      Rate and Rhythm: Normal rate and regular rhythm.      Heart sounds: Normal heart sounds.   Pulmonary:      Effort: Pulmonary effort is normal. No respiratory distress.      Breath sounds: Normal breath sounds.   Abdominal:      General: There is no distension.      Palpations: Abdomen is soft.      Tenderness: There is no abdominal tenderness.   Musculoskeletal:      Cervical back: Neck supple.   Neurological:      Mental Status: He is alert.   Psychiatric:         Behavior: Behavior normal.     Elderly, chronically ill     Results Review:       I reviewed the patient's new clinical results.  Results from last 7 days   Lab Units 02/10/25  0650 25  0446   WBC 10*3/mm3 10.05 14.41*   HEMOGLOBIN g/dL 14.5 15.2   PLATELETS 10*3/mm3 192 204     Results from last 7 days   Lab Units 02/10/25  0650 25  0446   SODIUM mmol/L 138 141   POTASSIUM mmol/L 3.9 3.8   CHLORIDE mmol/L 104 104   CO2 mmol/L 24.2 24.6   BUN mg/dL 8 14   CREATININE mg/dL 0.45* 0.44*   GLUCOSE  "mg/dL 105* 158*   Estimated Creatinine Clearance: 120.5 mL/min (A) (by C-G formula based on SCr of 0.45 mg/dL (L)).  Results from last 7 days   Lab Units 02/09/25  0446   ALBUMIN g/dL 3.8   BILIRUBIN mg/dL 0.4   ALK PHOS U/L 107   AST (SGOT) U/L 21   ALT (SGPT) U/L 18     Results from last 7 days   Lab Units 02/10/25  0650 02/09/25  0446   CALCIUM mg/dL 9.3 9.3   ALBUMIN g/dL  --  3.8     Results from last 7 days   Lab Units 02/09/25  0446   PROCALCITONIN ng/mL 0.03   LACTATE mmol/L 1.5       Coag     HbA1C   Lab Results   Component Value Date    HGBA1C 5.4 06/09/2015     Infection   Results from last 7 days   Lab Units 02/09/25  1440 02/09/25  0808 02/09/25  0446   BLOODCX  No growth at 24 hours No growth at 24 hours  --    PROCALCITONIN ng/mL  --   --  0.03     Radiology(recent) CT Abdomen Pelvis Without Contrast    Result Date: 2/9/2025    1. A 1.2 cm stone at the mid left ureter, with moderate left hydronephrosis, and increased stranding around the left kidney, suggesting inflammation/infection. Bilateral nonobstructive nephrolithiasis.  2. Colonic diverticulosis. No acute inflammatory process of bowel is identified.  3. Right middle lobe pulmonary reticulonodular opacities suggest inflammatory/infectious etiology, clinical correlation and CT follow-up recommended.  This report was finalized on 2/9/2025 7:05 AM by Dr. Wilton Richardson M.D on Workstation: [id number]     No results found for: \"TROPONINT\", \"TROPONINI\", \"BNP\"  No components found for: \"TSH;2\"    baclofen, 10 mg, Oral, Nightly  cefTRIAXone, 1,000 mg, Intravenous, Q24H  cycloSPORINE, 1 drop, Both Eyes, BID  latanoprost, 1 drop, Both Eyes, Nightly  metoprolol tartrate, 12.5 mg, Oral, BID  phenazopyridine, 200 mg, Oral, TID With Meals  senna-docusate sodium, 2 tablet, Oral, BID  tamsulosin, 0.4 mg, Oral, Nightly       Diet: Regular/House; Texture: Soft to Chew (NDD 3); Soft to Chew: Chopped Meat; Fluid Consistency: Thin (IDDSI 0)      Assessment & Plan "      Active Hospital Problems    Diagnosis  POA    **Ureterolithiasis [N20.1]  Yes    Leukocytosis [D72.829]  Yes    History of CVA (cerebrovascular accident) [Z86.73]  Not Applicable    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       Mr. Manriquez is a 80 y.o. male with CAD, HTN, HLD, history CVA, recurrent kidney stones presenting with LLQ pain. He was found to have obstructing kidney stone and possible UTI    Cysto and stent with Dr. Daniels on 2/9/25    On ABX, follow cultures    S/p IVFs    BP is elevated will restart metoprolol and have prn po hydralazine available       DW staff  DW family    Dispo- would likely benefit from acute rehab to get stronger      Antonio Gross MD  [facility]  02/10/25  16:09 EST

## 2025-02-10 NOTE — PLAN OF CARE
Goal Outcome Evaluation:  Plan of Care Reviewed With: patient, spouse      Pt admit from home due to hematuria ureterolithiasis. Pt is s/p cystoscopy with stent placement. Currently catheter in place. Pt has hx of congenital myopathy. Other: macular degeneration with poor vision, [place]( hearing aids not here today), right occipital CVA, CAD, HTN, SI arthropathy, colette knee pain with sx on right, narrowing lumbar spine. Pt lives with wife and is a retired . Pt wife described a decline in the last month from ability to amb with std up to 10' to  asst with bed to chair transfers, recliner with lift prn. Pt is alert and uic from OT session earlier today. He has been up for several hours. STS from recliner very difficult with multiple attempts and rocking. STS to his std wx with mod/max asst of 2. Flexed posture and asst to move COG  over his feet. He was able to move forward with several small steps but fatigued and returned to sitting: followed with chair. Pt STS 2nd trial with mod /max of 2 and unable to move closer to bed. Chair under pt at all times due to BLE fatigue. 3rd trial bed brought closer. Max asst of 2 with lob toward eob. Pt is at marked risk for falls at this time. BLE with impaired rom several areas with general weakness. COLETTE ankle DF < neutral colette. Pt will likely benefit from cont skilled PT to address deficits: impaired rom and strength BLE as able, impaired mobility in all areas, impaired balance with standing, transfers and amb. Recommend acute rehab  for pt at this time. He is highly motivated and is at risk for decline in all mobility . Plan is for eventual home with wife to asst prn.           Anticipated Discharge Disposition (PT): inpatient rehabilitation facility

## 2025-02-10 NOTE — PLAN OF CARE
Goal Outcome Evaluation:              Outcome Evaluation: This nurse took over patient at 1500 today. VSS. Patient is disoriented to time this shift. Working with PT/OT. Regular soft to chew diet tolerated well this shift. Pills given whole with water. Plan of care ongoing.

## 2025-02-11 PROCEDURE — 25810000003 SODIUM CHLORIDE 0.9 % SOLUTION: Performed by: INTERNAL MEDICINE

## 2025-02-11 PROCEDURE — 94761 N-INVAS EAR/PLS OXIMETRY MLT: CPT

## 2025-02-11 PROCEDURE — 94664 DEMO&/EVAL PT USE INHALER: CPT

## 2025-02-11 PROCEDURE — 94799 UNLISTED PULMONARY SVC/PX: CPT

## 2025-02-11 PROCEDURE — 0202U NFCT DS 22 TRGT SARS-COV-2: CPT | Performed by: INTERNAL MEDICINE

## 2025-02-11 PROCEDURE — G0378 HOSPITAL OBSERVATION PER HR: HCPCS

## 2025-02-11 PROCEDURE — 94760 N-INVAS EAR/PLS OXIMETRY 1: CPT

## 2025-02-11 PROCEDURE — 25010000002 CEFTRIAXONE PER 250 MG: Performed by: UROLOGY

## 2025-02-11 RX ORDER — BENZONATATE 100 MG/1
100 CAPSULE ORAL 3 TIMES DAILY PRN
Status: DISCONTINUED | OUTPATIENT
Start: 2025-02-11 | End: 2025-02-21 | Stop reason: HOSPADM

## 2025-02-11 RX ORDER — DEXTROMETHORPHAN POLISTIREX 30 MG/5ML
60 SUSPENSION ORAL EVERY 12 HOURS SCHEDULED
Status: DISCONTINUED | OUTPATIENT
Start: 2025-02-11 | End: 2025-02-21 | Stop reason: HOSPADM

## 2025-02-11 RX ADMIN — CYCLOSPORINE 1 DROP: 0.5 EMULSION OPHTHALMIC at 08:41

## 2025-02-11 RX ADMIN — BENZONATATE 100 MG: 100 CAPSULE ORAL at 15:50

## 2025-02-11 RX ADMIN — PHENAZOPYRIDINE 200 MG: 200 TABLET ORAL at 12:03

## 2025-02-11 RX ADMIN — CYCLOSPORINE 1 DROP: 0.5 EMULSION OPHTHALMIC at 20:22

## 2025-02-11 RX ADMIN — METOPROLOL TARTRATE 12.5 MG: 25 TABLET, FILM COATED ORAL at 20:22

## 2025-02-11 RX ADMIN — BACLOFEN 10 MG: 10 TABLET ORAL at 20:22

## 2025-02-11 RX ADMIN — PHENAZOPYRIDINE 200 MG: 200 TABLET ORAL at 17:28

## 2025-02-11 RX ADMIN — TAMSULOSIN HYDROCHLORIDE 0.4 MG: 0.4 CAPSULE ORAL at 20:22

## 2025-02-11 RX ADMIN — SENNOSIDES AND DOCUSATE SODIUM 2 TABLET: 50; 8.6 TABLET ORAL at 08:39

## 2025-02-11 RX ADMIN — CEFTRIAXONE SODIUM 1000 MG: 1 INJECTION, POWDER, FOR SOLUTION INTRAMUSCULAR; INTRAVENOUS at 07:58

## 2025-02-11 RX ADMIN — PHENAZOPYRIDINE 200 MG: 200 TABLET ORAL at 08:39

## 2025-02-11 RX ADMIN — SODIUM CHLORIDE 250 ML: 9 INJECTION, SOLUTION INTRAVENOUS at 15:49

## 2025-02-11 RX ADMIN — LATANOPROST 1 DROP: 50 SOLUTION/ DROPS OPHTHALMIC at 20:30

## 2025-02-11 RX ADMIN — DEXTROMETHORPHAN 60 MG: 30 SUSPENSION, EXTENDED RELEASE ORAL at 20:21

## 2025-02-11 RX ADMIN — SENNOSIDES AND DOCUSATE SODIUM 2 TABLET: 50; 8.6 TABLET ORAL at 20:22

## 2025-02-11 RX ADMIN — METOPROLOL TARTRATE 12.5 MG: 25 TABLET, FILM COATED ORAL at 08:39

## 2025-02-11 NOTE — DISCHARGE PLACEMENT REQUEST
"Amandeep Manriquez Jr. (80 y.o. Male)       Date of Birth   1945    Social Security Number       Address   [address]   [place] [address]    Home Phone   [phone]    MRN   [id number]       Church   None    Marital Status                               Admission Date   2/9/25    Admission Type   Emergency    Admitting Provider   Rashawn Larios MD    Attending Provider   Antonio Gross MD    Department, Room/Bed   [address], P389/1       Discharge Date       Discharge Disposition       Discharge Destination                                 Attending Provider: Antonio Gross MD    Allergies: Iodinated Contrast Media, Ampicillin    Isolation: None   Infection: None   Code Status: CPR    Ht: 162.6 cm (64\")   Wt: 79.4 kg (175 lb)    Admission Cmt: None   Principal Problem: Ureterolithiasis [N20.1]                   Active Insurance as of 2/9/2025       Primary Coverage       Payor Plan Insurance Group Employer/Plan Group    HUMANA MEDICARE REPLACEMENT HUMANA MED ADV GROUP [id number]       Payor Plan Address Payor Plan Phone Number Payor Plan Fax Number Effective Dates    [address] [address] [phone]  1/1/2018 - None Entered    [facility] [id number]         Subscriber Name Subscriber Birth Date Member ID       AMANDEEP MANRIQUEZ JR. 1945 [id number]                     Emergency Contacts        (Rel.) Home Phone Work Phone Mobile Phone    RomeliaJulissa may (Spouse) [phone] -- [phone]                "

## 2025-02-11 NOTE — PROGRESS NOTES
Name: Amandeep Manriquez Jr. ADMIT: 2025   : 1945  PCP: Provider, No Known    MRN: [id number] LOS: 0 days   AGE/SEX: 80 y.o. male  ROOM: [facility]   Subjective   Chief Complaint   Patient presents with    Flank Pain     Mr. Manriquez is a 80 y.o. male with CAD, HTN, HLD, history CVA, recurrent kidney stones presenting with LLQ pain. He was found to have obstructing kidney stone and UTI       Cysto and stent with Dr. Daniels on 25    Pain is fair  Gen weakness- working with therapy  On abx  Some cough  No fever        Objective   Vital Signs  Temp:  [97.5 °F (36.4 °C)-98.2 °F (36.8 °C)] 98.1 °F (36.7 °C)  Heart Rate:  [] 81  Resp:  [18] 18  BP: (143-166)/(77-97) 143/88  SpO2:  [91 %-94 %] 92 %  on   ;   Device (Oxygen Therapy): room air  Body mass index is 30.04 kg/m².    Physical Exam  HENT:      Head: Normocephalic and atraumatic.   Eyes:      General: No scleral icterus.  Cardiovascular:      Rate and Rhythm: Normal rate and regular rhythm.      Heart sounds: Normal heart sounds.   Pulmonary:      Effort: Pulmonary effort is normal. No respiratory distress.      Breath sounds: Normal breath sounds.   Abdominal:      General: There is no distension.      Palpations: Abdomen is soft.      Tenderness: There is no abdominal tenderness.   Musculoskeletal:      Cervical back: Neck supple.   Neurological:      Mental Status: He is alert.   Psychiatric:         Behavior: Behavior normal.     Elderly, chronically ill   +khan    Results Review:       I reviewed the patient's new clinical results.  Results from last 7 days   Lab Units 02/10/25  0650 25  0446   WBC 10*3/mm3 10.05 14.41*   HEMOGLOBIN g/dL 14.5 15.2   PLATELETS 10*3/mm3 192 204     Results from last 7 days   Lab Units 02/10/25  0650 25  0446   SODIUM mmol/L 138 141   POTASSIUM mmol/L 3.9 3.8   CHLORIDE mmol/L 104 104   CO2 mmol/L 24.2 24.6   BUN mg/dL 8 14   CREATININE mg/dL 0.45* 0.44*   GLUCOSE mg/dL 105* 158*   Estimated  "Creatinine Clearance: 120.5 mL/min (A) (by C-G formula based on SCr of 0.45 mg/dL (L)).  Results from last 7 days   Lab Units 02/09/25  0446   ALBUMIN g/dL 3.8   BILIRUBIN mg/dL 0.4   ALK PHOS U/L 107   AST (SGOT) U/L 21   ALT (SGPT) U/L 18     Results from last 7 days   Lab Units 02/10/25  0650 02/09/25  0446   CALCIUM mg/dL 9.3 9.3   ALBUMIN g/dL  --  3.8     Results from last 7 days   Lab Units 02/09/25  0446   PROCALCITONIN ng/mL 0.03   LACTATE mmol/L 1.5       Coag     HbA1C   Lab Results   Component Value Date    HGBA1C 5.4 06/09/2015     Infection   Results from last 7 days   Lab Units 02/09/25  1440 02/09/25  0808 02/09/25  0446   BLOODCX  No growth at 24 hours No growth at 2 days  --    PROCALCITONIN ng/mL  --   --  0.03     Radiology(recent) No radiology results for the last day  No results found for: \"TROPONINT\", \"TROPONINI\", \"BNP\"  No components found for: \"TSH;2\"    baclofen, 10 mg, Oral, Nightly  cefTRIAXone, 1,000 mg, Intravenous, Q24H  cycloSPORINE, 1 drop, Both Eyes, BID  dextromethorphan polistirex ER, 60 mg, Oral, Q12H  latanoprost, 1 drop, Both Eyes, Nightly  metoprolol tartrate, 12.5 mg, Oral, BID  phenazopyridine, 200 mg, Oral, TID With Meals  senna-docusate sodium, 2 tablet, Oral, BID  tamsulosin, 0.4 mg, Oral, Nightly       Diet: Regular/House; Texture: Soft to Chew (NDD 3); Soft to Chew: Chopped Meat; Fluid Consistency: Thin (IDDSI 0)      Assessment & Plan      Active Hospital Problems    Diagnosis  POA    **Ureterolithiasis [N20.1]  Yes    Leukocytosis [D72.829]  Yes    History of CVA (cerebrovascular accident) [Z86.73]  Not Applicable    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       Mr. Manriquez is a 80 y.o. male with CAD, HTN, HLD, history CVA, recurrent kidney stones presenting with " LLQ pain. He was found to have obstructing kidney stone and possible UTI    Cysto and stent with Dr. Daniels on 2/9/25    On ABX, follow cultures    S/p IVFs. Will give a little more today x 250cc    +cough. Check viral panel and add cough agents, IS, flutter    BP is elevated will restart metoprolol and have prn po hydralazine available       DW staff  DW family    Dispo- would likely benefit from rehab to get stronger. Maybe dc 2/12      Antonio Gross MD  [facility]  02/11/25  16:09 EST

## 2025-02-11 NOTE — PLAN OF CARE
Goal Outcome Evaluation:   250mL NS bolus ordered to be administered over 3 hours; currently infusing. Requiring 2L NC when resting. Respiratory panel collected. PRN tessalon given x1. Receiving rocephin q24h.  Did not require PRN pain medication this shift. Davis catheter output orange. VSS.

## 2025-02-11 NOTE — DISCHARGE PLACEMENT REQUEST
"Amandeep Manriquez Jr. (80 y.o. Male)       Date of Birth   1945    Social Security Number       Address   [address]   [place] [address]    Home Phone   [phone]    MRN   [id number]       Yazidi   None    Marital Status                               Admission Date   2/9/25    Admission Type   Emergency    Admitting Provider   Rashawn Larios MD    Attending Provider   Antonio Gross MD    Department, Room/Bed   [address], P389/1       Discharge Date       Discharge Disposition       Discharge Destination                                 Attending Provider: Antonio Gross MD    Allergies: Iodinated Contrast Media, Ampicillin    Isolation: None   Infection: None   Code Status: CPR    Ht: 162.6 cm (64\")   Wt: 79.4 kg (175 lb)    Admission Cmt: None   Principal Problem: Ureterolithiasis [N20.1]                   Active Insurance as of 2/9/2025       Primary Coverage       Payor Plan Insurance Group Employer/Plan Group    HUMANA MEDICARE REPLACEMENT HUMANA MED ADV GROUP [id number]       Payor Plan Address Payor Plan Phone Number Payor Plan Fax Number Effective Dates    [address] [address] [phone]  1/1/2018 - None Entered    [facility] [address]         Subscriber Name Subscriber Birth Date Member ID       AMANDEEP MANRIQUEZ JR. 1945 [id number]                     Emergency Contacts        (Rel.) Home Phone Work Phone Mobile Phone    RomeliaJulissa may (Spouse) [phone] -- [phone]                "

## 2025-02-11 NOTE — CASE MANAGEMENT/SOCIAL WORK
Discharge Planning Assessment  [facility]     Patient Name: Amandeep Manriquez Jr.  MRN: [id number]  Today's Date: 2/11/2025    Admit Date: 2/9/2025    Plan: Rehab pending referrals. Will likely need transport arranged.   Discharge Needs Assessment       Row Name 02/11/25 1521       Living Environment    People in Home spouse    Current Living Arrangements condominium    Primary Care Provided by spouse/significant other       Resource/Environmental Concerns    Transportation Concerns none       Transition Planning    Patient/Family Anticipates Transition to home with family;other (see comments);inpatient rehabilitation facility    Patient/Family Anticipated Services at Transition none    Transportation Anticipated other (see comments)       Discharge Needs Assessment    Readmission Within the Last 30 Days no previous admission in last 30 days    Equipment Currently Used at Home walker, standard;wheelchair;commode;grab bar    Concerns to be Addressed denies needs/concerns at this time;no discharge needs identified    Anticipated Changes Related to Illness none    Equipment Needed After Discharge none    Outpatient/Agency/Support Group Needs skilled nursing facility;inpatient rehabilitation facility;homecare agency                   Discharge Plan       Row Name 02/11/25 1522       Plan    Plan Rehab pending referrals. Will likely need transport arranged.    Patient/Family in Agreement with Plan yes    Plan Comments CCP met with pt and pt's wife at bedside. Introduced self and role. Facesheet information and pharmacy verified. Pt lives with his wife in a [address] single story condo. Pt does not drive; pt's wife transports him as needed. Pt needs assist with ADLs at baseline. Pt uses a WC, transport chair, BSC, walker, and grab bars at home. Pt has used [facility] in the past. Pt has not used SNF in the past. Pt does have a living will. Pt is enrolled in [facility]. Pt denies trouble affording or managing medications. CCP  discussed PT and OT recs IRF vs SNF; pt's wife given pt choice lists for IRF and SNF; pt's wife would like pt to come home with HH but would like PT to see pt prior to that decision. Pt's wife would like a referral sent to [facility]. CCP made referral to [facility]. Dr. Gross stopped by CCP office to notify CCP pt and pt's wife would like to go to rehab now. CCP met with pt's wife at bedside. Pt's wife would like referrals to [facility] and both [facility]. CCP placed referrals to [facility] and [facility]. CCP notified [place]/[facility] of referral via email. CCP notified [facility] of referral. CCP will continue to follow for medical DC readiness and any DC needs. Doug RN/CCP                  Continued Care and Services - Admitted Since 2/9/2025       Destination       Service Provider Request Status Services Address Phone Fax Patient Preferred    [facility] Accepted -- [address] [address] [phone] --    [facility] Considering -- [address] [facility] [address] [facility] [id number] [phone] --    [facility] Pending - Request Sent -- [address] [facility] [id number] [phone] --              Home Medical Care       Service Provider Request Status Services Address Phone Fax Patient Preferred    [facility], Home Medical , Home Rehabilitation, Home Nursing [address] [place] [phone] --                  Expected Discharge Date and Time       Expected Discharge Date Expected Discharge Time    Feb 12, 2025            Demographic Summary       Row Name 02/11/25 1521       General Information    Admission Type observation    Arrived From home    Required Notices Provided Observation Status Notice    Referral Source case finding;admission list    Preferred Language English       Contact  Information    Permission Granted to Share Info With ;family/designee                   Functional Status       Row Name 02/11/25 1521       Functional Status    Usual Activity Tolerance fair    Current Activity Tolerance poor       Physical Activity    On average, how many days per week do you engage in moderate to strenuous exercise (like a brisk walk)? Pt Declined    On average, how many minutes do you engage in exercise at this level? Pt Declined       Assessment of Health Literacy    How often do you have someone help you read hospital materials? Never    How often do you have problems learning about your medical condition because of difficulty understanding written information? Never    How often do you have a problem understanding what is told to you about your medical condition? Never    How confident are you filling out medical forms by yourself? Extremely    Health Literacy Excellent                   Psychosocial    No documentation.                  Abuse/Neglect    No documentation.                  Legal    No documentation.                  Substance Abuse    No documentation.                  Patient Forms    No documentation.                     Yessica Zhang RN

## 2025-02-11 NOTE — PLAN OF CARE
Goal Outcome Evaluation:   Pleasantly confused, q2 repo, khan cath maintained. 2L NC placed overnight intermittently. No cx pain/ nausea. Wife at bedside. No acute events overnight.

## 2025-02-12 PROCEDURE — 94664 DEMO&/EVAL PT USE INHALER: CPT

## 2025-02-12 PROCEDURE — 94761 N-INVAS EAR/PLS OXIMETRY MLT: CPT

## 2025-02-12 PROCEDURE — 97530 THERAPEUTIC ACTIVITIES: CPT

## 2025-02-12 PROCEDURE — 94799 UNLISTED PULMONARY SVC/PX: CPT

## 2025-02-12 PROCEDURE — 25010000002 CEFTRIAXONE PER 250 MG: Performed by: UROLOGY

## 2025-02-12 RX ORDER — METOPROLOL TARTRATE 25 MG/1
25 TABLET, FILM COATED ORAL 2 TIMES DAILY
Status: DISCONTINUED | OUTPATIENT
Start: 2025-02-12 | End: 2025-02-21 | Stop reason: HOSPADM

## 2025-02-12 RX ORDER — AMLODIPINE BESYLATE 2.5 MG/1
2.5 TABLET ORAL
Status: DISCONTINUED | OUTPATIENT
Start: 2025-02-12 | End: 2025-02-13

## 2025-02-12 RX ADMIN — PHENAZOPYRIDINE 200 MG: 200 TABLET ORAL at 09:11

## 2025-02-12 RX ADMIN — LATANOPROST 1 DROP: 50 SOLUTION/ DROPS OPHTHALMIC at 21:35

## 2025-02-12 RX ADMIN — PHENAZOPYRIDINE 200 MG: 200 TABLET ORAL at 13:04

## 2025-02-12 RX ADMIN — AMLODIPINE BESYLATE 2.5 MG: 2.5 TABLET ORAL at 09:10

## 2025-02-12 RX ADMIN — DEXTROMETHORPHAN 60 MG: 30 SUSPENSION, EXTENDED RELEASE ORAL at 09:10

## 2025-02-12 RX ADMIN — BACLOFEN 10 MG: 10 TABLET ORAL at 21:36

## 2025-02-12 RX ADMIN — METOPROLOL TARTRATE 25 MG: 25 TABLET, FILM COATED ORAL at 09:10

## 2025-02-12 RX ADMIN — SENNOSIDES AND DOCUSATE SODIUM 2 TABLET: 50; 8.6 TABLET ORAL at 21:36

## 2025-02-12 RX ADMIN — CYCLOSPORINE 1 DROP: 0.5 EMULSION OPHTHALMIC at 09:11

## 2025-02-12 RX ADMIN — TAMSULOSIN HYDROCHLORIDE 0.4 MG: 0.4 CAPSULE ORAL at 21:36

## 2025-02-12 RX ADMIN — CEFTRIAXONE SODIUM 1000 MG: 1 INJECTION, POWDER, FOR SOLUTION INTRAMUSCULAR; INTRAVENOUS at 09:10

## 2025-02-12 RX ADMIN — METOPROLOL TARTRATE 25 MG: 25 TABLET, FILM COATED ORAL at 21:36

## 2025-02-12 RX ADMIN — PHENAZOPYRIDINE 200 MG: 200 TABLET ORAL at 17:35

## 2025-02-12 RX ADMIN — CYCLOSPORINE 1 DROP: 0.5 EMULSION OPHTHALMIC at 21:35

## 2025-02-12 RX ADMIN — DEXTROMETHORPHAN 60 MG: 30 SUSPENSION, EXTENDED RELEASE ORAL at 21:35

## 2025-02-12 NOTE — PLAN OF CARE
Goal Outcome Evaluation:  Plan of Care Reviewed With: patient, spouse           Outcome Evaluation: a&o x4, forgetful, spouse at bedside and very involved in care, able to wean to room air when patient is awake, requires 1-2L when sleeping, iv abx given, worked with PT--able to sit end of bed, tolerating diet, high BP improving

## 2025-02-12 NOTE — THERAPY TREATMENT NOTE
Patient Name: Amandeep Manriquez Jr.  : 1945    MRN: [id number]                              Today's Date: 2025       Admit Date: 2025    Visit Dx:     ICD-10-CM ICD-9-CM   1. Ureteral calculus  N20.1 592.1   2. Hypertension, unspecified type  I10 401.9   3. Leukocytosis, unspecified type  D72.829 288.60   4. Ureterolithiasis  N20.1 592.1     Patient Active Problem List   Diagnosis    CAD (coronary artery disease), native coronary artery    Essential hypertension    Mixed hyperlipidemia    Congenital myopathy    Arthropathy of right sacroiliac joint    Osteoarthritis of right knee    Degenerative lumbar spinal stenosis    Renal stone    Bilateral impacted cerumen    Benign prostatic hyperplasia without lower urinary tract symptoms    Vertigo    High risk medication use    Vitamin D deficiency    OAB (overactive bladder)    Rosacea    Short-term memory loss    History of CVA (cerebrovascular accident)    COVID-19    Generalized weakness    Hypokalemia    Ureterolithiasis    Leukocytosis     Past Medical History:   Diagnosis Date    Acute myocardial infarction     Aneurysm of right femoral artery     Arthritis     BPH (benign prostatic hyperplasia)     CAD (coronary artery disease), native coronary artery     Congenital myopathy     DDD (degenerative disc disease), lumbar     with stenosis    Diabetes mellitus     Dizziness     Chronic Dizziness    Encounter for Medicare annual wellness exam     Essential hypertension     Gastrointestinal hemorrhage     Hard to intubate     History of impacted cerumen     Hyperlipidemia     Hypertension     Mixed hyperlipidemia     Sleep apnea      Past Surgical History:   Procedure Laterality Date    CARDIAC CATHETERIZATION  2015    30% Left main, 99% mid to distal LAD, 70-80% left circumflex, 80% proximal to mid RCA.    CATARACT EXTRACTION      CERVICAL FUSION      CHOLECYSTECTOMY      CORONARY ANGIOPLASTY WITH STENT PLACEMENT      CYSTOSCOPY W/ URETERAL STENT  PLACEMENT Left 2/9/2025    Procedure: CYSTOSCOPY URETERAL CATHETER/STENT INSERTION;  Surgeon: Alvino Daniels MD;  Location: [facility];  Service: Urology;  Laterality: Left;    KNEE SURGERY Right     OTHER SURGICAL HISTORY      percutaneous injection of extremity pseudoaneurysm      General Information       Row Name 02/12/25 1336          Physical Therapy Time and Intention    Document Type therapy note (daily note)  -ER     Mode of Treatment individual therapy;physical therapy  -ER       Row Name 02/12/25 1336          General Information    Patient Profile Reviewed yes  -ER     Existing Precautions/Restrictions fall  -ER       Row Name 02/12/25 1336          Cognition    Orientation Status (Cognition) oriented x 3  flat affect, slow to respond  -ER       Row Name 02/12/25 1336          Safety Issues/Impairments Affecting Functional Mobility    Impairments Affecting Function (Mobility) balance;endurance/activity tolerance;strength;shortness of breath;range of motion (ROM);pain  -ER     Comment, Safety Issues/Impairments (Mobility) insists on performing mobility independently with less regard for safety precaution follow through  -ER               User Key  (r) = Recorded By, (t) = Taken By, (c) = Cosigned By      Initials Name Provider Type    ER Berna Bales, PT Physical Therapist                   Mobility       Row Name 02/12/25 1337          Bed Mobility    Bed Mobility supine-sit;sit-supine  -ER     Supine-Sit [place] (Bed Mobility) moderate assist (50% patient effort)  -ER     Sit-Supine [place] (Bed Mobility) maximum assist (25% patient effort)  -ER       Row Name 02/12/25 1337          Sit-Stand Transfer    Sit-Stand [place] (Transfers) moderate assist (50% patient effort)  -ER     Assistive Device (Sit-Stand Transfers) walker, standard  -ER     Comment, (Sit-Stand Transfer) 2x attempt, elevated bed height  -ER       Row Name 02/12/25 1337          Gait/Stairs (Locomotion)    Patient  was able to Ambulate no, other medical factors prevent ambulation  -ER     Reason Patient was unable to Ambulate Excessive Weakness  -ER               User Key  (r) = Recorded By, (t) = Taken By, (c) = Cosigned By      Initials Name Provider Type    ER Berna Bales, MATILDA Physical Therapist                   Obj/Interventions       Row Name 02/12/25 7220          Balance    Balance Assessment sitting static balance;sitting dynamic balance  -ER     Static Sitting Balance contact guard  -ER     Dynamic Sitting Balance minimal assist;contact guard  -ER     Position, Sitting Balance sitting edge of bed;supported  -ER     Balance Interventions sitting;supported;static;dynamic  -ER     Comment, Balance unsteady,  high risk of falls  -ER               User Key  (r) = Recorded By, (t) = Taken By, (c) = Cosigned By      Initials Name Provider Type    ER Berna Bales PT Physical Therapist                   Goals/Plan    No documentation.                  Clinical Impression       Row Name 02/12/25 9208          Pain    Pre/Posttreatment Pain Comment does not c/o pain  -ER       Row Name 02/12/25 5229          Plan of Care Review    Plan of Care Reviewed With patient;spouse  -ER     Progress no change  -ER     Outcome Evaluation Pt. is seen this morning for physical therapy treatment session. He is very slow to perform mobility and insists on performing independently. Reduced regard for safety precautions. He performs BM with Mod A using bed rails. He trials STS with Mod A and RW 2x. First trial bed height is standard, however has difficulty achieving full stance from low sitting bed position. Second trial elevated bed to allow pt. more independence. Pt returns to bed with max A. PT recommending IRF at d/c.  -ER       Row Name 02/12/25 9422          Therapy Assessment/Plan (PT)    Rehab Potential (PT) good  -ER     Criteria for Skilled Interventions Met (PT) yes  -ER     Therapy Frequency (PT) 5 times/wk  -ER       Row Name  02/12/25 1338          Positioning and Restraints    Pre-Treatment Position in bed  -ER     Post Treatment Position bed  -ER     In Bed notified nsg;call light within reach;encouraged to call for assist;exit alarm on;with family/caregiver  -ER               User Key  (r) = Recorded By, (t) = Taken By, (c) = Cosigned By      Initials Name Provider Type    Berna Pearce PT Physical Therapist                   Outcome Measures       Row Name 02/12/25 1340 02/12/25 0915       How much help from another person do you currently need...    Turning from your back to your side while in flat bed without using bedrails? 2  -ER 3  -KR    Moving from lying on back to sitting on the side of a flat bed without bedrails? 2  -ER 2  -KR    Moving to and from a bed to a chair (including a wheelchair)? 1  -ER 2  -KR    Standing up from a chair using your arms (e.g., wheelchair, bedside chair)? 2  -ER 2  -KR    Climbing 3-5 steps with a railing? 1  -ER 1  -KR    To walk in hospital room? 1  -ER 1  -KR    AM-PAC 6 Clicks Score (PT) 9  -ER 11  -KR    Highest Level of Mobility Goal 3 --> Sit at edge of bed  -ER 4 --> Transfer to chair/commode  -KR      Row Name 02/12/25 1340          Functional Assessment    Outcome Measure Options AM-PAC 6 Clicks Basic Mobility (PT)  -ER               User Key  (r) = Recorded By, (t) = Taken By, (c) = Cosigned By      Initials Name Provider Type    Amanda Smyth, RN Registered Nurse    Berna Pearce PT Physical Therapist                                 Physical Therapy Education       Title: PT OT SLP Therapies (In Progress)       Topic: Physical Therapy (In Progress)       Point: Mobility training (In Progress)       Learning Progress Summary            Patient Acceptance, E, NR by ER at 2/12/2025 1341    Eager, E, VU,NR by  at 2/10/2025 1447   Family Acceptance, E, NR by ER at 2/12/2025 1341                      Point: Home exercise program (In Progress)       Learning Progress Summary             Patient Acceptance, E, NR by ER at 2/12/2025 1341    Eager, E, VU,NR by SV at 2/10/2025 1447   Family Acceptance, E, NR by ER at 2/12/2025 1341                      Point: Body mechanics (In Progress)       Learning Progress Summary            Patient Acceptance, E, NR by ER at 2/12/2025 1341   Family Acceptance, E, NR by ER at 2/12/2025 1341                      Point: Precautions (In Progress)       Learning Progress Summary            Patient Acceptance, E, NR by ER at 2/12/2025 1341   Family Acceptance, E, NR by ER at 2/12/2025 1341                                      User Key       Initials Effective Dates Name Provider Type Discipline     07/11/23 -  Tram Lloyd, PT Physical Therapist PT    ER 10/15/23 -  Berna Bales, PT Physical Therapist PT                  PT Recommendation and Plan     Progress: no change  Outcome Evaluation: Pt. is seen this morning for physical therapy treatment session. He is very slow to perform mobility and insists on performing independently. Reduced regard for safety precautions. He performs BM with Mod A using bed rails. He trials STS with Mod A and RW 2x. First trial bed height is standard, however has difficulty achieving full stance from low sitting bed position. Second trial elevated bed to allow pt. more independence. Pt returns to bed with max A. PT recommending IRF at d/c.     Time Calculation:         PT Charges       Row Name 02/12/25 1341             Time Calculation    Start Time 1041  -ER      Stop Time 1059  -ER      Time Calculation (min) 18 min  -ER      PT Received On 02/12/25  -ER      PT - Next Appointment 02/13/25  -ER         Time Calculation- PT    Total Timed Code Minutes- PT 18 minute(s)  -ER         Timed Charges    06426 - PT Therapeutic Activity Minutes 18  -ER         Total Minutes    Timed Charges Total Minutes 18  -ER       Total Minutes 18  -ER                User Key  (r) = Recorded By, (t) = Taken By, (c) = Cosigned By      Initials Name  Provider Type    ER Berna Bales, PT Physical Therapist                  Therapy Charges for Today       Code Description Service Date Service Provider Modifiers Qty    53657690066 HC PT THERAPEUTIC ACT EA 15 MIN 2/12/2025 Berna Bales, PT GP 1            PT G-Codes  Outcome Measure Options: AM-PAC 6 Clicks Basic Mobility (PT)  AM-PAC 6 Clicks Score (PT): 9  AM-PAC 6 Clicks Score (OT): 10  Modified Wallace Scale: 4 - Moderately severe disability.  Unable to walk without assistance, and unable to attend to own bodily needs without assistance.  PT Discharge Summary  Anticipated Discharge Disposition (PT): inpatient rehabilitation facility    Berna Bales PT  2/12/2025

## 2025-02-12 NOTE — PLAN OF CARE
Goal Outcome Evaluation:   Alert and oriented X 4, intermittent confusion, VSS. Q2 repo. Davis cath maintained. 2-3L NC overnight. No cx pain, nausea.

## 2025-02-12 NOTE — PLAN OF CARE
Goal Outcome Evaluation:  Plan of Care Reviewed With: patient, spouse        Progress: no change  Outcome Evaluation: Pt. is seen this morning for physical therapy treatment session. He is very slow to perform mobility and insists on performing independently. Reduced regard for safety precautions. He performs BM with Mod A using bed rails. He trials STS with Mod A and RW 2x. First trial bed height is standard, however has difficulty achieving full stance from low sitting bed position. Second trial elevated bed to allow pt. more independence. Pt returns to bed with max A. PT recommending IRF at d/c.    Anticipated Discharge Disposition (PT): inpatient rehabilitation facility

## 2025-02-12 NOTE — PROGRESS NOTES
Name: Amandeep Manriquez Jr. ADMIT: 2025   : 1945  PCP: Ori Bejarano MD    MRN: [id number] LOS: 0 days   AGE/SEX: 80 y.o. male  ROOM: [facility]   Subjective   Chief Complaint   Patient presents with    Flank Pain     Mr. Manriquez is a 80 y.o. male with CAD, HTN, HLD, history CVA, recurrent kidney stones presenting with LLQ pain. He was found to have obstructing kidney stone and UTI       Cysto and stent with Dr. Daniels on 25    Pain is fair  Gen weakness- working with therapy  On abx  O2 at night        Objective   Vital Signs  Temp:  [97.2 °F (36.2 °C)-98.4 °F (36.9 °C)] 97.9 °F (36.6 °C)  Heart Rate:  [70-95] 70  Resp:  [16-20] 18  BP: (155-166)/(83-92) 155/83  SpO2:  [88 %-95 %] 92 %  on  Flow (L/min) (Oxygen Therapy):  [1-4] 1;   Device (Oxygen Therapy): nasal cannula  Body mass index is 30.04 kg/m².    Physical Exam  HENT:      Head: Normocephalic and atraumatic.   Eyes:      General: No scleral icterus.  Cardiovascular:      Rate and Rhythm: Normal rate and regular rhythm.      Heart sounds: Normal heart sounds.   Pulmonary:      Effort: Pulmonary effort is normal. No respiratory distress.      Breath sounds: Normal breath sounds.   Abdominal:      General: There is no distension.      Palpations: Abdomen is soft.      Tenderness: There is no abdominal tenderness.   Musculoskeletal:      Cervical back: Neck supple.   Neurological:      Mental Status: He is alert.   Psychiatric:         Behavior: Behavior normal.     Elderly, chronically ill   +khan    Results Review:       I reviewed the patient's new clinical results.  Results from last 7 days   Lab Units 02/10/25  0650 25  0446   WBC 10*3/mm3 10.05 14.41*   HEMOGLOBIN g/dL 14.5 15.2   PLATELETS 10*3/mm3 192 204     Results from last 7 days   Lab Units 02/10/25  0650 25  0446   SODIUM mmol/L 138 141   POTASSIUM mmol/L 3.9 3.8   CHLORIDE mmol/L 104 104   CO2 mmol/L 24.2 24.6   BUN mg/dL 8 14   CREATININE mg/dL 0.45*  "0.44*   GLUCOSE mg/dL 105* 158*   Estimated Creatinine Clearance: 120.5 mL/min (A) (by C-G formula based on SCr of 0.45 mg/dL (L)).  Results from last 7 days   Lab Units 02/09/25  0446   ALBUMIN g/dL 3.8   BILIRUBIN mg/dL 0.4   ALK PHOS U/L 107   AST (SGOT) U/L 21   ALT (SGPT) U/L 18     Results from last 7 days   Lab Units 02/10/25  0650 02/09/25  0446   CALCIUM mg/dL 9.3 9.3   ALBUMIN g/dL  --  3.8     Results from last 7 days   Lab Units 02/09/25  0446   PROCALCITONIN ng/mL 0.03   LACTATE mmol/L 1.5       Coag     HbA1C   Lab Results   Component Value Date    HGBA1C 5.4 06/09/2015     Infection   Results from last 7 days   Lab Units 02/09/25  1440 02/09/25  0808 02/09/25  0446   BLOODCX  No growth at 3 days No growth at 3 days  --    PROCALCITONIN ng/mL  --   --  0.03     Radiology(recent) No radiology results for the last day  No results found for: \"TROPONINT\", \"TROPONINI\", \"BNP\"  No components found for: \"TSH;2\"    amLODIPine, 2.5 mg, Oral, Q24H  baclofen, 10 mg, Oral, Nightly  cefTRIAXone, 1,000 mg, Intravenous, Q24H  cycloSPORINE, 1 drop, Both Eyes, BID  dextromethorphan polistirex ER, 60 mg, Oral, Q12H  latanoprost, 1 drop, Both Eyes, Nightly  metoprolol tartrate, 25 mg, Oral, BID  phenazopyridine, 200 mg, Oral, TID With Meals  senna-docusate sodium, 2 tablet, Oral, BID  tamsulosin, 0.4 mg, Oral, Nightly       Diet: Regular/House; Texture: Soft to Chew (NDD 3); Soft to Chew: Chopped Meat; Fluid Consistency: Thin (IDDSI 0)      Assessment & Plan      Active Hospital Problems    Diagnosis  POA    **Ureterolithiasis [N20.1]  Yes    Leukocytosis [D72.829]  Yes    History of CVA (cerebrovascular accident) [Z86.73]  Not Applicable    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       Mr. Manriquez is a 80 y.o. male " with CAD, HTN, HLD, history CVA, recurrent kidney stones presenting with LLQ pain. He was found to have obstructing kidney stone and possible UTI    Cysto and stent with Dr. Daniels on 2/9/25    On ABX, follow cultures    S/p IVFs.     +cough. viral panel negative. cough agents, IS, flutter    BP is elevated will restart metoprolol and have prn po hydralazine available       DW staff  DW family    Dispo- would likely benefit from rehab to get stronger. Maybe dc 2/12 or 2/13 when precent obtained      Antonio Gross MD  [facility]  02/12/25  16:09 EST

## 2025-02-12 NOTE — PAYOR COMM NOTE
"Amandeep Manriquez Jr. (80 y.o. Male)     ATTN: NURSE REVIEWER  RE: INITIAL INPT AUTH CLINICALS   REF# [id number]  PLS REPLY TO CHRISTINE HARMON [phone] OR FAX# [phone]      Date of Birth   1945    Social Security Number       Address   [address] [place] [address] [address] [place] [address]    Home Phone   [phone]    MRN   [id number]       Confucianist   None    Marital Status                               Admission Date   2/9/25    Admission Type   Emergency    Admitting Provider   aRshawn Larios MD    Attending Provider   Antonio Gross MD    Department, Room/Bed   [address], P389/1       Discharge Date       Discharge Disposition       Discharge Destination                                 Attending Provider: Antonio Gross MD    Allergies: Iodinated Contrast Media, Ampicillin    Isolation: None   Infection: None   Code Status: CPR    Ht: 162.6 cm (64\")   Wt: 79.4 kg (175 lb)    Admission Cmt: None   Principal Problem: Ureterolithiasis [N20.1]                   Active Insurance as of 2/9/2025       Primary Coverage       Payor Plan Insurance Group Employer/Plan Group    HUMANA MEDICARE REPLACEMENT HUMANA MED ADV GROUP [id number]       Payor Plan Address Payor Plan Phone Number Payor Plan Fax Number Effective Dates    [address] [address] [phone]  1/1/2018 - None Entered    [facility] [id number]         Subscriber Name Subscriber Birth Date Member ID       DECLANAMANDEEP DAILEY JR. 1945 [id number]                     Emergency Contacts        (Rel.) Home Phone Work Phone Mobile Phone    DeclanJulissa (Spouse) [phone] -- [phone]                 History & Physical        Rashawn Larios MD at 02/09/25 0847              Patient Name:  Amandeep DAILEY Declan Ratliff  YOB: 1945  MRN:  [id number]  Admit Date:  2/9/2025  Patient Care Team:  Provider, No Known as PCP - General      Subjective  History Present Illness "     Chief Complaint   Patient presents with    Flank Pain       Mr. Manriquez is a 80 y.o. male with CAD, HTN, HLD, history CVA, recurrent kidney stones presenting with LLQ pain.  Pain started about 2 AM this morning, with radiation towards the left flank.  He states that this is consistent with pain from prior kidney stones.  No fevers or chills.  No nausea, no vomiting.  Denies urgency, hesitancy, frequency or dysuria.    Review of Systems   Constitutional:  Negative for chills and fatigue.   Respiratory:  Negative for shortness of breath.    Cardiovascular:  Negative for chest pain and palpitations.   Gastrointestinal:  Positive for abdominal pain. Negative for constipation, diarrhea, nausea and vomiting.   Genitourinary:  Positive for flank pain. Negative for dysuria, frequency, hematuria and urgency.   Neurological:  Negative for weakness and headaches.        Personal History     Past Medical History:   Diagnosis Date    Acute myocardial infarction     Aneurysm of right femoral artery     Arthritis     BPH (benign prostatic hyperplasia)     CAD (coronary artery disease), native coronary artery     Congenital myopathy     DDD (degenerative disc disease), lumbar     with stenosis    Diabetes mellitus     Dizziness     Chronic Dizziness    Encounter for Medicare annual wellness exam     Essential hypertension     Gastrointestinal hemorrhage     History of impacted cerumen     Hyperlipidemia     Hypertension     Mixed hyperlipidemia     Sleep apnea      Past Surgical History:   Procedure Laterality Date    CARDIAC CATHETERIZATION  06/2015    30% Left main, 99% mid to distal LAD, 70-80% left circumflex, 80% proximal to mid RCA.    CATARACT EXTRACTION      CERVICAL FUSION      CHOLECYSTECTOMY      CORONARY ANGIOPLASTY WITH STENT PLACEMENT      KNEE SURGERY Right     OTHER SURGICAL HISTORY      percutaneous injection of extremity pseudoaneurysm     Family History   Problem Relation Age of Onset    Coronary artery  disease Mother     Heart disease Mother     Heart attack Mother 68    Cancer Father         bladder    Heart disease Father         CHF    No Known Problems Other      Social History     Tobacco Use    Smoking status: Every Day     Types: Cigars    Smokeless tobacco: Never    Tobacco comments:     caffeine use   Substance Use Topics    Alcohol use: Yes     Comment: social    Drug use: Never     No current facility-administered medications on file prior to encounter.     Current Outpatient Medications on File Prior to Encounter   Medication Sig Dispense Refill    acetaminophen (TYLENOL) 325 MG tablet Take 2 tablets by mouth Every 4 (Four) Hours As Needed for Mild Pain .      aspirin 81 MG tablet Take 1 tablet by mouth daily.      baclofen (LIORESAL) 10 MG tablet Take 1 tablet by mouth Every Night.      cycloSPORINE (RESTASIS) 0.05 % ophthalmic emulsion 1 drop 2 (Two) Times a Day.      HYDROcodone-acetaminophen (NORCO) 5-325 MG per tablet Take 1 tablet by mouth Every 8 (Eight) Hours As Needed for Moderate Pain . For pain 10 tablet 0    latanoprost (XALATAN) 0.005 % ophthalmic solution Administer 1 drop to both eyes Every Night.      metoprolol tartrate (LOPRESSOR) 25 MG tablet Take 0.5 tablets by mouth 2 (Two) Times a Day.      Multiple Vitamins-Minerals (PRESERVISION AREDS 2+MULTI VIT PO) Take 1 tablet by mouth Daily.      Myrbetriq 50 MG tablet sustained-release 24 hour 24 hr tablet TAKE ONE TABLET BY MOUTH DAILY 30 tablet 2    nitroglycerin (NITROSTAT) 0.4 MG SL tablet Place 1 tablet under the tongue. For chest pain.  If pain remains after 5 minutes, call 911 and repeat dose.  Max 3 tabs in 15 minutes.      rosuvastatin (CRESTOR) 40 MG tablet TAKE ONE TABLET BY MOUTH DAILY 90 tablet 1    tamsulosin (FLOMAX) 0.4 MG capsule 24 hr capsule Take 1 capsule by mouth Every Night. 90 capsule 1     Allergies   Allergen Reactions    Iodinated Contrast Media Hives    Ampicillin Diarrhea       Objective   Objective     Vital  Signs  Temp:  [98 °F (36.7 °C)] 98 °F (36.7 °C)  Heart Rate:  [63-95] 95  Resp:  [18] 18  BP: (187-215)/() 215/108  SpO2:  [94 %-96 %] 94 %  on   ;   Device (Oxygen Therapy): room air  There is no height or weight on file to calculate BMI.    Physical Exam  Constitutional:       Appearance: He is ill-appearing.      Comments: Hard of hearing   Cardiovascular:      Rate and Rhythm: Normal rate.   Pulmonary:      Effort: Pulmonary effort is normal. No respiratory distress.      Breath sounds: No stridor.   Abdominal:      Tenderness: There is abdominal tenderness. There is no right CVA tenderness or left CVA tenderness.   Skin:     Coloration: Skin is not pale.   Neurological:      Mental Status: He is alert and oriented to person, place, and time.         Results Review:    I have personally reviewed:  [x]  Laboratory  [x]  Old records  [x]  Radiology   []  EKG/Telemetry   [x]  Microbiology    [x]  Cardiology/Vascular   []  Pathology     Lab Results (last 24 hours)       Procedure Component Value Units Date/Time    CBC & Differential [[id number]]  (Abnormal) Collected: 02/09/25 0446    Specimen: Blood Updated: 02/09/25 0459    Narrative:      The following orders were created for panel order CBC & Differential.  Procedure                               Abnormality         Status                     ---------                               -----------         ------                     CBC Auto Differential[[id number]]        Abnormal            Final result                 Please view results for these tests on the individual orders.    Comprehensive Metabolic Panel [[id number]]  (Abnormal) Collected: 02/09/25 0446    Specimen: Blood Updated: 02/09/25 0514     Glucose 158 mg/dL      BUN 14 mg/dL      Creatinine 0.44 mg/dL      Sodium 141 mmol/L      Potassium 3.8 mmol/L      Chloride 104 mmol/L      CO2 24.6 mmol/L      Calcium 9.3 mg/dL      Total Protein 6.5 g/dL      Albumin 3.8 g/dL      ALT (SGPT) 18 U/L       AST (SGOT) 21 U/L      Alkaline Phosphatase 107 U/L      Total Bilirubin 0.4 mg/dL      Globulin 2.7 gm/dL      A/G Ratio 1.4 g/dL      BUN/Creatinine Ratio 31.8     Anion Gap 12.4 mmol/L      eGFR 105.8 mL/min/1.73     Narrative:      GFR Categories in Chronic Kidney Disease (CKD)      GFR Category          GFR (mL/min/1.73)    Interpretation  G1                     90 or greater         Normal or high (1)  G2                      60-89                Mild decrease (1)  G3a                   45-59                Mild to moderate decrease  G3b                   30-44                Moderate to severe decrease  G4                    15-29                Severe decrease  G5                    14 or less           Kidney failure          (1)In the absence of evidence of kidney disease, neither GFR category G1 or G2 fulfill the criteria for CKD.    eGFR calculation 2021 CKD-EPI creatinine equation, which does not include race as a factor    Lipase [[id number]]  (Normal) Collected: 02/09/25 0446    Specimen: Blood Updated: 02/09/25 0514     Lipase 38 U/L     Lactic Acid, Plasma [[id number]]  (Normal) Collected: 02/09/25 0446    Specimen: Blood Updated: 02/09/25 0515     Lactate 1.5 mmol/L     CBC Auto Differential [[id number]]  (Abnormal) Collected: 02/09/25 0446    Specimen: Blood Updated: 02/09/25 0459     WBC 14.41 10*3/mm3      RBC 5.19 10*6/mm3      Hemoglobin 15.2 g/dL      Hematocrit 49.4 %      MCV 95.2 fL      MCH 29.3 pg      MCHC 30.8 g/dL      RDW 12.7 %      RDW-SD 44.9 fl      MPV 9.6 fL      Platelets 204 10*3/mm3      Neutrophil % 82.2 %      Lymphocyte % 9.8 %      Monocyte % 6.9 %      Eosinophil % 0.4 %      Basophil % 0.3 %      Immature Grans % 0.4 %      Neutrophils, Absolute 11.84 10*3/mm3      Lymphocytes, Absolute 1.41 10*3/mm3      Monocytes, Absolute 0.99 10*3/mm3      Eosinophils, Absolute 0.06 10*3/mm3      Basophils, Absolute 0.05 10*3/mm3      Immature Grans, Absolute 0.06 10*3/mm3      nRBC  0.0 /100 WBC     Urinalysis With Microscopic If Indicated (No Culture) - Urine, Clean Catch [[id number]]  (Abnormal) Collected: 02/09/25 0743    Specimen: Urine, Clean Catch Updated: 02/09/25 0802     Color, UA Yellow     Appearance, UA Clear     pH, UA 6.5     Specific Gravity, UA 1.015     Glucose, UA Negative     Ketones, UA 40 mg/dL (2+)     Bilirubin, UA Negative     Blood, UA Moderate (2+)     Protein, UA 30 mg/dL (1+)     Leuk Esterase, UA Trace     Nitrite, UA Negative     Urobilinogen, UA 0.2 E.U./dL    Urinalysis, Microscopic Only - Urine, Clean Catch [[id number]]  (Abnormal) Collected: 02/09/25 0743    Specimen: Urine, Clean Catch Updated: 02/09/25 0802     RBC, UA Too Numerous to Count /HPF      WBC, UA 3-5 /HPF      Bacteria, UA None Seen /HPF      Squamous Epithelial Cells, UA 0-2 /HPF      Hyaline Casts, UA 0-2 /LPF      Methodology Automated Microscopy    Blood Culture - Blood, Arm, Left [[id number]] Collected: 02/09/25 0808    Specimen: Blood from Arm, Left Updated: 02/09/25 0822            Imaging Results (Last 24 Hours)       Procedure Component Value Units Date/Time    CT Abdomen Pelvis Without Contrast [[id number]] Collected: 02/09/25 0654     Updated: 02/09/25 0708    Narrative:      CT ABDOMEN PELVIS WO CONTRAST-     INDICATIONS: Left flank pain     TECHNIQUE: Radiation dose reduction techniques were utilized, including  automated exposure control and exposure modulation based on body size.  Unenhanced ABDOMEN AND PELVIS CT     COMPARISON: 1/22/2020     FINDINGS:     At the mid left ureter, a 1.0 x 1.2 cm stone is present with moderate  left hydronephrosis. Increased stranding around the left kidney suggests  inflammation/infection. Nonobstructive stones are seen in the kidneys,  as large as 1.2 cm on the left, 1.3 cm on the right. No right  hydronephrosis. Left renal cyst is again demonstrated.     Otherwise unremarkable appearance of the liver, spleen, adrenal glands,  pancreas, kidneys,  bladder.     No bowel obstruction or abnormal bowel thickening is identified. The  appendix does not appear inflamed. Colonic diverticula are seen that do  not appear inflamed.     No free intraperitoneal gas or free fluid.     Scattered small mesenteric and para-aortic lymph nodes are seen that are  not significant by size criteria.     Abdominal aorta is not aneurysmal. Aortic and other arterial  calcifications are present.     The lung bases show granulomatous disease, atelectasis. Reticulonodular  opacity in the right middle lobe suggests inflammatory/infectious  etiology, for example axial image 10, clinical correlation and CT  follow-up advised to exclude any possibility of an underlying lesion.  Largest nodular density in this region measures 6 mm on axial image 12.     Degenerative changes are seen in the spine. No acute fracture is  identified.             Impression:            1. A 1.2 cm stone at the mid left ureter, with moderate left  hydronephrosis, and increased stranding around the left kidney,  suggesting inflammation/infection. Bilateral nonobstructive  nephrolithiasis.     2. Colonic diverticulosis. No acute inflammatory process of bowel is  identified.     3. Right middle lobe pulmonary reticulonodular opacities suggest  inflammatory/infectious etiology, clinical correlation and CT follow-up  recommended.     This report was finalized on 2/9/2025 7:05 AM by Dr. Wilton Richardson M.D on Workstation: [facility]                   ECG 12 Lead Pre-Op / Pre-Procedure   Preliminary Result   HEART RATE=89  bpm   RR Dmkezmoh=705  ms   NC Oqprnqoz=866  ms   P Horizontal Axis=-4  deg   P Front Axis=42  deg   QRSD Yvdokfxn=128  ms   QT Feawmexf=138  ms   NLrF=323  ms   QRS Axis=42  deg   T Wave Axis=28  deg   - BORDERLINE ECG -   Sinus rhythm   Probable left atrial enlargement   Date and Time of Study:2025-02-09 08:28:52           Assessment/Plan     Active Hospital Problems    Diagnosis  POA     **Ureterolithiasis [N20.1]  Yes    Leukocytosis [D72.829]  Yes    History of CVA (cerebrovascular accident) [Z86.73]  Not Applicable    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       Mr. Manriquez is a 80 y.o. male with CAD, HTN, HLD, history CVA, recurrent kidney stones presenting with LLQ pain.    Left ureteral stone  -CT with 1.2 cm stone mid left ureter, moderate left hydronephrosis, increase stranding around left kidney  -Urology consulted-plan for OR today  -add on u cx  -Received Rocephin in ED, will continue     Hx congential myopathy  -PT/OT eval post op     Right middle lobe reticulonodular opacities  -Seen on CT, suggestive of inflammatory versus infectious etiology; repeat CT in about 3 months to ensure clearing (around 5/2025)    HTN  CAD  -resume metoprolol, ASA, Crestor post-op     BPH  -continue Flomax     Hx of CVA  -right cerebellar, from small vessel disease  -on ASA and Crestor    I discussed the patient's findings and my recommendations with patient, family, and ED provider.    VTE Prophylaxis - SCDs.  Code Status - Full code.       Rashawn Larios MD  [facility]  02/09/25  08:49 EST      Electronically signed by Rashawn Larios MD at 02/09/25 0854       Facility-Administered Medications as of 2/12/2025   Medication Dose Route Frequency Provider Last Rate Last Admin    acetaminophen (TYLENOL) tablet 650 mg  650 mg Oral Q6H PRN Alvino Daniels MD        amLODIPine (NORVASC) tablet 2.5 mg  2.5 mg Oral Q24H Antonio Gross MD   2.5 mg at 02/12/25 0910    baclofen (LIORESAL) tablet 10 mg  10 mg Oral Nightly Alvino Daniels MD   10 mg at 02/11/25 2022    benzonatate (TESSALON) capsule 100 mg  100 mg Oral TID PRN Antonio Gross MD   100 mg at 02/11/25  1550    sennosides-docusate (PERICOLACE) 8.6-50 MG per tablet 2 tablet  2 tablet Oral BID Alvino Daniels MD   2 tablet at 25    And    polyethylene glycol (MIRALAX) packet 17 g  17 g Oral Daily PRN Alvino Daniels MD        And    bisacodyl (DULCOLAX) EC tablet 5 mg  5 mg Oral Daily PRN Alvino Daniels MD        And    bisacodyl (DULCOLAX) suppository 10 mg  10 mg Rectal Daily PRN Alvino Daniels MD        cefTRIAXone (ROCEPHIN) 1,000 mg in sodium chloride 0.9 % 100 mL MBP  1,000 mg Intravenous Q24H Alvino Daniels  mL/hr at 25 0910 1,000 mg at 25 0910    [COMPLETED] cefTRIAXone (ROCEPHIN) 2,000 mg in sodium chloride 0.9 % 100 mL MBP  2,000 mg Intravenous Once Cosmo Coughlin PA   Stopped at 25 0910    cycloSPORINE (RESTASIS) 0.05 % ophthalmic emulsion 1 drop  1 drop Both Eyes BID Alvino Daniels MD   1 drop at 25 0911    dextromethorphan polistirex ER (DELSYM) 30 MG/5ML oral suspension 60 mg  60 mg Oral Q12H Antonio Gross MD   60 mg at 25 0910    [COMPLETED] famotidine (PEPCID) injection 20 mg  20 mg Intravenous Once Annel Hooker MD   20 mg at 25 0955    hydrALAZINE (APRESOLINE) tablet 25 mg  25 mg Oral Q6H PRN Antonio Gross MD        HYDROcodone-acetaminophen (NORCO) 5-325 MG per tablet 1 tablet  1 tablet Oral Q4H PRN Alvino Daniels MD   1 tablet at 02/10/25 0000    HYDROmorphone (DILAUDID) injection 0.5 mg  0.5 mg Intravenous Q2H PRN Alvino Daniels MD        And    naloxone (NARCAN) injection 0.4 mg  0.4 mg Intravenous Q5 Min PRN Alvino Daniels MD        labetalol (NORMODYNE,TRANDATE) injection 10 mg  10 mg Intravenous Q1H PRN Alvino Daniels MD        [] lactated ringers infusion  9 mL/hr Intravenous Continuous Annel Hooker MD 9 mL/hr at 25 09 9 mL/hr at 25    latanoprost (XALATAN) 0.005 % ophthalmic solution 1 drop  1 drop Both Eyes Nightly Alvino Daniels MD   1 drop  at 02/11/25 2030    [COMPLETED] metoprolol tartrate (LOPRESSOR) injection 5 mg  5 mg Intravenous Once Cosmo Coughlin PA   5 mg at 02/09/25 0837    metoprolol tartrate (LOPRESSOR) tablet 25 mg  25 mg Oral BID Antonio Gross MD   25 mg at 02/12/25 0910    [COMPLETED] morphine injection 4 mg  4 mg Intravenous Once Bina Salcedo MD   4 mg at 02/09/25 0609    [COMPLETED] ondansetron (ZOFRAN) injection 4 mg  4 mg Intravenous Once Natividad Kyle PA-C   4 mg at 02/09/25 0607    ondansetron ODT (ZOFRAN-ODT) disintegrating tablet 4 mg  4 mg Oral Q6H PRN Alvino Daniels MD        Or    ondansetron (ZOFRAN) injection 4 mg  4 mg Intravenous Q6H PRN Alvino Daniels MD        phenazopyridine (PYRIDIUM) tablet 200 mg  200 mg Oral TID With Meals Alvino Daniels MD   200 mg at 02/12/25 0911    [COMPLETED] sodium chloride 0.9 % bolus 250 mL  250 mL Intravenous Once Antonio Gross MD   Stopped at 02/11/25 2022    sodium chloride 0.9 % flush 10 mL  10 mL Intravenous PRN Alvino Daniels MD        tamsulosin (FLOMAX) 24 hr capsule 0.4 mg  0.4 mg Oral Nightly Alvino Daniels MD   0.4 mg at 02/11/25 2022     Lab Results (last 24 hours)       Procedure Component Value Units Date/Time    Blood Culture - Blood, Arm, Left [[id number]]  (Normal) Collected: 02/09/25 0808    Specimen: Blood from Arm, Left Updated: 02/12/25 0830     Blood Culture No growth at 3 days    Respiratory Panel PCR w/COVID-19(SARS-CoV-2) BRUCE/ZHANNA/AAM/PAD/COR/OSITO In-House, NP Swab in [facility], 2 HR TAT - Swab, Nasopharynx [[id number]]  (Normal) Collected: 02/11/25 1554    Specimen: Swab from Nasopharynx Updated: 02/11/25 1742     ADENOVIRUS, PCR Not Detected     Coronavirus 229E Not Detected     Coronavirus HKU1 Not Detected     Coronavirus NL63 Not Detected     Coronavirus OC43 Not Detected     COVID19 Not Detected     Human Metapneumovirus Not Detected     Human Rhinovirus/Enterovirus Not Detected     Influenza A PCR Not Detected      Influenza B PCR Not Detected     Parainfluenza Virus 1 Not Detected     Parainfluenza Virus 2 Not Detected     Parainfluenza Virus 3 Not Detected     Parainfluenza Virus 4 Not Detected     RSV, PCR Not Detected     Bordetella pertussis pcr Not Detected     Bordetella parapertussis PCR Not Detected     Chlamydophila pneumoniae PCR Not Detected     Mycoplasma pneumo by PCR Not Detected    Narrative:      In the setting of a positive respiratory panel with a viral infection PLUS a negative procalcitonin without other underlying concern for bacterial infection, consider observing off antibiotics or discontinuation of antibiotics and continue supportive care. If the respiratory panel is positive for atypical bacterial infection (Bordetella pertussis, Chlamydophila pneumoniae, or Mycoplasma pneumoniae), consider antibiotic de-escalation to target atypical bacterial infection.    Blood Culture - Blood, Arm, Left [[id number]]  (Normal) Collected: 02/09/25 1440    Specimen: Blood from Arm, Left Updated: 02/11/25 1515     Blood Culture No growth at 2 days          Imaging Results (Last 24 Hours)       ** No results found for the last 24 hours. **          ECG/EMG Results (last 24 hours)       ** No results found for the last 24 hours. **          Orders (last 24 hrs)        Start     Ordered    02/12/25 0912  Inpatient Admission  Once         02/12/25 0912    02/12/25 0900  metoprolol tartrate (LOPRESSOR) tablet 25 mg  2 Times Daily         02/12/25 0806    02/12/25 0900  amLODIPine (NORVASC) tablet 2.5 mg  Every 24 Hours Scheduled         02/12/25 0806    02/11/25 2100  dextromethorphan polistirex ER (DELSYM) 30 MG/5ML oral suspension 60 mg  Every 12 Hours Scheduled         02/11/25 1504    02/11/25 1600  Incentive Spirometry  Every 2 Hours While Awake       02/11/25 1504    02/11/25 1600  sodium chloride 0.9 % bolus 250 mL  Once         02/11/25 1506    02/11/25 1505  Oscillating Positive Expiratory Pressure (OPEP)  4 Times  "Daily - RT       02/11/25 1504    02/11/25 1504  Respiratory Panel PCR w/COVID-19(SARS-CoV-2) BRUCE/ZHANNA/AMA/PAD/COR/OSITO In-House, NP Swab in [facility], 2 HR TAT - Swab, Nasopharynx  Once         02/11/25 1504    02/11/25 1503  benzonatate (TESSALON) capsule 100 mg  3 Times Daily PRN         02/11/25 1504    02/10/25 2100  metoprolol tartrate (LOPRESSOR) tablet 12.5 mg  2 Times Daily,   Status:  Discontinued         02/10/25 1608    02/10/25 1608  hydrALAZINE (APRESOLINE) tablet 25 mg  Every 6 Hours PRN         02/10/25 1609    02/10/25 1112  Catheter Care  Every Shift       02/10/25 1111    02/10/25 0800  cefTRIAXone (ROCEPHIN) 1,000 mg in sodium chloride 0.9 % 100 mL MBP  Every 24 Hours         02/09/25 0853    02/09/25 2100  baclofen (LIORESAL) tablet 10 mg  Nightly         02/09/25 0848    02/09/25 2100  cycloSPORINE (RESTASIS) 0.05 % ophthalmic emulsion 1 drop  2 Times Daily         02/09/25 0848    02/09/25 2100  latanoprost (XALATAN) 0.005 % ophthalmic solution 1 drop  Nightly         02/09/25 0848    02/09/25 2100  tamsulosin (FLOMAX) 24 hr capsule 0.4 mg  Nightly         02/09/25 0848    02/09/25 1800  Oral Care  2 Times Daily       02/09/25 0846    02/09/25 1222  phenazopyridine (PYRIDIUM) tablet 200 mg  3 Times Daily With Meals         02/09/25 1220    02/09/25 1200  Vital Signs  Every 4 Hours       02/09/25 0846    02/09/25 1030  sennosides-docusate (PERICOLACE) 8.6-50 MG per tablet 2 tablet  2 Times Daily        Placed in \"And\" Linked Group    02/09/25 0846    02/09/25 0858  HYDROcodone-acetaminophen (NORCO) 5-325 MG per tablet 1 tablet  Every 4 Hours PRN         02/09/25 0858    02/09/25 0857  acetaminophen (TYLENOL) tablet 650 mg  Every 6 Hours PRN         02/09/25 0857    02/09/25 0848  labetalol (NORMODYNE,TRANDATE) injection 10 mg  Every 1 Hour PRN         02/09/25 0848    02/09/25 0846  polyethylene glycol (MIRALAX) packet 17 g  Daily PRN        Placed in \"And\" Linked Group    02/09/25 0846    02/09/25 " "0846  bisacodyl (DULCOLAX) EC tablet 5 mg  Daily PRN        Placed in \"And\" Linked Group    25 0846    25 0846  bisacodyl (DULCOLAX) suppository 10 mg  Daily PRN        Placed in \"And\" Linked Group    25 0846    25 0846  HYDROmorphone (DILAUDID) injection 0.5 mg  Every 2 Hours PRN        Placed in \"And\" Linked Group    25 0846    25 0846  naloxone (NARCAN) injection 0.4 mg  Every 5 Minutes PRN        Placed in \"And\" Linked Group    25 0846    25 0846  Intake & Output  Every Shift       25 0846    25 0845  ondansetron ODT (ZOFRAN-ODT) disintegrating tablet 4 mg  Every 6 Hours PRN        Placed in \"Or\" Linked Group    25 0846    25 0845  ondansetron (ZOFRAN) injection 4 mg  Every 6 Hours PRN        Placed in \"Or\" Linked Group    25 0846    25 0428  sodium chloride 0.9 % flush 10 mL  As Needed         25 0429    25 0000  nitrofurantoin, macrocrystal-monohydrate, (Macrobid) 100 MG capsule  Daily         25 1221    Unscheduled  Oxygen Therapy- Nasal Cannula; Titrate 1-6 LPM Per SpO2; 90 - 95%  Continuous PRN       25 0938    Unscheduled  Oxygen Therapy- Nasal Cannula; Titrate 1-6 LPM Per SpO2; 90 - 95%  Continuous PRN       25 1211                  Operative/Procedure Notes (last 24 hours)  Notes from 25 0920 through 25 0920   No notes of this type exist for this encounter.          Physician Progress Notes (last 24 hours)        Antonio Gross MD at 25 1504              Name: Amandeep Manriquez Jr. ADMIT: 2025   : 1945  PCP: Provider, No Known    MRN: [id number] LOS: 0 days   AGE/SEX: 80 y.o. male  ROOM:    Subjective   Chief Complaint   Patient presents with    Flank Pain     Mr. Manriqeuz is a 80 y.o. male with CAD, HTN, HLD, history CVA, recurrent kidney stones presenting with LLQ pain. He was found to have obstructing kidney stone and UTI       Cysto and stent with " Dr. Daniels on 2/9/25    Pain is fair  Gen weakness- working with therapy  On abx  Some cough  No fever        Objective   Vital Signs  Temp:  [97.5 °F (36.4 °C)-98.2 °F (36.8 °C)] 98.1 °F (36.7 °C)  Heart Rate:  [] 81  Resp:  [18] 18  BP: (143-166)/(77-97) 143/88  SpO2:  [91 %-94 %] 92 %  on   ;   Device (Oxygen Therapy): room air  Body mass index is 30.04 kg/m².    Physical Exam  HENT:      Head: Normocephalic and atraumatic.   Eyes:      General: No scleral icterus.  Cardiovascular:      Rate and Rhythm: Normal rate and regular rhythm.      Heart sounds: Normal heart sounds.   Pulmonary:      Effort: Pulmonary effort is normal. No respiratory distress.      Breath sounds: Normal breath sounds.   Abdominal:      General: There is no distension.      Palpations: Abdomen is soft.      Tenderness: There is no abdominal tenderness.   Musculoskeletal:      Cervical back: Neck supple.   Neurological:      Mental Status: He is alert.   Psychiatric:         Behavior: Behavior normal.     Elderly, chronically ill   +khan    Results Review:       I reviewed the patient's new clinical results.  Results from last 7 days   Lab Units 02/10/25  0650 02/09/25  0446   WBC 10*3/mm3 10.05 14.41*   HEMOGLOBIN g/dL 14.5 15.2   PLATELETS 10*3/mm3 192 204     Results from last 7 days   Lab Units 02/10/25  0650 02/09/25  0446   SODIUM mmol/L 138 141   POTASSIUM mmol/L 3.9 3.8   CHLORIDE mmol/L 104 104   CO2 mmol/L 24.2 24.6   BUN mg/dL 8 14   CREATININE mg/dL 0.45* 0.44*   GLUCOSE mg/dL 105* 158*   Estimated Creatinine Clearance: 120.5 mL/min (A) (by C-G formula based on SCr of 0.45 mg/dL (L)).  Results from last 7 days   Lab Units 02/09/25  0446   ALBUMIN g/dL 3.8   BILIRUBIN mg/dL 0.4   ALK PHOS U/L 107   AST (SGOT) U/L 21   ALT (SGPT) U/L 18     Results from last 7 days   Lab Units 02/10/25  0650 02/09/25  0446   CALCIUM mg/dL 9.3 9.3   ALBUMIN g/dL  --  3.8     Results from last 7 days   Lab Units 02/09/25  1136  "  PROCALCITONIN ng/mL 0.03   LACTATE mmol/L 1.5       Coag     HbA1C   Lab Results   Component Value Date    HGBA1C 5.4 06/09/2015     Infection   Results from last 7 days   Lab Units 02/09/25  1440 02/09/25  0808 02/09/25  0446   BLOODCX  No growth at 24 hours No growth at 2 days  --    PROCALCITONIN ng/mL  --   --  0.03     Radiology(recent) No radiology results for the last day  No results found for: \"TROPONINT\", \"TROPONINI\", \"BNP\"  No components found for: \"TSH;2\"    baclofen, 10 mg, Oral, Nightly  cefTRIAXone, 1,000 mg, Intravenous, Q24H  cycloSPORINE, 1 drop, Both Eyes, BID  dextromethorphan polistirex ER, 60 mg, Oral, Q12H  latanoprost, 1 drop, Both Eyes, Nightly  metoprolol tartrate, 12.5 mg, Oral, BID  phenazopyridine, 200 mg, Oral, TID With Meals  senna-docusate sodium, 2 tablet, Oral, BID  tamsulosin, 0.4 mg, Oral, Nightly       Diet: Regular/House; Texture: Soft to Chew (NDD 3); Soft to Chew: Chopped Meat; Fluid Consistency: Thin (IDDSI 0)      Assessment & Plan     Active Hospital Problems    Diagnosis  POA    **Ureterolithiasis [N20.1]  Yes    Leukocytosis [D72.829]  Yes    History of CVA (cerebrovascular accident) [Z86.73]  Not Applicable    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       Mr. Manriquez is a 80 y.o. male with CAD, HTN, HLD, history CVA, recurrent kidney stones presenting with LLQ pain. He was found to have obstructing kidney stone and possible UTI    Cysto and stent with Dr. Daniels on 2/9/25    On ABX, follow cultures    S/p IVFs. Will give a little more today x 250cc    +cough. Check viral panel and add cough agents, IS, flutter    BP is elevated will restart metoprolol and have prn po hydralazine available       DW staff  DW family    Dispo- would likely benefit from rehab to get " stronger. Maybe dc 2/12      Antonio Gross MD  [facility]  02/11/25  16:09 EST    Electronically signed by Antonio Gross MD at 02/11/25 1505       Consult Notes (last 24 hours)  Notes from 02/11/25 0920 through 02/12/25 0920   No notes of this type exist for this encounter.

## 2025-02-13 ENCOUNTER — APPOINTMENT (OUTPATIENT)
Dept: GENERAL RADIOLOGY | Facility: HOSPITAL | Age: 80
DRG: 660 | End: 2025-02-13
Payer: MEDICARE

## 2025-02-13 PROCEDURE — 25010000002 CEFTRIAXONE PER 250 MG: Performed by: UROLOGY

## 2025-02-13 PROCEDURE — 63710000001 ONDANSETRON ODT 4 MG TABLET DISPERSIBLE: Performed by: UROLOGY

## 2025-02-13 PROCEDURE — 25010000002 HYDRALAZINE PER 20 MG: Performed by: INTERNAL MEDICINE

## 2025-02-13 PROCEDURE — 74018 RADEX ABDOMEN 1 VIEW: CPT

## 2025-02-13 PROCEDURE — 25010000002 PROCHLORPERAZINE 10 MG/2ML SOLUTION: Performed by: INTERNAL MEDICINE

## 2025-02-13 RX ORDER — AMOXICILLIN 250 MG
2 CAPSULE ORAL 2 TIMES DAILY
Start: 2025-02-13

## 2025-02-13 RX ORDER — HYDRALAZINE HYDROCHLORIDE 20 MG/ML
10 INJECTION INTRAMUSCULAR; INTRAVENOUS ONCE
Status: COMPLETED | OUTPATIENT
Start: 2025-02-13 | End: 2025-02-13

## 2025-02-13 RX ORDER — AMLODIPINE BESYLATE 5 MG/1
5 TABLET ORAL
Status: DISCONTINUED | OUTPATIENT
Start: 2025-02-14 | End: 2025-02-15

## 2025-02-13 RX ORDER — AMLODIPINE BESYLATE 5 MG/1
5 TABLET ORAL
Start: 2025-02-13 | End: 2025-02-13 | Stop reason: HOSPADM

## 2025-02-13 RX ORDER — CEPHALEXIN 500 MG/1
500 CAPSULE ORAL 3 TIMES DAILY
Status: DISCONTINUED | OUTPATIENT
Start: 2025-02-14 | End: 2025-02-21 | Stop reason: HOSPADM

## 2025-02-13 RX ORDER — AMLODIPINE BESYLATE 5 MG/1
5 TABLET ORAL
Start: 2025-02-14 | End: 2025-02-21 | Stop reason: HOSPADM

## 2025-02-13 RX ORDER — PROCHLORPERAZINE EDISYLATE 5 MG/ML
5 INJECTION INTRAMUSCULAR; INTRAVENOUS EVERY 6 HOURS PRN
Status: DISCONTINUED | OUTPATIENT
Start: 2025-02-13 | End: 2025-02-21 | Stop reason: HOSPADM

## 2025-02-13 RX ORDER — PHENAZOPYRIDINE HYDROCHLORIDE 200 MG/1
200 TABLET, FILM COATED ORAL
Start: 2025-02-13 | End: 2025-02-16

## 2025-02-13 RX ORDER — DEXTROMETHORPHAN POLISTIREX 30 MG/5ML
30 SUSPENSION ORAL EVERY 12 HOURS SCHEDULED
Start: 2025-02-13 | End: 2025-02-18

## 2025-02-13 RX ORDER — AMLODIPINE BESYLATE 5 MG/1
5 TABLET ORAL ONCE
Status: COMPLETED | OUTPATIENT
Start: 2025-02-13 | End: 2025-02-13

## 2025-02-13 RX ORDER — BENZONATATE 100 MG/1
100 CAPSULE ORAL 3 TIMES DAILY PRN
Start: 2025-02-13

## 2025-02-13 RX ADMIN — DEXTROMETHORPHAN 60 MG: 30 SUSPENSION, EXTENDED RELEASE ORAL at 22:02

## 2025-02-13 RX ADMIN — CYCLOSPORINE 1 DROP: 0.5 EMULSION OPHTHALMIC at 09:44

## 2025-02-13 RX ADMIN — METOPROLOL TARTRATE 25 MG: 25 TABLET, FILM COATED ORAL at 22:02

## 2025-02-13 RX ADMIN — SENNOSIDES AND DOCUSATE SODIUM 2 TABLET: 50; 8.6 TABLET ORAL at 22:02

## 2025-02-13 RX ADMIN — PHENAZOPYRIDINE 200 MG: 200 TABLET ORAL at 09:45

## 2025-02-13 RX ADMIN — HYDRALAZINE HYDROCHLORIDE 10 MG: 20 INJECTION INTRAMUSCULAR; INTRAVENOUS at 18:31

## 2025-02-13 RX ADMIN — DEXTROMETHORPHAN 60 MG: 30 SUSPENSION, EXTENDED RELEASE ORAL at 09:45

## 2025-02-13 RX ADMIN — AMLODIPINE BESYLATE 5 MG: 5 TABLET ORAL at 17:04

## 2025-02-13 RX ADMIN — Medication 10 ML: at 22:03

## 2025-02-13 RX ADMIN — TAMSULOSIN HYDROCHLORIDE 0.4 MG: 0.4 CAPSULE ORAL at 22:02

## 2025-02-13 RX ADMIN — PROCHLORPERAZINE EDISYLATE 5 MG: 5 INJECTION INTRAMUSCULAR; INTRAVENOUS at 18:32

## 2025-02-13 RX ADMIN — BACLOFEN 10 MG: 10 TABLET ORAL at 22:02

## 2025-02-13 RX ADMIN — ONDANSETRON 4 MG: 4 TABLET, ORALLY DISINTEGRATING ORAL at 14:59

## 2025-02-13 RX ADMIN — PHENAZOPYRIDINE 200 MG: 200 TABLET ORAL at 17:04

## 2025-02-13 RX ADMIN — LATANOPROST 1 DROP: 50 SOLUTION/ DROPS OPHTHALMIC at 22:03

## 2025-02-13 RX ADMIN — HYDRALAZINE HYDROCHLORIDE 25 MG: 25 TABLET ORAL at 17:04

## 2025-02-13 RX ADMIN — CYCLOSPORINE 1 DROP: 0.5 EMULSION OPHTHALMIC at 22:02

## 2025-02-13 RX ADMIN — AMLODIPINE BESYLATE 2.5 MG: 2.5 TABLET ORAL at 09:45

## 2025-02-13 RX ADMIN — METOPROLOL TARTRATE 25 MG: 25 TABLET, FILM COATED ORAL at 09:45

## 2025-02-13 RX ADMIN — PHENAZOPYRIDINE 200 MG: 200 TABLET ORAL at 12:47

## 2025-02-13 RX ADMIN — CEFTRIAXONE SODIUM 1000 MG: 1 INJECTION, POWDER, FOR SOLUTION INTRAMUSCULAR; INTRAVENOUS at 09:45

## 2025-02-13 NOTE — PROGRESS NOTES
Name: Amandeep Manriquez Jr. ADMIT: 2025   : 1945  PCP: Ori Bejarano MD    MRN: [id number] LOS: 1 days   AGE/SEX: 80 y.o. male  ROOM: [facility]   Subjective   Chief Complaint   Patient presents with    Flank Pain     Mr. Manriquez is a 80 y.o. male with CAD, HTN, HLD, history CVA, recurrent kidney stones presenting with LLQ pain. He was found to have obstructing kidney stone and UTI       Cysto and stent with Dr. Daniels on 25    Pain is fair  Gen weakness- working with therapy  On abx  O2 at night  Mild cough fair  BP is up a little today         Objective   Vital Signs  Temp:  [97.5 °F (36.4 °C)-98.9 °F (37.2 °C)] 98.8 °F (37.1 °C)  Heart Rate:  [72-85] 75  Resp:  [16-20] 16  BP: (143-177)/(77-97) 177/97  SpO2:  [91 %-94 %] 91 %  on   ;   Device (Oxygen Therapy): room air  Body mass index is 30.04 kg/m².    Physical Exam  HENT:      Head: Normocephalic and atraumatic.   Eyes:      General: No scleral icterus.  Cardiovascular:      Rate and Rhythm: Normal rate and regular rhythm.      Heart sounds: Normal heart sounds.   Pulmonary:      Effort: Pulmonary effort is normal. No respiratory distress.      Breath sounds: Normal breath sounds.   Abdominal:      General: There is no distension.      Palpations: Abdomen is soft.      Tenderness: There is no abdominal tenderness.   Musculoskeletal:      Cervical back: Neck supple.   Neurological:      Mental Status: He is alert.   Psychiatric:         Behavior: Behavior normal.     Elderly, chronically ill   +khan    Results Review:       I reviewed the patient's new clinical results.  Results from last 7 days   Lab Units 02/10/25  0650 25  0446   WBC 10*3/mm3 10.05 14.41*   HEMOGLOBIN g/dL 14.5 15.2   PLATELETS 10*3/mm3 192 204     Results from last 7 days   Lab Units 02/10/25  0650 25  0446   SODIUM mmol/L 138 141   POTASSIUM mmol/L 3.9 3.8   CHLORIDE mmol/L 104 104   CO2 mmol/L 24.2 24.6   BUN mg/dL 8 14   CREATININE mg/dL 0.45*  "0.44*   GLUCOSE mg/dL 105* 158*   Estimated Creatinine Clearance: 120.5 mL/min (A) (by C-G formula based on SCr of 0.45 mg/dL (L)).  Results from last 7 days   Lab Units 02/09/25  0446   ALBUMIN g/dL 3.8   BILIRUBIN mg/dL 0.4   ALK PHOS U/L 107   AST (SGOT) U/L 21   ALT (SGPT) U/L 18     Results from last 7 days   Lab Units 02/10/25  0650 02/09/25  0446   CALCIUM mg/dL 9.3 9.3   ALBUMIN g/dL  --  3.8     Results from last 7 days   Lab Units 02/09/25  0446   PROCALCITONIN ng/mL 0.03   LACTATE mmol/L 1.5       Coag     HbA1C   Lab Results   Component Value Date    HGBA1C 5.4 06/09/2015     Infection   Results from last 7 days   Lab Units 02/09/25  1440 02/09/25  0808 02/09/25  0446   BLOODCX  No growth at 4 days No growth at 4 days  --    PROCALCITONIN ng/mL  --   --  0.03     Radiology(recent) No radiology results for the last day  No results found for: \"TROPONINT\", \"TROPONINI\", \"BNP\"  No components found for: \"TSH;2\"    [START ON 2/14/2025] amLODIPine, 5 mg, Oral, Q24H  amLODIPine, 5 mg, Oral, Once  baclofen, 10 mg, Oral, Nightly  [START ON 2/14/2025] cephalexin, 500 mg, Oral, TID  cycloSPORINE, 1 drop, Both Eyes, BID  dextromethorphan polistirex ER, 60 mg, Oral, Q12H  latanoprost, 1 drop, Both Eyes, Nightly  metoprolol tartrate, 25 mg, Oral, BID  phenazopyridine, 200 mg, Oral, TID With Meals  senna-docusate sodium, 2 tablet, Oral, BID  tamsulosin, 0.4 mg, Oral, Nightly       Diet: Regular/House; Texture: Soft to Chew (NDD 3); Soft to Chew: Chopped Meat; Fluid Consistency: Thin (IDDSI 0)      Assessment & Plan      Active Hospital Problems    Diagnosis  POA    **Ureterolithiasis [N20.1]  Yes    Leukocytosis [D72.829]  Yes    History of CVA (cerebrovascular accident) [Z86.73]  Not Applicable    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       Mr. Manriquez is a 80 y.o. male with CAD, HTN, HLD, history CVA, recurrent kidney stones presenting with LLQ pain. He was found to have obstructing kidney stone and possible UTI    Cysto and stent with Dr. Daniels on 2/9/25    On ABX, follow cultures    S/p IVFs.     +cough. viral panel negative. cough agents, IS, flutter    BP is elevated on metoprolol and have prn po hydralazine available. Will increase amlodipine    SCDs ordered for dvt proph    DW staff  DW family    Dispo- would likely benefit from rehab to get stronger. Maybe dc 2/13 or 2/14 when precent obtained      Antonio Gross MD  [facility]  02/13/25  16:09 EST

## 2025-02-13 NOTE — PLAN OF CARE
Goal Outcome Evaluation:  Plan of Care Reviewed With: patient, spouse           Outcome Evaluation: alert & oriented, forgetful at times, spouse at bedside--very helpful and supportive, room air when awake, 2L HS, last dose of iv abx given, BP high--gave one time dose of norvasc and also prn hydralazine x1, BM x1     1500- prn zofran given for c/o nausea. Pt vomited ~150mL around 1700. Prn compazine given x1. Rechecked BP after prn hydralazine PO given, 215/100. New orders for IV hydralazine 10mg x1

## 2025-02-13 NOTE — CASE MANAGEMENT/SOCIAL WORK
Continued Stay Note  [facility]     Patient Name: Amandeep Manriquez Jr.  MRN: [id number]  Today's Date: 2/13/2025    Admit Date: 2/9/2025    Plan: DC to [facility] pending precert.   Discharge Plan       Row Name 02/13/25 1309       Plan    Plan DC to [facility] pending precert.    Patient/Family in Agreement with Plan yes    Plan Comments [place] notified CCP that [facility] approved pt; [facility] submitted pt for precert. CCP met with pt and pt's wife at bedside. Pt's wife aware [facility] accepted and [facility] submitted precert. Pt's wife requested outpatient WC transportation information. CCP supplied pt's wife with outpatient WC transportation information. Plan will be to DC to [facility] pending precert. Pt will need transportation arranged. CCP will continue to follow. RLutes RN/CCP                   Discharge Codes    No documentation.                 Expected Discharge Date and Time       Expected Discharge Date Expected Discharge Time    Feb 13, 2025               Yessica Zhang RN

## 2025-02-13 NOTE — PLAN OF CARE
Goal Outcome Evaluation:   Alert and oriented X 4, but forgetful at times. VSS. Q2 repo. Davis cath maintained. Wife at bedside. 2L NC utilized while sleeping. No acute events overnight.

## 2025-02-13 NOTE — DISCHARGE SUMMARY
NAME: Amandeep Manriquez Jr. ADMIT: 2025   : 1945  PCP: Ori Bejarano MD    MRN: [id number] LOS: 2 days   AGE/SEX: 80 y.o. 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       SECONDARY DIAGNOSES  Past Medical History:   Diagnosis Date    Acute myocardial infarction     Aneurysm of right femoral artery     Arthritis     BPH (benign prostatic hyperplasia)     CAD (coronary artery disease), native coronary artery     Congenital myopathy     DDD (degenerative disc disease), lumbar     with stenosis    Diabetes mellitus     Dizziness     Chronic Dizziness    Encounter for Medicare annual wellness exam     Essential hypertension     Gastrointestinal hemorrhage     Hard to intubate     History of impacted cerumen     Hyperlipidemia     Hypertension     Mixed hyperlipidemia     Sleep apnea        CONSULTS   Urology    HOSPITAL COURSE  Mr. Manriquez is a 80 y.o. male with CAD, HTN, HLD, history CVA, recurrent kidney stones presenting with LLQ pain. He was found to have obstructing kidney stone and possible UTI     Cysto and stent with Dr. Daniels on 25. Given ABX. Cultures negative.   +cough. viral panel negative. cough agents, IS, flutter. S/p ceftriaxone. Also CT scan with abnormal RML opacities as listed below and recommend repeat CT chest in 3 months.     Patient would like to discharge to rehab to get stronger. Per Urology:   The patient will be  discharged home with a stent and the Khan catheter.  Patient will have bleeding around the Khan catheter.  My office will call to schedule follow-up in approximately 10 days for catheter removal.  Will then plan left-sided stone surgery.  Call if any questions.    S/p ceftriaxone here. Cultures negative. Macrobid at MS as per Dr. Daniels. Will hold his asa at [place] right now with Urology expecting some bleeding related to khan and bulbar urethral stricture.       Diet: Regular/House; Texture: Soft to Chew (NDD 3); Soft to Chew: Chopped Meat; Fluid Consistency: Thin (IDDSI 0)  Diet Effective Now        References:    Diet Order Definitions   Question Answer Comment   Diets: Regular/House     Texture: Soft to Chew (NDD 3)     Soft to Chew: Chopped Meat     Fluid Consistency: Thin (IDDSI 0)         02/09/25 1348           DIAGNOSTICS    Collected Updated Procedure Result Status    02/11/2025 1554 02/11/2025 1742 Respiratory Panel PCR w/COVID-19(SARS-CoV-2) BURCE/ZHANNA/AMA/PAD/COR/OSITO In-House, NP Swab in UTM/VTM, 2 HR TAT - Swab, Nasopharynx [[id number]]    Swab from Nasopharynx    Final result Component Value   ADENOVIRUS, PCR Not Detected   Coronavirus 229E Not Detected   Coronavirus HKU1 Not Detected   Coronavirus NL63 Not Detected   Coronavirus OC43 Not Detected   COVID19 Not Detected   Human Metapneumovirus Not Detected   Human Rhinovirus/Enterovirus Not Detected   Influenza A PCR Not Detected   Influenza B PCR Not Detected   Parainfluenza Virus 1 Not Detected   Parainfluenza Virus 2 Not Detected   Parainfluenza Virus 3 Not Detected   Parainfluenza Virus 4 Not Detected   RSV, PCR Not Detected   Bordetella pertussis pcr Not Detected   Bordetella parapertussis PCR Not Detected   Chlamydophila pneumoniae PCR Not Detected   Mycoplasma pneumo by PCR Not Detected             02/10/2025 0650 02/10/2025 0727 Basic Metabolic Panel [[id number]]    (Abnormal)   Blood    Final result Component Value Units   Glucose 105 High  mg/dL    BUN 8 mg/dL   Creatinine 0.45 Low  mg/dL   Sodium 138 mmol/L   Potassium 3.9  mmol/L   Chloride 104 mmol/L   CO2 24.2 mmol/L   Calcium 9.3 mg/dL   BUN/Creatinine Ratio 17.8    Anion Gap 9.8 mmol/L   eGFR 105.1 mL/min/1.73          02/10/2025 0650 02/10/2025 0720 CBC (No Diff) [[id number]]   Blood    Final result Component Value Units   WBC 10.05 10*3/mm3   RBC 4.73 10*6/mm3   Hemoglobin 14.5 g/dL   Hematocrit 43.7 %   MCV 92.4 fL   MCH 30.7 pg   MCHC 33.2 g/dL   RDW 12.4 %   RDW-SD 41.8 fl   MPV 9.7 fL   Platelets 192 10*3/mm3        This procedure was auto-finalized with no dictation required.    CT Abdomen Pelvis Without Contrast [[id number]] Peyman as Reviewed   Order Status: Completed Collected: 02/09/25 0654    Updated: 02/09/25 0708   Narrative:     CT ABDOMEN PELVIS WO CONTRAST-     INDICATIONS: Left flank pain     TECHNIQUE: Radiation dose reduction techniques were utilized, including  automated exposure control and exposure modulation based on body size.  Unenhanced ABDOMEN AND PELVIS CT     COMPARISON: 1/22/2020     FINDINGS:     At the mid left ureter, a 1.0 x 1.2 cm stone is present with moderate  left hydronephrosis. Increased stranding around the left kidney suggests  inflammation/infection. Nonobstructive stones are seen in the kidneys,  as large as 1.2 cm on the left, 1.3 cm on the right. No right  hydronephrosis. Left renal cyst is again demonstrated.     Otherwise unremarkable appearance of the liver, spleen, adrenal glands,  pancreas, kidneys, bladder.     No bowel obstruction or abnormal bowel thickening is identified. The  appendix does not appear inflamed. Colonic diverticula are seen that do  not appear inflamed.     No free intraperitoneal gas or free fluid.     Scattered small mesenteric and para-aortic lymph nodes are seen that are  not significant by size criteria.     Abdominal aorta is not aneurysmal. Aortic and other arterial  calcifications are present.     The lung bases show  "granulomatous disease, atelectasis. Reticulonodular  opacity in the right middle lobe suggests inflammatory/infectious  etiology, for example axial image 10, clinical correlation and CT  follow-up advised to exclude any possibility of an underlying lesion.  Largest nodular density in this region measures 6 mm on axial image 12.     Degenerative changes are seen in the spine. No acute fracture is  identified.            Impression:           1. A 1.2 cm stone at the mid left ureter, with moderate left  hydronephrosis, and increased stranding around the left kidney,  suggesting inflammation/infection. Bilateral nonobstructive  nephrolithiasis.     2. Colonic diverticulosis. No acute inflammatory process of bowel is  identified.     3. Right middle lobe pulmonary reticulonodular opacities suggest  inflammatory/infectious etiology, clinical correlation and CT follow-up  recommended.          PHYSICAL EXAM  Objective:  Vital signs: (most recent): Blood pressure 137/75, pulse 74, temperature 99.1 °F (37.3 °C), temperature source Oral, resp. rate 18, height 162.6 cm (64\"), weight 79.4 kg (175 lb), SpO2 (!) 88%.                Alert  nad  No resp distress  Elderly, chronically ill  +khan    CONDITION ON DISCHARGE  Stable.      DISCHARGE DISPOSITION   Skilled Nursing Facility (DC - External)      DISCHARGE MEDICATIONS       Your medication list        START taking these medications        Instructions Last Dose Given Next Dose Due   amLODIPine 5 MG tablet  Commonly known as: NORVASC      Take 1 tablet by mouth Daily.       benzonatate 100 MG capsule  Commonly known as: TESSALON      Take 1 capsule by mouth 3 (Three) Times a Day As Needed for Cough.       dextromethorphan polistirex ER 30 MG/5ML Suspension Extended Release oral suspension  Commonly known as: DELSYM      Take 5 mL by mouth Every 12 (Twelve) Hours for 5 days.       nitrofurantoin (macrocrystal-monohydrate) 100 MG capsule  Commonly known as: Macrobid      Take " 1 capsule by mouth Daily.       phenazopyridine 200 MG tablet  Commonly known as: PYRIDIUM      Take 1 tablet by mouth 3 (Three) Times a Day With Meals for 3 days.       sennosides-docusate 8.6-50 MG per tablet  Commonly known as: PERICOLACE      Take 2 tablets by mouth 2 (Two) Times a Day.              CONTINUE taking these medications        Instructions Last Dose Given Next Dose Due   acetaminophen 325 MG tablet  Commonly known as: TYLENOL      Take 2 tablets by mouth Every 4 (Four) Hours As Needed for Mild Pain .       baclofen 10 MG tablet  Commonly known as: LIORESAL      Take 1 tablet by mouth Every Night.       cycloSPORINE 0.05 % ophthalmic emulsion  Commonly known as: RESTASIS      1 drop 2 (Two) Times a Day.       latanoprost 0.005 % ophthalmic solution  Commonly known as: XALATAN      Administer 1 drop to both eyes Every Night.       metoprolol tartrate 25 MG tablet  Commonly known as: LOPRESSOR      Take 0.5 tablets by mouth 2 (Two) Times a Day.       Myrbetriq 50 MG tablet sustained-release 24 hour 24 hr tablet  Generic drug: Mirabegron ER      TAKE ONE TABLET BY MOUTH DAILY       nitroglycerin 0.4 MG SL tablet  Commonly known as: NITROSTAT      Place 1 tablet under the tongue. For chest pain.  If pain remains after 5 minutes, call 911 and repeat dose.  Max 3 tabs in 15 minutes.       PRESERVISION AREDS 2+MULTI VIT PO      Take 1 tablet by mouth Daily.       rosuvastatin 40 MG tablet  Commonly known as: CRESTOR      TAKE ONE TABLET BY MOUTH DAILY       tamsulosin 0.4 MG capsule 24 hr capsule  Commonly known as: FLOMAX      Take 1 capsule by mouth Every Night.              STOP taking these medications      aspirin 81 MG tablet                  Where to Get Your Medications        These medications were sent to [place] [address]      Hours: Monday to Friday 7 AM to 6 PM, Saturday & Sunday 8 AM to 4:30 PM (Closed 12 PM to 12:30 PM) Phone:  [phone]   nitrofurantoin (macrocrystal-monohydrate) 100 MG capsule       Information about where to get these medications is not yet available    Ask your nurse or doctor about these medications  amLODIPine 5 MG tablet  benzonatate 100 MG capsule  dextromethorphan polistirex ER 30 MG/5ML Suspension Extended Release oral suspension  phenazopyridine 200 MG tablet  sennosides-docusate 8.6-50 MG per tablet        No future appointments.   Contact information for follow-up providers       Ori Bejarano MD .    Specialty: Internal Medicine  Contact information:  [facility] [place] [address] [phone]                       Contact information for after-discharge care       Home Medical Care       [facility] .    Services: Home Health Services, Home Medical , Home Rehabilitation, Home Nursing  Contact information:  [address] [place] [id number] [phone]                                   TEST  RESULTS PENDING AT DISCHARGE  Pending Labs       Order Current Status    Blood Culture - Blood, Arm, Left Preliminary result               Antonio Gross MD  [facility]  02/14/25  11:01 EST      Time: greater than 32 minutes on discharge  It was a pleasure taking care of this patient while in the hospital.

## 2025-02-14 LAB
BACTERIA SPEC AEROBE CULT: NORMAL
BACTERIA SPEC AEROBE CULT: NORMAL

## 2025-02-14 RX ADMIN — BACLOFEN 10 MG: 10 TABLET ORAL at 21:16

## 2025-02-14 RX ADMIN — PHENAZOPYRIDINE 200 MG: 200 TABLET ORAL at 11:31

## 2025-02-14 RX ADMIN — SENNOSIDES AND DOCUSATE SODIUM 2 TABLET: 50; 8.6 TABLET ORAL at 21:55

## 2025-02-14 RX ADMIN — PHENAZOPYRIDINE 200 MG: 200 TABLET ORAL at 08:48

## 2025-02-14 RX ADMIN — DEXTROMETHORPHAN 60 MG: 30 SUSPENSION, EXTENDED RELEASE ORAL at 08:49

## 2025-02-14 RX ADMIN — SENNOSIDES AND DOCUSATE SODIUM 2 TABLET: 50; 8.6 TABLET ORAL at 08:48

## 2025-02-14 RX ADMIN — CEPHALEXIN 500 MG: 500 CAPSULE ORAL at 08:48

## 2025-02-14 RX ADMIN — AMLODIPINE BESYLATE 5 MG: 5 TABLET ORAL at 08:48

## 2025-02-14 RX ADMIN — DEXTROMETHORPHAN 60 MG: 30 SUSPENSION, EXTENDED RELEASE ORAL at 21:16

## 2025-02-14 RX ADMIN — CEPHALEXIN 500 MG: 500 CAPSULE ORAL at 21:16

## 2025-02-14 RX ADMIN — LATANOPROST 1 DROP: 50 SOLUTION/ DROPS OPHTHALMIC at 21:16

## 2025-02-14 RX ADMIN — Medication 10 ML: at 21:16

## 2025-02-14 RX ADMIN — CEPHALEXIN 500 MG: 500 CAPSULE ORAL at 15:36

## 2025-02-14 RX ADMIN — CYCLOSPORINE 1 DROP: 0.5 EMULSION OPHTHALMIC at 08:48

## 2025-02-14 RX ADMIN — METOPROLOL TARTRATE 25 MG: 25 TABLET, FILM COATED ORAL at 21:16

## 2025-02-14 RX ADMIN — CYCLOSPORINE 1 DROP: 0.5 EMULSION OPHTHALMIC at 21:16

## 2025-02-14 RX ADMIN — TAMSULOSIN HYDROCHLORIDE 0.4 MG: 0.4 CAPSULE ORAL at 21:16

## 2025-02-14 RX ADMIN — METOPROLOL TARTRATE 25 MG: 25 TABLET, FILM COATED ORAL at 08:49

## 2025-02-14 RX ADMIN — PHENAZOPYRIDINE 200 MG: 200 TABLET ORAL at 17:13

## 2025-02-14 NOTE — CASE MANAGEMENT/SOCIAL WORK
Post-Acute Authorization Submission      Post Acute Pre-Cert Documentation  Request Submitted by Facility - Type:: Post Acute  Post-Acute Authorization Type Submitted:: IRF  Response Received from Insurance?: P2P Requested  Action Taken by Requesting Facility:: P2P Not Completed  Response Communicated to::               SOUMYA Gramajo

## 2025-02-14 NOTE — PLAN OF CARE
Goal Outcome Evaluation:         Patient alert and oriented, wife at bedside, o2 @ 3L, up w/assisxt x2, to be d/c w/khan and follow up as outpatient. Plan of care is ongoing.

## 2025-02-14 NOTE — PLAN OF CARE
Goal Outcome Evaluation:      Patient still awaiting placement. Up with assistance  with PT today. No complaints of any pain. Tolerating diet well. Wife at bedside

## 2025-02-15 RX ORDER — AMLODIPINE BESYLATE 2.5 MG/1
2.5 TABLET ORAL
Status: DISCONTINUED | OUTPATIENT
Start: 2025-02-16 | End: 2025-02-20

## 2025-02-15 RX ADMIN — CEPHALEXIN 500 MG: 500 CAPSULE ORAL at 16:23

## 2025-02-15 RX ADMIN — DEXTROMETHORPHAN 60 MG: 30 SUSPENSION, EXTENDED RELEASE ORAL at 23:49

## 2025-02-15 RX ADMIN — CEPHALEXIN 500 MG: 500 CAPSULE ORAL at 09:09

## 2025-02-15 RX ADMIN — CYCLOSPORINE 1 DROP: 0.5 EMULSION OPHTHALMIC at 23:54

## 2025-02-15 RX ADMIN — METOPROLOL TARTRATE 25 MG: 25 TABLET, FILM COATED ORAL at 23:47

## 2025-02-15 RX ADMIN — TAMSULOSIN HYDROCHLORIDE 0.4 MG: 0.4 CAPSULE ORAL at 23:47

## 2025-02-15 RX ADMIN — SENNOSIDES AND DOCUSATE SODIUM 2 TABLET: 50; 8.6 TABLET ORAL at 23:47

## 2025-02-15 RX ADMIN — AMLODIPINE BESYLATE 5 MG: 5 TABLET ORAL at 09:09

## 2025-02-15 RX ADMIN — CYCLOSPORINE 1 DROP: 0.5 EMULSION OPHTHALMIC at 09:09

## 2025-02-15 RX ADMIN — METOPROLOL TARTRATE 25 MG: 25 TABLET, FILM COATED ORAL at 09:08

## 2025-02-15 RX ADMIN — LATANOPROST 1 DROP: 50 SOLUTION/ DROPS OPHTHALMIC at 23:48

## 2025-02-15 RX ADMIN — PHENAZOPYRIDINE 200 MG: 200 TABLET ORAL at 11:56

## 2025-02-15 RX ADMIN — BACLOFEN 10 MG: 10 TABLET ORAL at 23:47

## 2025-02-15 RX ADMIN — CEPHALEXIN 500 MG: 500 CAPSULE ORAL at 23:47

## 2025-02-15 RX ADMIN — ACETAMINOPHEN 650 MG: 325 TABLET, FILM COATED ORAL at 17:06

## 2025-02-15 RX ADMIN — DEXTROMETHORPHAN 60 MG: 30 SUSPENSION, EXTENDED RELEASE ORAL at 09:09

## 2025-02-15 RX ADMIN — SENNOSIDES AND DOCUSATE SODIUM 2 TABLET: 50; 8.6 TABLET ORAL at 09:09

## 2025-02-15 RX ADMIN — PHENAZOPYRIDINE 200 MG: 200 TABLET ORAL at 09:08

## 2025-02-15 RX ADMIN — PHENAZOPYRIDINE 200 MG: 200 TABLET ORAL at 17:06

## 2025-02-15 NOTE — PLAN OF CARE
Goal Outcome Evaluation:      Patient A/Ox4,confused at times. Davis catheter in place, dark urine. Incontinent of stool at times, wearing brief. X1 BM. Vitals stable, 2L oxygen nasal canula, continuous pulse ox. Tolerating diet, takes pills whole. Q2 turns. Accumax on bed. PT to see patient tomorrow. SCDs on, patient refuses at times.

## 2025-02-15 NOTE — PROGRESS NOTES
Name: Amandeep Manriquez Jr. ADMIT: 2025   : 1945  PCP: Ori Bejarano MD    MRN: [id number] LOS: 3 days   AGE/SEX: 80 y.o. male  ROOM: [facility]   Subjective   Chief Complaint   Patient presents with    Flank Pain     Mr. Manriquez is a 80 y.o. male with CAD, HTN, HLD, history CVA, recurrent kidney stones presenting with LLQ pain. He was found to have obstructing kidney stone and UTI       Cysto and stent with Dr. Daniels on 25    Pain is fair  Gen weakness  On abx  O2 at night  Mild cough fair  BP not as elevated past couple of days  Abnormal urine color related to stent and pyridium        Objective   Vital Signs  Temp:  [98.1 °F (36.7 °C)-98.6 °F (37 °C)] 98.4 °F (36.9 °C)  Heart Rate:  [75-89] 75  Resp:  [16-18] 16  BP: (105-119)/(61-75) 119/68  SpO2:  [90 %-94 %] 90 %  on   ;   Device (Oxygen Therapy): room air  Body mass index is 29.78 kg/m².    Physical Exam  HENT:      Head: Normocephalic and atraumatic.   Eyes:      General: No scleral icterus.  Cardiovascular:      Rate and Rhythm: Normal rate and regular rhythm.      Heart sounds: Normal heart sounds.   Pulmonary:      Effort: Pulmonary effort is normal. No respiratory distress.      Breath sounds: Normal breath sounds.   Abdominal:      General: There is no distension.      Palpations: Abdomen is soft.      Tenderness: There is no abdominal tenderness.   Musculoskeletal:      Cervical back: Neck supple.   Neurological:      Mental Status: He is alert.   Psychiatric:         Behavior: Behavior normal.     Elderly, chronically ill   +khan  Abnormal urine color related to stent and pyridium  Similar exam to yesterday     Results Review:       I reviewed the patient's new clinical results.  Results from last 7 days   Lab Units 02/10/25  0650 25  0446   WBC 10*3/mm3 10.05 14.41*   HEMOGLOBIN g/dL 14.5 15.2   PLATELETS 10*3/mm3 192 204     Results from last 7 days   Lab Units 02/10/25  0650 25  0446   SODIUM mmol/L 138 141  "  POTASSIUM mmol/L 3.9 3.8   CHLORIDE mmol/L 104 104   CO2 mmol/L 24.2 24.6   BUN mg/dL 8 14   CREATININE mg/dL 0.45* 0.44*   GLUCOSE mg/dL 105* 158*   Estimated Creatinine Clearance: 119.9 mL/min (A) (by C-G formula based on SCr of 0.45 mg/dL (L)).  Results from last 7 days   Lab Units 02/09/25  0446   ALBUMIN g/dL 3.8   BILIRUBIN mg/dL 0.4   ALK PHOS U/L 107   AST (SGOT) U/L 21   ALT (SGPT) U/L 18     Results from last 7 days   Lab Units 02/10/25  0650 02/09/25  0446   CALCIUM mg/dL 9.3 9.3   ALBUMIN g/dL  --  3.8     Results from last 7 days   Lab Units 02/09/25  0446   PROCALCITONIN ng/mL 0.03   LACTATE mmol/L 1.5       Coag     HbA1C   Lab Results   Component Value Date    HGBA1C 5.4 06/09/2015     Infection   Results from last 7 days   Lab Units 02/09/25  1440 02/09/25  0808 02/09/25  0446   BLOODCX  No growth at 5 days No growth at 5 days  --    PROCALCITONIN ng/mL  --   --  0.03     Radiology(recent) XR Abdomen KUB    Result Date: 2/13/2025   Probable left nephrolithiasis, left double-J ureteral stent in place. Bowel gas pattern within normal limits.  This report was finalized on 2/13/2025 7:15 PM by Dr. Aguilar Pinedo M.D on Workstation: [id number]     No results found for: \"TROPONINT\", \"TROPONINI\", \"BNP\"  No components found for: \"TSH;2\"    [START ON 2/16/2025] amLODIPine, 2.5 mg, Oral, Q24H  baclofen, 10 mg, Oral, Nightly  cephalexin, 500 mg, Oral, TID  cycloSPORINE, 1 drop, Both Eyes, BID  dextromethorphan polistirex ER, 60 mg, Oral, Q12H  latanoprost, 1 drop, Both Eyes, Nightly  metoprolol tartrate, 25 mg, Oral, BID  phenazopyridine, 200 mg, Oral, TID With Meals  senna-docusate sodium, 2 tablet, Oral, BID  tamsulosin, 0.4 mg, Oral, Nightly       Diet: Regular/House; Texture: Soft to Chew (NDD 3); Soft to Chew: Chopped Meat; Fluid Consistency: Thin (IDDSI 0)      Assessment & Plan      Active Hospital Problems    Diagnosis  POA    **Ureterolithiasis [N20.1]  Yes    Leukocytosis [D72.829]  Yes    " History of CVA (cerebrovascular accident) [Z86.73]  Not Applicable    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       Mr. Manriquez is a 80 y.o. male with CAD, HTN, HLD, history CVA, recurrent kidney stones presenting with LLQ pain. He was found to have obstructing kidney stone and possible UTI    Cysto and stent with Dr. Daniels on 2/9/25    On ABX, cultures negative on keflex s/p previous ceftriaxone  Abnormal urine color related to stent and pyridium    S/p IVFs.     +cough. viral panel negative. cough agents, IS, flutter    BP is improved on metoprolol, amlodipine and have prn po hydralazine available. Decrease amlodipine    SCDs ordered for dvt proph. No lovenox/asa with risk of hematuria    DW staff  DW family    Dispo- would likely benefit from rehab to get stronger. dc to rehab when precent obtained. Family current appealing insurance denial. Will have PT reconsulted as likely they thought patient had left.       Antonio Gross MD  [facility]  02/15/25  16:09 EST

## 2025-02-15 NOTE — PROGRESS NOTES
Name: Amandeep Manriquez Jr. ADMIT: 2025   : 1945  PCP: Ori Bejarano MD    MRN: [id number] LOS: 3 days   AGE/SEX: 80 y.o. male  ROOM: [facility]   Subjective   Chief Complaint   Patient presents with    Flank Pain     Mr. Manriquez is a 80 y.o. male with CAD, HTN, HLD, history CVA, recurrent kidney stones presenting with LLQ pain. He was found to have obstructing kidney stone and UTI       Cysto and stent with Dr. Daniels on 25    Pain is fair  Gen weakness  On abx  O2 at night  Mild cough fair  BP had been elevated but better        Objective   Vital Signs  Temp:  [98.1 °F (36.7 °C)-98.6 °F (37 °C)] 98.4 °F (36.9 °C)  Heart Rate:  [75-89] 75  Resp:  [16-18] 16  BP: (105-119)/(61-75) 119/68  SpO2:  [90 %-94 %] 90 %  on   ;   Device (Oxygen Therapy): room air  Body mass index is 29.78 kg/m².    Physical Exam  HENT:      Head: Normocephalic and atraumatic.   Eyes:      General: No scleral icterus.  Cardiovascular:      Rate and Rhythm: Normal rate and regular rhythm.      Heart sounds: Normal heart sounds.   Pulmonary:      Effort: Pulmonary effort is normal. No respiratory distress.      Breath sounds: Normal breath sounds.   Abdominal:      General: There is no distension.      Palpations: Abdomen is soft.      Tenderness: There is no abdominal tenderness.   Musculoskeletal:      Cervical back: Neck supple.   Neurological:      Mental Status: He is alert.   Psychiatric:         Behavior: Behavior normal.     Elderly, chronically ill   +khan  Similar exam to yesterday     Results Review:       I reviewed the patient's new clinical results.  Results from last 7 days   Lab Units 02/10/25  0650 25  0446   WBC 10*3/mm3 10.05 14.41*   HEMOGLOBIN g/dL 14.5 15.2   PLATELETS 10*3/mm3 192 204     Results from last 7 days   Lab Units 02/10/25  0650 25  0446   SODIUM mmol/L 138 141   POTASSIUM mmol/L 3.9 3.8   CHLORIDE mmol/L 104 104   CO2 mmol/L 24.2 24.6   BUN mg/dL 8 14   CREATININE mg/dL  "0.45* 0.44*   GLUCOSE mg/dL 105* 158*   Estimated Creatinine Clearance: 119.9 mL/min (A) (by C-G formula based on SCr of 0.45 mg/dL (L)).  Results from last 7 days   Lab Units 02/09/25  0446   ALBUMIN g/dL 3.8   BILIRUBIN mg/dL 0.4   ALK PHOS U/L 107   AST (SGOT) U/L 21   ALT (SGPT) U/L 18     Results from last 7 days   Lab Units 02/10/25  0650 02/09/25  0446   CALCIUM mg/dL 9.3 9.3   ALBUMIN g/dL  --  3.8     Results from last 7 days   Lab Units 02/09/25  0446   PROCALCITONIN ng/mL 0.03   LACTATE mmol/L 1.5       Coag     HbA1C   Lab Results   Component Value Date    HGBA1C 5.4 06/09/2015     Infection   Results from last 7 days   Lab Units 02/09/25  1440 02/09/25  0808 02/09/25  0446   BLOODCX  No growth at 5 days No growth at 5 days  --    PROCALCITONIN ng/mL  --   --  0.03     Radiology(recent) XR Abdomen KUB    Result Date: 2/13/2025   Probable left nephrolithiasis, left double-J ureteral stent in place. Bowel gas pattern within normal limits.  This report was finalized on 2/13/2025 7:15 PM by Dr. Aguilar Pinedo M.D on Workstation: [id number]     No results found for: \"TROPONINT\", \"TROPONINI\", \"BNP\"  No components found for: \"TSH;2\"    [START ON 2/16/2025] amLODIPine, 2.5 mg, Oral, Q24H  baclofen, 10 mg, Oral, Nightly  cephalexin, 500 mg, Oral, TID  cycloSPORINE, 1 drop, Both Eyes, BID  dextromethorphan polistirex ER, 60 mg, Oral, Q12H  latanoprost, 1 drop, Both Eyes, Nightly  metoprolol tartrate, 25 mg, Oral, BID  phenazopyridine, 200 mg, Oral, TID With Meals  senna-docusate sodium, 2 tablet, Oral, BID  tamsulosin, 0.4 mg, Oral, Nightly       Diet: Regular/House; Texture: Soft to Chew (NDD 3); Soft to Chew: Chopped Meat; Fluid Consistency: Thin (IDDSI 0)      Assessment & Plan      Active Hospital Problems    Diagnosis  POA    **Ureterolithiasis [N20.1]  Yes    Leukocytosis [D72.829]  Yes    History of CVA (cerebrovascular accident) [Z86.73]  Not Applicable    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       Mr. Manriquez is a 80 y.o. male with CAD, HTN, HLD, history CVA, recurrent kidney stones presenting with LLQ pain. He was found to have obstructing kidney stone and possible UTI    Cysto and stent with Dr. Daniels on 2/9/25    On ABX, follow cultures    S/p IVFs.     +cough. viral panel negative. cough agents, IS, flutter    BP is elevated on metoprolol, amlodipine and have prn po hydralazine available.     SCDs ordered for dvt proph. No lovenox/asa with risk of hematuria    DW staff  DW family    Dispo- would likely benefit from rehab to get stronger. dc to rehab when precent obtained      Antonio Gross MD  [facility]  02/15/25  16:09 EST

## 2025-02-15 NOTE — PLAN OF CARE
Goal Outcome Evaluation:      Patient alert and oriented, O2 2L, assist x2, wears briefs, should d/c rehab in the near future and will need transportation. Wife at bedside this shift. Plan of care is ongoing.

## 2025-02-15 NOTE — NURSING NOTE
Assumed care for patient at approximately 1500.  Reviewed previous RN documentation and in agreement.  Supportive family at bedside. Swallows pills whole with water.  Davis catheter to bedside drainage.  Medicated once for pain.

## 2025-02-16 PROCEDURE — 97530 THERAPEUTIC ACTIVITIES: CPT

## 2025-02-16 RX ADMIN — METOPROLOL TARTRATE 25 MG: 25 TABLET, FILM COATED ORAL at 23:00

## 2025-02-16 RX ADMIN — CEPHALEXIN 500 MG: 500 CAPSULE ORAL at 15:20

## 2025-02-16 RX ADMIN — LATANOPROST 1 DROP: 50 SOLUTION/ DROPS OPHTHALMIC at 22:46

## 2025-02-16 RX ADMIN — BACLOFEN 10 MG: 10 TABLET ORAL at 23:00

## 2025-02-16 RX ADMIN — ACETAMINOPHEN 650 MG: 325 TABLET, FILM COATED ORAL at 13:13

## 2025-02-16 RX ADMIN — Medication 10 ML: at 22:46

## 2025-02-16 RX ADMIN — CYCLOSPORINE 1 DROP: 0.5 EMULSION OPHTHALMIC at 22:46

## 2025-02-16 RX ADMIN — CEPHALEXIN 500 MG: 500 CAPSULE ORAL at 22:46

## 2025-02-16 RX ADMIN — CYCLOSPORINE 1 DROP: 0.5 EMULSION OPHTHALMIC at 10:12

## 2025-02-16 RX ADMIN — CEPHALEXIN 500 MG: 500 CAPSULE ORAL at 10:11

## 2025-02-16 RX ADMIN — AMLODIPINE BESYLATE 2.5 MG: 2.5 TABLET ORAL at 10:11

## 2025-02-16 RX ADMIN — PHENAZOPYRIDINE 200 MG: 200 TABLET ORAL at 10:11

## 2025-02-16 RX ADMIN — METOPROLOL TARTRATE 25 MG: 25 TABLET, FILM COATED ORAL at 10:11

## 2025-02-16 RX ADMIN — DEXTROMETHORPHAN 60 MG: 30 SUSPENSION, EXTENDED RELEASE ORAL at 10:12

## 2025-02-16 RX ADMIN — DEXTROMETHORPHAN 60 MG: 30 SUSPENSION, EXTENDED RELEASE ORAL at 22:46

## 2025-02-16 RX ADMIN — PHENAZOPYRIDINE 200 MG: 200 TABLET ORAL at 16:28

## 2025-02-16 RX ADMIN — TAMSULOSIN HYDROCHLORIDE 0.4 MG: 0.4 CAPSULE ORAL at 22:46

## 2025-02-16 NOTE — PLAN OF CARE
Goal Outcome Evaluation:      Patient A/Ox4,confused at times. Davis catheter in place, dark urine. Tolerating diet, taking pills whole. Instructions provided on incentive spirometer. Worked with PT today. 2L oxygen nasal canula when resting, continuous pulse ox. Q2 turns. Accumax on bed. Refusing SCDs, education provided. Family has remained at the bedside.

## 2025-02-16 NOTE — PLAN OF CARE
Goal Outcome Evaluation:      Patient alert and oriented, family at bedside, o2 2L, q2h turn, assist of 2, wears briefs, awaiting placement. Plan of care is ongoing.

## 2025-02-16 NOTE — PLAN OF CARE
Goal Outcome Evaluation:  Plan of Care Reviewed With: patient, spouse        Progress: no change  Outcome Evaluation: Pt was agreeable to working with PT. He required modA to sit up to EOB and SBA to return supine. Pt was educated on use of leg  to promote increased indepndence. He stood with modA and rwx. He required cuing for sequence and forward weight shifting. Pt will benefit from continued skilled PT addressing limitations in functional mobility to maximize safety and independence.

## 2025-02-16 NOTE — PROGRESS NOTES
Name: Amandeep Manriquez Jr. ADMIT: 2025   : 1945  PCP: Ori Bejarano MD    MRN: [id number] LOS: 4 days   AGE/SEX: 80 y.o. male  ROOM: [facility]   Subjective   Chief Complaint   Patient presents with    Flank Pain     Mr. Manriquez is a 80 y.o. male with CAD, HTN, HLD, history CVA, recurrent kidney stones presenting with LLQ pain. He was found to have obstructing kidney stone and UTI       Cysto and stent with Dr. Daniels on 25    Pain is fair- sometimes with mild pain but better today   Gen weakness  On abx  O2 at night  Mild cough fair  BP currently controlled  Abnormal urine color related to stent and pyridium        Objective   Vital Signs  Temp:  [97.4 °F (36.3 °C)-97.7 °F (36.5 °C)] 97.4 °F (36.3 °C)  Heart Rate:  [77-83] 77  Resp:  [16-18] 18  BP: (114-123)/(64-76) 123/64  SpO2:  [92 %-93 %] 93 %  on  Flow (L/min) (Oxygen Therapy):  [2] 2;   Device (Oxygen Therapy): room air  Body mass index is 29.78 kg/m².    Physical Exam  HENT:      Head: Normocephalic and atraumatic.   Eyes:      General: No scleral icterus.  Cardiovascular:      Rate and Rhythm: Normal rate and regular rhythm.      Heart sounds: Normal heart sounds.   Pulmonary:      Effort: Pulmonary effort is normal. No respiratory distress.      Breath sounds: Normal breath sounds.   Abdominal:      General: There is no distension.      Palpations: Abdomen is soft.      Tenderness: There is no abdominal tenderness.   Musculoskeletal:      Cervical back: Neck supple.   Neurological:      Mental Status: He is alert.   Psychiatric:         Behavior: Behavior normal.     Elderly, chronically ill   +khan  Abnormal urine color related to stent and pyridium  Similar exam to past couple of days     Results Review:       I reviewed the patient's new clinical results.  Results from last 7 days   Lab Units 02/10/25  0650   WBC 10*3/mm3 10.05   HEMOGLOBIN g/dL 14.5   PLATELETS 10*3/mm3 192     Results from last 7 days   Lab Units  "02/10/25  0650   SODIUM mmol/L 138   POTASSIUM mmol/L 3.9   CHLORIDE mmol/L 104   CO2 mmol/L 24.2   BUN mg/dL 8   CREATININE mg/dL 0.45*   GLUCOSE mg/dL 105*   Estimated Creatinine Clearance: 119.9 mL/min (A) (by C-G formula based on SCr of 0.45 mg/dL (L)).        Results from last 7 days   Lab Units 02/10/25  0650   CALCIUM mg/dL 9.3             Coag     HbA1C   Lab Results   Component Value Date    HGBA1C 5.4 06/09/2015     Infection   Results from last 7 days   Lab Units 02/09/25  1440   BLOODCX  No growth at 5 days     Radiology(recent) No radiology results for the last day  No results found for: \"TROPONINT\", \"TROPONINI\", \"BNP\"  No components found for: \"TSH;2\"    amLODIPine, 2.5 mg, Oral, Q24H  baclofen, 10 mg, Oral, Nightly  cephalexin, 500 mg, Oral, TID  cycloSPORINE, 1 drop, Both Eyes, BID  dextromethorphan polistirex ER, 60 mg, Oral, Q12H  latanoprost, 1 drop, Both Eyes, Nightly  metoprolol tartrate, 25 mg, Oral, BID  phenazopyridine, 200 mg, Oral, TID With Meals  senna-docusate sodium, 2 tablet, Oral, BID  tamsulosin, 0.4 mg, Oral, Nightly       Diet: Regular/House; Texture: Soft to Chew (NDD 3); Soft to Chew: Chopped Meat; Fluid Consistency: Thin (IDDSI 0)      Assessment & Plan      Active Hospital Problems    Diagnosis  POA    **Ureterolithiasis [N20.1]  Yes    Leukocytosis [D72.829]  Yes    History of CVA (cerebrovascular accident) [Z86.73]  Not Applicable    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       Mr. Manriquez is a 80 y.o. male with CAD, HTN, HLD, history CVA, recurrent kidney stones presenting with LLQ pain. He was found to have obstructing kidney stone and possible UTI    Cysto and stent with Dr. Daniels on 2/9/25    On ABX, cultures negative on keflex s/p previous ceftriaxone  Abnormal " urine color related to stent and pyridium    S/p IVFs.     +cough. viral panel negative. cough agents, IS, flutter    BP is improved on metoprolol, amlodipine and have prn po hydralazine available. Decreased amlodipine    Can repeat labs in AM    SCDs ordered for dvt proph. No lovenox/asa with risk of hematuria    DW staff  DW family    Dispo- would likely benefit from rehab to get stronger. dc to rehab when precent obtained. Family current appealing insurance denial.       Antonio Gross MD  [facility]  02/16/25  16:09 EST

## 2025-02-17 LAB
ANION GAP SERPL CALCULATED.3IONS-SCNC: 14.3 MMOL/L (ref 5–15)
BUN SERPL-MCNC: 18 MG/DL (ref 8–23)
BUN/CREAT SERPL: 36 (ref 7–25)
CALCIUM SPEC-SCNC: 9.5 MG/DL (ref 8.6–10.5)
CHLORIDE SERPL-SCNC: 107 MMOL/L (ref 98–107)
CO2 SERPL-SCNC: 26.7 MMOL/L (ref 22–29)
CREAT SERPL-MCNC: 0.5 MG/DL (ref 0.76–1.27)
EGFRCR SERPLBLD CKD-EPI 2021: 101.8 ML/MIN/1.73
GLUCOSE SERPL-MCNC: 144 MG/DL (ref 65–99)
MAGNESIUM SERPL-MCNC: 2.2 MG/DL (ref 1.6–2.4)
POTASSIUM SERPL-SCNC: 4 MMOL/L (ref 3.5–5.2)
SODIUM SERPL-SCNC: 148 MMOL/L (ref 136–145)

## 2025-02-17 PROCEDURE — 83735 ASSAY OF MAGNESIUM: CPT | Performed by: INTERNAL MEDICINE

## 2025-02-17 PROCEDURE — 97530 THERAPEUTIC ACTIVITIES: CPT

## 2025-02-17 PROCEDURE — 80048 BASIC METABOLIC PNL TOTAL CA: CPT | Performed by: INTERNAL MEDICINE

## 2025-02-17 PROCEDURE — 25010000003 DEXTROSE 5 % SOLUTION: Performed by: INTERNAL MEDICINE

## 2025-02-17 RX ORDER — DEXTROSE MONOHYDRATE 50 MG/ML
50 INJECTION, SOLUTION INTRAVENOUS CONTINUOUS
Status: ACTIVE | OUTPATIENT
Start: 2025-02-17 | End: 2025-02-17

## 2025-02-17 RX ADMIN — DEXTROSE MONOHYDRATE 50 ML/HR: 50 INJECTION, SOLUTION INTRAVENOUS at 14:11

## 2025-02-17 RX ADMIN — CEPHALEXIN 500 MG: 500 CAPSULE ORAL at 16:09

## 2025-02-17 RX ADMIN — CEPHALEXIN 500 MG: 500 CAPSULE ORAL at 22:44

## 2025-02-17 RX ADMIN — METOPROLOL TARTRATE 25 MG: 25 TABLET, FILM COATED ORAL at 22:44

## 2025-02-17 RX ADMIN — CEPHALEXIN 500 MG: 500 CAPSULE ORAL at 08:55

## 2025-02-17 RX ADMIN — AMLODIPINE BESYLATE 2.5 MG: 2.5 TABLET ORAL at 08:55

## 2025-02-17 RX ADMIN — CYCLOSPORINE 1 DROP: 0.5 EMULSION OPHTHALMIC at 08:55

## 2025-02-17 RX ADMIN — PHENAZOPYRIDINE 200 MG: 200 TABLET ORAL at 08:55

## 2025-02-17 RX ADMIN — DEXTROMETHORPHAN 60 MG: 30 SUSPENSION, EXTENDED RELEASE ORAL at 08:55

## 2025-02-17 RX ADMIN — LATANOPROST 1 DROP: 50 SOLUTION/ DROPS OPHTHALMIC at 22:44

## 2025-02-17 RX ADMIN — TAMSULOSIN HYDROCHLORIDE 0.4 MG: 0.4 CAPSULE ORAL at 22:44

## 2025-02-17 RX ADMIN — BACLOFEN 10 MG: 10 TABLET ORAL at 22:44

## 2025-02-17 RX ADMIN — PHENAZOPYRIDINE 200 MG: 200 TABLET ORAL at 17:39

## 2025-02-17 RX ADMIN — METOPROLOL TARTRATE 25 MG: 25 TABLET, FILM COATED ORAL at 08:55

## 2025-02-17 RX ADMIN — PHENAZOPYRIDINE 200 MG: 200 TABLET ORAL at 11:53

## 2025-02-17 RX ADMIN — Medication 10 ML: at 22:45

## 2025-02-17 RX ADMIN — DEXTROMETHORPHAN 60 MG: 30 SUSPENSION, EXTENDED RELEASE ORAL at 22:44

## 2025-02-17 NOTE — THERAPY TREATMENT NOTE
Patient Name: Amandeep Manriquez Jr.  : 1945    MRN: [id number]                              Today's Date: 2025       Admit Date: 2025    Visit Dx:     ICD-10-CM ICD-9-CM   1. Ureteral calculus  N20.1 592.1   2. Hypertension, unspecified type  I10 401.9   3. Leukocytosis, unspecified type  D72.829 288.60   4. Ureterolithiasis  N20.1 592.1     Patient Active Problem List   Diagnosis    CAD (coronary artery disease), native coronary artery    Essential hypertension    Mixed hyperlipidemia    Congenital myopathy    Arthropathy of right sacroiliac joint    Osteoarthritis of right knee    Degenerative lumbar spinal stenosis    Renal stone    Bilateral impacted cerumen    Benign prostatic hyperplasia without lower urinary tract symptoms    Vertigo    High risk medication use    Vitamin D deficiency    OAB (overactive bladder)    Rosacea    Short-term memory loss    History of CVA (cerebrovascular accident)    COVID-19    Generalized weakness    Hypokalemia    Ureterolithiasis    Leukocytosis     Past Medical History:   Diagnosis Date    Acute myocardial infarction     Aneurysm of right femoral artery     Arthritis     BPH (benign prostatic hyperplasia)     CAD (coronary artery disease), native coronary artery     Congenital myopathy     DDD (degenerative disc disease), lumbar     with stenosis    Diabetes mellitus     Dizziness     Chronic Dizziness    Encounter for Medicare annual wellness exam     Essential hypertension     Gastrointestinal hemorrhage     Hard to intubate     History of impacted cerumen     Hyperlipidemia     Hypertension     Mixed hyperlipidemia     Sleep apnea      Past Surgical History:   Procedure Laterality Date    CARDIAC CATHETERIZATION  2015    30% Left main, 99% mid to distal LAD, 70-80% left circumflex, 80% proximal to mid RCA.    CATARACT EXTRACTION      CERVICAL FUSION      CHOLECYSTECTOMY      CORONARY ANGIOPLASTY WITH STENT PLACEMENT      CYSTOSCOPY W/ URETERAL STENT  PLACEMENT Left 2/9/2025    Procedure: CYSTOSCOPY URETERAL CATHETER/STENT INSERTION;  Surgeon: Alvino Daniels MD;  Location: [facility];  Service: Urology;  Laterality: Left;    KNEE SURGERY Right     OTHER SURGICAL HISTORY      percutaneous injection of extremity pseudoaneurysm      General Information       Row Name 02/17/25 1046          Physical Therapy Time and Intention    Document Type therapy note (daily note)  -DJ     Mode of Treatment individual therapy;physical therapy  -DJ       Row Name 02/17/25 1046          General Information    Patient Profile Reviewed yes  -DJ     Existing Precautions/Restrictions fall  -DJ       Row Name 02/17/25 1046          Cognition    Orientation Status (Cognition) oriented x 3;other (see comments)  slow to respond  -DJ       Row Name 02/17/25 1046          Safety Issues/Impairments Affecting Functional Mobility    Comment, Safety Issues/Impairments (Mobility) gt belt, rubber soled shoes  -DJ               User Key  (r) = Recorded By, (t) = Taken By, (c) = Cosigned By      Initials Name Provider Type    DJ Haylee White, PT Physical Therapist                   Mobility       Row Name 02/17/25 1048          Bed Mobility    Bed Mobility supine-sit;sit-supine  -DJ     Supine-Sit Stewart (Bed Mobility) contact guard;minimum assist (75% patient effort);verbal cues  -DJ     Sit-Supine Stewart (Bed Mobility) contact guard;minimum assist (75% patient effort)  -DJ     Assistive Device (Bed Mobility) bed rails;head of bed elevated  -DJ     Comment, (Bed Mobility) increased time for processing, leg  to position legs, very particular  -DJ       Row Name 02/17/25 1048          Transfers    Comment, (Transfers) sit/stand from elevated EOB x 3 trials  -DJ       Row Name 02/17/25 1048          Bed-Chair Transfer    Bed-Chair Stewart (Transfers) not tested  -DJ       Row Name 02/17/25 1048          Sit-Stand Transfer    Sit-Stand Stewart (Transfers) contact  guard;minimum assist (75% patient effort);verbal cues  -DJ     Assistive Device (Sit-Stand Transfers) walker, standard  -DJ     Comment, (Sit-Stand Transfer) 1st attempt pt failed to stand upright; other 2 attempts pt initially flexed posture but able to use UEs to straighten self upright; wife stabilizes wx  -DJ       Row Name 02/17/25 1048          Gait/Stairs (Locomotion)    Emmons Level (Gait) unable to assess  -DJ     Distance in Feet (Gait) 0  -DJ     Emmons Level (Stairs) not tested  -DJ     Comment, (Gait/Stairs) Pt unable to take any lateral steps today due to knee pain with attempt to sidestep  -DJ               User Key  (r) = Recorded By, (t) = Taken By, (c) = Cosigned By      Initials Name Provider Type    Haylee Hewitt PT Physical Therapist                   Obj/Interventions       Row Name 02/17/25 1052          Motor Skills    Motor Skills functional endurance  -DJ     Functional Endurance poor - limited by pain  -DJ     Therapeutic Exercise other (see comments)  seated AP, LAQ, marching 10x  -DJ       Row Name 02/17/25 1052          Advanced Stepping/Walking Interventions    Stepping/Walking Interventions side stepping  -DJ       Row Name 02/17/25 1052          Balance    Balance Assessment standing static balance;standing dynamic balance  -DJ     Static Standing Balance minimal assist;verbal cues  -DJ     Dynamic Standing Balance minimal assist;verbal cues  -DJ     Position/Device Used, Standing Balance walker, standard;supported  -DJ     Balance Interventions sitting;standing;sit to stand;supported;weight shifting activity  -DJ     Comment, Balance unsteady with attempt to take steps  -DJ               User Key  (r) = Recorded By, (t) = Taken By, (c) = Cosigned By      Initials Name Provider Type    Haylee Hewitt PT Physical Therapist                   Goals/Plan    No documentation.                  Clinical Impression       Row Name 02/17/25 1054          Pain    Pain Location  knee  -DJ     Pain Side/Orientation generalized  -DJ     Pain Management Interventions exercise or physical activity utilized  -DJ     Response to Pain Interventions activity participation with increased pain  -DJ     Pre/Posttreatment Pain Comment Pt c/o knee pain with attempt to take any steps  -DJ       Row Name 02/17/25 1055          Plan of Care Review    Plan of Care Reviewed With patient;spouse;child  -DJ     Progress improving  -DJ     Outcome Evaluation Pt resting in bed in NAD, family present and helpful. Pt req CGA - min A to sit EOB with vc for sequencing and increased time for processing. He was dependent to don shoes. He stood from raised EOB x 3 trials using wx. He was unable to stand upright with 1st attempt; on last 2 attempts, pt was initially flexed at hips but able to use B UE to straighten upright. Wife stabilized wx with standing transfers. Pt unable to take any lateral steps today due to knee pain with attempt to sidestep. He tolerated seated LE ther ex. Pt returned supine with CGA - min A and vc for sequencing, using UE to lift legs back into bed. He was able to reposition himself in bed with CGA. Overall, pt req decreased assist with mobility but required increased time for processing. Family is supportive, pt very particular about sequencing and placement of wx. Cont PT to address functional deficits and prepare for d/c as appropriate  -DJ       Row Name 02/17/25 1053          Therapy Assessment/Plan (PT)    Criteria for Skilled Interventions Met (PT) skilled treatment is necessary  -DJ       Row Name 02/17/25 1051          Vital Signs    O2 Delivery Pre Treatment nasal cannula  -DJ     O2 Delivery Intra Treatment nasal cannula  -DJ     Post SpO2 (%) 90  -DJ     O2 Delivery Post Treatment nasal cannula  -DJ     Pre Patient Position Supine  -DJ     Intra Patient Position Standing  -DJ     Post Patient Position Supine  -DJ       Row Name 02/17/25 1050          Positioning and Restraints     Pre-Treatment Position in bed  -DJ     Post Treatment Position bed  -DJ     In Bed notified nsg;supine;call light within reach;encouraged to call for assist;exit alarm on;with family/caregiver;SCD pump applied  -DJ               User Key  (r) = Recorded By, (t) = Taken By, (c) = Cosigned By      Initials Name Provider Type    Haylee Hewitt, PT Physical Therapist                   Outcome Measures       Row Name 02/17/25 1101 02/17/25 0857       How much help from another person do you currently need...    Turning from your back to your side while in flat bed without using bedrails? 3  -DJ 3  -KR    Moving from lying on back to sitting on the side of a flat bed without bedrails? 3  -DJ 3  -KR    Moving to and from a bed to a chair (including a wheelchair)? 2  -DJ 2  -KR    Standing up from a chair using your arms (e.g., wheelchair, bedside chair)? 2  -DJ 2  -KR    Climbing 3-5 steps with a railing? 1  -DJ 1  -KR    To walk in hospital room? 1  -DJ 1  -KR    AM-PAC 6 Clicks Score (PT) 12  -DJ 12  -KR    Highest Level of Mobility Goal 4 --> Transfer to chair/commode  -DJ 4 --> Transfer to chair/commode  -KR      Row Name 02/17/25 1101          Functional Assessment    Outcome Measure Options AM-PAC 6 Clicks Basic Mobility (PT)  -DJ               User Key  (r) = Recorded By, (t) = Taken By, (c) = Cosigned By      Initials Name Provider Type    Haylee Hewitt, PT Physical Therapist    Amanda Smyth, RN Registered Nurse                                 Physical Therapy Education       Title: PT OT SLP Therapies (In Progress)       Topic: Physical Therapy (Done)       Point: Mobility training (Done)       Learning Progress Summary            Patient Acceptance, E, VU,NR by DJ at 2/17/2025 1102    Acceptance, E, NR by LW at 2/16/2025 1044    Acceptance, E, NR by ER at 2/12/2025 1341    Eager, E, VU,NR by SV at 2/10/2025 1447   Family Acceptance, E, VU,NR by DJ at 2/17/2025 1102    Acceptance, E, NR by ER at 2/12/2025  1341                      Point: Home exercise program (Done)       Learning Progress Summary            Patient Acceptance, E, VU,NR by DJ at 2/17/2025 1102    Acceptance, E, NR by ER at 2/12/2025 1341    Eager, E, VU,NR by SV at 2/10/2025 1447   Family Acceptance, E, VU,NR by DJ at 2/17/2025 1102    Acceptance, E, NR by ER at 2/12/2025 1341                      Point: Body mechanics (Done)       Learning Progress Summary            Patient Acceptance, E, VU,NR by DJ at 2/17/2025 1102    Acceptance, E, NR by LW at 2/16/2025 1044    Acceptance, E, NR by ER at 2/12/2025 1341   Family Acceptance, E, VU,NR by DJ at 2/17/2025 1102    Acceptance, E, NR by ER at 2/12/2025 1341                      Point: Precautions (Done)       Learning Progress Summary            Patient Acceptance, E, VU,NR by DJ at 2/17/2025 1102    Acceptance, E, NR by LW at 2/16/2025 1044    Acceptance, E, NR by ER at 2/12/2025 1341   Family Acceptance, E, VU,NR by DJ at 2/17/2025 1102    Acceptance, E, NR by ER at 2/12/2025 1341                                      User Key       Initials Effective Dates Name Provider Type Discipline     07/11/23 -  Tram Lloyd, PT Physical Therapist PT    DJ 10/25/19 -  Haylee White, PT Physical Therapist PT    LW 05/08/23 -  Annel Lucero, PT Physical Therapist PT    ER 10/15/23 -  Berna Bales, PT Physical Therapist PT                  PT Recommendation and Plan     Progress: improving  Outcome Evaluation: Pt resting in bed in NAD, family present and helpful. Pt req CGA - min A to sit EOB with vc for sequencing and increased time for processing. He was dependent to don shoes. He stood from raised EOB x 3 trials using wx. He was unable to stand upright with 1st attempt; on last 2 attempts, pt was initially flexed at hips but able to use B UE to straighten upright. Wife stabilized wx with standing transfers. Pt unable to take any lateral steps today due to knee pain with attempt to sidestep. He tolerated  seated LE ther ex. Pt returned supine with CGA - min A and vc for sequencing, using UE to lift legs back into bed. He was able to reposition himself in bed with CGA. Overall, pt req decreased assist with mobility but required increased time for processing. Family is supportive, pt very particular about sequencing and placement of wx. Cont PT to address functional deficits and prepare for d/c as appropriate     Time Calculation:         PT Charges       Row Name 02/17/25 1103             Time Calculation    Start Time 1016  -DJ      Stop Time 1047  -DJ      Time Calculation (min) 31 min  -DJ      PT Non-Billable Time (min) 10 min  -DJ      PT Received On 02/17/25  -DJ      PT - Next Appointment 02/18/25  -DJ                User Key  (r) = Recorded By, (t) = Taken By, (c) = Cosigned By      Initials Name Provider Type    Haylee Hewitt, PT Physical Therapist                  Therapy Charges for Today       Code Description Service Date Service Provider Modifiers Qty    78427349593 HC PT THERAPEUTIC ACT EA 15 MIN 2/17/2025 Haylee White, PT GP 2            PT G-Codes  Outcome Measure Options: AM-PAC 6 Clicks Basic Mobility (PT)  AM-PAC 6 Clicks Score (PT): 12  AM-PAC 6 Clicks Score (OT): 10  Modified [place] Scale: 4 - Moderately severe disability.  Unable to walk without assistance, and unable to attend to own bodily needs without assistance.       Haylee White PT  2/17/2025

## 2025-02-17 NOTE — PROGRESS NOTES
Name: Amandeep Manriquez Jr. ADMIT: 2025   : 1945  PCP: Ori Bejarano MD    MRN: [id number] LOS: 5 days   AGE/SEX: 80 y.o. male  ROOM: [facility]   Subjective   Chief Complaint   Patient presents with    Flank Pain     Mr. Manriquez is a 80 y.o. male with CAD, HTN, HLD, history CVA, recurrent kidney stones presenting with LLQ pain. He was found to have obstructing kidney stone and UTI       Cysto and stent with Dr. Daniels on 25    Gen weakness working with therapy  On abx  Mild cough fair  Abnormal urine color related to stent and pyridium        Objective   Vital Signs  Temp:  [97.9 °F (36.6 °C)-98.6 °F (37 °C)] 98.6 °F (37 °C)  Heart Rate:  [67-85] 85  Resp:  [16-20] 20  BP: (102-180)/(55-96) 180/87  SpO2:  [90 %-94 %] 92 %  on  Flow (L/min) (Oxygen Therapy):  [2] 2;   Device (Oxygen Therapy): nasal cannula  Body mass index is 29.78 kg/m².    Physical Exam  HENT:      Head: Normocephalic and atraumatic.   Eyes:      General: No scleral icterus.  Cardiovascular:      Rate and Rhythm: Normal rate and regular rhythm.      Heart sounds: Normal heart sounds.   Pulmonary:      Effort: Pulmonary effort is normal. No respiratory distress.      Breath sounds: Normal breath sounds.   Abdominal:      General: There is no distension.      Palpations: Abdomen is soft.      Tenderness: There is no abdominal tenderness.   Musculoskeletal:      Cervical back: Neck supple.   Neurological:      Mental Status: He is alert.   Psychiatric:         Behavior: Behavior normal.     Elderly, chronically ill   +khan  Abnormal urine color related to stent and pyridium  Similar exam to past couple of days   DW family at bedside    Results Review:       I reviewed the patient's new clinical results.        Results from last 7 days   Lab Units 25  0947   SODIUM mmol/L 148*   POTASSIUM mmol/L 4.0   CHLORIDE mmol/L 107   CO2 mmol/L 26.7   BUN mg/dL 18   CREATININE mg/dL 0.50*   GLUCOSE mg/dL 144*   Estimated  "Creatinine Clearance: 107.9 mL/min (A) (by C-G formula based on SCr of 0.5 mg/dL (L)).        Results from last 7 days   Lab Units 02/17/25  0947   CALCIUM mg/dL 9.5   MAGNESIUM mg/dL 2.2             Coag     HbA1C   Lab Results   Component Value Date    HGBA1C 5.4 06/09/2015     Infection         Radiology(recent) No radiology results for the last day  No results found for: \"TROPONINT\", \"TROPONINI\", \"BNP\"  No components found for: \"TSH;2\"    amLODIPine, 2.5 mg, Oral, Q24H  baclofen, 10 mg, Oral, Nightly  cephalexin, 500 mg, Oral, TID  cycloSPORINE, 1 drop, Both Eyes, BID  dextromethorphan polistirex ER, 60 mg, Oral, Q12H  latanoprost, 1 drop, Both Eyes, Nightly  metoprolol tartrate, 25 mg, Oral, BID  phenazopyridine, 200 mg, Oral, TID With Meals  senna-docusate sodium, 2 tablet, Oral, BID  tamsulosin, 0.4 mg, Oral, Nightly       Diet: Regular/House; Texture: Soft to Chew (NDD 3); Soft to Chew: Chopped Meat; Fluid Consistency: Thin (IDDSI 0)      Assessment & Plan      Active Hospital Problems    Diagnosis  POA    **Ureterolithiasis [N20.1]  Yes    Leukocytosis [D72.829]  Yes    History of CVA (cerebrovascular accident) [Z86.73]  Not Applicable    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       Mr. Manriquez is a 80 y.o. male with CAD, HTN, HLD, history CVA, recurrent kidney stones presenting with LLQ pain. He was found to have obstructing kidney stone and possible UTI    Cysto and stent with Dr. Daniels on 2/9/25    On ABX, cultures negative on keflex s/p previous ceftriaxone  Abnormal urine color related to stent and pyridium    S/p IVFs. Sodium a little up today will give a little D5W    +cough. viral panel negative. cough agents, IS, flutter    BP is improved on metoprolol, amlodipine and have prn po hydralazine " available. Decreased amlodipine      SCDs ordered for dvt proph. No lovenox/asa with risk of hematuria    DW staff  DW family    Dispo- would likely benefit from rehab to get stronger. dc to rehab when precent obtained. Family current appealing insurance denial.       Antonio Gross MD  [facility]  02/17/25  16:09 EST

## 2025-02-17 NOTE — PLAN OF CARE
Goal Outcome Evaluation:  Plan of Care Reviewed With: patient, spouse, child        Progress: improving  Outcome Evaluation: Pt resting in bed in NAD, family present and helpful. Pt req CGA - min A to sit EOB with vc for sequencing and increased time for processing. He was dependent to don shoes. He stood from raised EOB x 3 trials using wx. He was unable to stand upright with 1st attempt; on last 2 attempts, pt was initially flexed at hips but able to use B UE to straighten upright. Wife stabilized wx with standing transfers. Pt unable to take any lateral steps today due to knee pain with attempt to sidestep. He tolerated seated LE ther ex. Pt returned supine with CGA - min A and vc for sequencing, using UE to lift legs back into bed. He was able to reposition himself in bed with CGA. Overall, pt req decreased assist with mobility but required increased time for processing. Family is supportive, pt very particular about sequencing and placement of wx. Cont PT to address functional deficits and prepare for d/c as appropriate

## 2025-02-17 NOTE — PLAN OF CARE
Goal Outcome Evaluation:  Plan of Care Reviewed With: patient, spouse           Outcome Evaluation: a&o x4, can be forgetful, wife at bedside, tolerating diet--needs tray set up, worked with PT, d5w ordered at 50 ml/hr for 5 hours for hypernatremia, new PIV placed in R FA. Spoke with Dr. Daniels regarding patient's outpatient appt tomorrow--he advises patient's wife to reschedule appt and gave telephone orders to remove khan cath and do a voiding trial, BP elevated early this morning prior to BP meds given but improved significantly at recheck, discharge planning

## 2025-02-17 NOTE — CASE MANAGEMENT/SOCIAL WORK
Continued Stay Note  [facility]     Patient Name: Amandeep Manriquez Jr.  MRN: [id number]  Today's Date: 2/17/2025    Admit Date: 2/9/2025    Plan: IRF pending appeal.   Discharge Plan       Row Name 02/17/25 1406       Plan    Plan IRF pending appeal.    Patient/Family in Agreement with Plan yes    Plan Comments CCP s/w [place]/[facility]; insurance denied IRF stay; [place] to submit pt appeal today. CCP will continue to follow. RLutes RN/CCP                   Discharge Codes    No documentation.                 Expected Discharge Date and Time       Expected Discharge Date Expected Discharge Time    Feb 14, 2025               Yessica Zhang RN

## 2025-02-17 NOTE — PLAN OF CARE
Goal Outcome Evaluation:         Patient alert and oriented, family at bedside, voids per khan, pt works with PT during the day where he is assist x2, O2 @2L, cont abx for UTI, awaiting placement. Plan of care is ongoing.

## 2025-02-18 PROCEDURE — 94799 UNLISTED PULMONARY SVC/PX: CPT

## 2025-02-18 RX ADMIN — PHENAZOPYRIDINE 200 MG: 200 TABLET ORAL at 16:26

## 2025-02-18 RX ADMIN — LATANOPROST 1 DROP: 50 SOLUTION/ DROPS OPHTHALMIC at 20:40

## 2025-02-18 RX ADMIN — METOPROLOL TARTRATE 25 MG: 25 TABLET, FILM COATED ORAL at 20:38

## 2025-02-18 RX ADMIN — CYCLOSPORINE 1 DROP: 0.5 EMULSION OPHTHALMIC at 20:41

## 2025-02-18 RX ADMIN — PHENAZOPYRIDINE 200 MG: 200 TABLET ORAL at 07:34

## 2025-02-18 RX ADMIN — SENNOSIDES AND DOCUSATE SODIUM 2 TABLET: 50; 8.6 TABLET ORAL at 07:38

## 2025-02-18 RX ADMIN — CEPHALEXIN 500 MG: 500 CAPSULE ORAL at 16:26

## 2025-02-18 RX ADMIN — CEPHALEXIN 500 MG: 500 CAPSULE ORAL at 20:38

## 2025-02-18 RX ADMIN — TAMSULOSIN HYDROCHLORIDE 0.4 MG: 0.4 CAPSULE ORAL at 20:38

## 2025-02-18 RX ADMIN — CYCLOSPORINE 1 DROP: 0.5 EMULSION OPHTHALMIC at 00:20

## 2025-02-18 RX ADMIN — CYCLOSPORINE 1 DROP: 0.5 EMULSION OPHTHALMIC at 07:38

## 2025-02-18 RX ADMIN — PHENAZOPYRIDINE 200 MG: 200 TABLET ORAL at 12:17

## 2025-02-18 RX ADMIN — BACLOFEN 10 MG: 10 TABLET ORAL at 20:38

## 2025-02-18 RX ADMIN — SENNOSIDES AND DOCUSATE SODIUM 2 TABLET: 50; 8.6 TABLET ORAL at 20:38

## 2025-02-18 RX ADMIN — AMLODIPINE BESYLATE 2.5 MG: 2.5 TABLET ORAL at 07:38

## 2025-02-18 RX ADMIN — DEXTROMETHORPHAN 60 MG: 30 SUSPENSION, EXTENDED RELEASE ORAL at 07:38

## 2025-02-18 RX ADMIN — CEPHALEXIN 500 MG: 500 CAPSULE ORAL at 07:38

## 2025-02-18 RX ADMIN — Medication 10 ML: at 20:42

## 2025-02-18 RX ADMIN — DEXTROMETHORPHAN 60 MG: 30 SUSPENSION, EXTENDED RELEASE ORAL at 20:38

## 2025-02-18 RX ADMIN — METOPROLOL TARTRATE 25 MG: 25 TABLET, FILM COATED ORAL at 07:37

## 2025-02-18 NOTE — PROGRESS NOTES
Name: Amandeep Manriquez Jr. ADMIT: 2025   : 1945  PCP: Ori Bejarano MD    MRN: [id number] LOS: 6 days   AGE/SEX: 80 y.o. male  ROOM: [facility]   Subjective   Chief Complaint   Patient presents with    Flank Pain     Mr. Manriquez is a 80 y.o. male with CAD, HTN, HLD, history CVA, recurrent kidney stones presenting with LLQ pain. He was found to have obstructing kidney stone and UTI       Cysto and stent with Dr. Daniels on 25    Gen weakness working with therapy  On abx  Mild cough fair  Abnormal urine color related to stent and pyridium  Davis removed per         Objective   Vital Signs  Temp:  [97.3 °F (36.3 °C)-98.1 °F (36.7 °C)] 97.3 °F (36.3 °C)  Heart Rate:  [63-72] 63  Resp:  [16-18] 16  BP: (109-153)/(71-88) 137/78  SpO2:  [90 %-91 %] 91 %  on  Flow (L/min) (Oxygen Therapy):  [2] 2;   Device (Oxygen Therapy): nasal cannula  Body mass index is 29.78 kg/m².    Physical Exam  HENT:      Head: Normocephalic and atraumatic.   Eyes:      General: No scleral icterus.  Cardiovascular:      Rate and Rhythm: Normal rate and regular rhythm.      Heart sounds: Normal heart sounds.   Pulmonary:      Effort: Pulmonary effort is normal. No respiratory distress.      Breath sounds: Normal breath sounds.   Abdominal:      General: There is no distension.      Palpations: Abdomen is soft.      Tenderness: There is no abdominal tenderness.   Musculoskeletal:      Cervical back: Neck supple.   Neurological:      Mental Status: He is alert.   Psychiatric:         Behavior: Behavior normal.     Elderly, chronically ill   Similar exam to past couple of days   DW family at bedside    Results Review:       I reviewed the patient's new clinical results.        Results from last 7 days   Lab Units 25  0947   SODIUM mmol/L 148*   POTASSIUM mmol/L 4.0   CHLORIDE mmol/L 107   CO2 mmol/L 26.7   BUN mg/dL 18   CREATININE mg/dL 0.50*   GLUCOSE mg/dL 144*   Estimated Creatinine Clearance: 107.9 mL/min (A)  "(by C-G formula based on SCr of 0.5 mg/dL (L)).        Results from last 7 days   Lab Units 02/17/25  0947   CALCIUM mg/dL 9.5   MAGNESIUM mg/dL 2.2             Coag     HbA1C   Lab Results   Component Value Date    HGBA1C 5.4 06/09/2015     Infection         Radiology(recent) No radiology results for the last day  No results found for: \"TROPONINT\", \"TROPONINI\", \"BNP\"  No components found for: \"TSH;2\"    amLODIPine, 2.5 mg, Oral, Q24H  baclofen, 10 mg, Oral, Nightly  cephalexin, 500 mg, Oral, TID  cycloSPORINE, 1 drop, Both Eyes, BID  dextromethorphan polistirex ER, 60 mg, Oral, Q12H  latanoprost, 1 drop, Both Eyes, Nightly  metoprolol tartrate, 25 mg, Oral, BID  phenazopyridine, 200 mg, Oral, TID With Meals  senna-docusate sodium, 2 tablet, Oral, BID  tamsulosin, 0.4 mg, Oral, Nightly       Diet: Regular/House; Texture: Soft to Chew (NDD 3); Soft to Chew: Chopped Meat; Fluid Consistency: Thin (IDDSI 0)      Assessment & Plan      Active Hospital Problems    Diagnosis  POA    **Ureterolithiasis [N20.1]  Yes    Leukocytosis [D72.829]  Yes    History of CVA (cerebrovascular accident) [Z86.73]  Not Applicable    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       Mr. Manriquez is a 80 y.o. male with CAD, HTN, HLD, history CVA, recurrent kidney stones presenting with LLQ pain. He was found to have obstructing kidney stone and possible UTI    Cysto and stent with Dr. Daniels on 2/9/25    On ABX, cultures negative on keflex s/p previous ceftriaxone  Abnormal urine color related to stent and pyridium.     Davis removed per Urology    S/p IVFs.     +cough. viral panel negative. cough agents, IS, flutter. May have had a mild PNA which was treated with the abx    BP is improved on metoprolol, amlodipine and have prn po hydralazine " available. Decreased amlodipine      SCDs ordered for dvt proph. No lovenox/asa with risk of hematuria    DW staff  DW family    Dispo- would likely benefit from rehab to get stronger. dc to rehab when precent obtained. Family current appealing insurance denial.       Antonio Gross MD  [facility]  02/18/25  16:09 EST

## 2025-02-18 NOTE — PLAN OF CARE
Goal Outcome Evaluation:            Pt alert and oriented, on o2 2L, assist x2, wears briefs, continues on ABX for UTI, family at bedside, q2h turn, voids per urinal, works w/PT. Awaiting placement, will need transportation, see MAR for med administration. Plan of care is ongoing.

## 2025-02-18 NOTE — PROGRESS NOTES
"Nutrition Services    Patient Name:  Amandeep Manriquez Jr.  YOB: 1945  MRN: [id number]  Admit Date:  2/9/2025    Assessment Date:  02/18/25    NUTRITION SCREENING      Reason for Encounter Length of Stay   Diagnosis/Problem 81 yo male with obstructive kidney stone, UTI, h/o CVA       PO Diet Diet: Regular/House; Texture: Soft to Chew (NDD 3); Soft to Chew: Chopped Meat; Fluid Consistency: Thin (IDDSI 0)   Supplements n/a   PO Intake % 100% of meals documented, eating well per pt and wife       Medications MAR reviewed by RD   Labs  Listed below, reviewed   Physical Findings Alert, oriented in bed   GI Function + BM 2/16   Skin Status Intact       Height  Weight  BMI  Weight Trend     Height: 162.6 cm (64\")  Weight: 78.7 kg (173 lb 8 oz) (02/15/25 0423)  Body mass index is 29.78 kg/m².  Gain, Amount/Timeframe: approx 15# over past 6 months       Nutrition Problem (PES) No nutrition diagnosis at this time.       Intervention/Plan Patient doing well with current regimen, offered assistance PRN.    RD to follow up per protocol.     Results from last 7 days   Lab Units 02/17/25  0947   SODIUM mmol/L 148*   POTASSIUM mmol/L 4.0   CHLORIDE mmol/L 107   CO2 mmol/L 26.7   BUN mg/dL 18   CREATININE mg/dL 0.50*   CALCIUM mg/dL 9.5   GLUCOSE mg/dL 144*     Results from last 7 days   Lab Units 02/17/25  0947   MAGNESIUM mg/dL 2.2     Lab Results   Component Value Date    HGBA1C 5.4 06/09/2015         Electronically signed by:  Tk Schmidt RD  02/18/25 13:35 EST  "

## 2025-02-18 NOTE — PLAN OF CARE
Goal Outcome Evaluation:      Patient remains very pleasant throughout shift. Voiding in urinal with spouts of incontinence. Continued on pyridium. Wife remains at bedside. PT to visit patient. Awaiting placement for discharge.

## 2025-02-19 LAB
BILIRUB UR QL STRIP: ABNORMAL
CLARITY UR: ABNORMAL
COLOR UR: ABNORMAL
GLUCOSE UR STRIP-MCNC: NEGATIVE MG/DL
HGB UR QL STRIP.AUTO: ABNORMAL
KETONES UR QL STRIP: ABNORMAL
LEUKOCYTE ESTERASE UR QL STRIP.AUTO: ABNORMAL
NITRITE UR QL STRIP: POSITIVE
PH UR STRIP.AUTO: ABNORMAL [PH]
PROT UR QL STRIP: ABNORMAL
SP GR UR STRIP: 1.02 (ref 1–1.03)
UROBILINOGEN UR QL STRIP: ABNORMAL

## 2025-02-19 PROCEDURE — 97530 THERAPEUTIC ACTIVITIES: CPT

## 2025-02-19 PROCEDURE — 81003 URINALYSIS AUTO W/O SCOPE: CPT | Performed by: INTERNAL MEDICINE

## 2025-02-19 RX ADMIN — TAMSULOSIN HYDROCHLORIDE 0.4 MG: 0.4 CAPSULE ORAL at 23:25

## 2025-02-19 RX ADMIN — CEPHALEXIN 500 MG: 500 CAPSULE ORAL at 21:50

## 2025-02-19 RX ADMIN — AMLODIPINE BESYLATE 2.5 MG: 2.5 TABLET ORAL at 09:31

## 2025-02-19 RX ADMIN — LATANOPROST 1 DROP: 50 SOLUTION/ DROPS OPHTHALMIC at 21:49

## 2025-02-19 RX ADMIN — PHENAZOPYRIDINE 200 MG: 200 TABLET ORAL at 09:33

## 2025-02-19 RX ADMIN — METOPROLOL TARTRATE 25 MG: 25 TABLET, FILM COATED ORAL at 09:32

## 2025-02-19 RX ADMIN — CEPHALEXIN 500 MG: 500 CAPSULE ORAL at 15:40

## 2025-02-19 RX ADMIN — PHENAZOPYRIDINE 200 MG: 200 TABLET ORAL at 13:10

## 2025-02-19 RX ADMIN — BACLOFEN 10 MG: 10 TABLET ORAL at 21:49

## 2025-02-19 RX ADMIN — DEXTROMETHORPHAN 60 MG: 30 SUSPENSION, EXTENDED RELEASE ORAL at 09:32

## 2025-02-19 RX ADMIN — CYCLOSPORINE 1 DROP: 0.5 EMULSION OPHTHALMIC at 09:32

## 2025-02-19 RX ADMIN — CYCLOSPORINE 1 DROP: 0.5 EMULSION OPHTHALMIC at 21:49

## 2025-02-19 RX ADMIN — CEPHALEXIN 500 MG: 500 CAPSULE ORAL at 09:32

## 2025-02-19 RX ADMIN — METOPROLOL TARTRATE 25 MG: 25 TABLET, FILM COATED ORAL at 21:49

## 2025-02-19 RX ADMIN — DEXTROMETHORPHAN 60 MG: 30 SUSPENSION, EXTENDED RELEASE ORAL at 21:49

## 2025-02-19 NOTE — PLAN OF CARE
Goal Outcome Evaluation:  Plan of Care Reviewed With: patient, child        Progress: improving  Outcome Evaluation: Pt was asleep with son at bedside when PT arrived. Pt was easily woken up and agreeable to PT. Pt was able to transfer supine to sitting EOB w/ min/mod A. Pt is slow to respond and requires increased time. Once sitting, pt was able to maintain static seated balance with no external support. Pt attempted STS 5x. The first two attempts pt was max A and did not clear his bottom. The 3rd attempt pt was able to clear his bottom, but could not fully extend and was severely forward flexed. Pt improved his STS to mod/max A for 4th and 5th attempt, but due to pain in knees could not tolerate standing long. Pt returned to bed w/ mod A to BLE when getting in bed. PT recommendation at discharge is IPR.    Anticipated Discharge Disposition (PT): inpatient rehabilitation facility

## 2025-02-19 NOTE — CASE MANAGEMENT/SOCIAL WORK
Continued Stay Note  [facility]     Patient Name: Amandeep Manriquez Jr.  MRN: [id number]  Today's Date: 2/19/2025    Admit Date: 2/9/2025    Plan: Plan will be DC to [facility] pending appeal. Pt will need transport   Discharge Plan       Row Name 02/19/25 1554       Plan    Plan Plan will be DC to [facility] pending appeal. Pt will need transport    Patient/Family in Agreement with Plan yes    Plan Comments CCP s/w [place]/[facility]; pt appeal is still pending. Plan will be DC to [facility] pending appeal. Pt will need transport. RLutes RN/CCP                   Discharge Codes    No documentation.                 Expected Discharge Date and Time       Expected Discharge Date Expected Discharge Time    Feb 19, 2025               Yessica Zhang RN

## 2025-02-19 NOTE — PLAN OF CARE
Goal Outcome Evaluation:      Patient alert and oriented, family at bedside, on O2 @2L, productive cough noted, suction output increased. Multiple bowel movements this shift, including 1 soft/pasty followed by 2 loose stools. Pt is q2h turn, voids per urinal, wears a brief, assist x2.  WCTM. Plan of care is ongoing.

## 2025-02-19 NOTE — THERAPY TREATMENT NOTE
Patient Name: Amandeep Manriquez Jr.  : 1945    MRN: [id number]                              Today's Date: 2025       Admit Date: 2025    Visit Dx:     ICD-10-CM ICD-9-CM   1. Ureteral calculus  N20.1 592.1   2. Hypertension, unspecified type  I10 401.9   3. Leukocytosis, unspecified type  D72.829 288.60   4. Ureterolithiasis  N20.1 592.1     Patient Active Problem List   Diagnosis    CAD (coronary artery disease), native coronary artery    Essential hypertension    Mixed hyperlipidemia    Congenital myopathy    Arthropathy of right sacroiliac joint    Osteoarthritis of right knee    Degenerative lumbar spinal stenosis    Renal stone    Bilateral impacted cerumen    Benign prostatic hyperplasia without lower urinary tract symptoms    Vertigo    High risk medication use    Vitamin D deficiency    OAB (overactive bladder)    Rosacea    Short-term memory loss    History of CVA (cerebrovascular accident)    COVID-19    Generalized weakness    Hypokalemia    Ureterolithiasis    Leukocytosis     Past Medical History:   Diagnosis Date    Acute myocardial infarction     Aneurysm of right femoral artery     Arthritis     BPH (benign prostatic hyperplasia)     CAD (coronary artery disease), native coronary artery     Congenital myopathy     DDD (degenerative disc disease), lumbar     with stenosis    Diabetes mellitus     Dizziness     Chronic Dizziness    Encounter for Medicare annual wellness exam     Essential hypertension     Gastrointestinal hemorrhage     Hard to intubate     History of impacted cerumen     Hyperlipidemia     Hypertension     Mixed hyperlipidemia     Sleep apnea      Past Surgical History:   Procedure Laterality Date    CARDIAC CATHETERIZATION  2015    30% Left main, 99% mid to distal LAD, 70-80% left circumflex, 80% proximal to mid RCA.    CATARACT EXTRACTION      CERVICAL FUSION      CHOLECYSTECTOMY      CORONARY ANGIOPLASTY WITH STENT PLACEMENT      CYSTOSCOPY W/ URETERAL STENT  PLACEMENT Left 2/9/2025    Procedure: CYSTOSCOPY URETERAL CATHETER/STENT INSERTION;  Surgeon: Alvino Daniels MD;  Location: [facility];  Service: Urology;  Laterality: Left;    KNEE SURGERY Right     OTHER SURGICAL HISTORY      percutaneous injection of extremity pseudoaneurysm      General Information       Row Name 02/19/25 1556          Physical Therapy Time and Intention    Document Type therapy note (daily note)  -     Mode of Treatment individual therapy;physical therapy  -       Row Name 02/19/25 1556          General Information    Patient Profile Reviewed yes  -JL     Existing Precautions/Restrictions fall  -JL       Row Name 02/19/25 1556          Cognition    Orientation Status (Cognition) oriented x 3  -       Row Name 02/19/25 1556          Safety Issues/Impairments Affecting Functional Mobility    Safety Issues Affecting Function (Mobility) impulsivity;awareness of need for assistance;insight into deficits/self-awareness;positioning of assistive device;safety precaution awareness  -     Impairments Affecting Function (Mobility) balance;coordination;endurance/activity tolerance;strength;postural/trunk control;range of motion (ROM);motor planning;motor control  -               User Key  (r) = Recorded By, (t) = Taken By, (c) = Cosigned By      Initials Name Provider Type    JL Bina Stephens, PT Physical Therapist                   Mobility       Row Name 02/19/25 1557          Bed Mobility    Bed Mobility supine-sit;sit-supine  -JL     Supine-Sit Eck[place] (Bed Mobility) minimum assist (75% patient effort);verbal cues  -     Sit-Supine [place] (Bed Mobility) moderate assist (50% patient effort);1 person assist  -     Assistive Device (Bed Mobility) head of bed elevated;bed rails  -     Comment, (Bed Mobility) Increased time for response and mod A with BLE for transferring sit to supine  -       Row Name 02/19/25 1554          Sit-Stand Transfer    Sit-Stand Eckert  (Transfers) moderate assist (50% patient effort);maximum assist (25% patient effort);1 person assist  -JL     Assistive Device (Sit-Stand Transfers) walker, standard  -JL     Comment, (Sit-Stand Transfer) Pt attempted STS 5x. First 2x was max A and pt did not clear bottom. 3rd and 4th attempt improved with pt attempting to extend hips and spine. 5th attempt, pt was able to stand for 2-3 seconds mod A before fatiguing  -       Row Name 02/19/25 1557          Gait/Stairs (Locomotion)    [place] Level (Gait) unable to assess  -JL     Patient was able to Ambulate no, other medical factors prevent ambulation  -JL     Reason Patient was unable to Ambulate Excessive Weakness  -JL     [place] Level (Stairs) not tested  -               User Key  (r) = Recorded By, (t) = Taken By, (c) = Cosigned By      Initials Name Provider Type    Bina Carbajal PT Physical Therapist                   Obj/Interventions    No documentation.                  Goals/Plan    No documentation.                  Clinical Impression       Row Name 02/19/25 1600          Pain    Pretreatment Pain Rating 0/10 - no pain  -JL     Posttreatment Pain Rating 0/10 - no pain  -JL     Pre/Posttreatment Pain Comment Pt gave no numerical value and started with no pain, but started having pain with WBing attempts  -       Row Name 02/19/25 1600          Plan of Care Review    Plan of Care Reviewed With patient;child  -     Progress improving  -     Outcome Evaluation Pt was asleep with son at bedside when PT arrived. Pt was easily woken up and agreeable to PT. Pt was able to transfer supine to sitting EOB w/ min/mod A. Pt is slow to respond and requires increased time. Once sitting, pt was able to maintain static seated balance with no external support. Pt attempted STS 5x. The first two attempts pt was max A and did not clear his bottom. The 3rd attempt pt was able to clear his bottom, but could not fully extend and was severely forward  flexed. Pt improved his STS to mod/max A for 4th and 5th attempt, but due to pain in knees could not tolerate standing long. Pt returned to bed w/ mod A to BLE when getting in bed. PT recommendation at discharge is IPR.  -       Row Name 02/19/25 1600          Therapy Assessment/Plan (PT)    Rehab Potential (PT) good  -JL     Criteria for Skilled Interventions Met (PT) yes;skilled treatment is necessary  -JL     Therapy Frequency (PT) 5 times/wk  -       Row Name 02/19/25 1600          Positioning and Restraints    Pre-Treatment Position in bed  -JL     Post Treatment Position bed  -JL     In Bed supine;call light within reach;encouraged to call for assist;exit alarm on;with family/caregiver  -               User Key  (r) = Recorded By, (t) = Taken By, (c) = Cosigned By      Initials Name Provider Type    Bina Carbajal PT Physical Therapist                   Outcome Measures       Row Name 02/19/25 1605          How much help from another person do you currently need...    Turning from your back to your side while in flat bed without using bedrails? 3  -JL     Moving from lying on back to sitting on the side of a flat bed without bedrails? 2  -JL     Moving to and from a bed to a chair (including a wheelchair)? 2  -JL     Standing up from a chair using your arms (e.g., wheelchair, bedside chair)? 2  -JL     Climbing 3-5 steps with a railing? 1  -JL     To walk in hospital room? 1  -JL     AM-PAC 6 Clicks Score (PT) 11  -JL     Highest Level of Mobility Goal 4 --> Transfer to chair/commode  -       Row Name 02/19/25 1605          Modified [place] Scale    Modified Wallace Scale 4 - Moderately severe disability.  Unable to walk without assistance, and unable to attend to own bodily needs without assistance.  -               User Key  (r) = Recorded By, (t) = Taken By, (c) = Cosigned By      Initials Name Provider Type    Bina Carbajal PT Physical Therapist                                 Physical  Therapy Education       Title: PT OT SLP Therapies (In Progress)       Topic: Physical Therapy (Done)       Point: Mobility training (Done)       Learning Progress Summary            Patient Acceptance, E, VU,NR by  at 2/17/2025 1102    Acceptance, E, NR by LW at 2/16/2025 1044    Acceptance, E, NR by ER at 2/12/2025 1341    Eager, E, VU,NR by  at 2/10/2025 1447   Family Acceptance, E, VU,NR by DJ at 2/17/2025 1102    Acceptance, E, NR by ER at 2/12/2025 1341                      Point: Home exercise program (Done)       Learning Progress Summary            Patient Acceptance, E, VU,NR by DJ at 2/17/2025 1102    Acceptance, E, NR by ER at 2/12/2025 1341    Eager, E, VU,NR by SV at 2/10/2025 1447   Family Acceptance, E, VU,NR by DJ at 2/17/2025 1102    Acceptance, E, NR by ER at 2/12/2025 1341                      Point: Body mechanics (Done)       Learning Progress Summary            Patient Acceptance, E, VU,NR by DJ at 2/17/2025 1102    Acceptance, E, NR by LW at 2/16/2025 1044    Acceptance, E, NR by ER at 2/12/2025 1341   Family Acceptance, E, VU,NR by DJ at 2/17/2025 1102    Acceptance, E, NR by ER at 2/12/2025 1341                      Point: Precautions (Done)       Learning Progress Summary            Patient Acceptance, E, VU,NR by  at 2/17/2025 1102    Acceptance, E, NR by LW at 2/16/2025 1044    Acceptance, E, NR by ER at 2/12/2025 1341   Family Acceptance, E, VU,NR by DJ at 2/17/2025 1102    Acceptance, E, NR by ER at 2/12/2025 1341                                      User Key       Initials Effective Dates Name Provider Type Discipline     07/11/23 -  Tram Lloyd, PT Physical Therapist PT    DJ 10/25/19 -  Haylee White, PT Physical Therapist PT    LW 05/08/23 -  Annel Lucero, PT Physical Therapist PT    ER 10/15/23 -  Berna Bales, PT Physical Therapist PT                  PT Recommendation and Plan     Progress: improving  Outcome Evaluation: Pt was asleep with son at bedside when PT  arrived. Pt was easily woken up and agreeable to PT. Pt was able to transfer supine to sitting EOB w/ min/mod A. Pt is slow to respond and requires increased time. Once sitting, pt was able to maintain static seated balance with no external support. Pt attempted STS 5x. The first two attempts pt was max A and did not clear his bottom. The 3rd attempt pt was able to clear his bottom, but could not fully extend and was severely forward flexed. Pt improved his STS to mod/max A for 4th and 5th attempt, but due to pain in knees could not tolerate standing long. Pt returned to bed w/ mod A to BLE when getting in bed. PT recommendation at discharge is IPR.     Time Calculation:         PT Charges       Row Name 02/19/25 1606             Time Calculation    Start Time 1521  -JL      Stop Time 1547  -JL      Time Calculation (min) 26 min  -JL      PT Received On 02/19/25  -JL      PT - Next Appointment 02/20/25  -      PT Goal Re-Cert Due Date 02/27/25  -         Time Calculation- PT    Total Timed Code Minutes- PT 26 minute(s)  -JL         Timed Charges    84404 - PT Therapeutic Activity Minutes 26  -JL         Total Minutes    Timed Charges Total Minutes 26  -JL       Total Minutes 26  -JL                User Key  (r) = Recorded By, (t) = Taken By, (c) = Cosigned By      Initials Name Provider Type    Bina Carbajal PT Physical Therapist                  Therapy Charges for Today       Code Description Service Date Service Provider Modifiers Qty    [id number]  PT THERAPEUTIC ACT EA 15 MIN 2/19/2025 Bina Stephens PT GP 2            PT G-Codes  Outcome Measure Options: AM-PAC 6 Clicks Basic Mobility (PT)  AM-PAC 6 Clicks Score (PT): 11  AM-PAC 6 Clicks Score (OT): 10  Modified [place] Scale: 4 - Moderately severe disability.  Unable to walk without assistance, and unable to attend to own bodily needs without assistance.  PT Discharge Summary  Anticipated Discharge Disposition (PT): inpatient rehabilitation  facility    Bina Stephens, PT  2/19/2025

## 2025-02-19 NOTE — PROGRESS NOTES
Name: Amandeep Manriquez Jr. ADMIT: 2025   : 1945  PCP: Ori Bejarano MD    MRN: [id number] LOS: 7 days   AGE/SEX: 80 y.o. male  ROOM: [facility]     Subjective   Subjective   Chief Complaint   Patient presents with    Flank Pain     He had some nasal drainage and cough but both are improving today.  Not reporting shortness of breath.  No chest pain.  Not reporting any nausea vomiting or abdominal pain.     Objective   Objective   Vital Signs  Temp:  [97.3 °F (36.3 °C)-98.4 °F (36.9 °C)] 98.4 °F (36.9 °C)  Heart Rate:  [60-87] 60  Resp:  [16-20] 18  BP: (104-132)/(69-78) 104/69  SpO2:  [90 %-93 %] 93 %  on  Flow (L/min) (Oxygen Therapy):  [2] 2;   Device (Oxygen Therapy): nasal cannula  Body mass index is 29.78 kg/m².    Physical Exam  Vitals and nursing note reviewed.   Constitutional:       General: He is not in acute distress.     Appearance: He is ill-appearing (chronically). He is not diaphoretic.   HENT:      Head: Atraumatic.   Cardiovascular:      Rate and Rhythm: Normal rate and regular rhythm.      Pulses: Normal pulses.   Pulmonary:      Effort: Pulmonary effort is normal.      Breath sounds: No wheezing.   Abdominal:      General: There is no distension.      Palpations: Abdomen is soft.      Tenderness: There is no abdominal tenderness. There is no guarding or rebound.   Musculoskeletal:         General: No tenderness.   Skin:     General: Skin is warm and dry.      Coloration: Skin is pale.   Neurological:      Mental Status: He is alert. Mental status is at baseline.   Psychiatric:         Behavior: Behavior normal.       Results Review  I reviewed the patient's new clinical results.        Results from last 7 days   Lab Units 25  0947   SODIUM mmol/L 148*   POTASSIUM mmol/L 4.0   CHLORIDE mmol/L 107   CO2 mmol/L 26.7   BUN mg/dL 18   CREATININE mg/dL 0.50*   GLUCOSE mg/dL 144*   EGFR mL/min/1.73 101.8         Results from last 7 days   Lab Units 25  0947   CALCIUM  "mg/dL 9.5   MAGNESIUM mg/dL 2.2         No results found for: \"HGBA1C\", \"POCGLU\"    No radiology results for the last day    I have personally reviewed all medications:  Scheduled Medications  amLODIPine, 2.5 mg, Oral, Q24H  baclofen, 10 mg, Oral, Nightly  cephalexin, 500 mg, Oral, TID  cycloSPORINE, 1 drop, Both Eyes, BID  dextromethorphan polistirex ER, 60 mg, Oral, Q12H  latanoprost, 1 drop, Both Eyes, Nightly  metoprolol tartrate, 25 mg, Oral, BID  phenazopyridine, 200 mg, Oral, TID With Meals  senna-docusate sodium, 2 tablet, Oral, BID  tamsulosin, 0.4 mg, Oral, Nightly      Infusions     Diet  Diet: Regular/House; Texture: Soft to Chew (NDD 3); Soft to Chew: Chopped Meat; Fluid Consistency: Thin (IDDSI 0)       Assessment/Plan     Active Hospital Problems    Diagnosis  POA    **Ureterolithiasis [N20.1]  Yes    Leukocytosis [D72.829]  Yes    History of CVA (cerebrovascular accident) [Z86.73]  Not Applicable    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       80 y.o. male admitted with Ureterolithiasis.    Ureterolithiasis with hydronephrosis: Status post cystoscopy and stent placement 2/9.  On Keflex.  Urology evaluated.  Cough/nasal congestion: RVP was negative.  No respiratory compromise.  On antibiotics as above.  Hypertension: Acceptable acutely.  Monitoring.  PPX: SCD  Disposition: SNF/pending placement    Expected Discharge Date: 2/19/2025; Expected Discharge Time:      Jose E Pugh MD  [facility]  02/19/25  14:17 EST    Dictated portions of note using Dragon dictation software.  Copied text in this note has been reviewed by me and remains accurate as of 02/19/25  "

## 2025-02-20 LAB
ALBUMIN SERPL-MCNC: 2.9 G/DL (ref 3.5–5.2)
ANION GAP SERPL CALCULATED.3IONS-SCNC: 6.7 MMOL/L (ref 5–15)
BUN SERPL-MCNC: 18 MG/DL (ref 8–23)
BUN/CREAT SERPL: 37.5 (ref 7–25)
CALCIUM SPEC-SCNC: 9.4 MG/DL (ref 8.6–10.5)
CHLORIDE SERPL-SCNC: 108 MMOL/L (ref 98–107)
CO2 SERPL-SCNC: 27.3 MMOL/L (ref 22–29)
CREAT SERPL-MCNC: 0.48 MG/DL (ref 0.76–1.27)
DEPRECATED RDW RBC AUTO: 39.9 FL (ref 37–54)
EGFRCR SERPLBLD CKD-EPI 2021: 103.1 ML/MIN/1.73
ERYTHROCYTE [DISTWIDTH] IN BLOOD BY AUTOMATED COUNT: 12.2 % (ref 12.3–15.4)
GLUCOSE SERPL-MCNC: 100 MG/DL (ref 65–99)
HCT VFR BLD AUTO: 37.7 % (ref 37.5–51)
HGB BLD-MCNC: 12.4 G/DL (ref 13–17.7)
MAGNESIUM SERPL-MCNC: 2.3 MG/DL (ref 1.6–2.4)
MCH RBC QN AUTO: 29.8 PG (ref 26.6–33)
MCHC RBC AUTO-ENTMCNC: 32.9 G/DL (ref 31.5–35.7)
MCV RBC AUTO: 90.6 FL (ref 79–97)
PHOSPHATE SERPL-MCNC: 2.8 MG/DL (ref 2.5–4.5)
PLATELET # BLD AUTO: 245 10*3/MM3 (ref 140–450)
PMV BLD AUTO: 9.2 FL (ref 6–12)
POTASSIUM SERPL-SCNC: 4 MMOL/L (ref 3.5–5.2)
RBC # BLD AUTO: 4.16 10*6/MM3 (ref 4.14–5.8)
SODIUM SERPL-SCNC: 142 MMOL/L (ref 136–145)
WBC NRBC COR # BLD AUTO: 8.47 10*3/MM3 (ref 3.4–10.8)

## 2025-02-20 PROCEDURE — 80069 RENAL FUNCTION PANEL: CPT | Performed by: INTERNAL MEDICINE

## 2025-02-20 PROCEDURE — 83735 ASSAY OF MAGNESIUM: CPT | Performed by: INTERNAL MEDICINE

## 2025-02-20 PROCEDURE — 85027 COMPLETE CBC AUTOMATED: CPT | Performed by: INTERNAL MEDICINE

## 2025-02-20 RX ADMIN — CYCLOSPORINE 1 DROP: 0.5 EMULSION OPHTHALMIC at 21:52

## 2025-02-20 RX ADMIN — DEXTROMETHORPHAN 60 MG: 30 SUSPENSION, EXTENDED RELEASE ORAL at 23:11

## 2025-02-20 RX ADMIN — BACLOFEN 10 MG: 10 TABLET ORAL at 21:51

## 2025-02-20 RX ADMIN — METOPROLOL TARTRATE 25 MG: 25 TABLET, FILM COATED ORAL at 21:51

## 2025-02-20 RX ADMIN — LATANOPROST 1 DROP: 50 SOLUTION/ DROPS OPHTHALMIC at 23:11

## 2025-02-20 RX ADMIN — TAMSULOSIN HYDROCHLORIDE 0.4 MG: 0.4 CAPSULE ORAL at 21:52

## 2025-02-20 RX ADMIN — CEPHALEXIN 500 MG: 500 CAPSULE ORAL at 15:31

## 2025-02-20 RX ADMIN — METOPROLOL TARTRATE 25 MG: 25 TABLET, FILM COATED ORAL at 08:06

## 2025-02-20 RX ADMIN — CYCLOSPORINE 1 DROP: 0.5 EMULSION OPHTHALMIC at 08:04

## 2025-02-20 RX ADMIN — AMLODIPINE BESYLATE 2.5 MG: 2.5 TABLET ORAL at 08:06

## 2025-02-20 RX ADMIN — CEPHALEXIN 500 MG: 500 CAPSULE ORAL at 21:51

## 2025-02-20 RX ADMIN — CEPHALEXIN 500 MG: 500 CAPSULE ORAL at 08:04

## 2025-02-20 RX ADMIN — DEXTROMETHORPHAN 60 MG: 30 SUSPENSION, EXTENDED RELEASE ORAL at 08:04

## 2025-02-20 NOTE — PROGRESS NOTES
Name: Amandeep Manriquez Jr. ADMIT: 2025   : 1945  PCP: Ori Bejarano MD    MRN: [id number] LOS: 8 days   AGE/SEX: 80 y.o. male  ROOM: [facility]     Subjective   Subjective   Chief Complaint   Patient presents with    Flank Pain     Coughing up phlegm.  Not having acute shortness of breath.  No chest pain.  No nausea vomiting or abdominal pain.  Urinalysis was sent due to hematuria noted by family.     Objective   Objective   Vital Signs  Temp:  [97.5 °F (36.4 °C)-97.9 °F (36.6 °C)] 97.7 °F (36.5 °C)  Heart Rate:  [66-80] 69  Resp:  [18-20] 18  BP: ()/(51-79) 118/69  SpO2:  [91 %-94 %] 94 %  on  Flow (L/min) (Oxygen Therapy):  [2] 2;   Device (Oxygen Therapy): nasal cannula  Body mass index is 29.78 kg/m².    Physical Exam  Vitals and nursing note reviewed.   Constitutional:       General: He is not in acute distress.     Appearance: He is ill-appearing (chronically). He is not diaphoretic.   HENT:      Head: Atraumatic.   Cardiovascular:      Rate and Rhythm: Normal rate and regular rhythm.      Pulses: Normal pulses.   Pulmonary:      Effort: Pulmonary effort is normal.      Breath sounds: No wheezing.   Abdominal:      General: There is no distension.      Palpations: Abdomen is soft.      Tenderness: There is no abdominal tenderness. There is no guarding or rebound.   Musculoskeletal:         General: No tenderness.   Skin:     General: Skin is warm and dry.      Coloration: Skin is pale.   Neurological:      Mental Status: He is alert. Mental status is at baseline.   Psychiatric:         Behavior: Behavior normal.       Results Review  I reviewed the patient's new clinical results.    Results from last 7 days   Lab Units 25  0654   WBC 10*3/mm3 8.47   HEMOGLOBIN g/dL 12.4*   PLATELETS 10*3/mm3 245     Results from last 7 days   Lab Units 25  0654 25  0947   SODIUM mmol/L 142 148*   POTASSIUM mmol/L 4.0 4.0   CHLORIDE mmol/L 108* 107   CO2 mmol/L 27.3 26.7   BUN mg/dL  "18 18   CREATININE mg/dL 0.48* 0.50*   GLUCOSE mg/dL 100* 144*   EGFR mL/min/1.73 103.1 101.8     Results from last 7 days   Lab Units 02/20/25  0654   ALBUMIN g/dL 2.9*     Results from last 7 days   Lab Units 02/20/25  0654 02/17/25  0947   CALCIUM mg/dL 9.4 9.5   ALBUMIN g/dL 2.9*  --    MAGNESIUM mg/dL 2.3 2.2   PHOSPHORUS mg/dL 2.8  --          No results found for: \"HGBA1C\", \"POCGLU\"    No radiology results for the last day    I have personally reviewed all medications:  Scheduled Medications  amLODIPine, 2.5 mg, Oral, Q24H  baclofen, 10 mg, Oral, Nightly  cephalexin, 500 mg, Oral, TID  cycloSPORINE, 1 drop, Both Eyes, BID  dextromethorphan polistirex ER, 60 mg, Oral, Q12H  latanoprost, 1 drop, Both Eyes, Nightly  metoprolol tartrate, 25 mg, Oral, BID  senna-docusate sodium, 2 tablet, Oral, BID  tamsulosin, 0.4 mg, Oral, Nightly      Infusions     Diet  Diet: Regular/House; Texture: Soft to Chew (NDD 3); Soft to Chew: Chopped Meat; Fluid Consistency: Thin (IDDSI 0)       Assessment/Plan     Active Hospital Problems    Diagnosis  POA    **Ureterolithiasis [N20.1]  Yes    Leukocytosis [D72.829]  Yes    History of CVA (cerebrovascular accident) [Z86.73]  Not Applicable    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       80 y.o. male admitted with Ureterolithiasis.    Ureterolithiasis with hydronephrosis: Status post cystoscopy and stent placement 2/9.  On Keflex.  Urology evaluated.  Cough/nasal congestion: RVP was negative.  No respiratory compromise.  On antibiotics as above.  Hypertension: Borderline low.  Hold Norvasc.  There are hold parameters on his metoprolol  PPX: SCD  Disposition: SNF/pending placement    Expected Discharge Date: 2/20/2025; Expected Discharge Time:      Jose E Pugh MD  [facility]  02/20/25  13:34 EST    Dictated portions of note using Dragon dictation software.  Copied text in this note has been reviewed by me and remains accurate as of 02/20/25

## 2025-02-20 NOTE — PLAN OF CARE
Goal Outcome Evaluation:      Patient alert and oriented,on o2 @2L, last bm 02/19/25, bed bound, wears briefs, up w/ PT only, urine sent to lab for UA, q2h turn,  awaiting placement. [place] f care is ongoing.

## 2025-02-20 NOTE — PLAN OF CARE
Goal Outcome Evaluation:      Patient and wife both very pleasant. Still awaiting placement for discharge. Wife has told staff she will take patient home with hh and pt if not discharged tomorrow. Wife remains very supportive and involved in care. Patient sat on side of bed multiple times today. Voiding in urinal with periods of incontinence.

## 2025-02-21 ENCOUNTER — READMISSION MANAGEMENT (OUTPATIENT)
Dept: CALL CENTER | Facility: HOSPITAL | Age: 80
End: 2025-02-21
Payer: MEDICARE

## 2025-02-21 VITALS
HEIGHT: 64 IN | DIASTOLIC BLOOD PRESSURE: 73 MMHG | HEART RATE: 74 BPM | SYSTOLIC BLOOD PRESSURE: 119 MMHG | OXYGEN SATURATION: 96 % | WEIGHT: 173.5 LBS | RESPIRATION RATE: 16 BRPM | TEMPERATURE: 97.3 F | BODY MASS INDEX: 29.62 KG/M2

## 2025-02-21 LAB
ANION GAP SERPL CALCULATED.3IONS-SCNC: 7.5 MMOL/L (ref 5–15)
BUN SERPL-MCNC: 20 MG/DL (ref 8–23)
BUN/CREAT SERPL: 51.3 (ref 7–25)
CALCIUM SPEC-SCNC: 9.1 MG/DL (ref 8.6–10.5)
CHLORIDE SERPL-SCNC: 110 MMOL/L (ref 98–107)
CO2 SERPL-SCNC: 27.5 MMOL/L (ref 22–29)
CREAT SERPL-MCNC: 0.39 MG/DL (ref 0.76–1.27)
DEPRECATED RDW RBC AUTO: 43.2 FL (ref 37–54)
EGFRCR SERPLBLD CKD-EPI 2021: 109.8 ML/MIN/1.73
ERYTHROCYTE [DISTWIDTH] IN BLOOD BY AUTOMATED COUNT: 12.5 % (ref 12.3–15.4)
GLUCOSE SERPL-MCNC: 99 MG/DL (ref 65–99)
HCT VFR BLD AUTO: 36.9 % (ref 37.5–51)
HGB BLD-MCNC: 11.6 G/DL (ref 13–17.7)
MCH RBC QN AUTO: 29.5 PG (ref 26.6–33)
MCHC RBC AUTO-ENTMCNC: 31.4 G/DL (ref 31.5–35.7)
MCV RBC AUTO: 93.9 FL (ref 79–97)
PLATELET # BLD AUTO: 261 10*3/MM3 (ref 140–450)
PMV BLD AUTO: 9.6 FL (ref 6–12)
POTASSIUM SERPL-SCNC: 3.8 MMOL/L (ref 3.5–5.2)
RBC # BLD AUTO: 3.93 10*6/MM3 (ref 4.14–5.8)
SODIUM SERPL-SCNC: 145 MMOL/L (ref 136–145)
WBC NRBC COR # BLD AUTO: 8.56 10*3/MM3 (ref 3.4–10.8)

## 2025-02-21 PROCEDURE — 85027 COMPLETE CBC AUTOMATED: CPT | Performed by: INTERNAL MEDICINE

## 2025-02-21 PROCEDURE — 80048 BASIC METABOLIC PNL TOTAL CA: CPT | Performed by: INTERNAL MEDICINE

## 2025-02-21 PROCEDURE — 97530 THERAPEUTIC ACTIVITIES: CPT

## 2025-02-21 RX ORDER — CEPHALEXIN 500 MG/1
500 CAPSULE ORAL 3 TIMES DAILY
Qty: 2 CAPSULE | Refills: 0 | Status: SHIPPED | OUTPATIENT
Start: 2025-02-21 | End: 2025-02-22

## 2025-02-21 RX ADMIN — CEPHALEXIN 500 MG: 500 CAPSULE ORAL at 08:43

## 2025-02-21 RX ADMIN — CYCLOSPORINE 1 DROP: 0.5 EMULSION OPHTHALMIC at 08:44

## 2025-02-21 RX ADMIN — METOPROLOL TARTRATE 25 MG: 25 TABLET, FILM COATED ORAL at 08:43

## 2025-02-21 RX ADMIN — DEXTROMETHORPHAN 60 MG: 30 SUSPENSION, EXTENDED RELEASE ORAL at 14:38

## 2025-02-21 RX ADMIN — CEPHALEXIN 500 MG: 500 CAPSULE ORAL at 14:38

## 2025-02-21 NOTE — PLAN OF CARE
Goal Outcome Evaluation:      Patient calm, compliant. No complaints of pain throughout duration of shift. VSS. Wife and patient hopeful for transfer news later today. If not, patient expressed desire to d/c home with home health. Patient abided to fall risk prevention strategies. This RN will continue to monitor for remaining duration of shift.

## 2025-02-21 NOTE — THERAPY TREATMENT NOTE
Patient Name: Amandeep Manriquez Jr.  : 1945    MRN: [id number]                              Today's Date: 2025       Admit Date: 2025    Visit Dx:     ICD-10-CM ICD-9-CM   1. Ureteral calculus  N20.1 592.1   2. Hypertension, unspecified type  I10 401.9   3. Leukocytosis, unspecified type  D72.829 288.60   4. Ureterolithiasis  N20.1 592.1   5. Congenital myopathy  G71.20 359.0   6. Generalized weakness  R53.1 780.79   7. Arthropathy of right sacroiliac joint  M47.818 721.3   8. Degenerative lumbar spinal stenosis  M48.061 724.02   9. History of CVA (cerebrovascular accident)  Z86.73 V12.54     Patient Active Problem List   Diagnosis    CAD (coronary artery disease), native coronary artery    Essential hypertension    Mixed hyperlipidemia    Congenital myopathy    Arthropathy of right sacroiliac joint    Osteoarthritis of right knee    Degenerative lumbar spinal stenosis    Renal stone    Bilateral impacted cerumen    Benign prostatic hyperplasia without lower urinary tract symptoms    Vertigo    High risk medication use    Vitamin D deficiency    OAB (overactive bladder)    Rosacea    Short-term memory loss    History of CVA (cerebrovascular accident)    COVID-19    Generalized weakness    Hypokalemia    Ureterolithiasis    Leukocytosis     Past Medical History:   Diagnosis Date    Acute myocardial infarction     Aneurysm of right femoral artery     Arthritis     BPH (benign prostatic hyperplasia)     CAD (coronary artery disease), native coronary artery     Congenital myopathy     DDD (degenerative disc disease), lumbar     with stenosis    Diabetes mellitus     Dizziness     Chronic Dizziness    Encounter for Medicare annual wellness exam     Essential hypertension     Gastrointestinal hemorrhage     Hard to intubate     History of impacted cerumen     Hyperlipidemia     Hypertension     Mixed hyperlipidemia     Sleep apnea      Past Surgical History:   Procedure Laterality Date    CARDIAC  CATHETERIZATION  06/2015    30% Left main, 99% mid to distal LAD, 70-80% left circumflex, 80% proximal to mid RCA.    CATARACT EXTRACTION      CERVICAL FUSION      CHOLECYSTECTOMY      CORONARY ANGIOPLASTY WITH STENT PLACEMENT      CYSTOSCOPY W/ URETERAL STENT PLACEMENT Left 2/9/2025    Procedure: CYSTOSCOPY URETERAL CATHETER/STENT INSERTION;  Surgeon: Alvino Daniels MD;  Location: [facility];  Service: Urology;  Laterality: Left;    KNEE SURGERY Right     OTHER SURGICAL HISTORY      percutaneous injection of extremity pseudoaneurysm      General Information       Row Name 02/21/25 1036          Physical Therapy Time and Intention    Document Type therapy note (daily note)  -     Mode of Treatment individual therapy;physical therapy  -       Row Name 02/21/25 1036          General Information    Existing Precautions/Restrictions fall  -       Row Name 02/21/25 1036          Cognition    Orientation Status (Cognition) oriented x 3  -       Row Name 02/21/25 1036          Safety Issues/Impairments Affecting Functional Mobility    Safety Issues Affecting Function (Mobility) impulsivity;insight into deficits/self-awareness;judgment  -     Impairments Affecting Function (Mobility) balance;coordination;endurance/activity tolerance;strength;postural/trunk control;range of motion (ROM);motor planning;motor control  -               User Key  (r) = Recorded By, (t) = Taken By, (c) = Cosigned By      Initials Name Provider Type     Erica Anderson PT Physical Therapist                   Mobility       Row Name 02/21/25 1037          Bed Mobility    Bed Mobility supine-sit;sit-supine  -     Supine-Sit [place] (Bed Mobility) verbal cues;nonverbal cues (demo/gesture);contact guard  -     Sit-Supine [place] (Bed Mobility) not tested  sitting in wheelchair  -     Assistive Device (Bed Mobility) bed rails;head of bed elevated  -     Comment, (Bed Mobility) pt insisted on trying to complete  bed mobility on his own, pt did require some increased time to complete task  -       Row Name 02/21/25 1037          Sit-Stand Transfer    Sit-Stand [place] (Transfers) verbal cues;nonverbal cues (demo/gesture);minimum assist (75% patient effort)  -     Assistive Device (Sit-Stand Transfers) walker, standard  -     Comment, (Sit-Stand Transfer) cues for posture and feet placement, pt with very wide LATIA and FF posture with initial stand but was able to correct with cues  -       Row Name 02/21/25 1037          Gait/Stairs (Locomotion)    [place] Level (Gait) verbal cues;nonverbal cues (demo/gesture);minimum assist (75% patient effort)  -     Assistive Device (Gait) walker, front-wheeled  -     Distance in Feet (Gait) 3  bed to chair  -     Deviations/Abnormal Patterns (Gait) saul decreased;festinating/shuffling;gait speed decreased;stride length decreased;weight shifting decreased  -     Comment, (Gait/Stairs) pt reports difficulty moving R LE secondary to pain, pt did have R knee buckle x1 but was able to correct with Nathalie  -               User Key  (r) = Recorded By, (t) = Taken By, (c) = Cosigned By      Initials Name Provider Type     Erica Anderson, MATILDA Physical Therapist                   Obj/Interventions       Row Name 02/21/25 1047          Motor Skills    Therapeutic Exercise --  10 reps B LE LAQ and SM  -               User Key  (r) = Recorded By, (t) = Taken By, (c) = Cosigned By      Initials Name Provider Type     Erica Anderson, PT Physical Therapist                   Goals/Plan    No documentation.                  Clinical Impression       Row Name 02/21/25 1047          Pain    Pretreatment Pain Rating 0/10 - no pain  -     Posttreatment Pain Rating 4/10  -     Pain Location knee  -     Pain Side/Orientation right  -     Pain Management Interventions exercise or physical activity utilized  -       Row Name 02/21/25 1047          Plan of Care  Review    Plan of Care Reviewed With patient;spouse  -CH     Outcome Evaluation Pt demonstrates increased activity tolerance and functional strength as he was able to perform bed mobility and transfers with less assistance. Pt was also able to take several steps from bed to wheelchair. Pt is safe to go by wheelchair van upon discharge. RN and CCP notified. PT will continue to follow to address strength, mobility, and gait.  -       Row Name 02/21/25 1047          Positioning and Restraints    Pre-Treatment Position in bed  -CH     Post Treatment Position wheelchair  -     In Wheelchair sitting;call light within reach;encouraged to call for assist;with family/caregiver;notified [facility]  -               User Key  (r) = Recorded By, (t) = Taken By, (c) = Cosigned By      Initials Name Provider Type     Erica Anderson, PT Physical Therapist                   Outcome Measures       Row Name 02/21/25 1051 02/21/25 0845       How much help from another person do you currently need...    Turning from your back to your side while in flat bed without using bedrails? 3  - 3  -QW    Moving from lying on back to sitting on the side of a flat bed without bedrails? 3  - 3  -QW    Moving to and from a bed to a chair (including a wheelchair)? 3  -CH 2  -QW    Standing up from a chair using your arms (e.g., wheelchair, bedside chair)? 3  - 2  -QW    Climbing 3-5 steps with a railing? 1  - 1  -QW    To walk in hospital room? 2  - 1  -QW    AM-PAC 6 Clicks Score (PT) 15  - 12  -QW    Highest Level of Mobility Goal 4 --> Transfer to chair/commode  -CH 4 --> Transfer to chair/commode  -QW      Row Name 02/21/25 0000          How much help from another person do you currently need...    Turning from your back to your side while in flat bed without using bedrails? 3  -SB     Moving from lying on back to sitting on the side of a flat bed without bedrails? 2  -SB     Moving to and from a bed to a chair (including a  wheelchair)? 2  -SB     Standing up from a chair using your arms (e.g., wheelchair, bedside chair)? 2  -SB     Climbing 3-5 steps with a railing? 1  -SB     To walk in hospital room? 1  -SB     AM-PAC 6 Clicks Score (PT) 11  -SB     Highest Level of Mobility Goal 4 --> Transfer to chair/commode  -SB       Row Name 02/21/25 1051          Functional Assessment    Outcome Measure Options AM-PAC 6 Clicks Basic Mobility (PT)  -               User Key  (r) = Recorded By, (t) = Taken By, (c) = Cosigned By      Initials Name Provider Type     Erica Anderson, PT Physical Therapist    QW Marcell Sanon, RN Registered Nurse    Aissatou Hoyos, RN Registered Nurse                                 Physical Therapy Education       Title: PT OT SLP Therapies (Done)       Topic: Physical Therapy (Done)       Point: Mobility training (Done)       Learning Progress Summary            Patient Acceptance, E,TB,D, VU,NR by  at 2/21/2025 1051    Acceptance, E, Bed IU by QW at 2/21/2025 0936    Acceptance, E, VU,NR by DJ at 2/17/2025 1102    Acceptance, E, NR by LW at 2/16/2025 1044    Acceptance, E, NR by ER at 2/12/2025 1341    Eager, E, VU,NR by SV at 2/10/2025 1447   Family Acceptance, E, VU,NR by DJ at 2/17/2025 1102    Acceptance, E, NR by ER at 2/12/2025 1341                      Point: Home exercise program (Done)       Learning Progress Summary            Patient Acceptance, E,TB,D, VU,NR by  at 2/21/2025 1051    Acceptance, E, Bed IU by QW at 2/21/2025 0936    Acceptance, E, VU,NR by DJ at 2/17/2025 1102    Acceptance, E, NR by ER at 2/12/2025 1341    Eager, E, VU,NR by SV at 2/10/2025 1447   Family Acceptance, E, VU,NR by DJ at 2/17/2025 1102    Acceptance, E, NR by ER at 2/12/2025 1341                      Point: Body mechanics (Done)       Learning Progress Summary            Patient Acceptance, E,TB,D, VU,NR by  at 2/21/2025 1051    Acceptance, E, Bed IU by JAMAAL at 2/21/2025 0936    Acceptance, E, TIN,NR by IMAN at  2/17/2025 1102    Acceptance, E, NR by LW at 2/16/2025 1044    Acceptance, E, NR by ER at 2/12/2025 1341   Family Acceptance, E, VU,NR by DJ at 2/17/2025 1102    Acceptance, E, NR by ER at 2/12/2025 1341                      Point: Precautions (Done)       Learning Progress Summary            Patient Acceptance, E,TB,D, VU,NR by  at 2/21/2025 1051    Acceptance, E, Bed IU by QW at 2/21/2025 0936    Acceptance, E, VU,NR by DJ at 2/17/2025 1102    Acceptance, E, NR by LW at 2/16/2025 1044    Acceptance, E, NR by ER at 2/12/2025 1341   Family Acceptance, E, VU,NR by DJ at 2/17/2025 1102    Acceptance, E, NR by ER at 2/12/2025 1341                                      User Key       Initials Effective Dates Name Provider Type Discipline     06/16/21 -  Erica Anderson, PT Physical Therapist PT    SV 07/11/23 -  Tram Lloyd, PT Physical Therapist PT    DJ 10/25/19 -  Haylee White, PT Physical Therapist PT    LW 05/08/23 -  Annel Lucero, PT Physical Therapist PT    ER 10/15/23 -  Berna Bales, PT Physical Therapist PT    QW 01/23/24 -  Marcell Sanon, RN Registered Nurse Nurse                  PT Recommendation and Plan     Outcome Evaluation: Pt demonstrates increased activity tolerance and functional strength as he was able to perform bed mobility and transfers with less assistance. Pt was also able to take several steps from bed to wheelchair. Pt is safe to go by wheelchair van upon discharge. RN and CCP notified. PT will continue to follow to address strength, mobility, and gait.     Time Calculation:         PT Charges       Row Name 02/21/25 1052             Time Calculation    Start Time 0957  -      Stop Time 1012  -      Time Calculation (min) 15 min  -      PT Received On 02/21/25  -      PT - Next Appointment 02/24/25  -         Time Calculation- PT    Total Timed Code Minutes- PT 15 minute(s)  -         Timed Charges    90378 - PT Therapeutic Activity Minutes 15  -CH         Total  Minutes    Timed Charges Total Minutes 15  -CH       Total Minutes 15  -CH                User Key  (r) = Recorded By, (t) = Taken By, (c) = Cosigned By      Initials Name Provider Type     Erica Anderson PT Physical Therapist                  Therapy Charges for Today       Code Description Service Date Service Provider Modifiers Qty    [id number]  PT THERAPEUTIC ACT EA 15 MIN 2/21/2025 Erica Anderson PT GP 1            PT G-Codes  Outcome Measure Options: AM-PAC 6 Clicks Basic Mobility (PT)  AM-PAC 6 Clicks Score (PT): 15  AM-PAC 6 Clicks Score (OT): 10  Modified [place] Scale: 4 - Moderately severe disability.  Unable to walk without assistance, and unable to attend to own bodily needs without assistance.  PT Discharge Summary  Anticipated Discharge Disposition (PT): inpatient rehabilitation facility, skilled nursing facility    Erica Anderson, PT  2/21/2025

## 2025-02-21 NOTE — PLAN OF CARE
Goal Outcome Evaluation:  Plan of Care Reviewed With: patient, spouse           Outcome Evaluation: Pt demonstrates increased activity tolerance and functional strength as he was able to perform bed mobility and transfers with less assistance. Pt was also able to take several steps from bed to wheelchair. Pt is safe to go by wheelchair van upon discharge. RN and CCP notified. PT will continue to follow to address strength, mobility, and gait.    Anticipated Discharge Disposition (PT): inpatient rehabilitation facility, skilled nursing facility

## 2025-02-21 NOTE — DISCHARGE SUMMARY
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       Presenting Problem/History of Present Illness from H&P:  Ureterolithiasis [N20.1]  Ureteral calculus [N20.1]  Leukocytosis, unspecified type [D72.829]  Hypertension, unspecified type [I10]     Mr. Manriquez is a 80 y.o. male with CAD, HTN, HLD, history CVA, recurrent kidney stones presenting with LLQ pain.  Pain started about 2 AM this morning, with radiation towards the left flank.  He states that this is consistent with pain from prior kidney stones.  No fevers or chills.  No nausea, no vomiting.  Denies urgency, hesitancy, frequency or dysuria.     Hospital Course:  The patient is a 80 y.o. male who presented with      Ureteral stone and hydronephrosis.  He underwent cystoscopy and stent placement on 2/9.  He is completing Keflex course and nitrofurantoin was ordered at discharge after he completes that.  He needs follow-up with urology in clinic.    He had hypertension but blood pressure has been borderline low so the Norvasc he was prescribed here has since been held.  I am not going to continue that at discharge.  Follow-up with primary care to determine if chronic medications are needed in the outpatient setting.    Repeat CT chest was recommended to follow-up the findings below.    Patient remained in hospital while waiting on his insurance company, initial pre-CERT denial and appeal for rehab.    This patient will need to be discharged with a  semi-electric hospital bed from a [employer] due to congenital myopathy and history of CVA. The patient has suffered from their condition for greater than 2 years and is getting worse. The patient requires positioning for functional weight shift due to congenital myopathy and history of CVA. The patient also needs frequent changes in body position to assist caretaker in bathing, dressing and feeding the patient due to congenital myopathy and history of CVA. The patient will use the hospital bed to achieve positioning and relief of symptoms not possible with a regular bed. The semi-electric bed will enable frequent changes in body position.     Exam Today:  Constitutional:       General: He is not in acute distress.     Appearance: He is ill-appearing (chronically). He is not diaphoretic.   HENT:      Head: Atraumatic.   Cardiovascular:      Rate and Rhythm: Normal rate and regular rhythm.      Pulses: Normal pulses.   Pulmonary:      Effort: Pulmonary effort is normal.      Breath sounds: No wheezing.   Abdominal:      General: There is no distension.      Palpations: Abdomen is soft.      Tenderness: There is no abdominal tenderness. There is no guarding or rebound.   Musculoskeletal:         General: No tenderness.   Skin:     General: Skin is warm and dry.      Coloration: Skin is pale.   Neurological:      Mental Status: He is alert. Mental status is at baseline.   Psychiatric:         Behavior: Behavior normal.     Results:  XR Abdomen KUB    Result Date: 2/13/2025   Probable left nephrolithiasis, left double-J ureteral stent in place. Bowel gas pattern within normal limits.  This report was finalized on 2/13/2025 7:15 PM by Dr. Aguilar Pinedo M.D on Workstation: [id number]      CT Abdomen Pelvis Without Contrast    Result Date: 2/9/2025    1. A 1.2 cm stone at the mid left ureter, with moderate left hydronephrosis, and increased stranding around the left kidney, suggesting inflammation/infection.  Bilateral nonobstructive nephrolithiasis.  2. Colonic diverticulosis. No acute inflammatory process of bowel is identified.  3. Right middle lobe pulmonary reticulonodular opacities suggest inflammatory/infectious etiology, clinical correlation and CT follow-up recommended.  This report was finalized on 2/9/2025 7:05 AM by Dr. Wilton Richardson M.D on Workstation: [id number]        Procedures Performed:  Procedure(s):  CYSTOSCOPY URETERAL CATHETER/STENT INSERTION       Consults:   Consults       Date and Time Order Name Status Description    2/9/2025  8:03 AM [facility] (on-call MD unless specified) Details               Discharge Disposition:  Home-Health Care [facility]    Discharge Medications:     Discharge Medications        New Medications        Instructions Start Date   benzonatate 100 MG capsule  Commonly known as: TESSALON   100 mg, Oral, 3 Times Daily PRN      cephalexin 500 MG capsule  Commonly known as: KEFLEX   500 mg, Oral, 3 times daily      nitrofurantoin (macrocrystal-monohydrate) 100 MG capsule  Commonly known as: Macrobid   100 mg, Oral, Daily      sennosides-docusate 8.6-50 MG per tablet  Commonly known as: PERICOLACE   2 tablets, Oral, 2 Times Daily             Continue These Medications        Instructions Start Date   acetaminophen 325 MG tablet  Commonly known as: TYLENOL   650 mg, Oral, Every 4 Hours PRN      baclofen 10 MG tablet  Commonly known as: LIORESAL   10 mg, Oral, Nightly      cycloSPORINE 0.05 % ophthalmic emulsion  Commonly known as: RESTASIS   1 drop, 2 Times Daily      latanoprost 0.005 % ophthalmic solution  Commonly known as: XALATAN   1 drop, Nightly      metoprolol tartrate 25 MG tablet  Commonly known as: LOPRESSOR   12.5 mg, Oral, 2 Times Daily      Myrbetriq 50 MG tablet sustained-release 24 hour 24 hr tablet  Generic drug: Mirabegron ER   TAKE ONE TABLET BY MOUTH DAILY      nitroglycerin 0.4 MG SL tablet  Commonly known as: NITROSTAT   1 tablet, Sublingual, For chest pain.  If pain  remains after 5 minutes, call 911 and repeat dose.  Max 3 tabs in 15 minutes.      PRESERVISION AREDS 2+MULTI VIT PO   1 tablet, Daily      rosuvastatin 40 MG tablet  Commonly known as: CRESTOR   TAKE ONE TABLET BY MOUTH DAILY      tamsulosin 0.4 MG capsule 24 hr capsule  Commonly known as: FLOMAX   0.4 mg, Oral, Nightly             Stop These Medications      aspirin 81 MG tablet            ASK your doctor about these medications        Instructions Start Date   dextromethorphan polistirex ER 30 MG/5ML Suspension Extended Release oral suspension  Commonly known as: DELSYM  Ask about: Should I take this medication?   30 mg, Oral, Every 12 Hours Scheduled      phenazopyridine 200 MG tablet  Commonly known as: PYRIDIUM  Ask about: Should I take this medication?   200 mg, Oral, 3 Times Daily With Meals               Discharge Diet:   Diet Instructions       Diet: Regular/House Diet; Soft to Chew (NDD 3); Chopped Meat; Thin (IDDSI 0)      Discharge Diet: Regular/House Diet    Texture: Soft to Chew (NDD 3)    Soft to Chew: Chopped Meat    Fluid Consistency: Thin (IDDSI 0)            Activity at Discharge:   Activity Instructions       Activity as Tolerated              Follow-up Appointments:  Additional Instructions for the Follow-ups that You Need to Schedule       Ambulatory Referral to Home Health   As directed      Face to Face Visit Date: 2/21/2025   Follow-up provider for Plan of Care?: I treated the patient in an acute care facility and will not continue treatment after discharge.   Follow-up provider: KELSY BEJARANO [[id number]]   Reason/Clinical Findings: weakness   Describe mobility limitations that make leaving home difficult: see above   Nursing/Therapeutic Services Requested: Physical Therapy Occupational Therapy   PT orders: Therapeutic exercise Strengthening   Occupational orders: Strengthening   Frequency: 1 Week 1               Contact information for follow-up providers       Kelsy Bejarano MD  .    Specialty: Internal Medicine  Contact information:  [address]  [facility] [[email or web address] [address]  [facility] [address]  [phone]               Alvino Daniels MD Follow up.    Specialty: Urology  Contact information:  [address]  [address]  [facility] [address] [phone]                       Contact information for after-discharge care       Home Medical Care       [facility] .    Services: Home Health Services, Home Medical , Home Rehabilitation, Home Nursing  Contact information:  [address] [place] [address] [phone]                                   Test Results Pending at Discharge:       Joes E Pugh MD  02/21/25  08:48 EST    Time Spent on Discharge Activities: >30 minutes    Dictated portions using Dragon dictation software.

## 2025-02-21 NOTE — CASE MANAGEMENT/SOCIAL WORK
Continued Stay Note  [facility]     Patient Name: Amandeep Manriquez Jr.  MRN: [id number]  Today's Date: 2/21/2025    Admit Date: 2/9/2025    Plan: Home with [facility] via [facility].   Discharge Plan       Row Name 02/21/25 1743       Plan    Plan Home with [facility] via [facility] transport.    Patient/Family in Agreement with Plan yes    Plan Comments CCP received message from RN that pt's wife/Julissa would like to take pt home if appeal for rehab is still pending today. CCP met with pt and Julissa at bedside. Julissa confirms if appeal is still pending today, she would like to take pt home with HH and a hospital bed. Julissa requests WC transport to take pt home. Julissa would like [facility] to provide hospital bed. Julissa states if hospital bed cannot be delivered today, she would still like to take pt home. CCP placed referral to [facility]; hospital bed ordered; MD added hospital bed verbiage. [place] confirms appeal is still pending this afternoon. CCP met with Julissa at bedside; Julissa would prefer to go home now. CCP arranged WC transport through [facility] for 1730 today; Cost is $100. CCP notified [facility] pt would be leaving today. Per [facility] hospital bed will be delivered on Monday. CCP notified Julissa that pt's hospital bed will not be delivered until Monday; Julissa confirmed to CCP that she still wants to take pt home with hospital bed arriving on Monday. CCP notified Julissa WC transport is set up for 1730. CCP notified [facility] that pt is discharging home today. Plan will be to DC pt home with [facility] today via [facility] WC van transport today at 1730. RLutes RN/CCP    Final Discharge Disposition Code 06 - home with home health care    Final Note Home with [facility] via [facility] transport.                   Discharge Codes    No documentation.                 Expected Discharge Date and Time       Expected Discharge Date Expected Discharge  Time    Feb 21, 2025               Yessica Zhang RN

## 2025-02-21 NOTE — CASE MANAGEMENT/SOCIAL WORK
Case Management Discharge Note      Final Note: Home with [facility] via [facility] transport.         Selected Continued Care - Admitted Since 2/9/2025       Destination    No services have been selected for the patient.                Durable Medical Equipment    No services have been selected for the patient.                Dialysis/Infusion    No services have been selected for the patient.                Home Medical Care       Service Provider Services Address Phone Fax Patient Preferred    [facility] Services, Home Medical , Home Rehabilitation, Home Nursing [address] [place] [address] [phone] --              Therapy    No services have been selected for the patient.                Community Resources    No services have been selected for the patient.                Community & DME    No services have been selected for the patient.                         Final Discharge Disposition Code: 06 - home with home health care

## 2025-02-21 NOTE — DISCHARGE PLACEMENT REQUEST
"Amandeep Rodriguez Jr. (80 y.o. Male)       Date of Birth   1945    Social Security Number       Address   [address] [facility] [address] [place] [address]    Home Phone   [phone]    MRN   [id number]       Rastafarian   None    Marital Status                               Admission Date   2/9/25    Admission Type   Emergency    Admitting Provider   Rashawn Larios MD    Attending Provider   Jose E Pugh MD    Department, Room/Bed   [address], P389/1       Discharge Date       Discharge Disposition   Home-Health Care [facility]    Discharge Destination                                 Attending Provider: Jose E Pugh MD    Allergies: Iodinated Contrast Media, Ampicillin    Isolation: None   Infection: None   Code Status: CPR    Ht: 162.6 cm (64\")   Wt: 78.7 kg (173 lb 8 oz)    Admission Cmt: None   Principal Problem: Ureterolithiasis [N20.1]                   Active Insurance as of 2/9/2025       Primary Coverage       Payor Plan Insurance Group Employer/Plan Group    HUMANA MEDICARE REPLACEMENT HUMANA MED ADV GROUP [id number]       Payor Plan Address Payor Plan Phone Number Payor Plan Fax Number Effective Dates    [address] [address] [phone]  1/1/2018 - None Entered    [facility] [address]         Subscriber Name Subscriber Birth Date Member ID       AMANDEEP RODRIGUEZ JR. 1945 [id number]                     Emergency Contacts        (Rel.) Home Phone Work Phone Mobile Phone    DeclanJulissa (Spouse) [phone] -- [phone]                "

## 2025-02-22 NOTE — OUTREACH NOTE
Prep Survey      Flowsheet Row Responses   [facility] facility patient discharged from? [facility]   Is LACE score < 7 ? No   Eligibility Readm Mgmt   Discharge diagnosis Ureterolithiasis, CYSTOSCOPY URETERAL CATHETER/STENT INSERTION   Does the patient have one of the following disease processes/diagnoses(primary or secondary)? Other   Does the patient have Home health ordered? Yes   What is the Home health agency?  [facility]   Is there a DME ordered? Yes   What DME was ordered? hospital bed   Prep survey completed? Yes            Jennifer VALDES - Registered Nurse

## 2025-02-26 ENCOUNTER — READMISSION MANAGEMENT (OUTPATIENT)
Dept: CALL CENTER | Facility: HOSPITAL | Age: 80
End: 2025-02-26
Payer: MEDICARE

## 2025-02-26 NOTE — OUTREACH NOTE
Medical Week 1 Survey      Flowsheet Row Responses   [facility] patient discharged from? [place]   Does the patient have one of the following disease processes/diagnoses(primary or secondary)? Other   Week 1 attempt successful? Yes   Call start time 0822   Call end time 0826   Discharge diagnosis Ureterolithiasis, CYSTOSCOPY URETERAL CATHETER/STENT INSERTION   Is patient permission given to speak with other caregiver? Yes   List who call center can speak with Spouse   Person spoke with today (if not patient) and relationship Spouse   Meds reviewed with patient/caregiver? Yes   Is the patient having any side effects they believe may be caused by any medication additions or changes? No   Does the patient have all medications ordered at discharge? Yes   Is the patient taking all medications as directed (includes completed medication regime)? Yes   Comments regarding appointments Patient needs urology visit, Spouse states she will schedule when she is able to get patient out of house, he has had several falls since d/c.   Does the patient have a primary care provider?  Yes   Does the patient have an appointment with their PCP within 7 days of discharge? Yes   Comments regarding PCP Patient has had virtual visit.   Has the patient kept scheduled appointments due by today? Yes   Home health comments Spouse states they are using [facility] and not [facility], they are awaiting insurance approval for PT and will use acute care.   What DME was ordered? hospital bed   Has all DME been delivered? Yes   Psychosocial issues? No   Did the patient receive a copy of their discharge instructions? Yes   Nursing interventions Reviewed instructions with patient   What is the patient's perception of their health status since discharge? Same   Is the patient/caregiver able to teach back signs and symptoms related to disease process for when to call PCP? Yes   Is the patient/caregiver able to teach back signs and symptoms  related to disease process for when to call 911? Yes   Is the patient/caregiver able to teach back the hierarchy of who to call/visit for symptoms/problems? PCP, Specialist, Home health nurse, Urgent Care, ED, 911 Yes   If the patient is a current smoker, are they able to teach back resources for cessation? Not a smoker   Week 1 call completed? Yes   Would this patient benefit from a Referral to Amb Social Work? No   Is the patient interested in additional calls from an ambulatory ? No   Call end time 0826            Arlin JEFFERS - Registered Nurse

## 2025-03-07 ENCOUNTER — READMISSION MANAGEMENT (OUTPATIENT)
Dept: CALL CENTER | Facility: HOSPITAL | Age: 80
End: 2025-03-07
Payer: MEDICARE

## 2025-03-07 NOTE — OUTREACH NOTE
Medical Week 2 Survey      Flowsheet Row Responses   [facility] patient discharged from? [place]   Does the patient have one of the following disease processes/diagnoses(primary or secondary)? Other   Week 2 attempt successful? Yes   Call start time 1427   Discharge diagnosis Ureterolithiasis, CYSTOSCOPY URETERAL CATHETER/STENT INSERTION   Call end time 1429   Person spoke with today (if not patient) and relationship Spouse   Meds reviewed with patient/caregiver? Yes   Is the patient having any side effects they believe may be caused by any medication additions or changes? No   Does the patient have all medications ordered at discharge? Yes   Is the patient taking all medications as directed (includes completed medication regime)? Yes   Does the patient have a primary care provider?  Yes   Does the patient have an appointment with their PCP within 7 days of discharge? Yes   Has the patient kept scheduled appointments due by today? Yes   What is the Home health agency?  [facility]    Has home health visited the patient within 72 hours of discharge? Yes   Has all DME been delivered? Yes   Psychosocial issues? No   Did the patient receive a copy of their discharge instructions? Yes   Nursing interventions Reviewed instructions with patient   What is the patient's perception of their health status since discharge? Improving   Is the patient/caregiver able to teach back signs and symptoms related to disease process for when to call PCP? Yes   Is the patient/caregiver able to teach back signs and symptoms related to disease process for when to call 911? Yes   Is the patient/caregiver able to teach back the hierarchy of who to call/visit for symptoms/problems? PCP, Specialist, Home health nurse, Urgent Care, ED, 911 Yes   If the patient is a current smoker, are they able to teach back resources for cessation? Not a smoker   Week 2 Call Completed? Yes   Is the patient interested in additional calls from an  ambulatory ? No   Would this patient benefit from a Referral to [facility] Social Work? No   Wrap up additional comments Spouse states they are working on trying to get in home PT for pt and insurance declined inpt PT. Pt does have hospital bed at home, and left is to be delivered on Monday, 3/10/25. Goal is to try to get pt back to baseline as pt was walking before hospitalization per spouse. Spouse shares pt still has urinary tract stone and has upcoming procedure for lithotripsy.   Call end time 5652            Rima VALDES - Registered Nurse

## 2025-03-18 ENCOUNTER — TELEPHONE (OUTPATIENT)
Dept: CARDIOLOGY | Facility: CLINIC | Age: 80
End: 2025-03-18
Payer: MEDICARE

## 2025-03-18 ENCOUNTER — READMISSION MANAGEMENT (OUTPATIENT)
Dept: CALL CENTER | Facility: HOSPITAL | Age: 80
End: 2025-03-18
Payer: MEDICARE

## 2025-03-18 NOTE — TELEPHONE ENCOUNTER
Called and informed patient's wife that we do not offer COVID boosters and they would need to make an appointment with either an outside pharmacy or to call and ask the patient's PCP.    Patient's spouse verbalized understanding and call was ended.

## 2025-03-18 NOTE — OUTREACH NOTE
Medical Week 3 Survey      Flowsheet Row Responses   [facility] patient discharged from? [place]   Does the patient have one of the following disease processes/diagnoses(primary or secondary)? Other   Week 3 attempt successful? Yes   Call start time 1358   Call end time 1359   Discharge diagnosis Ureterolithiasis, CYSTOSCOPY URETERAL CATHETER/STENT INSERTION   Person spoke with today (if not patient) and relationship Spouse   [facility]   Has home health visited the patient within 72 hours of discharge? Yes   Home health comments Nurse, PT coming to the home.   Did the patient receive a copy of their discharge instructions? Yes   Nursing interventions Reviewed instructions with patient   What is the patient's perception of their health status since discharge? Improving   Is the patient/caregiver able to teach back signs and symptoms related to disease process for when to call PCP? Yes   Is the patient/caregiver able to teach back signs and symptoms related to disease process for when to call 911? Yes   Is the patient/caregiver able to teach back the hierarchy of who to call/visit for symptoms/problems? PCP, Specialist, Home health nurse, Urgent Care, ED, 911 Yes   Week 3 Call Completed? Yes   Graduated Yes   Wrap up additional comments Spouse states patient is doing well. HH coming to the home. No concerns or questions noted.   Call end time 1359            Luana OLSEN - Registered Nurse

## 2025-03-18 NOTE — TELEPHONE ENCOUNTER
Caller: Julissa Manriquez    Relationship: Emergency Contact    Best call back number: [phone]      What was the call regarding: PATIENT'S SPOUSE CALL TO SEE IF IT WOULD BE POSSIBLE TO GET A COVID 19 BOOSTER SHOT WHILE AT THE VISIT. PATIENT USES [facility] THAT COSTS THEM $100.00 A TRIP AND WANTED TO SEE IF THAT COULD BE DONE SAME TIME TO SAVE ANOTHER TRIP. PLEASE CALL AND ADVISE. PFIZER BOOSTER.

## 2025-03-25 ENCOUNTER — OFFICE VISIT (OUTPATIENT)
Dept: CARDIOLOGY | Age: 80
End: 2025-03-25
Payer: MEDICARE

## 2025-03-25 VITALS
HEIGHT: 64 IN | OXYGEN SATURATION: 97 % | DIASTOLIC BLOOD PRESSURE: 82 MMHG | SYSTOLIC BLOOD PRESSURE: 160 MMHG | HEART RATE: 63 BPM | BODY MASS INDEX: 31.58 KG/M2 | WEIGHT: 185 LBS

## 2025-03-25 DIAGNOSIS — N20.0 RENAL STONE: ICD-10-CM

## 2025-03-25 DIAGNOSIS — Z01.810 PREOP CARDIOVASCULAR EXAM: Primary | ICD-10-CM

## 2025-03-25 DIAGNOSIS — E78.2 MIXED HYPERLIPIDEMIA: Chronic | ICD-10-CM

## 2025-03-25 DIAGNOSIS — Z86.73 HISTORY OF CVA (CEREBROVASCULAR ACCIDENT): ICD-10-CM

## 2025-03-25 DIAGNOSIS — I10 ESSENTIAL HYPERTENSION: Chronic | ICD-10-CM

## 2025-03-25 DIAGNOSIS — Z09 HOSPITAL DISCHARGE FOLLOW-UP: ICD-10-CM

## 2025-03-25 DIAGNOSIS — I25.10 CORONARY ARTERY DISEASE INVOLVING NATIVE CORONARY ARTERY OF NATIVE HEART WITHOUT ANGINA PECTORIS: Chronic | ICD-10-CM

## 2025-03-25 PROCEDURE — 1160F RVW MEDS BY RX/DR IN RCRD: CPT | Performed by: INTERNAL MEDICINE

## 2025-03-25 PROCEDURE — 99204 OFFICE O/P NEW MOD 45 MIN: CPT | Performed by: INTERNAL MEDICINE

## 2025-03-25 PROCEDURE — 93000 ELECTROCARDIOGRAM COMPLETE: CPT | Performed by: INTERNAL MEDICINE

## 2025-03-25 PROCEDURE — 1159F MED LIST DOCD IN RCRD: CPT | Performed by: INTERNAL MEDICINE

## 2025-03-25 PROCEDURE — 3079F DIAST BP 80-89 MM HG: CPT | Performed by: INTERNAL MEDICINE

## 2025-03-25 PROCEDURE — 3077F SYST BP >= 140 MM HG: CPT | Performed by: INTERNAL MEDICINE

## 2025-03-25 RX ORDER — ASPIRIN 81 MG/1
81 TABLET, CHEWABLE ORAL DAILY
COMMUNITY

## 2025-03-25 RX ORDER — OXYBUTYNIN CHLORIDE 10 MG/1
10 TABLET, EXTENDED RELEASE ORAL DAILY
COMMUNITY

## 2025-03-25 RX ORDER — TRAMADOL HYDROCHLORIDE 50 MG/1
50 TABLET ORAL EVERY 6 HOURS PRN
COMMUNITY
Start: 2025-01-28

## 2025-03-25 NOTE — PROGRESS NOTES
Subjective:     Encounter Date: 4/13/2018      Patient ID: Amandeep Manriquez Jr. is a 80 y.o. male.    Chief Complaint:  Coronary Artery Disease  Symptoms include muscle weakness.     Dear Dr. Daniels,    I had the pleasure of seeing this patient in the office today for evaluation and consultation regarding his cardiac status.  He has a history of CAD.  He has a history of congenital myopathy, history of CVA, hypertension, hyperlipidemia, recurrent kidney stones.  He comes in part for assessment of cardiac risk prior to surgery.  He is kelin require intervention for a kidney stone.    I have seen in the past, but the most recent visit was back in 2018.    Patient was hospitalized at [facility] with acute kidney stone in February.  He underwent cystoscopy and stent placement on February 9.  He is scheduled for left ureteroscopy with stone basket extraction on April 17.    No chest pain or chest discomfort.  Denies shortness of breath now.  He did have some shortness of breath when he is hospitalized because he had pneumonia but that is resolved.  No feeling of palpitations or tachycardia.    He has a history of a non-ST segment elevation myocardial infarction.  There was caused by subtotally occluded LAD there was treated with stent placement.  He then was readmitted on July 21, 2015 for staged intervention to the circumflex and he received a 3.5 x 18 no immediate or drug-eluting stent placed in the distal left circumflex.    Patient had a GI bleed in June 2015 secondary to gastric ulcerations and H. pylori.  He had been on Proventil and he was changed to Plavix.  He also had a right groin pseudoaneurysm that occurred after his initial cardiac catheterization; his heart catheterization been performed through the right femoral artery secondary to tortuosity in the right radial artery.    The following portions of the patient's history were reviewed and updated as appropriate: allergies, current medications, past  "family history, past medical history, past social history, past surgical history and problem list.    Past Medical History:   Diagnosis Date    Acute myocardial infarction     Aneurysm of right femoral artery     Arthritis     BPH (benign prostatic hyperplasia)     CAD (coronary artery disease), native coronary artery     Congenital myopathy     DDD (degenerative disc disease), lumbar     with stenosis    Diabetes mellitus     Dizziness     Chronic Dizziness    Encounter for Medicare annual wellness exam     Essential hypertension     Gastrointestinal hemorrhage     Hard to intubate     History of impacted cerumen     Hyperlipidemia     Hypertension     Mixed hyperlipidemia     Sleep apnea        Past Surgical History:   Procedure Laterality Date    CARDIAC CATHETERIZATION  06/2015    30% Left main, 99% mid to distal LAD, 70-80% left circumflex, 80% proximal to mid RCA.    CATARACT EXTRACTION      CERVICAL FUSION      CHOLECYSTECTOMY      CORONARY ANGIOPLASTY WITH STENT PLACEMENT      CYSTOSCOPY W/ URETERAL STENT PLACEMENT Left 2/9/2025    Procedure: CYSTOSCOPY URETERAL CATHETER/STENT INSERTION;  Surgeon: Alvino Daniels MD;  Location: [facility];  Service: Urology;  Laterality: Left;    KNEE SURGERY Right     OTHER SURGICAL HISTORY      percutaneous injection of extremity pseudoaneurysm         ECG 12 Lead    Date/Time: 3/25/2025 9:28 AM  Performed by: Santi Benton III, MD    Authorized by: Santi Benton III, MD  Comparison: compared with previous ECG   Similar to previous ECG  Rhythm: sinus rhythm  Rate: normal  Conduction: conduction normal  ST Segments: ST segments normal  T Waves: T waves normal  QRS axis: normal  Other: no other findings    Clinical impression: normal ECG             Objective:     Vitals:    03/25/25 0910   BP: 160/82   BP Location: Left arm   Patient Position: Sitting   Cuff Size: Adult   Pulse: 63   SpO2: 97%   Weight: 83.9 kg (185 lb)   Height: 162.6 cm (64\")           Physical " Exam   Constitutional: He is oriented to person, place, and time. He appears well-developed and well-nourished. No distress.   HENT:   Head: Normocephalic and atraumatic.   Nose: Nose normal.   Mouth/Throat: Oropharynx is clear and moist.   Eyes: Conjunctivae and EOM are normal. Pupils are equal, round, and reactive to light. Right eye exhibits no discharge. Left eye exhibits no discharge.   Neck: Normal range of motion. Neck supple. No tracheal deviation present. No thyromegaly present.   Cardiovascular: Normal rate, regular rhythm, S1 normal, S2 normal, normal heart sounds and normal pulses.  Exam reveals no S3.    Pulmonary/Chest: Effort normal and breath sounds normal. No stridor. No respiratory distress. He exhibits no tenderness.   Abdominal: Soft. Bowel sounds are normal. He exhibits no distension and no mass. There is no tenderness. There is no rebound and no guarding.   Musculoskeletal: Normal range of motion. He exhibits no tenderness or deformity.   Lymphadenopathy:     He has no cervical adenopathy.   Neurological: He is alert and oriented to person, place, and time. He has normal reflexes. He exhibits abnormal muscle tone.   Skin: Skin is warm and dry. No rash noted. He is not diaphoretic. No erythema.   Psychiatric: He has a normal mood and affect. Thought content normal.       Lab Review:           Lab Results   Component Value Date    GLUCOSE 99 02/21/2025    BUN 20 02/21/2025    CREATININE 0.39 (L) 02/21/2025     02/21/2025    K 3.8 02/21/2025     (H) 02/21/2025    CALCIUM 9.1 02/21/2025    PROTEINTOT 6.5 02/09/2025    ALBUMIN 2.9 (L) 02/20/2025    ALT 18 02/09/2025    AST 21 02/09/2025    ALKPHOS 107 02/09/2025    BILITOT 0.4 02/09/2025    GLOB 2.7 02/09/2025    AGRATIO 1.4 02/09/2025    BCR 51.3 (H) 02/21/2025    ANIONGAP 7.5 02/21/2025    EGFR 109.8 02/21/2025     CBC          2/10/2025    06:50 2/20/2025    06:54 2/21/2025    06:50   CBC   WBC 10.05  8.47  8.56    RBC 4.73  4.16   3.93    Hemoglobin 14.5  12.4  11.6    Hematocrit 43.7  37.7  36.9    MCV 92.4  90.6  93.9    MCH 30.7  29.8  29.5    MCHC 33.2  32.9  31.4    RDW 12.4  12.2  12.5    Platelets 192  245  261        XR Abdomen KUB  Result Date: 2/13/2025   Probable left nephrolithiasis, left double-J ureteral stent in place. Bowel gas pattern within normal limits.  This report was finalized on 2/13/2025 7:15 PM by Dr. Aguilar Pinedo M.D on Workstation: [id number]      CT Abdomen Pelvis Without Contrast  Result Date: 2/9/2025    1. A 1.2 cm stone at the mid left ureter, with moderate left hydronephrosis, and increased stranding around the left kidney, suggesting inflammation/infection. Bilateral nonobstructive nephrolithiasis.  2. Colonic diverticulosis. No acute inflammatory process of bowel is identified.  3. Right middle lobe pulmonary reticulonodular opacities suggest inflammatory/infectious etiology, clinical correlation and CT follow-up recommended.  This report was finalized on 2/9/2025 7:05 AM by Dr. Wilton Richardson M.D on Workstation: [id number]            Assessment:          Diagnosis Plan   1. Preop cardiovascular exam        2. Hospital discharge follow-up        3. Coronary artery disease involving native coronary artery of native heart without angina pectoris        4. Essential hypertension        5. Mixed hyperlipidemia        6. Renal stone        7. History of CVA (cerebrovascular accident)                 Plan:       1.  CAD, prior stent placement to the LAD and then staged intervention to the circumflex performed in 2015.  Patient is aspirin and metoprolol, continue same  2. Hypertensive heart disease without heart failure-blood pressure is elevated today on arrival but has been stable at home, continue to follow, creatinine, BUN reviewed.  3.  Mixed hyperlipidemia-continue lipid-lowering therapy, AST, ALT reviewed  4.  Preop cardiac assessment-recurrent kidney stones, patient comes in today for assessment  of cardiac risk prior to surgery.  Patient just underwent cystoscopy without any issue.  Appropriate to proceed with the uteroscopy; heightened risk is due to overall clinical status and is not increased due to any cardiac issues.  He does not meet guideline recommendations for any additional cardiac testing at this time.  5.  History of CVA, complicating all aspects of care  6.  Congenital myopathy-complicating all aspects of care.  Thank you very much for allowing us to participate in the care of this pleasant patient.  Please don't hesitate to call if I can be of assistance in any way.      Current Outpatient Medications:     aspirin 81 MG chewable tablet, Chew 1 tablet Daily., Disp: , Rfl:     baclofen (LIORESAL) 10 MG tablet, Take 1 tablet by mouth Every Night., Disp: , Rfl:     cycloSPORINE (RESTASIS) 0.05 % ophthalmic emulsion, 1 drop 2 (Two) Times a Day., Disp: , Rfl:     latanoprost (XALATAN) 0.005 % ophthalmic solution, Administer 1 drop to both eyes Every Night., Disp: , Rfl:     metoprolol tartrate (LOPRESSOR) 25 MG tablet, Take 0.5 tablets by mouth 2 (Two) Times a Day., Disp: , Rfl:     Multiple Vitamins-Minerals (PRESERVISION AREDS 2+MULTI VIT PO), Take 1 tablet by mouth Daily., Disp: , Rfl:     Myrbetriq 50 MG tablet sustained-release 24 hour 24 hr tablet, TAKE ONE TABLET BY MOUTH DAILY, Disp: 30 tablet, Rfl: 2    nitroglycerin (NITROSTAT) 0.4 MG SL tablet, Place 1 tablet under the tongue. For chest pain.  If pain remains after 5 minutes, call 911 and repeat dose.  Max 3 tabs in 15 minutes., Disp: , Rfl:     oxybutynin XL (DITROPAN-XL) 10 MG 24 hr tablet, Take 1 tablet by mouth Daily., Disp: , Rfl:     rosuvastatin (CRESTOR) 40 MG tablet, TAKE ONE TABLET BY MOUTH DAILY, Disp: 90 tablet, Rfl: 1    sennosides-docusate (PERICOLACE) 8.6-50 MG per tablet, Take 2 tablets by mouth 2 (Two) Times a Day., Disp: , Rfl:     tamsulosin (FLOMAX) 0.4 MG capsule 24 hr capsule, Take 1 capsule by mouth Every Night.,  Disp: 90 capsule, Rfl: 1    traMADol (ULTRAM) 50 MG tablet, Take 1 tablet by mouth Every 6 (Six) Hours As Needed for Moderate Pain or Severe Pain., Disp: , Rfl:     vitamin E 100 UNIT capsule, Take 1 capsule by mouth Daily., Disp: , Rfl:     acetaminophen (TYLENOL) 325 MG tablet, Take 2 tablets by mouth Every 4 (Four) Hours As Needed for Mild Pain . (Patient not taking: Reported on 3/25/2025), Disp: , Rfl:

## 2025-03-25 NOTE — LETTER
March 25, 2025     Alvino Daniels MD  [address]  [place] [id number]    Patient: Amandeep Manriquez Jr.   YOB: 1945   Date of Visit: 3/25/2025     Dear Alvino Daniels MD:       Thank you for referring Amandeep Manriquez to me for evaluation. Below are the relevant portions of my assessment and plan of care.    If you have questions, please do not hesitate to call me. I look forward to following Amandeep along with you.         Sincerely,        Santi Benton III, MD        CC: No Recipients    Santi Benton III, MD  03/25/25 0930  Sign when Signing Visit      Subjective:     Encounter Date: 4/13/2018      Patient ID: Amandeep Manriquez Jr. is a 80 y.o. male.    Chief Complaint:  Coronary Artery Disease  Symptoms include muscle weakness.     Dear Dr. Daniels,    I had the pleasure of seeing this patient in the office today for evaluation and consultation regarding his cardiac status.  He has a history of CAD.  He has a history of congenital myopathy, history of CVA, hypertension, hyperlipidemia, recurrent kidney stones.  He comes in part for assessment of cardiac risk prior to surgery.  He is kelin require intervention for a kidney stone.    I have seen in the past, but the most recent visit was back in 2018.    Patient was hospitalized at [facility] with acute kidney stone in February.  He underwent cystoscopy and stent placement on February 9.  He is scheduled for left ureteroscopy with stone basket extraction on April 17.    No chest pain or chest discomfort.  Denies shortness of breath now.  He did have some shortness of breath when he is hospitalized because he had pneumonia but that is resolved.  No feeling of palpitations or tachycardia.    He has a history of a non-ST segment elevation myocardial infarction.  There was caused by subtotally occluded LAD there was treated with stent placement.  He then was readmitted on July 21, 2015 for staged intervention to the circumflex and  he received a 3.5 x 18 no immediate or drug-eluting stent placed in the distal left circumflex.    Patient had a GI bleed in June 2015 secondary to gastric ulcerations and H. pylori.  He had been on Proventil and he was changed to Plavix.  He also had a right groin pseudoaneurysm that occurred after his initial cardiac catheterization; his heart catheterization been performed through the right femoral artery secondary to tortuosity in the right radial artery.    The following portions of the patient's history were reviewed and updated as appropriate: allergies, current medications, past family history, past medical history, past social history, past surgical history and problem list.    Past Medical History:   Diagnosis Date   • Acute myocardial infarction    • Aneurysm of right femoral artery    • Arthritis    • BPH (benign prostatic hyperplasia)    • CAD (coronary artery disease), native coronary artery    • Congenital myopathy    • DDD (degenerative disc disease), lumbar     with stenosis   • Diabetes mellitus    • Dizziness     Chronic Dizziness   • Encounter for Medicare annual wellness exam    • Essential hypertension    • Gastrointestinal hemorrhage    • Hard to intubate    • History of impacted cerumen    • Hyperlipidemia    • Hypertension    • Mixed hyperlipidemia    • Sleep apnea        Past Surgical History:   Procedure Laterality Date   • CARDIAC CATHETERIZATION  06/2015    30% Left main, 99% mid to distal LAD, 70-80% left circumflex, 80% proximal to mid RCA.   • CATARACT EXTRACTION     • CERVICAL FUSION     • CHOLECYSTECTOMY     • CORONARY ANGIOPLASTY WITH STENT PLACEMENT     • CYSTOSCOPY W/ URETERAL STENT PLACEMENT Left 2/9/2025    Procedure: CYSTOSCOPY URETERAL CATHETER/STENT INSERTION;  Surgeon: Alvino Daniels MD;  Location: [facility];  Service: Urology;  Laterality: Left;   • KNEE SURGERY Right    • OTHER SURGICAL HISTORY      percutaneous injection of extremity pseudoaneurysm         ECG  "12 Lead    Date/Time: 3/25/2025 9:28 AM  Performed by: Santi Benton III, MD    Authorized by: Santi Benton III, MD  Comparison: compared with previous ECG   Similar to previous ECG  Rhythm: sinus rhythm  Rate: normal  Conduction: conduction normal  ST Segments: ST segments normal  T Waves: T waves normal  QRS axis: normal  Other: no other findings    Clinical impression: normal ECG             Objective:     Vitals:    03/25/25 0910   BP: 160/82   BP Location: Left arm   Patient Position: Sitting   Cuff Size: Adult   Pulse: 63   SpO2: 97%   Weight: 83.9 kg (185 lb)   Height: 162.6 cm (64\")           Physical Exam   Constitutional: He is oriented to person, place, and time. He appears well-developed and well-nourished. No distress.   HENT:   Head: Normocephalic and atraumatic.   Nose: Nose normal.   Mouth/Throat: Oropharynx is clear and moist.   Eyes: Conjunctivae and EOM are normal. Pupils are equal, round, and reactive to light. Right eye exhibits no discharge. Left eye exhibits no discharge.   Neck: Normal range of motion. Neck supple. No tracheal deviation present. No thyromegaly present.   Cardiovascular: Normal rate, regular rhythm, S1 normal, S2 normal, normal heart sounds and normal pulses.  Exam reveals no S3.    Pulmonary/Chest: Effort normal and breath sounds normal. No stridor. No respiratory distress. He exhibits no tenderness.   Abdominal: Soft. Bowel sounds are normal. He exhibits no distension and no mass. There is no tenderness. There is no rebound and no guarding.   Musculoskeletal: Normal range of motion. He exhibits no tenderness or deformity.   Lymphadenopathy:     He has no cervical adenopathy.   Neurological: He is alert and oriented to person, place, and time. He has normal reflexes. He exhibits abnormal muscle tone.   Skin: Skin is warm and dry. No rash noted. He is not diaphoretic. No erythema.   Psychiatric: He has a normal mood and affect. Thought content normal.       Lab Review:     "       Lab Results   Component Value Date    GLUCOSE 99 02/21/2025    BUN 20 02/21/2025    CREATININE 0.39 (L) 02/21/2025     02/21/2025    K 3.8 02/21/2025     (H) 02/21/2025    CALCIUM 9.1 02/21/2025    PROTEINTOT 6.5 02/09/2025    ALBUMIN 2.9 (L) 02/20/2025    ALT 18 02/09/2025    AST 21 02/09/2025    ALKPHOS 107 02/09/2025    BILITOT 0.4 02/09/2025    GLOB 2.7 02/09/2025    AGRATIO 1.4 02/09/2025    BCR 51.3 (H) 02/21/2025    ANIONGAP 7.5 02/21/2025    EGFR 109.8 02/21/2025     CBC          2/10/2025    06:50 2/20/2025    06:54 2/21/2025    06:50   CBC   WBC 10.05  8.47  8.56    RBC 4.73  4.16  3.93    Hemoglobin 14.5  12.4  11.6    Hematocrit 43.7  37.7  36.9    MCV 92.4  90.6  93.9    MCH 30.7  29.8  29.5    MCHC 33.2  32.9  31.4    RDW 12.4  12.2  12.5    Platelets 192  245  261        XR Abdomen KUB  Result Date: 2/13/2025   Probable left nephrolithiasis, left double-J ureteral stent in place. Bowel gas pattern within normal limits.  This report was finalized on 2/13/2025 7:15 PM by Dr. Aguilar Pinedo M.D on Workstation: [id number]      CT Abdomen Pelvis Without Contrast  Result Date: 2/9/2025    1. A 1.2 cm stone at the mid left ureter, with moderate left hydronephrosis, and increased stranding around the left kidney, suggesting inflammation/infection. Bilateral nonobstructive nephrolithiasis.  2. Colonic diverticulosis. No acute inflammatory process of bowel is identified.  3. Right middle lobe pulmonary reticulonodular opacities suggest inflammatory/infectious etiology, clinical correlation and CT follow-up recommended.  This report was finalized on 2/9/2025 7:05 AM by Dr. Wilton Richardson M.D on Workstation: [id number]            Assessment:          Diagnosis Plan   1. Preop cardiovascular exam        2. Hospital discharge follow-up        3. Coronary artery disease involving native coronary artery of native heart without angina pectoris        4. Essential hypertension        5. Mixed  hyperlipidemia        6. Renal stone        7. History of CVA (cerebrovascular accident)                 Plan:       1.  CAD, prior stent placement to the LAD and then staged intervention to the circumflex performed in 2015.  Patient is aspirin and metoprolol, continue same  2. Hypertensive heart disease without heart failure-blood pressure is elevated today on arrival but has been stable at home, continue to follow, creatinine, BUN reviewed.  3.  Mixed hyperlipidemia-continue lipid-lowering therapy, AST, ALT reviewed  4.  Preop cardiac assessment-recurrent kidney stones, patient comes in today for assessment of cardiac risk prior to surgery.  Patient just underwent cystoscopy without any issue.  Appropriate to proceed with the uteroscopy; heightened risk is due to overall clinical status and is not increased due to any cardiac issues.  He does not meet guideline recommendations for any additional cardiac testing at this time.  5.  History of CVA, complicating all aspects of care  6.  Congenital myopathy-complicating all aspects of care.  Thank you very much for allowing us to participate in the care of this pleasant patient.  Please don't hesitate to call if I can be of assistance in any way.      Current Outpatient Medications:   •  aspirin 81 MG chewable tablet, Chew 1 tablet Daily., Disp: , Rfl:   •  baclofen (LIORESAL) 10 MG tablet, Take 1 tablet by mouth Every Night., Disp: , Rfl:   •  cycloSPORINE (RESTASIS) 0.05 % ophthalmic emulsion, 1 drop 2 (Two) Times a Day., Disp: , Rfl:   •  latanoprost (XALATAN) 0.005 % ophthalmic solution, Administer 1 drop to both eyes Every Night., Disp: , Rfl:   •  metoprolol tartrate (LOPRESSOR) 25 MG tablet, Take 0.5 tablets by mouth 2 (Two) Times a Day., Disp: , Rfl:   •  Multiple Vitamins-Minerals (PRESERVISION AREDS 2+MULTI VIT PO), Take 1 tablet by mouth Daily., Disp: , Rfl:   •  Myrbetriq 50 MG tablet sustained-release 24 hour 24 hr tablet, TAKE ONE TABLET BY MOUTH DAILY,  Disp: 30 tablet, Rfl: 2  •  nitroglycerin (NITROSTAT) 0.4 MG SL tablet, Place 1 tablet under the tongue. For chest pain.  If pain remains after 5 minutes, call 911 and repeat dose.  Max 3 tabs in 15 minutes., Disp: , Rfl:   •  oxybutynin XL (DITROPAN-XL) 10 MG 24 hr tablet, Take 1 tablet by mouth Daily., Disp: , Rfl:   •  rosuvastatin (CRESTOR) 40 MG tablet, TAKE ONE TABLET BY MOUTH DAILY, Disp: 90 tablet, Rfl: 1  •  sennosides-docusate (PERICOLACE) 8.6-50 MG per tablet, Take 2 tablets by mouth 2 (Two) Times a Day., Disp: , Rfl:   •  tamsulosin (FLOMAX) 0.4 MG capsule 24 hr capsule, Take 1 capsule by mouth Every Night., Disp: 90 capsule, Rfl: 1  •  traMADol (ULTRAM) 50 MG tablet, Take 1 tablet by mouth Every 6 (Six) Hours As Needed for Moderate Pain or Severe Pain., Disp: , Rfl:   •  vitamin E 100 UNIT capsule, Take 1 capsule by mouth Daily., Disp: , Rfl:   •  acetaminophen (TYLENOL) 325 MG tablet, Take 2 tablets by mouth Every 4 (Four) Hours As Needed for Mild Pain . (Patient not taking: Reported on 3/25/2025), Disp: , Rfl:

## 2025-03-28 ENCOUNTER — TELEPHONE (OUTPATIENT)
Dept: CARDIOLOGY | Age: 80
End: 2025-03-28
Payer: MEDICARE

## 2025-07-11 ENCOUNTER — APPOINTMENT (OUTPATIENT)
Dept: CARDIOLOGY | Facility: HOSPITAL | Age: 80
End: 2025-07-11
Payer: MEDICARE

## 2025-07-11 ENCOUNTER — APPOINTMENT (OUTPATIENT)
Dept: GENERAL RADIOLOGY | Facility: HOSPITAL | Age: 80
End: 2025-07-11
Payer: MEDICARE

## 2025-07-11 ENCOUNTER — APPOINTMENT (OUTPATIENT)
Dept: CT IMAGING | Facility: HOSPITAL | Age: 80
End: 2025-07-11
Payer: MEDICARE

## 2025-07-11 ENCOUNTER — HOSPITAL ENCOUNTER (INPATIENT)
Facility: HOSPITAL | Age: 80
LOS: 2 days | Discharge: HOME | End: 2025-07-14
Attending: EMERGENCY MEDICINE | Admitting: INTERNAL MEDICINE
Payer: MEDICARE

## 2025-07-11 DIAGNOSIS — J96.01 ACUTE RESPIRATORY FAILURE WITH HYPOXIA: Primary | ICD-10-CM

## 2025-07-11 PROBLEM — R13.10 TROUBLE SWALLOWING: Status: ACTIVE | Noted: 2025-07-11

## 2025-07-11 PROBLEM — J15.9 PNEUMONIA, BACTERIAL: Status: ACTIVE | Noted: 2025-07-11

## 2025-07-11 PROBLEM — M48.10 DISH (DIFFUSE IDIOPATHIC SKELETAL HYPEROSTOSIS): Status: ACTIVE | Noted: 2025-07-11

## 2025-07-11 PROBLEM — J96.91 HYPOXIC RESPIRATORY FAILURE: Status: ACTIVE | Noted: 2025-07-11

## 2025-07-11 PROBLEM — J69.0 ASPIRATION PNEUMONITIS: Status: ACTIVE | Noted: 2025-07-11

## 2025-07-11 PROBLEM — A41.9 SEPSIS: Status: ACTIVE | Noted: 2025-07-11

## 2025-07-11 LAB
ALBUMIN SERPL-MCNC: 4 G/DL (ref 3.5–5.2)
ALBUMIN/GLOB SERPL: 1.2 G/DL
ALP SERPL-CCNC: 128 U/L (ref 39–117)
ALT SERPL W P-5'-P-CCNC: 19 U/L (ref 1–41)
ANION GAP SERPL CALCULATED.3IONS-SCNC: 8.8 MMOL/L (ref 5–15)
AORTIC DIMENSIONLESS INDEX: 0.96 (DI)
APTT PPP: 28.9 SECONDS (ref 22.7–35.4)
AST SERPL-CCNC: 24 U/L (ref 1–40)
AV MEAN PRESS GRAD SYS DOP V1V2: 2.9 MMHG
AV VMAX SYS DOP: 121 CM/SEC
B PARAPERT DNA SPEC QL NAA+PROBE: NOT DETECTED
B PERT DNA SPEC QL NAA+PROBE: NOT DETECTED
BASOPHILS # BLD AUTO: 0.06 10*3/MM3 (ref 0–0.2)
BASOPHILS NFR BLD AUTO: 0.4 % (ref 0–1.5)
BH CV ECHO MEAS - ACS: 1.84 CM
BH CV ECHO MEAS - AO MAX PG: 5.9 MMHG
BH CV ECHO MEAS - AO ROOT DIAM: 3.4 CM
BH CV ECHO MEAS - AO V2 VTI: 18.1 CM
BH CV ECHO MEAS - AVA(I,D): 2.9 CM2
BH CV ECHO MEAS - EDV(CUBED): 32.8 ML
BH CV ECHO MEAS - EDV(MOD-SP2): 67 ML
BH CV ECHO MEAS - EDV(MOD-SP4): 66 ML
BH CV ECHO MEAS - EF(MOD-SP2): 61.2 %
BH CV ECHO MEAS - EF(MOD-SP4): 56.1 %
BH CV ECHO MEAS - ESV(CUBED): 15.9 ML
BH CV ECHO MEAS - ESV(MOD-SP2): 26 ML
BH CV ECHO MEAS - ESV(MOD-SP4): 29 ML
BH CV ECHO MEAS - FS: 21.3 %
BH CV ECHO MEAS - IVS/LVPW: 1.13 CM
BH CV ECHO MEAS - IVSD: 0.9 CM
BH CV ECHO MEAS - LAT PEAK E' VEL: 5.5 CM/SEC
BH CV ECHO MEAS - LV DIASTOLIC VOL/BSA (35-75): 34.8 CM2
BH CV ECHO MEAS - LV MASS(C)D: 71.2 GRAMS
BH CV ECHO MEAS - LV MAX PG: 3.9 MMHG
BH CV ECHO MEAS - LV MEAN PG: 1.95 MMHG
BH CV ECHO MEAS - LV SYSTOLIC VOL/BSA (12-30): 15.3 CM2
BH CV ECHO MEAS - LV V1 MAX: 98.8 CM/SEC
BH CV ECHO MEAS - LV V1 VTI: 17.3 CM
BH CV ECHO MEAS - LVIDD: 3.2 CM
BH CV ECHO MEAS - LVIDS: 2.5 CM
BH CV ECHO MEAS - LVOT AREA: 3.1 CM2
BH CV ECHO MEAS - LVOT DIAM: 1.97 CM
BH CV ECHO MEAS - LVPWD: 0.8 CM
BH CV ECHO MEAS - MED PEAK E' VEL: 4.6 CM/SEC
BH CV ECHO MEAS - MV A DUR: 0.15 SEC
BH CV ECHO MEAS - MV A MAX VEL: 98.1 CM/SEC
BH CV ECHO MEAS - MV DEC SLOPE: 199.2 CM/SEC2
BH CV ECHO MEAS - MV DEC TIME: 0.25 SEC
BH CV ECHO MEAS - MV E MAX VEL: 48 CM/SEC
BH CV ECHO MEAS - MV E/A: 0.49
BH CV ECHO MEAS - MV MAX PG: 4.9 MMHG
BH CV ECHO MEAS - MV MEAN PG: 1.74 MMHG
BH CV ECHO MEAS - MV P1/2T: 83.2 MSEC
BH CV ECHO MEAS - MV V2 VTI: 17.4 CM
BH CV ECHO MEAS - MVA(P1/2T): 2.6 CM2
BH CV ECHO MEAS - MVA(VTI): 3.1 CM2
BH CV ECHO MEAS - PA ACC TIME: 0.13 SEC
BH CV ECHO MEAS - PA V2 MAX: 109.9 CM/SEC
BH CV ECHO MEAS - RV MAX PG: 2.9 MMHG
BH CV ECHO MEAS - RV V1 MAX: 85.3 CM/SEC
BH CV ECHO MEAS - RV V1 VTI: 16.6 CM
BH CV ECHO MEAS - SV(LVOT): 53.1 ML
BH CV ECHO MEAS - SV(MOD-SP2): 41 ML
BH CV ECHO MEAS - SV(MOD-SP4): 37 ML
BH CV ECHO MEAS - SVI(LVOT): 28 ML/M2
BH CV ECHO MEAS - SVI(MOD-SP2): 21.6 ML/M2
BH CV ECHO MEAS - SVI(MOD-SP4): 19.5 ML/M2
BH CV ECHO MEAS - TAPSE (>1.6): 1.61 CM
BH CV ECHO MEASUREMENTS AVERAGE E/E' RATIO: 9.5
BH CV XLRA - RV MID: 3.2 CM
BH CV XLRA - TDI S': 15.9 CM/SEC
BILIRUB SERPL-MCNC: 0.4 MG/DL (ref 0–1.2)
BUN SERPL-MCNC: 15 MG/DL (ref 8–23)
BUN/CREAT SERPL: 29.4 (ref 7–25)
C PNEUM DNA NPH QL NAA+NON-PROBE: NOT DETECTED
CALCIUM SPEC-SCNC: 10.1 MG/DL (ref 8.6–10.5)
CHLORIDE SERPL-SCNC: 105 MMOL/L (ref 98–107)
CHOLEST SERPL-MCNC: 131 MG/DL (ref 0–200)
CO2 SERPL-SCNC: 26.2 MMOL/L (ref 22–29)
CREAT SERPL-MCNC: 0.51 MG/DL (ref 0.76–1.27)
D DIMER PPP FEU-MCNC: 3.13 MCGFEU/ML (ref 0–0.82)
D-LACTATE SERPL-SCNC: 1 MMOL/L (ref 0.5–2)
DEPRECATED RDW RBC AUTO: 44.7 FL (ref 37–54)
EGFRCR SERPLBLD CKD-EPI 2021: 101.2 ML/MIN/1.73
EOSINOPHIL # BLD AUTO: 0.2 10*3/MM3 (ref 0–0.4)
EOSINOPHIL NFR BLD AUTO: 1.4 % (ref 0.3–6.2)
ERYTHROCYTE [DISTWIDTH] IN BLOOD BY AUTOMATED COUNT: 13.2 % (ref 12.3–15.4)
FLUAV SUBTYP SPEC NAA+PROBE: NOT DETECTED
FLUBV RNA NPH QL NAA+NON-PROBE: NOT DETECTED
GEN 5 1HR TROPONIN T REFLEX: 15 NG/L
GLOBULIN UR ELPH-MCNC: 3.3 GM/DL
GLUCOSE SERPL-MCNC: 105 MG/DL (ref 65–99)
HADV DNA SPEC NAA+PROBE: NOT DETECTED
HBA1C MFR BLD: 5.9 % (ref 4.8–5.6)
HCOV 229E RNA SPEC QL NAA+PROBE: NOT DETECTED
HCOV HKU1 RNA SPEC QL NAA+PROBE: NOT DETECTED
HCOV NL63 RNA SPEC QL NAA+PROBE: NOT DETECTED
HCOV OC43 RNA SPEC QL NAA+PROBE: NOT DETECTED
HCT VFR BLD AUTO: 48.5 % (ref 37.5–51)
HDLC SERPL-MCNC: 45 MG/DL (ref 40–60)
HGB BLD-MCNC: 15.7 G/DL (ref 13–17.7)
HMPV RNA NPH QL NAA+NON-PROBE: NOT DETECTED
HPIV1 RNA ISLT QL NAA+PROBE: NOT DETECTED
HPIV2 RNA SPEC QL NAA+PROBE: NOT DETECTED
HPIV3 RNA NPH QL NAA+PROBE: NOT DETECTED
HPIV4 P GENE NPH QL NAA+PROBE: NOT DETECTED
IMM GRANULOCYTES # BLD AUTO: 0.06 10*3/MM3 (ref 0–0.05)
IMM GRANULOCYTES NFR BLD AUTO: 0.4 % (ref 0–0.5)
INR PPP: 0.98 (ref 0.9–1.1)
L PNEUMO1 AG UR QL IA: NEGATIVE
LDLC SERPL CALC-MCNC: 71 MG/DL (ref 0–100)
LDLC/HDLC SERPL: 1.59 {RATIO}
LEFT ATRIUM VOLUME INDEX: 18.2 ML/M2
LV EF BIPLANE MOD: 60.1 %
LYMPHOCYTES # BLD AUTO: 1.71 10*3/MM3 (ref 0.7–3.1)
LYMPHOCYTES NFR BLD AUTO: 12.1 % (ref 19.6–45.3)
M PNEUMO IGG SER IA-ACNC: NOT DETECTED
MAGNESIUM SERPL-MCNC: 2.4 MG/DL (ref 1.6–2.4)
MCH RBC QN AUTO: 29.8 PG (ref 26.6–33)
MCHC RBC AUTO-ENTMCNC: 32.4 G/DL (ref 31.5–35.7)
MCV RBC AUTO: 92.2 FL (ref 79–97)
MONOCYTES # BLD AUTO: 1.11 10*3/MM3 (ref 0.1–0.9)
MONOCYTES NFR BLD AUTO: 7.9 % (ref 5–12)
NEUTROPHILS NFR BLD AUTO: 10.94 10*3/MM3 (ref 1.7–7)
NEUTROPHILS NFR BLD AUTO: 77.8 % (ref 42.7–76)
NRBC BLD AUTO-RTO: 0 /100 WBC (ref 0–0.2)
NT-PROBNP SERPL-MCNC: 91.1 PG/ML (ref 0–1800)
PLATELET # BLD AUTO: 223 10*3/MM3 (ref 140–450)
PMV BLD AUTO: 10 FL (ref 6–12)
POTASSIUM SERPL-SCNC: 4.3 MMOL/L (ref 3.5–5.2)
PROCALCITONIN SERPL-MCNC: 0.03 NG/ML (ref 0–0.25)
PROT SERPL-MCNC: 7.3 G/DL (ref 6–8.5)
PROTHROMBIN TIME: 12.9 SECONDS (ref 11.7–14.2)
QT INTERVAL: 343 MS
QTC INTERVAL: 444 MS
RBC # BLD AUTO: 5.26 10*6/MM3 (ref 4.14–5.8)
RHINOVIRUS RNA SPEC NAA+PROBE: NOT DETECTED
RSV RNA NPH QL NAA+NON-PROBE: NOT DETECTED
S PNEUM AG SPEC QL LA: NEGATIVE
SARS-COV-2 RNA NPH QL NAA+NON-PROBE: NOT DETECTED
SINUS: 3.7 CM
SODIUM SERPL-SCNC: 140 MMOL/L (ref 136–145)
STJ: 3.2 CM
TRIGL SERPL-MCNC: 73 MG/DL (ref 0–150)
TROPONIN T NUMERIC DELTA: -2 NG/L
TROPONIN T SERPL HS-MCNC: 17 NG/L
VLDLC SERPL-MCNC: 15 MG/DL (ref 5–40)
WBC NRBC COR # BLD AUTO: 14.08 10*3/MM3 (ref 3.4–10.8)

## 2025-07-11 PROCEDURE — 80053 COMPREHEN METABOLIC PANEL: CPT | Performed by: EMERGENCY MEDICINE

## 2025-07-11 PROCEDURE — 83036 HEMOGLOBIN GLYCOSYLATED A1C: CPT | Performed by: INTERNAL MEDICINE

## 2025-07-11 PROCEDURE — 25010000002 ONDANSETRON PER 1 MG: Performed by: EMERGENCY MEDICINE

## 2025-07-11 PROCEDURE — 36415 COLL VENOUS BLD VENIPUNCTURE: CPT

## 2025-07-11 PROCEDURE — 87040 BLOOD CULTURE FOR BACTERIA: CPT | Performed by: INTERNAL MEDICINE

## 2025-07-11 PROCEDURE — 99214 OFFICE O/P EST MOD 30 MIN: CPT | Performed by: INTERNAL MEDICINE

## 2025-07-11 PROCEDURE — 85025 COMPLETE CBC W/AUTO DIFF WBC: CPT | Performed by: EMERGENCY MEDICINE

## 2025-07-11 PROCEDURE — 25010000002 CEFTRIAXONE PER 250 MG: Performed by: INTERNAL MEDICINE

## 2025-07-11 PROCEDURE — 85730 THROMBOPLASTIN TIME PARTIAL: CPT | Performed by: EMERGENCY MEDICINE

## 2025-07-11 PROCEDURE — 80061 LIPID PANEL: CPT | Performed by: INTERNAL MEDICINE

## 2025-07-11 PROCEDURE — 93306 TTE W/DOPPLER COMPLETE: CPT

## 2025-07-11 PROCEDURE — 99291 CRITICAL CARE FIRST HOUR: CPT

## 2025-07-11 PROCEDURE — 71275 CT ANGIOGRAPHY CHEST: CPT

## 2025-07-11 PROCEDURE — 83605 ASSAY OF LACTIC ACID: CPT | Performed by: INTERNAL MEDICINE

## 2025-07-11 PROCEDURE — 25010000002 KETOROLAC TROMETHAMINE PER 15 MG: Performed by: STUDENT IN AN ORGANIZED HEALTH CARE EDUCATION/TRAINING PROGRAM

## 2025-07-11 PROCEDURE — 25010000002 DIPHENHYDRAMINE PER 50 MG: Performed by: STUDENT IN AN ORGANIZED HEALTH CARE EDUCATION/TRAINING PROGRAM

## 2025-07-11 PROCEDURE — 93306 TTE W/DOPPLER COMPLETE: CPT | Performed by: INTERNAL MEDICINE

## 2025-07-11 PROCEDURE — 85379 FIBRIN DEGRADATION QUANT: CPT | Performed by: STUDENT IN AN ORGANIZED HEALTH CARE EDUCATION/TRAINING PROGRAM

## 2025-07-11 PROCEDURE — G0378 HOSPITAL OBSERVATION PER HR: HCPCS

## 2025-07-11 PROCEDURE — 84145 PROCALCITONIN (PCT): CPT | Performed by: STUDENT IN AN ORGANIZED HEALTH CARE EDUCATION/TRAINING PROGRAM

## 2025-07-11 PROCEDURE — 0202U NFCT DS 22 TRGT SARS-COV-2: CPT | Performed by: STUDENT IN AN ORGANIZED HEALTH CARE EDUCATION/TRAINING PROGRAM

## 2025-07-11 PROCEDURE — 25810000003 SODIUM CHLORIDE 0.9 % SOLUTION 250 ML FLEX CONT: Performed by: INTERNAL MEDICINE

## 2025-07-11 PROCEDURE — 25010000002 ENOXAPARIN PER 10 MG: Performed by: INTERNAL MEDICINE

## 2025-07-11 PROCEDURE — 85610 PROTHROMBIN TIME: CPT | Performed by: EMERGENCY MEDICINE

## 2025-07-11 PROCEDURE — 25010000002 AZITHROMYCIN PER 500 MG: Performed by: INTERNAL MEDICINE

## 2025-07-11 PROCEDURE — 93010 ELECTROCARDIOGRAM REPORT: CPT | Performed by: INTERNAL MEDICINE

## 2025-07-11 PROCEDURE — 93005 ELECTROCARDIOGRAM TRACING: CPT | Performed by: EMERGENCY MEDICINE

## 2025-07-11 PROCEDURE — 83735 ASSAY OF MAGNESIUM: CPT | Performed by: EMERGENCY MEDICINE

## 2025-07-11 PROCEDURE — 71045 X-RAY EXAM CHEST 1 VIEW: CPT

## 2025-07-11 PROCEDURE — 25010000002 METHYLPREDNISOLONE PER 40 MG: Performed by: STUDENT IN AN ORGANIZED HEALTH CARE EDUCATION/TRAINING PROGRAM

## 2025-07-11 PROCEDURE — 25010000002 MORPHINE PER 10 MG: Performed by: EMERGENCY MEDICINE

## 2025-07-11 PROCEDURE — 87449 NOS EACH ORGANISM AG IA: CPT | Performed by: INTERNAL MEDICINE

## 2025-07-11 PROCEDURE — 83880 ASSAY OF NATRIURETIC PEPTIDE: CPT | Performed by: EMERGENCY MEDICINE

## 2025-07-11 PROCEDURE — 84484 ASSAY OF TROPONIN QUANT: CPT | Performed by: EMERGENCY MEDICINE

## 2025-07-11 PROCEDURE — 25510000001 IOPAMIDOL PER 1 ML: Performed by: STUDENT IN AN ORGANIZED HEALTH CARE EDUCATION/TRAINING PROGRAM

## 2025-07-11 RX ORDER — OXYBUTYNIN CHLORIDE 10 MG/1
10 TABLET, EXTENDED RELEASE ORAL DAILY
Status: DISCONTINUED | OUTPATIENT
Start: 2025-07-11 | End: 2025-07-14 | Stop reason: HOSPADM

## 2025-07-11 RX ORDER — BACLOFEN 10 MG/1
10 TABLET ORAL NIGHTLY
Status: DISCONTINUED | OUTPATIENT
Start: 2025-07-11 | End: 2025-07-14 | Stop reason: HOSPADM

## 2025-07-11 RX ORDER — ROSUVASTATIN CALCIUM 40 MG/1
40 TABLET, COATED ORAL NIGHTLY
Status: DISCONTINUED | OUTPATIENT
Start: 2025-07-11 | End: 2025-07-14 | Stop reason: HOSPADM

## 2025-07-11 RX ORDER — ACETAMINOPHEN 325 MG/1
650 TABLET ORAL EVERY 4 HOURS PRN
Status: DISCONTINUED | OUTPATIENT
Start: 2025-07-11 | End: 2025-07-14 | Stop reason: HOSPADM

## 2025-07-11 RX ORDER — METOPROLOL TARTRATE 25 MG/1
12.5 TABLET, FILM COATED ORAL 2 TIMES DAILY
Status: DISCONTINUED | OUTPATIENT
Start: 2025-07-11 | End: 2025-07-14 | Stop reason: HOSPADM

## 2025-07-11 RX ORDER — SODIUM CHLORIDE 0.9 % (FLUSH) 0.9 %
10 SYRINGE (ML) INJECTION AS NEEDED
Status: DISCONTINUED | OUTPATIENT
Start: 2025-07-11 | End: 2025-07-14 | Stop reason: HOSPADM

## 2025-07-11 RX ORDER — MORPHINE SULFATE 2 MG/ML
1 INJECTION, SOLUTION INTRAMUSCULAR; INTRAVENOUS EVERY 4 HOURS PRN
Status: DISCONTINUED | OUTPATIENT
Start: 2025-07-11 | End: 2025-07-14 | Stop reason: HOSPADM

## 2025-07-11 RX ORDER — AMOXICILLIN 250 MG
2 CAPSULE ORAL 2 TIMES DAILY
Status: DISCONTINUED | OUTPATIENT
Start: 2025-07-11 | End: 2025-07-14 | Stop reason: HOSPADM

## 2025-07-11 RX ORDER — IOPAMIDOL 755 MG/ML
100 INJECTION, SOLUTION INTRAVASCULAR
Status: COMPLETED | OUTPATIENT
Start: 2025-07-11 | End: 2025-07-11

## 2025-07-11 RX ORDER — SODIUM CHLORIDE 0.9 % (FLUSH) 0.9 %
3 SYRINGE (ML) INJECTION EVERY 12 HOURS SCHEDULED
Status: DISCONTINUED | OUTPATIENT
Start: 2025-07-11 | End: 2025-07-14 | Stop reason: HOSPADM

## 2025-07-11 RX ORDER — LATANOPROST 50 UG/ML
1 SOLUTION/ DROPS OPHTHALMIC NIGHTLY
Status: DISCONTINUED | OUTPATIENT
Start: 2025-07-11 | End: 2025-07-14 | Stop reason: HOSPADM

## 2025-07-11 RX ORDER — NALOXONE HCL 0.4 MG/ML
0.4 VIAL (ML) INJECTION
Status: DISCONTINUED | OUTPATIENT
Start: 2025-07-11 | End: 2025-07-14 | Stop reason: HOSPADM

## 2025-07-11 RX ORDER — SODIUM CHLORIDE 9 MG/ML
40 INJECTION, SOLUTION INTRAVENOUS AS NEEDED
Status: DISCONTINUED | OUTPATIENT
Start: 2025-07-11 | End: 2025-07-14 | Stop reason: HOSPADM

## 2025-07-11 RX ORDER — ACETAMINOPHEN 160 MG/5ML
650 SOLUTION ORAL EVERY 4 HOURS PRN
Status: DISCONTINUED | OUTPATIENT
Start: 2025-07-11 | End: 2025-07-14 | Stop reason: HOSPADM

## 2025-07-11 RX ORDER — POLYETHYLENE GLYCOL 3350 17 G/17G
17 POWDER, FOR SOLUTION ORAL DAILY PRN
Status: DISCONTINUED | OUTPATIENT
Start: 2025-07-11 | End: 2025-07-14 | Stop reason: HOSPADM

## 2025-07-11 RX ORDER — KETOROLAC TROMETHAMINE 15 MG/ML
15 INJECTION, SOLUTION INTRAMUSCULAR; INTRAVENOUS ONCE
Status: COMPLETED | OUTPATIENT
Start: 2025-07-11 | End: 2025-07-11

## 2025-07-11 RX ORDER — ASPIRIN 81 MG/1
324 TABLET, CHEWABLE ORAL ONCE
Status: DISCONTINUED | OUTPATIENT
Start: 2025-07-11 | End: 2025-07-14 | Stop reason: HOSPADM

## 2025-07-11 RX ORDER — SODIUM CHLORIDE 0.9 % (FLUSH) 0.9 %
3-10 SYRINGE (ML) INJECTION AS NEEDED
Status: DISCONTINUED | OUTPATIENT
Start: 2025-07-11 | End: 2025-07-14 | Stop reason: HOSPADM

## 2025-07-11 RX ORDER — TRAMADOL HYDROCHLORIDE 50 MG/1
50 TABLET ORAL EVERY 6 HOURS PRN
Status: DISCONTINUED | OUTPATIENT
Start: 2025-07-11 | End: 2025-07-14 | Stop reason: HOSPADM

## 2025-07-11 RX ORDER — CYCLOSPORINE 0.5 MG/ML
1 EMULSION OPHTHALMIC 2 TIMES DAILY
Status: DISCONTINUED | OUTPATIENT
Start: 2025-07-11 | End: 2025-07-14 | Stop reason: HOSPADM

## 2025-07-11 RX ORDER — AMOXICILLIN 250 MG
2 CAPSULE ORAL 2 TIMES DAILY PRN
Status: DISCONTINUED | OUTPATIENT
Start: 2025-07-11 | End: 2025-07-11

## 2025-07-11 RX ORDER — METHYLPREDNISOLONE SODIUM SUCCINATE 40 MG/ML
40 INJECTION, POWDER, LYOPHILIZED, FOR SOLUTION INTRAMUSCULAR; INTRAVENOUS EVERY 4 HOURS
Status: COMPLETED | OUTPATIENT
Start: 2025-07-11 | End: 2025-07-12

## 2025-07-11 RX ORDER — ENOXAPARIN SODIUM 100 MG/ML
30 INJECTION SUBCUTANEOUS DAILY
Status: DISCONTINUED | OUTPATIENT
Start: 2025-07-11 | End: 2025-07-14 | Stop reason: HOSPADM

## 2025-07-11 RX ORDER — ONDANSETRON 4 MG/1
4 TABLET, ORALLY DISINTEGRATING ORAL EVERY 6 HOURS PRN
Status: DISCONTINUED | OUTPATIENT
Start: 2025-07-11 | End: 2025-07-14 | Stop reason: HOSPADM

## 2025-07-11 RX ORDER — BISACODYL 10 MG
10 SUPPOSITORY, RECTAL RECTAL DAILY PRN
Status: DISCONTINUED | OUTPATIENT
Start: 2025-07-11 | End: 2025-07-14 | Stop reason: HOSPADM

## 2025-07-11 RX ORDER — BISACODYL 5 MG/1
5 TABLET, DELAYED RELEASE ORAL DAILY PRN
Status: DISCONTINUED | OUTPATIENT
Start: 2025-07-11 | End: 2025-07-14 | Stop reason: HOSPADM

## 2025-07-11 RX ORDER — DIPHENHYDRAMINE HYDROCHLORIDE 50 MG/ML
50 INJECTION, SOLUTION INTRAMUSCULAR; INTRAVENOUS
Status: COMPLETED | OUTPATIENT
Start: 2025-07-11 | End: 2025-07-11

## 2025-07-11 RX ORDER — ASPIRIN 81 MG/1
81 TABLET, CHEWABLE ORAL DAILY
Status: DISCONTINUED | OUTPATIENT
Start: 2025-07-11 | End: 2025-07-14 | Stop reason: HOSPADM

## 2025-07-11 RX ORDER — FAMOTIDINE 20 MG/1
10 TABLET, FILM COATED ORAL 2 TIMES DAILY PRN
Status: DISCONTINUED | OUTPATIENT
Start: 2025-07-11 | End: 2025-07-14 | Stop reason: HOSPADM

## 2025-07-11 RX ORDER — ONDANSETRON 2 MG/ML
4 INJECTION INTRAMUSCULAR; INTRAVENOUS ONCE
Status: COMPLETED | OUTPATIENT
Start: 2025-07-11 | End: 2025-07-11

## 2025-07-11 RX ORDER — ACETAMINOPHEN 650 MG/1
650 SUPPOSITORY RECTAL EVERY 4 HOURS PRN
Status: DISCONTINUED | OUTPATIENT
Start: 2025-07-11 | End: 2025-07-14 | Stop reason: HOSPADM

## 2025-07-11 RX ORDER — ONDANSETRON 2 MG/ML
4 INJECTION INTRAMUSCULAR; INTRAVENOUS EVERY 6 HOURS PRN
Status: DISCONTINUED | OUTPATIENT
Start: 2025-07-11 | End: 2025-07-14 | Stop reason: HOSPADM

## 2025-07-11 RX ORDER — NITROGLYCERIN 0.4 MG/1
0.4 TABLET SUBLINGUAL
Status: DISCONTINUED | OUTPATIENT
Start: 2025-07-11 | End: 2025-07-14 | Stop reason: HOSPADM

## 2025-07-11 RX ORDER — MORPHINE SULFATE 2 MG/ML
2 INJECTION, SOLUTION INTRAMUSCULAR; INTRAVENOUS ONCE
Status: COMPLETED | OUTPATIENT
Start: 2025-07-11 | End: 2025-07-11

## 2025-07-11 RX ORDER — TAMSULOSIN HYDROCHLORIDE 0.4 MG/1
0.4 CAPSULE ORAL NIGHTLY
Status: DISCONTINUED | OUTPATIENT
Start: 2025-07-11 | End: 2025-07-14 | Stop reason: HOSPADM

## 2025-07-11 RX ADMIN — ROSUVASTATIN 40 MG: 40 TABLET, FILM COATED ORAL at 21:12

## 2025-07-11 RX ADMIN — ENOXAPARIN SODIUM 30 MG: 100 INJECTION SUBCUTANEOUS at 11:28

## 2025-07-11 RX ADMIN — METHYLPREDNISOLONE SODIUM SUCCINATE 40 MG: 40 INJECTION, POWDER, FOR SOLUTION INTRAMUSCULAR; INTRAVENOUS at 17:10

## 2025-07-11 RX ADMIN — SENNOSIDES, DOCUSATE SODIUM 2 TABLET: 50; 8.6 TABLET, FILM COATED ORAL at 21:24

## 2025-07-11 RX ADMIN — SENNOSIDES, DOCUSATE SODIUM 2 TABLET: 50; 8.6 TABLET, FILM COATED ORAL at 14:36

## 2025-07-11 RX ADMIN — OXYBUTYNIN CHLORIDE 10 MG: 10 TABLET, EXTENDED RELEASE ORAL at 14:36

## 2025-07-11 RX ADMIN — IOPAMIDOL 85 ML: 755 INJECTION, SOLUTION INTRAVENOUS at 08:28

## 2025-07-11 RX ADMIN — METOPROLOL TARTRATE 12.5 MG: 25 TABLET, FILM COATED ORAL at 21:12

## 2025-07-11 RX ADMIN — CYCLOSPORINE 1 DROP: 0.5 EMULSION OPHTHALMIC at 14:36

## 2025-07-11 RX ADMIN — CEFTRIAXONE 2000 MG: 2 INJECTION, POWDER, FOR SOLUTION INTRAMUSCULAR; INTRAVENOUS at 11:28

## 2025-07-11 RX ADMIN — CYCLOSPORINE 1 DROP: 0.5 EMULSION OPHTHALMIC at 21:14

## 2025-07-11 RX ADMIN — TAMSULOSIN HYDROCHLORIDE 0.4 MG: 0.4 CAPSULE ORAL at 21:13

## 2025-07-11 RX ADMIN — AZITHROMYCIN MONOHYDRATE 500 MG: 500 INJECTION, POWDER, LYOPHILIZED, FOR SOLUTION INTRAVENOUS at 14:35

## 2025-07-11 RX ADMIN — NITROGLYCERIN 0.4 MG: 0.4 TABLET SUBLINGUAL at 06:17

## 2025-07-11 RX ADMIN — METOPROLOL TARTRATE 12.5 MG: 25 TABLET, FILM COATED ORAL at 14:36

## 2025-07-11 RX ADMIN — DIPHENHYDRAMINE HYDROCHLORIDE 50 MG: 50 INJECTION INTRAMUSCULAR; INTRAVENOUS at 08:04

## 2025-07-11 RX ADMIN — KETOROLAC TROMETHAMINE 15 MG: 15 INJECTION INTRAMUSCULAR at 09:55

## 2025-07-11 RX ADMIN — MORPHINE SULFATE 2 MG: 2 INJECTION, SOLUTION INTRAMUSCULAR; INTRAVENOUS at 06:53

## 2025-07-11 RX ADMIN — METHYLPREDNISOLONE SODIUM SUCCINATE 40 MG: 40 INJECTION, POWDER, FOR SOLUTION INTRAMUSCULAR; INTRAVENOUS at 21:10

## 2025-07-11 RX ADMIN — Medication 3 ML: at 21:10

## 2025-07-11 RX ADMIN — LATANOPROST 1 DROP: 50 SOLUTION/ DROPS OPHTHALMIC at 21:16

## 2025-07-11 RX ADMIN — BACLOFEN 10 MG: 10 TABLET ORAL at 21:12

## 2025-07-11 RX ADMIN — METHYLPREDNISOLONE SODIUM SUCCINATE 40 MG: 40 INJECTION, POWDER, FOR SOLUTION INTRAMUSCULAR; INTRAVENOUS at 08:04

## 2025-07-11 RX ADMIN — ONDANSETRON 4 MG: 2 INJECTION, SOLUTION INTRAMUSCULAR; INTRAVENOUS at 06:53

## 2025-07-11 RX ADMIN — METHYLPREDNISOLONE SODIUM SUCCINATE 40 MG: 40 INJECTION, POWDER, FOR SOLUTION INTRAMUSCULAR; INTRAVENOUS at 14:36

## 2025-07-11 RX ADMIN — Medication 3 ML: at 11:28

## 2025-07-11 NOTE — ED PROVIDER NOTES
EMERGENCY DEPARTMENT ENCOUNTER  Room Number:  09/09  PCP: Ori Bejarano MD  Independent Historians: Patient and Family      HPI:  Chief Complaint: had concerns including Chest Pain.       Context: Amandeep Manriquez Jr. is a 80 y.o. male with a medical history of CAD, HTN, HLD, ANA who presents to the ED c/o acute pain.  Patient cannot state when the chest pain began.  His wife states he complained of it when she got him up to go to the bathroom and this persisted through the night so she called EMS this morning.  Patient reports he still has discomfort in his chest and is radiating through to his back.  Patient additionally feels short of air.  Patient has not had any cough or fever.      Review of prior external notes (non-ED) -and- Review of prior external test results outside of this encounter: Discharge summary from 2/21/2025 reviewed and notable for admission secondary to ureteral stone and hydronephrosis.  Patient had a stent placed and was treated with antibiotics.  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    Arthropathy of right sacroiliac joint 11/29/2018    Osteoarthritis of right knee 12/27/2012    Degenerative lumbar spinal stenosis 02/05/2020    Renal stone 02/05/2020    Bilateral impacted cerumen 02/05/2020    Benign prostatic hyperplasia without lower urinary tract symptoms 02/05/2020    Vertigo 02/05/2020    High risk medication use 08/17/2020    Vitamin D deficiency 08/17/2020    OAB (overactive bladder) 04/06/2021    Rosacea 07/09/2021    Short-term memory loss 10/19/2021    History of CVA (cerebrovascular accident) 02/07/2022    COVID-19 06/13/2022    Generalized weakness 06/13/2022    Hypokalemia  06/15/2022    Ureterolithiasis 02/09/2025    Leukocytosis 02/09/2025     Resolved Ambulatory Problems     Diagnosis Date Noted    Acute myocardial infarction     Right hip pain 11/29/2018    Myopathy, congenital 12/03/2013    Vascular dementia without behavioral disturbance 02/07/2022     Past Medical History:   Diagnosis Date    Aneurysm of right femoral artery     Arthritis     BPH (benign prostatic hyperplasia)     DDD (degenerative disc disease), lumbar     Diabetes mellitus     Dizziness     Encounter for Medicare annual wellness exam     Gastrointestinal hemorrhage     Hard to intubate     History of impacted cerumen     Hyperlipidemia     Hypertension     Sleep apnea          PAST SURGICAL HISTORY  Past Surgical History:   Procedure Laterality Date    CARDIAC CATHETERIZATION  06/2015    30% Left main, 99% mid to distal LAD, 70-80% left circumflex, 80% proximal to mid RCA.    CATARACT EXTRACTION      CERVICAL FUSION      CHOLECYSTECTOMY      CORONARY ANGIOPLASTY WITH STENT PLACEMENT      CYSTOSCOPY W/ URETERAL STENT PLACEMENT Left 2/9/2025    Procedure: CYSTOSCOPY URETERAL CATHETER/STENT INSERTION;  Surgeon: Alvino Daniels MD;  Location: [facility];  Service: Urology;  Laterality: Left;    KNEE SURGERY Right     OTHER SURGICAL HISTORY      percutaneous injection of extremity pseudoaneurysm         FAMILY HISTORY  Family History   Problem Relation Age of Onset    Coronary artery disease Mother     Heart disease Mother     Heart attack Mother 68    Cancer Father         bladder    Heart disease Father         CHF    No Known Problems Other          SOCIAL HISTORY  Social History     Socioeconomic History    Marital status:    Tobacco Use    Smoking status: Former     Types: Cigars     Passive exposure: Past    Smokeless tobacco: Never    Tobacco comments:     caffeine use   Vaping Use    Vaping status: Never Used   Substance and Sexual Activity    Alcohol use: Not Currently     Comment: social     Drug use: Never    Sexual activity: Never       Chronic or social conditions impacting patient care (Social Determinants of Health):  Social Drivers of Health     Tobacco Use: Medium Risk (3/25/2025)    Patient History     Smoking Tobacco Use: Former     Smokeless Tobacco Use: Never     Passive Exposure: Past   Alcohol Use: Not At Risk (2/9/2025)    AUDIT-C     Frequency of Alcohol Consumption: Never     Average Number of Drinks: Patient does not drink     Frequency of Binge Drinking: Never   Financial Resource Strain: Not on file   Food Insecurity: Not on file   Transportation Needs: Not on file   Physical Activity: Patient Declined (2/11/2025)    Exercise Vital Sign     Days of Exercise per Week: Patient declined     Minutes of Exercise per Session: Patient declined   Stress: Not on file   Social Connections: Not on file   Interpersonal Safety: Not At Risk (7/11/2025)    Abuse Screen     Unsafe at Home or Work/School: no     Feels Threatened by Someone?: no     Does Anyone Keep You from Contacting Others or Doint Things Outside the Home?: no     Physical Sign of Abuse Present: no   Depression: Not at risk (4/6/2021)    PHQ-2     PHQ-2 Score: 0   Housing Stability: Unknown (2/11/2025)    Housing Stability     Current Living Arrangements: condominium     Potentially Unsafe Housing Conditions: Not on file   Utilities: Not on file   Health Literacy: Unknown (2/11/2025)    Education     Help with school or training?: Not on file     Preferred Language: English   Employment: Not on file   Disabilities: At Risk (2/9/2025)    Disabilities     Concentrating, Remembering, or Making Decisions Difficulty: yes     Doing Errands Independently Difficulty: yes       ALLERGIES  Iodinated contrast media and Ampicillin      REVIEW OF SYSTEMS  Review of Systems  Included in HPI  All systems reviewed and negative except for those discussed in HPI.      PHYSICAL EXAM    I have reviewed the triage vital signs and nursing notes.    ED  Triage Vitals   Temp Heart Rate Resp BP SpO2   07/11/25 0512 07/11/25 0512 07/11/25 0512 07/11/25 0512 07/11/25 0512   97.8 °F (36.6 °C) 86 18 (!) 196/84 96 %      Temp src Heart Rate Source Patient Position BP Location FiO2 (%)   07/11/25 0512 07/11/25 0512 07/11/25 0630 07/11/25 0630 --   Oral Monitor Lying Right arm        Physical Exam  GENERAL: alert, no acute distress  SKIN: Warm, dry  HENT: Normocephalic, atraumatic  EYES: no scleral icterus  CV: regular rhythm, regular rate  RESPIRATORY: normal effort, coarse breath sounds bilaterally, on supplementary O2  ABDOMEN: soft, nontender, nondistended  MUSCULOSKELETAL: no deformity  NEURO: alert, moves all extremities, follows commands            LAB RESULTS  Recent Results (from the past 24 hours)   ECG 12 Lead Chest Pain    Collection Time: 07/11/25  5:44 AM   Result Value Ref Range    QT Interval 343 ms    QTC Interval 444 ms   Comprehensive Metabolic Panel    Collection Time: 07/11/25  5:49 AM    Specimen: Blood   Result Value Ref Range    Glucose 105 (H) 65 - 99 mg/dL    BUN 15.0 8.0 - 23.0 mg/dL    Creatinine 0.51 (L) 0.76 - 1.27 mg/dL    Sodium 140 136 - 145 mmol/L    Potassium 4.3 3.5 - 5.2 mmol/L    Chloride 105 98 - 107 mmol/L    CO2 26.2 22.0 - 29.0 mmol/L    Calcium 10.1 8.6 - 10.5 mg/dL    Total Protein 7.3 6.0 - 8.5 g/dL    Albumin 4.0 3.5 - 5.2 g/dL    ALT (SGPT) 19 1 - 41 U/L    AST (SGOT) 24 1 - 40 U/L    Alkaline Phosphatase 128 (H) 39 - 117 U/L    Total Bilirubin 0.4 0.0 - 1.2 mg/dL    Globulin 3.3 gm/dL    A/G Ratio 1.2 g/dL    BUN/Creatinine Ratio 29.4 (H) 7.0 - 25.0    Anion Gap 8.8 5.0 - 15.0 mmol/L    eGFR 101.2 >60.0 mL/min/1.73   Protime-INR    Collection Time: 07/11/25  5:49 AM    Specimen: Blood   Result Value Ref Range    Protime 12.9 11.7 - 14.2 Seconds    INR 0.98 0.90 - 1.10   aPTT    Collection Time: 07/11/25  5:49 AM    Specimen: Blood   Result Value Ref Range    PTT 28.9 22.7 - 35.4 seconds   BNP    Collection Time: 07/11/25  5:49  AM    Specimen: Blood   Result Value Ref Range    proBNP 91.1 0.0 - 1,800.0 pg/mL   High Sensitivity Troponin T    Collection Time: 07/11/25  5:49 AM    Specimen: Blood   Result Value Ref Range    HS Troponin T 17 <22 ng/L   Magnesium    Collection Time: 07/11/25  5:49 AM    Specimen: Blood   Result Value Ref Range    Magnesium 2.4 1.6 - 2.4 mg/dL   CBC Auto Differential    Collection Time: 07/11/25  5:49 AM    Specimen: Blood   Result Value Ref Range    WBC 14.08 (H) 3.40 - 10.80 10*3/mm3    RBC 5.26 4.14 - 5.80 10*6/mm3    Hemoglobin 15.7 13.0 - 17.7 g/dL    Hematocrit 48.5 37.5 - 51.0 %    MCV 92.2 79.0 - 97.0 fL    MCH 29.8 26.6 - 33.0 pg    MCHC 32.4 31.5 - 35.7 g/dL    RDW 13.2 12.3 - 15.4 %    RDW-SD 44.7 37.0 - 54.0 fl    MPV 10.0 6.0 - 12.0 fL    Platelets 223 140 - 450 10*3/mm3    Neutrophil % 77.8 (H) 42.7 - 76.0 %    Lymphocyte % 12.1 (L) 19.6 - 45.3 %    Monocyte % 7.9 5.0 - 12.0 %    Eosinophil % 1.4 0.3 - 6.2 %    Basophil % 0.4 0.0 - 1.5 %    Immature Grans % 0.4 0.0 - 0.5 %    Neutrophils, Absolute 10.94 (H) 1.70 - 7.00 10*3/mm3    Lymphocytes, Absolute 1.71 0.70 - 3.10 10*3/mm3    Monocytes, Absolute 1.11 (H) 0.10 - 0.90 10*3/mm3    Eosinophils, Absolute 0.20 0.00 - 0.40 10*3/mm3    Basophils, Absolute 0.06 0.00 - 0.20 10*3/mm3    Immature Grans, Absolute 0.06 (H) 0.00 - 0.05 10*3/mm3    nRBC 0.0 0.0 - 0.2 /100 WBC   D-dimer, Quantitative    Collection Time: 07/11/25  5:49 AM    Specimen: Blood   Result Value Ref Range    D-Dimer, Quantitative 3.13 (H) 0.00 - 0.82 MCGFEU/mL   Procalcitonin    Collection Time: 07/11/25  5:49 AM    Specimen: Blood   Result Value Ref Range    Procalcitonin 0.03 0.00 - 0.25 ng/mL   Respiratory Panel PCR w/COVID-19(SARS-CoV-2) [facility] In-House, NP Swab in UTM/VTM, 2 HR TAT - Swab, Nasopharynx    Collection Time: 07/11/25  8:01 AM    Specimen: Nasopharynx; Swab   Result Value Ref Range    ADENOVIRUS, PCR Not Detected Not Detected    Coronavirus 229E Not  Detected Not Detected    Coronavirus HKU1 Not Detected Not Detected    Coronavirus NL63 Not Detected Not Detected    Coronavirus OC43 Not Detected Not Detected    COVID19 Not Detected Not Detected - Ref. Range    Human Metapneumovirus Not Detected Not Detected    Human Rhinovirus/Enterovirus Not Detected Not Detected    Influenza A PCR Not Detected Not Detected    Influenza B PCR Not Detected Not Detected    Parainfluenza Virus 1 Not Detected Not Detected    Parainfluenza Virus 2 Not Detected Not Detected    Parainfluenza Virus 3 Not Detected Not Detected    Parainfluenza Virus 4 Not Detected Not Detected    RSV, PCR Not Detected Not Detected    Bordetella pertussis pcr Not Detected Not Detected    Bordetella parapertussis PCR Not Detected Not Detected    Chlamydophila pneumoniae PCR Not Detected Not Detected    Mycoplasma pneumo by PCR Not Detected Not Detected   High Sensitivity Troponin T 1Hr    Collection Time: 07/11/25  8:42 AM    Specimen: Arm, Right; Blood   Result Value Ref Range    HS Troponin T 15 <22 ng/L    Troponin T Numeric Delta -2 Abnormal if >/=3 ng/L         RADIOLOGY  CT Angiogram Chest Pulmonary Embolism  Result Date: 7/11/2025  CT ANGIOGRAM OF THE CHEST. MULTIPLE CORONAL, SAGITTAL, AND 3-D RECONSTRUCTIONS.  HISTORY: Chest pain shortness of breath hypoxia  TECHNIQUE: Radiation dose reduction techniques were utilized, including automated exposure control and exposure modulation based on body size. CT angiogram of the chest was performed. Multiple coronal, sagittal, and 3-D reconstruction images were obtained.  COMPARISON: CTA chest 6/13/2015      FINDINGS AND IMPRESSION:  Please note evaluation is suboptimal due to beam hardening and streak artifact from the patient's arms being along his side.  Gallbladder surgically absent. Hypodense left renal lesion demonstrate density less than 15 Hounsfield units is likely benign per Bosniak 2019 criteria. There is diverticulosis within the visualized colon.   No findings of pulmonary arterial embolism are seen.  No hilar, mediastinal or axillary adenopathy by size criteria. Trace pericardial effusion or thickening. Roughly symmetric dependent bibasilar pulmonary opacification is present favored represent atelectasis.. Reticulonodular opacification within the right middle lobe is present, a finding which was also seen on 6/13/2015; however appears to have mildly progressed. Correlation with patient is recommended to exclude acute on chronic atypical pneumonia. There is a nodular area of opacification within the posterior inferior right upper lobe near the major fissure with surrounding satellite nodularity not seen in 2015. While findings may be infectious/inflammatory given the above findings, given the findings are new follow-up with chest CT in 8 weeks recommended to ensure stability and/or resolution exclude the possibility of malignancy.  Please note the thoracic aorta cannot be evaluated due to contrast timing.  No suspicious lytic or blastic osseous lesions. Findings suggestive of diffuse idiopathic skeletal hyperostosis. There is increased kyphosis of the upper thoracic spine. Findings can be further evaluated with MRI if clinically indicated.      This report was finalized on 7/11/2025 9:03 AM by Dr. Josiah Duke M.D on Workstation: [id number]      XR Chest 1 View  Result Date: 7/11/2025  SINGLE VIEW OF THE CHEST  HISTORY: Chest pain  COMPARISON: 3/13/2022  FINDINGS: There is cardiomegaly. There is no vascular congestion. There is calcification of the aorta. No pneumothorax or pleural effusion is seen. No acute infiltrates are identified.      No acute findings.  This report was finalized on 7/11/2025 5:58 AM by Dr. Elke Fernandez M.D on Workstation: [id number]          MEDICATIONS GIVEN IN ER  Medications   aspirin chewable tablet 324 mg (0 mg Oral Hold 7/11/25 0619)   nitroglycerin (NITROSTAT) SL tablet 0.4 mg (0.4 mg Sublingual Not Given 7/11/25  0637)   sodium chloride 0.9 % flush 10 mL (has no administration in time range)   methylPREDNISolone sodium succinate (SOLU-Medrol) injection 40 mg (40 mg Intravenous Given 7/11/25 0804)   morphine injection 2 mg (2 mg Intravenous Given 7/11/25 0653)   ondansetron (ZOFRAN) injection 4 mg (4 mg Intravenous Given 7/11/25 0653)   diphenhydrAMINE (BENADRYL) injection 50 mg (50 mg Intravenous Given 7/11/25 0804)   iopamidol (ISOVUE-370) 76 % injection 100 mL (85 mL Intravenous Given by Other 7/11/25 0828)   ketorolac (TORADOL) injection 15 mg (15 mg Intravenous Given 7/11/25 0955)         ORDERS PLACED DURING THIS VISIT:  Orders Placed This Encounter   Procedures    Respiratory Panel PCR w/COVID-19(SARS-CoV-2) [facility] In-House, NP Swab in UTM/VTM, 2 HR TAT - Swab, Nasopharynx    XR Chest 1 View    CT Angiogram Chest Pulmonary Embolism    Comprehensive Metabolic Panel    Protime-INR    aPTT    BNP    High Sensitivity Troponin T    Magnesium    CBC Auto Differential    High Sensitivity Troponin T 1Hr    D-dimer, Quantitative    Procalcitonin    Monitor Blood Pressure    Continuous Pulse Oximetry    Vital Signs Recheck    Discontinue Medications for Contrast Allergy Pre-Treatment Once Patient Arrives to Floor    [facility] (on-call MD unless specified) Details    ECG 12 Lead Chest Pain    Insert Peripheral IV    Initiate Observation Status    CBC & Differential         OUTPATIENT MEDICATION MANAGEMENT:  Current Facility-Administered Medications Ordered in Epic   Medication Dose Route Frequency Provider Last Rate Last Admin    aspirin chewable tablet 324 mg  324 mg Oral Once Aguilar Lao MD        methylPREDNISolone sodium succinate (SOLU-Medrol) injection 40 mg  40 mg Intravenous Q4H Owen James MD   40 mg at 07/11/25 0804    nitroglycerin (NITROSTAT) SL tablet 0.4 mg  0.4 mg Sublingual Q5 Min PRN Aguilar Lao MD   0.4 mg at 07/11/25 0617    sodium chloride 0.9 % flush 10 mL  10 mL  Intravenous Aguilar Adams MD         Current Outpatient Medications Ordered in Epic   Medication Sig Dispense Refill    aspirin 81 MG chewable tablet Chew 1 tablet Daily.      baclofen (LIORESAL) 10 MG tablet Take 1 tablet by mouth Every Night.      cycloSPORINE (RESTASIS) 0.05 % ophthalmic emulsion 1 drop 2 (Two) Times a Day.      latanoprost (XALATAN) 0.005 % ophthalmic solution Administer 1 drop to both eyes Every Night.      metoprolol tartrate (LOPRESSOR) 25 MG tablet Take 0.5 tablets by mouth 2 (Two) Times a Day.      Multiple Vitamins-Minerals (PRESERVISION AREDS 2+MULTI VIT PO) Take 1 tablet by mouth Daily.      Myrbetriq 50 MG tablet sustained-release 24 hour 24 hr tablet TAKE ONE TABLET BY MOUTH DAILY (Patient taking differently: Take 50 mg by mouth Daily.) 30 tablet 2    oxybutynin XL (DITROPAN-XL) 10 MG 24 hr tablet Take 1 tablet by mouth Daily.      rosuvastatin (CRESTOR) 40 MG tablet TAKE ONE TABLET BY MOUTH DAILY (Patient taking differently: Take 1 tablet by mouth Daily.) 90 tablet 1    sennosides-docusate (PERICOLACE) 8.6-50 MG per tablet Take 2 tablets by mouth 2 (Two) Times a Day.      tamsulosin (FLOMAX) 0.4 MG capsule 24 hr capsule Take 1 capsule by mouth Every Night. 90 capsule 1    traMADol (ULTRAM) 50 MG tablet Take 1 tablet by mouth Every 6 (Six) Hours As Needed for Moderate Pain or Severe Pain.      vitamin E 100 UNIT capsule Take 1 capsule by mouth Daily.      nitroglycerin (NITROSTAT) 0.4 MG SL tablet Place 1 tablet under the tongue. For chest pain.  If pain remains after 5 minutes, call 911 and repeat dose.  Max 3 tabs in 15 minutes.           PROCEDURES  Procedures      Critical Care Note: Critical care provider statement:    Critical care time (minutes): 33.   Critical care time was exclusive of:  Separately billable procedures and treating other patients   Critical care was necessary to treat or prevent imminent or life-threatening deterioration of the following  conditions:  Respiratory Failure   Critical care was time spent personally by me on the following activities:  Development of treatment plan with patient or surrogate, discussions with consultants, evaluation of patient's response to treatment, examination of patient, obtaining history from patient or surrogate, ordering and performing treatments and interventions, ordering and review of laboratory studies, ordering and review of radiographic studies, pulse oximetry, re-evaluation of patient's condition and review of old charts. Critical Care indicators: Hypoxia / hypoxemia       PROGRESS, DATA ANALYSIS, CONSULTS, AND MEDICAL DECISION MAKING  All labs have been independently interpreted by me.  All radiology studies have been reviewed by me. All EKG's have been independently viewed and interpreted by me.  Discussion below represents my analysis of pertinent findings related to patient's condition, differential diagnosis, treatment plan and final disposition.    Differential diagnosis includes but is not limited to pneumonia, viral URI, ACS, PE, aortic pathology.    Clinical Scores:                                       ED Course as of 07/11/25 0956   Fri Jul 11, 2025   0536 First look: Patient presents with 3 hours of sharp right-sided chest pain that is constant.  Nothing makes the pain better or worse.  No diaphoresis no nausea vomiting no shortness of breath.  Patient has prior history of MI in 2015 was treated with stents states that that pain was different than the pain he is currently experiencing. [TJ]   0716 EKG interpreted by me demonstrates sinus rhythm, rate of 100, no [place]/QT prolongation, no ST elevation, overall similar to previous EKG dated 2/9/2025 [MW]   0717 Chest x-ray interpreted by me demonstrates no evidence of dense consolidation [MW]   0953 Workup in the emergency department is overall unremarkable outside of a leukocytosis of 14 and elevated D-dimer.  Pro-Aba is negative and RPP is negative.   Patient is noted to be hypoxic in the mid 80s.  CT chest demonstrates no PE however there are some chronic nodules noted.  Will go ahead and admit patient for further evaluation and management given his hypoxia. [MW]   0933 Discussed with Dr. Miller of [facility] who agrees with admission, he will treat for pneumonia [MW]      ED Course User Index  [MW] Owen James MD  [TJ] Aguilar Lao MD             AS OF 09:56 EDT VITALS:    BP - 123/80  HR - 104  TEMP - 97.8 °F (36.6 °C) (Oral)  O2 SATS - 99%    COMPLEXITY OF CARE  The patient requires admission.      DIAGNOSIS  Final diagnoses:   Acute respiratory failure with hypoxia         DISPOSITION  ED Disposition       ED Disposition   Decision to Admit    Condition   --    Comment   Level of Care: Telemetry [5]   Diagnosis: Hypoxic respiratory failure [[id number]]   Admitting Physician: BARRY MILLER [[id number]]   Attending Physician: BARRY MILLER [[id number]]   Is patient appropriate for Inpatient Observation Unit?: No [0]                  Please note that portions of this document were completed with a voice recognition program.    Note Disclaimer: At [facility], we believe that sharing information builds trust and better relationships. You are receiving this note because you recently visited [facility]. It is possible you will see health information before a provider has talked with you about it. This kind of information can be easy to misunderstand. To help you fully understand what it means for your health, we urge you to discuss this note with your provider.         Owen James MD  07/11/25 0956

## 2025-07-11 NOTE — H&P
[facility] Medicine Services  HISTORY AND PHYSICAL    Patient Name: Amandeep Manriquez Jr.  : 1945  MRN: [id number]  Primary Care Physician: Ori Bejarano MD  Date of admission: 2025    Subjective   Subjective   Chief Complaint:  Chest pain    HPI:  Amandeep Manriquez Jr. is a 80 y.o. male with past medical history as listed presents to the hospital with chest pain this morning when he got up to bathroom and continued.  It was severe and sharp and radiated to his back.  His troponin were negative in the emergency room.  You are positive.  CT angiogram showed lung nodules but no pulmonary embolisms there were also infiltrates.  Patient was feeling little short of breath.  He is an extremely poor historian.  Much of the history was from his wife and the emergency room team.  His wife does note he has been choking on food recently sometimes when he swallows liquids.  Patient she reports is bedfast and gets up to a wheelchair with a Latonia lift at home.  She says this is due to arthritis and other orthopedic conditions.      Review of Systems   No current fevers or chills  No current nausea, vomiting, or diarrhea  No current chest pain or palpitations    All other systems reviewed and are negative.     Personal History     Past Medical History:   Diagnosis Date   • Acute myocardial infarction    • Aneurysm of right femoral artery    • Arthritis    • BPH (benign prostatic hyperplasia)    • CAD (coronary artery disease), native coronary artery    • Congenital myopathy    • DDD (degenerative disc disease), lumbar     with stenosis   • Diabetes mellitus    • Dizziness     Chronic Dizziness   • Encounter for Medicare annual wellness exam    • Essential hypertension    • Gastrointestinal hemorrhage    • Hard to intubate    • History of impacted cerumen    • Hyperlipidemia    • Hypertension    • Mixed hyperlipidemia    • Sleep apnea        Past Surgical History:   Procedure Laterality Date   • CARDIAC  CATHETERIZATION  06/2015    30% Left main, 99% mid to distal LAD, 70-80% left circumflex, 80% proximal to mid RCA.   • CATARACT EXTRACTION     • CERVICAL FUSION     • CHOLECYSTECTOMY     • CORONARY ANGIOPLASTY WITH STENT PLACEMENT     • CYSTOSCOPY W/ URETERAL STENT PLACEMENT Left 2/9/2025    Procedure: CYSTOSCOPY URETERAL CATHETER/STENT INSERTION;  Surgeon: Alvino Daniels MD;  Location: [facility];  Service: Urology;  Laterality: Left;   • KNEE SURGERY Right    • OTHER SURGICAL HISTORY      percutaneous injection of extremity pseudoaneurysm       Family History: family history includes Cancer in his father; Coronary artery disease in his mother; Heart attack (age of onset: 68) in his mother; Heart disease in his father and mother; No Known Problems in an other family member. Other pertinent FHx was reviewed and unremarkable.     Social History:  reports that he has quit smoking. His smoking use included cigars. He has been exposed to tobacco smoke. He has never used smokeless tobacco. He reports that he does not currently use alcohol. He reports that he does not use drugs.  Social History     Social History Narrative   • Not on file       Medications:  Available home medication information reviewed.    Allergies   Allergen Reactions   • Iodinated Contrast Media Hives   • Ampicillin Diarrhea       Objective   Objective   Vital Signs:   Temp:  [97.8 °F (36.6 °C)-98.2 °F (36.8 °C)] 98.2 °F (36.8 °C)  Heart Rate:  [] 88  Resp:  [18-20] 18  BP: (102-196)/(68-95) 141/85        Physical Exam   Constitutional: Awake, alert, acutely ill-appearing  HENT: NCAT, mucous membranes moist, neck supple  Respiratory: Cough is present, breathing mildly labored, currently on supplemental oxygen  Cardiovascular: Pulse rate is initially tachycardic but subsequently improved, palpable radial pulses  Gastrointestinal:  Soft, nontender, nondistended  Musculoskeletal: Very frail and chronically debilitated in appearance, obese  BMI is 32, osteoarthritis noted diffusely.    Psychiatric: Calm affect, cooperative, conversational  Neurologic: Poor historian, poor short-term memory, no slurred speech or facial droop, follows commands  Skin: No rashes or jaundice, warm      Results from last 7 days   Lab Units 07/11/25  0549   WBC 10*3/mm3 14.08*   HEMOGLOBIN g/dL 15.7   HEMATOCRIT % 48.5   PLATELETS 10*3/mm3 223   INR  0.98     Results from last 7 days   Lab Units 07/11/25  1053 07/11/25  0842 07/11/25  0549   SODIUM mmol/L  --   --  140   POTASSIUM mmol/L  --   --  4.3   CHLORIDE mmol/L  --   --  105   CO2 mmol/L  --   --  26.2   BUN mg/dL  --   --  15.0   CREATININE mg/dL  --   --  0.51*   GLUCOSE mg/dL  --   --  105*   CALCIUM mg/dL  --   --  10.1   ALK PHOS U/L  --   --  128*   ALT (SGPT) U/L  --   --  19   AST (SGOT) U/L  --   --  24   HSTROP T ng/L  --  15 17   PROBNP pg/mL  --   --  91.1   LACTATE mmol/L 1.0  --   --    PROCALCITONIN ng/mL  --   --  0.03     Estimated Creatinine Clearance: 109.1 mL/min (A) (by C-G formula based on SCr of 0.51 mg/dL (L)).  Brief Urine Lab Results  (Last result in the past 365 days)        Color   Clarity   Blood   Leuk Est   Nitrite   Protein   CREAT   Urine HCG        02/19/25 2227 Orange  Comment: Any Substance that causes an abnormal urine color can alter the accuracy of the chemical reactions.   Turbid   Small (1+)   Moderate (2+)   Positive   100 mg/dL (2+)                 Imaging Results (Last 24 Hours)       Procedure Component Value Units Date/Time    CT Angiogram Chest Pulmonary Embolism [[id number]] Collected: 07/11/25 0837     Updated: 07/11/25 0906    Narrative:      CT ANGIOGRAM OF THE CHEST. MULTIPLE CORONAL, SAGITTAL, AND 3-D  RECONSTRUCTIONS.     HISTORY: Chest pain shortness of breath hypoxia     TECHNIQUE: Radiation dose reduction techniques were utilized, including  automated exposure control and exposure modulation based on body size.   CT angiogram of the chest was performed. Multiple  coronal, sagittal, and  3-D reconstruction images were obtained.      COMPARISON: CTA chest 6/13/2015       Impression:      FINDINGS AND IMPRESSION:      Please note evaluation is suboptimal due to beam hardening and streak  artifact from the patient's arms being along his side.     Gallbladder surgically absent. Hypodense left renal lesion demonstrate  density less than 15 Hounsfield units is likely benign per Bosniak 2019  criteria. There is diverticulosis within the visualized colon.     No findings of pulmonary arterial embolism are seen.     No hilar, mediastinal or axillary adenopathy by size criteria. Trace  pericardial effusion or thickening. Roughly symmetric dependent  bibasilar pulmonary opacification is present favored represent  atelectasis.. Reticulonodular opacification within the right middle lobe  is present, a finding which was also seen on 6/13/2015; however appears  to have mildly progressed. Correlation with patient is recommended to  exclude acute on chronic atypical pneumonia. There is a nodular area of  opacification within the posterior inferior right upper lobe near the  major fissure with surrounding satellite nodularity not seen in 2015.  While findings may be infectious/inflammatory given the above findings,  given the findings are new follow-up with chest CT in 8 weeks  recommended to ensure stability and/or resolution exclude the  possibility of malignancy.     Please note the thoracic aorta cannot be evaluated due to contrast  timing.     No suspicious lytic or blastic osseous lesions. Findings suggestive of  diffuse idiopathic skeletal hyperostosis. There is increased kyphosis of  the upper thoracic spine. Findings can be further evaluated with MRI if  clinically indicated.                 This report was finalized on 7/11/2025 9:03 AM by Dr. Josiah Duke M.D  on Workstation: [id number]       XR Chest 1 View [[id number]] Collected: 07/11/25 0558     Updated: 07/11/25 0601     Narrative:      SINGLE VIEW OF THE CHEST     HISTORY: Chest pain     COMPARISON: 3/13/2022     FINDINGS:  There is cardiomegaly. There is no vascular congestion. There is  calcification of the aorta. No pneumothorax or pleural effusion is seen.  No acute infiltrates are identified.       Impression:      No acute findings.     This report was finalized on 7/11/2025 5:58 AM by Dr. Elke Fernandez M.D on Workstation: [id number]             Results for orders placed during the hospital encounter of 07/11/25    Adult Transthoracic Echo Complete W/ Cont if Necessary Per Protocol 07/11/2025  3:26 PM    Interpretation Summary  •  Left ventricular systolic function is normal. Calculated left ventricular EF = 60.1%  •  Left ventricular wall thickness is consistent with borderline concentric hypertrophy.  •  Left ventricular diastolic function is consistent with (grade I) impaired relaxation.  •  Aortic valve sclerosis without hemodynamically significant stenosis.      Assessment & Plan   Assessment & Plan     Active Hospital Problems    Diagnosis  POA   • **Acute respiratory failure with hypoxia [J96.01]  Yes   • DISH (diffuse idiopathic skeletal hyperostosis) [M48.10]  Yes   • Trouble swallowing [R13.10]  Yes   • Pneumonia, bacterial [J15.9]  Yes   • Aspiration pneumonitis [J69.0]  Yes   • Leukocytosis [D72.829]  Yes   • Generalized weakness [R53.1]  Yes   • History of CVA (cerebrovascular accident) [Z86.73]  Not Applicable   • Short-term memory loss [R41.3]  Yes   • Congenital myopathy [G71.20]  Yes   • Mixed hyperlipidemia [E78.2]  Yes   • Essential hypertension [I10]  Yes   • CAD (coronary artery disease), native coronary artery [I25.10]  Yes     80-year-old male presents to hospital with chest pain radiating to his back and was found to have acute hypoxic respiratory failure with pneumonia and concern for aspiration pneumonitis.    Acute hypoxic respiratory failure due to probable bacterial pneumonia and aspiration  pneumonitis with sepsis:  Plan to start broad-spectrum antibiotic coverage.  Send blood cultures.  Titrate supplemental oxygen as needed.  Send urine studies for Legionella and pneumococcal.  Based on clinical images which I have reviewed on CT scan with diffuse atypical infiltrates I am concerned this could be aspiration pneumonia.  Wife does note intermittent difficulty with liquids and patient is quite elderly frail and debilitated.  Speech therapy consult for concern for dysphagia.  D-dimer was elevated.  CT angiogram neck for pulmonary embolism and it is noted with history of contrast allergy ER ordered Benadryl with methylprednisolone to prevent reaction.  Monitoring following this procedure.    Chest pain with CAD: Cardiology consult.  ACS ruled out.  Continue aspirin and statin and beta-blocker.    Severe debility, immobility, bedfast with congenital myopathy:  Typically uses Latonia lift at home.  PT OT.  Supportive care and symptom treatment.    Essential hypertension: Continue appropriate home blood pressure medications.  Monitor blood pressure adjust as needed.    Hyperlipidemia: Continue statin.  Stable.    BPH: Flomax.  Monitor for any hypertension.  Also takes oxybutynin for overactive bladder    Poor memory: Possibly due to acute issues however I did recommend to wife he consider outpatient neurocognitive evaluation to assess for possible MCI or early dementia.    Diffuse osteoporosis DISH: noted on imaging.  Supportive care and symptom treatment    Treatment plan discussed with wife who is in agreement with plan.    DVT prophylaxis:   Lovenox    CODE STATUS:     Code Status and Medical Interventions: CPR (Attempt to Resuscitate); Full Support   Ordered at: 07/11/25 1035     Code Status (Patient has no pulse and is not breathing):    CPR (Attempt to Resuscitate)     Medical Interventions (Patient has pulse or is breathing):    Full Support     Level Of Support Discussed With:    Next of Kin (If No  Surrogate)          Gavin Miller MD  07/11/25

## 2025-07-11 NOTE — PLAN OF CARE
Problem: Adult Inpatient Plan of Care  Goal: Plan of Care Review  Outcome: Progressing  Flowsheets (Taken 7/11/2025 9110)  Progress: improving  Outcome Evaluation: pt admitted to [facility]. c/o chest and back pain, now resolved. Cardiology consulted. Echo done. Started on IV Antibiotics. Placed on low air loss bed. Wife at bedside. Plan of care ongoing.  Plan of Care Reviewed With:   patient   spouse

## 2025-07-11 NOTE — PLAN OF CARE
Goal Outcome Evaluation:              Outcome Evaluation: Orders received for swallow eval. Pt off the floor for testing. Will f/u as able.

## 2025-07-11 NOTE — ED NOTES
Nursing report ED to floor  Amandeep Leónlalo Ratliff  80 y.o.  male    HPI :  HPI  Stated Reason for Visit: Patient arrives to emergency room via ambulance with complaints of chest pain 7 out of 10 on left side  History Obtained From: EMS    Chief Complaint  Chief Complaint   Patient presents with    Chest Pain       Admitting doctor:   Gavin Miller MD    Admitting diagnosis:   The encounter diagnosis was Acute respiratory failure with hypoxia.    Code status:   Current Code Status       Date Active Code Status Order ID Comments User Context       Prior            Allergies:   Iodinated contrast media and Ampicillin    Isolation:   No active isolations    Intake and Output  No intake or output data in the 24 hours ending 07/11/25 1004    Weight:   There were no vitals filed for this visit.    Most recent vitals:   Vitals:    07/11/25 0913 07/11/25 0928 07/11/25 0942 07/11/25 1000   BP: 128/77 123/80  152/95   BP Location:       Patient Position:       Pulse: 104 104  96   Resp:   18 18   Temp:       TempSrc:       SpO2: 100% 99%  99%       Active LDAs/IV Access:   Lines, Drains & Airways       Active LDAs       Name Placement date Placement time Site Days    Peripheral IV 07/11/25 0547 20 G Left Antecubital 07/11/25  0547  Antecubital  less than 1                    Labs (abnormal labs have a star):   Labs Reviewed   COMPREHENSIVE METABOLIC PANEL - Abnormal; Notable for the following components:       Result Value    Glucose 105 (*)     Creatinine 0.51 (*)     Alkaline Phosphatase 128 (*)     BUN/Creatinine Ratio 29.4 (*)     All other components within normal limits    Narrative:     GFR Categories in Chronic Kidney Disease (CKD)              GFR Category          GFR (mL/min/1.73)    Interpretation  G1                    90 or greater        Normal or high (1)  G2                    60-89                Mild decrease (1)  G3a                   45-59                Mild to moderate decrease  G3b              "      30-44                Moderate to severe decrease  G4                    15-29                Severe decrease  G5                    14 or less           Kidney failure    (1)In the absence of evidence of kidney disease, neither GFR category G1 or G2 fulfill the criteria for CKD.    eGFR calculation 2021 CKD-EPI creatinine equation, which does not include race as a factor   CBC WITH AUTO DIFFERENTIAL - Abnormal; Notable for the following components:    WBC 14.08 (*)     Neutrophil % 77.8 (*)     Lymphocyte % 12.1 (*)     Neutrophils, Absolute 10.94 (*)     Monocytes, Absolute 1.11 (*)     Immature Grans, Absolute 0.06 (*)     All other components within normal limits   D-DIMER, QUANTITATIVE - Abnormal; Notable for the following components:    D-Dimer, Quantitative 3.13 (*)     All other components within normal limits    Narrative:     According to the assay 's published package insert, a normal (<0.50 MCGFEU/mL) D-dimer result in conjunction with a non-high clinical probability assessment, excludes deep vein thrombosis (DVT) and pulmonary embolism (PE) with high sensitivity.    D-dimer values increase with age and this can make VTE exclusion of an older population difficult. To address this, the [employer], based on best available evidence and recent guidelines, recommends that clinicians use age-adjusted D-dimer thresholds in patients greater than 50 years of age with: a) a low probability of PE who do not meet all Pulmonary Embolism Rule Out Criteria, or b) in those with intermediate probability of PE.   The formula for an age-adjusted D-dimer cut-off is \"age/100\".  For example, a 60 year old patient would have an age-adjusted cut-off of 0.60 MCGFEU/mL and an 80 year old 0.80 MCGFEU/mL.   RESPIRATORY PANEL PCR W/ COVID-19 (SARS-COV-2), NP SWAB IN UTM/VTP, 2 HR TAT - Normal    Narrative:     In the setting of a positive respiratory panel with a viral infection PLUS a negative " procalcitonin without other underlying concern for bacterial infection, consider observing off antibiotics or discontinuation of antibiotics and continue supportive care. If the respiratory panel is positive for atypical bacterial infection (Bordetella pertussis, Chlamydophila pneumoniae, or Mycoplasma pneumoniae), consider antibiotic de-escalation to target atypical bacterial infection.   PROTIME-INR - Normal   APTT - Normal   BNP (IN-HOUSE) - Normal    Narrative:     This assay is used as an aid in the diagnosis of individuals suspected of having heart failure. It can be used as an aid in the diagnosis of acute decompensated heart failure (ADHF) in patients presenting with signs and symptoms of ADHF to the emergency department (ED). In addition, NT-proBNP of <300 pg/mL indicates ADHF is not likely.    Age Range Result Interpretation  NT-proBNP Concentration (pg/mL:      <50             Positive            >450                   Gray                 300-450                    Negative             <300    50-75           Positive            >900                  Gray                300-900                  Negative            <300      >75             Positive            >1800                  Gray                300-1800                  Negative            <300   TROPONIN - Normal    Narrative:     High Sensitive Troponin T Reference Range:  <14.0 ng/L- Negative Female for AMI  <22.0 ng/L- Negative Male for AMI  >=14 - Abnormal Female indicating possible myocardial injury.  >=22 - Abnormal Male indicating possible myocardial injury.   Clinicians would have to utilize clinical acumen, EKG, Troponin, and serial changes to determine if it is an Acute Myocardial Infarction or myocardial injury due to an underlying chronic condition.        MAGNESIUM - Normal   HIGH SENSITIVITIY TROPONIN T 1HR - Normal    Narrative:     High Sensitive Troponin T Reference Range:  <14.0 ng/L- Negative Female for AMI  <22.0 ng/L-  "Negative Male for AMI  >=14 - Abnormal Female indicating possible myocardial injury.  >=22 - Abnormal Male indicating possible myocardial injury.   Clinicians would have to utilize clinical acumen, EKG, Troponin, and serial changes to determine if it is an Acute Myocardial Infarction or myocardial injury due to an underlying chronic condition.        PROCALCITONIN - Normal    Narrative:     As a Marker for Sepsis (Non-Neonates):    1. <0.5 ng/mL represents a low risk of severe sepsis and/or septic shock.  2. >2 ng/mL represents a high risk of severe sepsis and/or septic shock.    As a Marker for Lower Respiratory Tract Infections that require antibiotic therapy:    PCT on Admission    Antibiotic Therapy       6-12 Hrs later    >0.5                Strongly Recommended  >0.25 - <0.5        Recommended   0.1 - 0.25          Discouraged              Remeasure/reassess PCT  <0.1                Strongly Discouraged     Remeasure/reassess PCT    As 28 day mortality risk marker: \"Change in Procalcitonin Result\" (>80% or <=80%) if Day 0 (or Day 1) and Day 4 values are available. Refer to http://www.[email or web address]pct[facility]cal[facility]    Change in PCT <=80%  A decrease of PCT levels below or equal to 80% defines a positive change in PCT test result representing a higher risk for 28-day all-cause mortality of patients diagnosed with severe sepsis for septic shock.    Change in PCT >80%  A decrease of PCT levels of more than 80% defines a negative change in PCT result representing a lower risk for 28-day all-cause mortality of patients diagnosed with severe sepsis or septic shock.      CBC AND DIFFERENTIAL    Narrative:     The following orders were created for panel order CBC & Differential.  Procedure                               Abnormality         Status                     ---------                               -----------         ------                     CBC Auto Differential[[id number]]        Abnormal            Final result   "               Please view results for these tests on the individual orders.       EKG:   ECG 12 Lead Chest Pain   Preliminary Result   HEART OISB=630  bpm   RR Kxtgualh=980  ms   WY Oxrfugpm=868  ms   P Horizontal Axis=1  deg   P Front Axis=43  deg   QRSD Jpjszpkc=690  ms   QT Datvjnro=041  ms   PXsU=484  ms   QRS Axis=50  deg   T Wave Axis=44  deg   - ABNORMAL ECG -   Sinus tachycardia   Probable inferior infarct, old   When compared with ECG of 09-Feb-2025 08:28:52,   Significant axis, voltage or hypertrophy change   Date and Time of Study:2025-07-11 05:44:37          Meds given in ED:   Medications   aspirin chewable tablet 324 mg (0 mg Oral Hold 7/11/25 0619)   nitroglycerin (NITROSTAT) SL tablet 0.4 mg (0.4 mg Sublingual Not Given 7/11/25 0637)   sodium chloride 0.9 % flush 10 mL (has no administration in time range)   methylPREDNISolone sodium succinate (SOLU-Medrol) injection 40 mg (40 mg Intravenous Given 7/11/25 0804)   morphine injection 2 mg (2 mg Intravenous Given 7/11/25 0653)   ondansetron (ZOFRAN) injection 4 mg (4 mg Intravenous Given 7/11/25 0653)   diphenhydrAMINE (BENADRYL) injection 50 mg (50 mg Intravenous Given 7/11/25 0804)   iopamidol (ISOVUE-370) 76 % injection 100 mL (85 mL Intravenous Given by Other 7/11/25 0828)   ketorolac (TORADOL) injection 15 mg (15 mg Intravenous Given 7/11/25 0955)       Imaging results:  CT Angiogram Chest Pulmonary Embolism  Result Date: 7/11/2025  FINDINGS AND IMPRESSION:  Please note evaluation is suboptimal due to beam hardening and streak artifact from the patient's arms being along his side.  Gallbladder surgically absent. Hypodense left renal lesion demonstrate density less than 15 Hounsfield units is likely benign per Bosniak 2019 criteria. There is diverticulosis within the visualized colon.  No findings of pulmonary arterial embolism are seen.  No hilar, mediastinal or axillary adenopathy by size criteria. Trace pericardial effusion or thickening. Roughly  symmetric dependent bibasilar pulmonary opacification is present favored represent atelectasis.. Reticulonodular opacification within the right middle lobe is present, a finding which was also seen on 6/13/2015; however appears to have mildly progressed. Correlation with patient is recommended to exclude acute on chronic atypical pneumonia. There is a nodular area of opacification within the posterior inferior right upper lobe near the major fissure with surrounding satellite nodularity not seen in 2015. While findings may be infectious/inflammatory given the above findings, given the findings are new follow-up with chest CT in 8 weeks recommended to ensure stability and/or resolution exclude the possibility of malignancy.  Please note the thoracic aorta cannot be evaluated due to contrast timing.  No suspicious lytic or blastic osseous lesions. Findings suggestive of diffuse idiopathic skeletal hyperostosis. There is increased kyphosis of the upper thoracic spine. Findings can be further evaluated with MRI if clinically indicated.      This report was finalized on 7/11/2025 9:03 AM by Dr. Josiah Duke M.D on Workstation: [id number]      XR Chest 1 View  Result Date: 7/11/2025  No acute findings.  This report was finalized on 7/11/2025 5:58 AM by Dr. Elke Fernandez M.D on Workstation: [id number]        Ambulatory status:   - bed bound    Social issues:   Social History     Socioeconomic History    Marital status:    Tobacco Use    Smoking status: Former     Types: Cigars     Passive exposure: Past    Smokeless tobacco: Never    Tobacco comments:     caffeine use   Vaping Use    Vaping status: Never Used   Substance and Sexual Activity    Alcohol use: Not Currently     Comment: social    Drug use: Never    Sexual activity: Never       Peripheral Neurovascular  Peripheral Neurovascular (Adult)  Peripheral Neurovascular WDL: WDL, capillary refill  Capillary Refill, General: less than/equal to 3  secs    Neuro Cognitive  Neuro Cognitive (Adult)  Cognitive/Neuro/Behavioral WDL: WDL, orientation  Orientation: oriented x 4    Learning       Respiratory  Respiratory WDL  Respiratory WDL: WDL    Abdominal Pain       Pain Assessments  Pain (Adult)  (0-10) Pain Rating: Rest: 7  (0-10) Pain Rating: Activity: 7  Patient requested Medication Prescribed for Lower Pain Scale: Yes  Pain Location: back  Pain Side/Orientation: medial  Response to Pain Interventions: interventions effective per patient    NIH Stroke Scale       Angie Lynch RN  07/11/25 10:04 EDT

## 2025-07-11 NOTE — PROGRESS NOTES
Clinical Pharmacy Services: Medication History    Amandeep Manriquez Jr. is a 80 y.o. male presenting to [facility] for   Chief Complaint   Patient presents with    Chest Pain       He  has a past medical history of Acute myocardial infarction, Aneurysm of right femoral artery, Arthritis, BPH (benign prostatic hyperplasia), CAD (coronary artery disease), native coronary artery, Congenital myopathy, DDD (degenerative disc disease), lumbar, Diabetes mellitus, Dizziness, Encounter for Medicare annual wellness exam, Essential hypertension, Gastrointestinal hemorrhage, Hard to intubate, History of impacted cerumen, Hyperlipidemia, Hypertension, Mixed hyperlipidemia, and Sleep apnea.    Allergies as of 07/11/2025 - Reviewed 07/11/2025   Allergen Reaction Noted    Iodinated contrast media Hives 11/13/2015    Ampicillin Diarrhea 11/13/2015       Medication information was obtained from: Pharmacy  Pharmacy and Phone Number:   [facility] [id number] - [place], [place] - [address] AT [facility] - [phone] PH - [phone] FX  [address] [facility] [address]  Phone: [phone] Fax: [phone]    [facility] [address] [facility] [phone] PH - [phone] FX  [address] [facility] [address]  Phone: [phone] Fax: [phone]    [facility] - [place]  [address] [facility] [address]  Phone: [phone] Fax: [phone]    [facility] - [place], [place] - [address]  [phone] PH - [phone] FX  [address]  [facility] [address]  Phone: [phone] Fax: [phone]        Prior to Admission Medications       Prescriptions Last Dose Informant Patient Reported? Taking?    aspirin 81 MG chewable tablet  Self Yes Yes    Chew 1 tablet Daily.    baclofen (LIORESAL) 10 MG tablet  Pharmacy No Yes    Take 1 tablet by mouth Every Night.    cycloSPORINE (RESTASIS)  0.05 % ophthalmic emulsion  Pharmacy Yes Yes    1 drop 2 (Two) Times a Day.    latanoprost (XALATAN) 0.005 % ophthalmic solution  Pharmacy Yes Yes    Administer 1 drop to both eyes Every Night.    metoprolol tartrate (LOPRESSOR) 25 MG tablet  Pharmacy No Yes    Take 0.5 tablets by mouth 2 (Two) Times a Day.    Multiple Vitamins-Minerals (PRESERVISION AREDS 2+MULTI VIT PO)  Self Yes Yes    Take 1 tablet by mouth Daily.    Myrbetriq 50 MG tablet sustained-release 24 hour 24 hr tablet  Pharmacy No Yes    TAKE ONE TABLET BY MOUTH DAILY    Patient taking differently:  Take 50 mg by mouth Daily.    oxybutynin XL (DITROPAN-XL) 10 MG 24 hr tablet  Pharmacy Yes Yes    Take 1 tablet by mouth Daily.    rosuvastatin (CRESTOR) 40 MG tablet  Pharmacy No Yes    TAKE ONE TABLET BY MOUTH DAILY    Patient taking differently:  Take 1 tablet by mouth Daily.    sennosides-docusate (PERICOLACE) 8.6-50 MG per tablet  Self No Yes    Take 2 tablets by mouth 2 (Two) Times a Day.    tamsulosin (FLOMAX) 0.4 MG capsule 24 hr capsule  Pharmacy No Yes    Take 1 capsule by mouth Every Night.    traMADol (ULTRAM) 50 MG tablet   Yes Yes    Take 1 tablet by mouth Every 6 (Six) Hours As Needed for Moderate Pain or Severe Pain.    vitamin E 100 UNIT capsule  Self Yes Yes    Take 1 capsule by mouth Daily.    nitroglycerin (NITROSTAT) 0.4 MG SL tablet   Yes No    Place 1 tablet under the tongue. For chest pain.  If pain remains after 5 minutes, call 911 and repeat dose.  Max 3 tabs in 15 minutes.              Medication notes:     This medication list is complete to the best of my knowledge as of 7/11/2025    Please call if questions.    River Manuel  Medication History Technician  [phone]    7/11/2025 09:42 EDT

## 2025-07-11 NOTE — CONSULTS
[facility]    Patient Name: Amandeep Manriquez Jr.  :1945  80 y.o.  LOS: 0  Encounter Provider: Manuel Dawn MD      Patient Care Team:  Ori Bejarano MD as PCP - General (Internal Medicine)    Chief Complaint/Consult question: Chest pain    PMH/HPI: Amandeep Manriquez is an 80 year old patient with a past medical history of CAD and stents, HTN, hyperlipidemia, congenital myopathy, CVA, vascular dementia, and renal stones. He is follow by Dr. Benton, his last office visit was 3/25/25. He has CAD, with a stent placed to his LAD in 2016, he is on aspirin and metoprolol. Hypertension stable, hyperlipidemia on statin. Considered to be at heightened risk is due to overall clinical status and is not increased due to any cardiac issues. He does not meet guideline recommendations for any additional cardiac testing at this time, prior to ureteroscopy. History of CVA and congenital myopathy, complicating all aspects of care.     Interval History: On 25 he presented to the ED with chest pain, he was unable to state when the pain started, his wife states he reported the pain when he got up to go to the bathroom and continued to this morning. The pain is in his chest and radiates to his back. Troponin negative x2, WBC 14.08, D-Dimes 3.13. His O2 sats were low and he was found to have chronic pulmonary nodules, no PE. He was treated with solumedrol, toradol, and morphine in the ED, and admitted to [facility] for further work up.     At time of interview, patient was in no acute distress but appears confused.  Very poor historian.  Does not endorse active chest pain.  Remainder of the history was obtained from chart review.    Results for orders placed during the hospital encounter of 25    Adult Transthoracic Echo Complete W/ Cont if Necessary Per Protocol    Interpretation Summary    Left ventricular systolic function is normal. Calculated left ventricular EF = 60.1%    Left ventricular wall  thickness is consistent with borderline concentric hypertrophy.    Left ventricular diastolic function is consistent with (grade I) impaired relaxation.    Aortic valve sclerosis without hemodynamically significant stenosis.       Echocardiogram 2015    Conclusions  Global left ventricular wall motion and contractility are within normal  limits.  The ejection fraction was measured at 65%.  Abnormal left ventricular diastolic filling is observed, consistent with  Grade 1 (impaired LV relaxation).  There is a trace of mitral regurgitation.  There is a trace tricuspid regurgitation.   The left atrium is mildly dilated.    Cardiac cath 2015    CONCLUSIONS:  1. LEFT MAIN: Normal.   2. LAD: Proximal LAD is normal. Mid LAD has a diffuse stented segment that is  normal. Diffuse 30% apical LAD stenosis.   3. LEFT CIRCUMFLEX: Large-caliber dominant vessel with tubular 80% distal  stenosis.   4. Successful percutaneous intervention of the distal left circumflex coronary  artery with a 3.5 x 18 mm XIENCE Alpine drug-eluting stent postdilated to 3.75  mm in diameter in the proximal segment of the stent.      RECOMMENDATIONS: The patient will be monitored overnight and continue on  aspirin and Plavix along with his other medications          Objective   Vital Signs  Temp:  [97.8 °F (36.6 °C)-98.2 °F (36.8 °C)] 98.2 °F (36.8 °C)  Heart Rate:  [] 88  Resp:  [18-20] 18  BP: (102-196)/(68-95) 141/85    Intake/Output Summary (Last 24 hours) at 7/11/2025 1529  Last data filed at 7/11/2025 1230  Gross per 24 hour   Intake 120 ml   Output --   Net 120 ml           Vitals and nursing note reviewed.   Constitutional:       Appearance: Not in distress. Frail. Chronically ill-appearing.   Pulmonary:      Effort: Pulmonary effort is normal.   Cardiovascular:      PMI at left midclavicular line. Normal rate. Regular rhythm.      Murmurs: There is no murmur.   Edema:     Peripheral edema absent.   Abdominal:      General: Bowel sounds are  normal. There is no distension.   Neurological:      Mental Status: Confused.           Pertinent Test Results:  Results from last 7 days   Lab Units 07/11/25  0549   SODIUM mmol/L 140   POTASSIUM mmol/L 4.3   CHLORIDE mmol/L 105   CO2 mmol/L 26.2   BUN mg/dL 15.0   CREATININE mg/dL 0.51*   GLUCOSE mg/dL 105*   CALCIUM mg/dL 10.1   AST (SGOT) U/L 24   ALT (SGPT) U/L 19     Results from last 7 days   Lab Units 07/11/25  0842 07/11/25  0549   HSTROP T ng/L 15 17     Results from last 7 days   Lab Units 07/11/25  0549   WBC 10*3/mm3 14.08*   HEMOGLOBIN g/dL 15.7   HEMATOCRIT % 48.5   PLATELETS 10*3/mm3 223     Results from last 7 days   Lab Units 07/11/25  0549   INR  0.98   APTT seconds 28.9     Results from last 7 days   Lab Units 07/11/25  0549   MAGNESIUM mg/dL 2.4     Results from last 7 days   Lab Units 07/11/25  0842   CHOLESTEROL mg/dL 131   TRIGLYCERIDES mg/dL 73   HDL CHOL mg/dL 45     Results from last 7 days   Lab Units 07/11/25  0549   PROBNP pg/mL 91.1               Medication Review:   aspirin, 324 mg, Oral, Once  aspirin, 81 mg, Oral, Daily  azithromycin, 500 mg, Intravenous, Q24H  baclofen, 10 mg, Oral, Nightly  cefTRIAXone, 2,000 mg, Intravenous, Q24H  cycloSPORINE, 1 drop, Both Eyes, BID  enoxaparin sodium, 30 mg, Subcutaneous, Daily  latanoprost, 1 drop, Both Eyes, Nightly  methylPREDNISolone sodium succinate, 40 mg, Intravenous, Q4H  metoprolol tartrate, 12.5 mg, Oral, BID  oxybutynin XL, 10 mg, Oral, Daily  rosuvastatin, 40 mg, Oral, Nightly  sennosides-docusate, 2 tablet, Oral, BID  sodium chloride, 3 mL, Intravenous, Q12H  tamsulosin, 0.4 mg, Oral, Nightly                   Assessment & Plan     Active Hospital Problems    Diagnosis  POA    **Hypoxic respiratory failure [J96.91]  Yes    DISH (diffuse idiopathic skeletal hyperostosis) [M48.10]  Yes    Trouble swallowing [R13.10]  Yes    Leukocytosis [D72.829]  Yes    Generalized weakness [R53.1]  Yes    History of CVA (cerebrovascular accident)  [Z86.73]  Not Applicable    Short-term memory loss [R41.3]  Yes    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        Chest pain: Very poor historian, pain quality is atypical.  EKG and serial high-sensitivity troponin negative.  ACS has been ruled out.  Echo showed LVEF 60% with asymmetric aortic valve sclerosis/thickening.  No further ischemic evaluation planned especially in the setting of frailty.  Okay to continue home Lopressor 12.5 bid.  CAD s/p PCI: Known history of LAD stenting.  History of CVA  Hypertension: Beta-blocker use as above.  Hyperlipidemia: Lipids this admission showed HDL 45, LDL 71, mildly above goal (LDL 70).  Continue home Crestor 40 daily, consider adding Zetia 10 daily upon discharge.  Prediabetes: Hemoglobin A1c 5.9 this admission.  ?  Pneumonia: Questionable opacification on CT chest.  Mild transaminitis, procalcitonin within normal limits.  Management per primary team and other specialists, getting empiric antibiotics.  Obesity: BMI 32.    Thank you for involving us in the care of Mr. Manriquez.  Cardiology will sign off at this time but please do not hesitate to reach back out to us if additional questions arise.    Manuel Dawn MD  [facility]  07/11/25  12:23 EDT

## 2025-07-12 PROBLEM — J15.9 BACTERIAL PNEUMONIA: Status: ACTIVE | Noted: 2025-07-12

## 2025-07-12 LAB
ANION GAP SERPL CALCULATED.3IONS-SCNC: 10.5 MMOL/L (ref 5–15)
BUN SERPL-MCNC: 17 MG/DL (ref 8–23)
BUN/CREAT SERPL: 42.5 (ref 7–25)
CALCIUM SPEC-SCNC: 9.4 MG/DL (ref 8.6–10.5)
CHLORIDE SERPL-SCNC: 107 MMOL/L (ref 98–107)
CO2 SERPL-SCNC: 25.5 MMOL/L (ref 22–29)
CREAT SERPL-MCNC: 0.4 MG/DL (ref 0.76–1.27)
DEPRECATED RDW RBC AUTO: 44.9 FL (ref 37–54)
EGFRCR SERPLBLD CKD-EPI 2021: 108.9 ML/MIN/1.73
ERYTHROCYTE [DISTWIDTH] IN BLOOD BY AUTOMATED COUNT: 13.3 % (ref 12.3–15.4)
GLUCOSE SERPL-MCNC: 145 MG/DL (ref 65–99)
HCT VFR BLD AUTO: 42 % (ref 37.5–51)
HGB BLD-MCNC: 13.7 G/DL (ref 13–17.7)
MCH RBC QN AUTO: 29.6 PG (ref 26.6–33)
MCHC RBC AUTO-ENTMCNC: 32.6 G/DL (ref 31.5–35.7)
MCV RBC AUTO: 90.7 FL (ref 79–97)
PLATELET # BLD AUTO: 221 10*3/MM3 (ref 140–450)
PMV BLD AUTO: 10.1 FL (ref 6–12)
POTASSIUM SERPL-SCNC: 4.1 MMOL/L (ref 3.5–5.2)
RBC # BLD AUTO: 4.63 10*6/MM3 (ref 4.14–5.8)
SODIUM SERPL-SCNC: 143 MMOL/L (ref 136–145)
WBC NRBC COR # BLD AUTO: 15.12 10*3/MM3 (ref 3.4–10.8)

## 2025-07-12 PROCEDURE — 25810000003 SODIUM CHLORIDE 0.9 % SOLUTION 250 ML FLEX CONT: Performed by: INTERNAL MEDICINE

## 2025-07-12 PROCEDURE — 80048 BASIC METABOLIC PNL TOTAL CA: CPT | Performed by: INTERNAL MEDICINE

## 2025-07-12 PROCEDURE — 25010000002 CEFTRIAXONE PER 250 MG: Performed by: INTERNAL MEDICINE

## 2025-07-12 PROCEDURE — 85027 COMPLETE CBC AUTOMATED: CPT | Performed by: INTERNAL MEDICINE

## 2025-07-12 PROCEDURE — 25010000002 AZITHROMYCIN PER 500 MG: Performed by: INTERNAL MEDICINE

## 2025-07-12 PROCEDURE — 25010000002 METHYLPREDNISOLONE PER 40 MG: Performed by: STUDENT IN AN ORGANIZED HEALTH CARE EDUCATION/TRAINING PROGRAM

## 2025-07-12 PROCEDURE — 92610 EVALUATE SWALLOWING FUNCTION: CPT

## 2025-07-12 PROCEDURE — 25010000002 ENOXAPARIN PER 10 MG: Performed by: INTERNAL MEDICINE

## 2025-07-12 PROCEDURE — 36415 COLL VENOUS BLD VENIPUNCTURE: CPT | Performed by: INTERNAL MEDICINE

## 2025-07-12 RX ADMIN — SENNOSIDES, DOCUSATE SODIUM 2 TABLET: 50; 8.6 TABLET, FILM COATED ORAL at 20:54

## 2025-07-12 RX ADMIN — BACLOFEN 10 MG: 10 TABLET ORAL at 20:55

## 2025-07-12 RX ADMIN — CEFTRIAXONE 2000 MG: 2 INJECTION, POWDER, FOR SOLUTION INTRAMUSCULAR; INTRAVENOUS at 10:15

## 2025-07-12 RX ADMIN — CYCLOSPORINE 1 DROP: 0.5 EMULSION OPHTHALMIC at 09:10

## 2025-07-12 RX ADMIN — CYCLOSPORINE 1 DROP: 0.5 EMULSION OPHTHALMIC at 20:55

## 2025-07-12 RX ADMIN — Medication 3 ML: at 20:55

## 2025-07-12 RX ADMIN — ENOXAPARIN SODIUM 30 MG: 100 INJECTION SUBCUTANEOUS at 09:09

## 2025-07-12 RX ADMIN — LATANOPROST 1 DROP: 50 SOLUTION/ DROPS OPHTHALMIC at 20:55

## 2025-07-12 RX ADMIN — ROSUVASTATIN 40 MG: 40 TABLET, FILM COATED ORAL at 20:54

## 2025-07-12 RX ADMIN — AZITHROMYCIN MONOHYDRATE 500 MG: 500 INJECTION, POWDER, LYOPHILIZED, FOR SOLUTION INTRAVENOUS at 11:32

## 2025-07-12 RX ADMIN — SENNOSIDES, DOCUSATE SODIUM 2 TABLET: 50; 8.6 TABLET, FILM COATED ORAL at 09:09

## 2025-07-12 RX ADMIN — OXYBUTYNIN CHLORIDE 10 MG: 10 TABLET, EXTENDED RELEASE ORAL at 09:10

## 2025-07-12 RX ADMIN — Medication 3 ML: at 09:10

## 2025-07-12 RX ADMIN — METOPROLOL TARTRATE 12.5 MG: 25 TABLET, FILM COATED ORAL at 20:55

## 2025-07-12 RX ADMIN — METHYLPREDNISOLONE SODIUM SUCCINATE 40 MG: 40 INJECTION, POWDER, FOR SOLUTION INTRAMUSCULAR; INTRAVENOUS at 00:22

## 2025-07-12 RX ADMIN — ASPIRIN 81 MG CHEWABLE TABLET 81 MG: 81 TABLET CHEWABLE at 09:09

## 2025-07-12 RX ADMIN — METHYLPREDNISOLONE SODIUM SUCCINATE 40 MG: 40 INJECTION, POWDER, FOR SOLUTION INTRAMUSCULAR; INTRAVENOUS at 04:34

## 2025-07-12 RX ADMIN — TAMSULOSIN HYDROCHLORIDE 0.4 MG: 0.4 CAPSULE ORAL at 20:54

## 2025-07-12 RX ADMIN — METOPROLOL TARTRATE 12.5 MG: 25 TABLET, FILM COATED ORAL at 09:09

## 2025-07-12 NOTE — PLAN OF CARE
Problem: Adult Inpatient Plan of Care  Goal: Plan of Care Review  Outcome: Progressing   Goal Outcome Evaluation:         Clinical bedside dysphagia evaluaion completed this date. Pt presented within functional limites all aspects of the swallow.. SLP recommends pt remain on a regular diet with thin liquids no straws. Medication whole/crushed with puree/ thin liquids. Oral care BID. Upright at 90 degrees for all meals plus 30 minutes after. SLP recommends a VFSS to r/o silent aspiration.

## 2025-07-12 NOTE — PLAN OF CARE
Goal Outcome Evaluation:  Plan of Care Reviewed With: patient           Outcome Evaluation: Currently on 2L NC. Denies soa, n/v. No acute events. Spouse at bedside.

## 2025-07-12 NOTE — SIGNIFICANT NOTE
07/12/25 1247   OTHER   Discipline occupational therapy assistant   Therapy Assessment/Plan (PT)   Criteria for Skilled Interventions Met (PT) no;does not meet criteria for skilled intervention  (Spoke w/ RN- pt is bedbound at baseline and reports pt is currently at his baseline. Skilled acute OT services not warranted. OT to sign off.)

## 2025-07-12 NOTE — PROGRESS NOTES
[facility] Medicine Services  PROGRESS NOTE    Patient Name: Amandeep Manriquez Jr.  : 1945  MRN: [id number]    Date of Admission: 2025  Primary Care Physician: Ori Bejarano MD    Subjective   Subjective     CC:  Follow-up for pneumonia and other medical issues    Subjective:  Patient reports that he is feeling much better today.  Wife is very pleased with his improvements.  He is breathing better.  He is coughing occasionally.  Wife now reports more episodes of choking on food over the past week now that she thinks about it.  No new complaints reported    Review of Systems  No current fevers or chills  No current nausea, vomiting, or diarrhea  No current chest pain or palpitations       Objective   Objective     Vital Signs:   Temp:  [97.7 °F (36.5 °C)-98.4 °F (36.9 °C)] 97.9 °F (36.6 °C)  Heart Rate:  [] 95  Resp:  [16-18] 18  BP: (107-141)/(63-85) 131/79        Physical Exam:  Constitutional: Awake, alert, elderly appearing  HENT: NCAT, mucous membranes moist, neck supple, chronic eye changes with drainage  Respiratory: Occasional cough and coarse sounds, patient on nasal cannula oxygen at 2 L  Cardiovascular: Pulse rate is borderline tachycardia, palpable radial pulses  Gastrointestinal:  Soft, nontender, nondistended  Musculoskeletal: Obese BMI is 32, frail and debilitated in appearance, significant muscle wasting noted, generally weak  Psychiatric: Generalized weakness, appropriate affect, cooperative, conversational  Neurologic: No slurred speech or facial droop, follows commands  Skin: No rashes or jaundice, warm      Results Reviewed:  Results from last 7 days   Lab Units 25  0529 25  0549   WBC 10*3/mm3 15.12* 14.08*   HEMOGLOBIN g/dL 13.7 15.7   HEMATOCRIT % 42.0 48.5   PLATELETS 10*3/mm3 221 223   INR   --  0.98   PROCALCITONIN ng/mL  --  0.03     Results from last 7 days   Lab Units 25  0529 25  0842 25  0549   SODIUM mmol/L 143  --  140    POTASSIUM mmol/L 4.1  --  4.3   CHLORIDE mmol/L 107  --  105   CO2 mmol/L 25.5  --  26.2   BUN mg/dL 17.0  --  15.0   CREATININE mg/dL 0.40*  --  0.51*   GLUCOSE mg/dL 145*  --  105*   CALCIUM mg/dL 9.4  --  10.1   ALK PHOS U/L  --   --  128*   ALT (SGPT) U/L  --   --  19   AST (SGOT) U/L  --   --  24   HSTROP T ng/L  --  15 17   PROBNP pg/mL  --   --  91.1     Estimated Creatinine Clearance: 139.2 mL/min (A) (by C-G formula based on SCr of 0.4 mg/dL (L)).    Microbiology Results Abnormal       None            Imaging Results (Last 24 Hours)       ** No results found for the last 24 hours. **            Results for orders placed during the hospital encounter of 07/11/25    Adult Transthoracic Echo Complete W/ Cont if Necessary Per Protocol 07/11/2025  3:26 PM    Interpretation Summary  •  Left ventricular systolic function is normal. Calculated left ventricular EF = 60.1%  •  Left ventricular wall thickness is consistent with borderline concentric hypertrophy.  •  Left ventricular diastolic function is consistent with (grade I) impaired relaxation.  •  Aortic valve sclerosis without hemodynamically significant stenosis.      I have reviewed the medications:  Scheduled Meds:aspirin, 324 mg, Oral, Once  aspirin, 81 mg, Oral, Daily  azithromycin, 500 mg, Intravenous, Q24H  baclofen, 10 mg, Oral, Nightly  cefTRIAXone, 2,000 mg, Intravenous, Q24H  cycloSPORINE, 1 drop, Both Eyes, BID  enoxaparin sodium, 30 mg, Subcutaneous, Daily  latanoprost, 1 drop, Both Eyes, Nightly  metoprolol tartrate, 12.5 mg, Oral, BID  oxybutynin XL, 10 mg, Oral, Daily  rosuvastatin, 40 mg, Oral, Nightly  sennosides-docusate, 2 tablet, Oral, BID  sodium chloride, 3 mL, Intravenous, Q12H  tamsulosin, 0.4 mg, Oral, Nightly      Continuous Infusions:   PRN Meds:.•  acetaminophen **OR** acetaminophen **OR** acetaminophen  •  [DISCONTINUED] senna-docusate sodium **AND** polyethylene glycol **AND** bisacodyl **AND** bisacodyl  •  famotidine  •   Morphine **AND** naloxone  •  nitroglycerin  •  ondansetron ODT **OR** ondansetron  •  [COMPLETED] Insert Peripheral IV **AND** sodium chloride  •  sodium chloride  •  sodium chloride  •  traMADol    Assessment & Plan   Assessment & Plan     Active Hospital Problems    Diagnosis  POA   • **Acute respiratory failure with hypoxia [J96.01]  Yes   • DISH (diffuse idiopathic skeletal hyperostosis) [M48.10]  Yes   • Trouble swallowing [R13.10]  Yes   • Pneumonia, bacterial [J15.9]  Yes   • Aspiration pneumonitis [J69.0]  Yes   • Sepsis [A41.9]  Yes   • Leukocytosis [D72.829]  Yes   • Generalized weakness [R53.1]  Yes   • History of CVA (cerebrovascular accident) [Z86.73]  Not Applicable   • Short-term memory loss [R41.3]  Yes   • 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        Brief Hospital Course to date:  Amandeep Manriquez . is a 80 y.o. male  presents to hospital with chest pain radiating to his back and was found to have acute hypoxic respiratory failure with pneumonia and concern for aspiration pneumonitis.    Discussion/plan for today:  Clinically patient has shown good response to therapy thus far.  Urine studies returned negative for Legionella and pneumococcal.  Plan to wean supplemental oxygen today.  Continue IV antibiotics with ceftriaxone and azithromycin.  Wife now reports more episodes of aspiration before hospitalization last week.  Awaiting speech therapy evaluation today for probable dysphagia.  Chest pain appears more pleuritic.  Patient has ruled out for ACS.  Mental status much improved since yesterday.  Patient quite confused yesterday but is now oriented.  Suspect probably some degree of acute metabolic encephalopathy but also probably some underlying cognitive impairment.  Continue treatment  otherwise as listed below.     Acute hypoxic respiratory failure due to probable bacterial pneumonia and aspiration pneumonitis with sepsis:  Plan to start broad-spectrum antibiotic coverage.  Send blood cultures.  Titrate supplemental oxygen as needed.   Based on clinical images which I have reviewed on CT scan with diffuse atypical infiltrates I am concerned this could be aspiration pneumonia.  Wife does note intermittent difficulty with liquids and patient is quite elderly frail and debilitated.  Speech therapy consult for concern for dysphagia.  D-dimer was elevated.  CT angiogram neck for pulmonary embolism.       Atypical probably pleuritic chest pain with history of CAD: Cardiology consult team evaluated and did not feel ischemic evaluation was warranted.  ACS ruled out.  Continue aspirin and statin and beta-blocker.     Severe debility, immobility, bedfast with congenital myopathy:  Typically uses Latonia lift at home.  PT OT.  Supportive care and symptom treatment.     Essential hypertension: Continue appropriate home blood pressure medications.  Monitor blood pressure adjust as needed.     Hyperlipidemia: Continue statin.  Stable.     BPH: Flomax.  Monitor for any hypertension.  Also takes oxybutynin for overactive bladder     Poor memory: Possibly due to acute issues however I did recommend to wife he consider outpatient neurocognitive evaluation to assess for possible MCI or early dementia.     Diffuse osteoporosis DISH: noted on imaging.  Supportive care and symptom treatment     Treatment plan discussed with wife who is in agreement with plan.     DVT prophylaxis:   Lovenox      Disposition: I expect the patient to be discharged home with wife who is his primary caregiver in 2 to 3 days    CODE STATUS:   Code Status and Medical Interventions: CPR (Attempt to Resuscitate); Full Support   Ordered at: 07/11/25 1035     Code Status (Patient has no pulse and is not breathing):    CPR (Attempt to Resuscitate)      Medical Interventions (Patient has pulse or is breathing):    Full Support     Level Of Support Discussed With:    Next of Kin (If No Surrogate)       Gavin Miller MD  07/12/25

## 2025-07-12 NOTE — THERAPY EVALUATION
Acute Care - Speech Language Pathology   Swallow Initial Evaluation [facility]     Patient Name: Amandeep Manriquez Jr.  : 1945  MRN: [id number]  Today's Date: 2025               Admit Date: 2025    Visit Dx:     ICD-10-CM ICD-9-CM   1. Acute respiratory failure with hypoxia  J96.01 518.81     Patient Active Problem List   Diagnosis    CAD (coronary artery disease), native coronary artery    Essential hypertension    Mixed hyperlipidemia    Congenital myopathy    Arthropathy of right sacroiliac joint    Osteoarthritis of right knee    Degenerative lumbar spinal stenosis    Renal stone    Bilateral impacted cerumen    Benign prostatic hyperplasia without lower urinary tract symptoms    Vertigo    High risk medication use    Vitamin D deficiency    OAB (overactive bladder)    Rosacea    Short-term memory loss    History of CVA (cerebrovascular accident)    COVID-19    Generalized weakness    Hypokalemia    Ureterolithiasis    Leukocytosis    Acute respiratory failure with hypoxia    DISH (diffuse idiopathic skeletal hyperostosis)    Trouble swallowing    Pneumonia, bacterial    Aspiration pneumonitis    Sepsis    Bacterial pneumonia     Past Medical History:   Diagnosis Date    Acute myocardial infarction     Aneurysm of right femoral artery     Arthritis     BPH (benign prostatic hyperplasia)     CAD (coronary artery disease), native coronary artery     Congenital myopathy     DDD (degenerative disc disease), lumbar     with stenosis    Diabetes mellitus     Dizziness     Chronic Dizziness    Encounter for Medicare annual wellness exam     Essential hypertension     Gastrointestinal hemorrhage     Hard to intubate     History of impacted cerumen     Hyperlipidemia     Hypertension     Mixed hyperlipidemia     Sleep apnea      Past Surgical History:   Procedure Laterality Date    CARDIAC CATHETERIZATION  2015    30% Left main, 99% mid to distal LAD, 70-80% left circumflex, 80% proximal to mid  RCA.    CATARACT EXTRACTION      CERVICAL FUSION      CHOLECYSTECTOMY      CORONARY ANGIOPLASTY WITH STENT PLACEMENT      CYSTOSCOPY W/ URETERAL STENT PLACEMENT Left 2/9/2025    Procedure: CYSTOSCOPY URETERAL CATHETER/STENT INSERTION;  Surgeon: Alvino Daniels MD;  Location: [facility];  Service: Urology;  Laterality: Left;    KNEE SURGERY Right     OTHER SURGICAL HISTORY      percutaneous injection of extremity pseudoaneurysm       SLP Recommendation and Plan     SLP Diet Recommendation: regular textures, thin liquids (07/12/25 1100)  Recommended Precautions and Strategies: no straw, upright posture during/after eating, small bites of food and sips of liquid (07/12/25 1100)  SLP Rec. for Method of Medication Administration: meds whole, meds crushed, with thin liquids, with puree (07/12/25 1100)     Monitor for Signs of Aspiration: yes, notify SLP if any concerns (07/12/25 1100)  Recommended Diagnostics: VFSS (MBS) (07/12/25 1100)           Therapy Frequency (Swallow): PRN (07/12/25 1100)  Predicted Duration Therapy Intervention (Days): until discharge (07/12/25 1100)  Oral Care Recommendations: Oral Care BID/PRN (07/12/25 1100)                                               SWALLOW EVALUATION (Last 72 Hours)       SLP Adult Swallow Evaluation       Row Name 07/12/25 1100                   Rehab Evaluation    Document Type evaluation  -LS        Patient Effort good  -LS           General Information    Patient Profile Reviewed yes  -LS        Pertinent History Of Current Problem 80-year-old male presents to hospital with chest pain radiating to his back and was found to have acute hypoxic respiratory failure with pneumonia and concern for aspiration pneumonitis.  -LS        Current Method of Nutrition regular textures;thin liquids  -LS        Precautions/Limitations, Vision --  macular degeneration  -LS        Precautions/Limitations, Hearing WFL;for purposes of eval  -LS        Prior Level of  Function-Communication WFL  -LS        Prior Level of Function-Swallowing no diet consistency restrictions  -        Plans/Goals Discussed with patient and family  -        Barriers to Rehab medically complex  -LS           Oral Musculature and Cranial Nerve Assessment    Oral Motor General Assessment WFL  -LS           General Eating/Swallowing Observations    Eating/Swallowing Skills self-fed;fed by SLP  -LS        Positioning During Eating upright 90 degree;upright in bed  -        Utensils Used spoon;cup  -LS        Consistencies Trialed soft to chew textures;pureed;mixed consistency;thin liquids  -LS           Clinical Swallow Eval    Oral Prep Phase WFL  -LS        Oral Transit WFL  -LS        Oral Residue WFL  -LS        Pharyngeal Phase no overt signs/symptoms of pharyngeal impairment  -        Clinical Swallow Evaluation Summary Clinical bedside dysphagia evaluaion completed this date. Pt presented within functional limites all aspects of the swallow..  SLP recommends pt remain on  a regular diet with thin liquids  no straws.  Medication whole/crushed with puree/ thin liquids. Oral care BID.  Upright at 90 degrees for all meals  plus 30 minutes after.  SLP recommends a VFSS to r/o silent aspiration.  -           Recommendations    Therapy Frequency (Swallow) PRN  -LS        Predicted Duration Therapy Intervention (Days) until discharge  -        SLP Diet Recommendation regular textures;thin liquids  -LS        Recommended Diagnostics VFSS (MBS)  -        Recommended Precautions and Strategies no straw;upright posture during/after eating;small bites of food and sips of liquid  -LS        Oral Care Recommendations Oral Care BID/PRN  -LS        SLP Rec. for Method of Medication Administration meds whole;meds crushed;with thin liquids;with puree  -LS        Monitor for Signs of Aspiration yes;notify SLP if any concerns  -                  User Key  (r) = Recorded By, (t) = Taken By, (c) =  Cosigned By      Initials Name Effective Dates    LS Jevon Frias SLP 01/05/24 -                     EDUCATION  The patient has been educated in the following areas:   Dysphagia (Swallowing Impairment).                Time Calculation:       Therapy Charges for Today       Code Description Service Date Service Provider Modifiers Qty    [id number] HC ST EVAL ORAL PHARYNG SWALLOW 5 7/12/2025 Jevon Frias SLP GN 1                 VALERIY Christina  7/12/2025

## 2025-07-12 NOTE — SIGNIFICANT NOTE
07/12/25 1021   OTHER   Discipline physical therapist   Rehab Time/Intention   Session Not Performed   (Spoke with nsg. Pt not appropriate for skilled PT intervention 2' bedbound at baseline. Will s/o at this time.)   Therapy Assessment/Plan (PT)   Criteria for Skilled Interventions Met (PT) no;does not meet criteria for skilled intervention

## 2025-07-12 NOTE — PLAN OF CARE
Problem: Adult Inpatient Plan of Care  Goal: Plan of Care Review  Outcome: Progressing  Flowsheets (Taken 7/12/2025 4840)  Progress: improving  Outcome Evaluation: Vitals stable. Currently on room air. SLP eval - no change to diet, will need video swallow. IV antibiotics continued. Wife at bedside. Plan of care ongoing.  Plan of Care Reviewed With:   patient   spouse

## 2025-07-13 LAB
ANION GAP SERPL CALCULATED.3IONS-SCNC: 9.1 MMOL/L (ref 5–15)
BUN SERPL-MCNC: 20 MG/DL (ref 8–23)
BUN/CREAT SERPL: 45.5 (ref 7–25)
CALCIUM SPEC-SCNC: 9.2 MG/DL (ref 8.6–10.5)
CHLORIDE SERPL-SCNC: 108 MMOL/L (ref 98–107)
CO2 SERPL-SCNC: 24.9 MMOL/L (ref 22–29)
CREAT SERPL-MCNC: 0.44 MG/DL (ref 0.76–1.27)
DEPRECATED RDW RBC AUTO: 42.9 FL (ref 37–54)
EGFRCR SERPLBLD CKD-EPI 2021: 105.8 ML/MIN/1.73
ERYTHROCYTE [DISTWIDTH] IN BLOOD BY AUTOMATED COUNT: 13 % (ref 12.3–15.4)
GLUCOSE SERPL-MCNC: 96 MG/DL (ref 65–99)
HCT VFR BLD AUTO: 42.7 % (ref 37.5–51)
HGB BLD-MCNC: 14.1 G/DL (ref 13–17.7)
MCH RBC QN AUTO: 29.9 PG (ref 26.6–33)
MCHC RBC AUTO-ENTMCNC: 33 G/DL (ref 31.5–35.7)
MCV RBC AUTO: 90.7 FL (ref 79–97)
PLATELET # BLD AUTO: 242 10*3/MM3 (ref 140–450)
PMV BLD AUTO: 9.5 FL (ref 6–12)
POTASSIUM SERPL-SCNC: 3.9 MMOL/L (ref 3.5–5.2)
RBC # BLD AUTO: 4.71 10*6/MM3 (ref 4.14–5.8)
SODIUM SERPL-SCNC: 142 MMOL/L (ref 136–145)
WBC NRBC COR # BLD AUTO: 13.09 10*3/MM3 (ref 3.4–10.8)

## 2025-07-13 PROCEDURE — 25010000002 CEFTRIAXONE PER 250 MG: Performed by: INTERNAL MEDICINE

## 2025-07-13 PROCEDURE — 25010000002 ENOXAPARIN PER 10 MG: Performed by: INTERNAL MEDICINE

## 2025-07-13 PROCEDURE — 85027 COMPLETE CBC AUTOMATED: CPT | Performed by: INTERNAL MEDICINE

## 2025-07-13 PROCEDURE — 80048 BASIC METABOLIC PNL TOTAL CA: CPT | Performed by: INTERNAL MEDICINE

## 2025-07-13 PROCEDURE — 36415 COLL VENOUS BLD VENIPUNCTURE: CPT | Performed by: INTERNAL MEDICINE

## 2025-07-13 RX ADMIN — CEFTRIAXONE 2000 MG: 2 INJECTION, POWDER, FOR SOLUTION INTRAMUSCULAR; INTRAVENOUS at 11:34

## 2025-07-13 RX ADMIN — Medication 3 ML: at 21:16

## 2025-07-13 RX ADMIN — METOPROLOL TARTRATE 12.5 MG: 25 TABLET, FILM COATED ORAL at 21:13

## 2025-07-13 RX ADMIN — CYCLOSPORINE 1 DROP: 0.5 EMULSION OPHTHALMIC at 21:14

## 2025-07-13 RX ADMIN — BACLOFEN 10 MG: 10 TABLET ORAL at 21:13

## 2025-07-13 RX ADMIN — TAMSULOSIN HYDROCHLORIDE 0.4 MG: 0.4 CAPSULE ORAL at 21:13

## 2025-07-13 RX ADMIN — Medication 3 ML: at 08:52

## 2025-07-13 RX ADMIN — Medication 10 ML: at 11:34

## 2025-07-13 RX ADMIN — OXYBUTYNIN CHLORIDE 10 MG: 10 TABLET, EXTENDED RELEASE ORAL at 08:48

## 2025-07-13 RX ADMIN — ROSUVASTATIN 40 MG: 40 TABLET, FILM COATED ORAL at 21:13

## 2025-07-13 RX ADMIN — ASPIRIN 81 MG CHEWABLE TABLET 81 MG: 81 TABLET CHEWABLE at 08:51

## 2025-07-13 RX ADMIN — CYCLOSPORINE 1 DROP: 0.5 EMULSION OPHTHALMIC at 08:51

## 2025-07-13 RX ADMIN — ENOXAPARIN SODIUM 30 MG: 100 INJECTION SUBCUTANEOUS at 08:51

## 2025-07-13 RX ADMIN — LATANOPROST 1 DROP: 50 SOLUTION/ DROPS OPHTHALMIC at 21:17

## 2025-07-13 RX ADMIN — METOPROLOL TARTRATE 12.5 MG: 25 TABLET, FILM COATED ORAL at 08:51

## 2025-07-13 RX ADMIN — SENNOSIDES, DOCUSATE SODIUM 2 TABLET: 50; 8.6 TABLET, FILM COATED ORAL at 08:48

## 2025-07-13 NOTE — PLAN OF CARE
Goal Outcome Evaluation:  Plan of Care Reviewed With: patient        Progress: improving  Outcome Evaluation: BP elevated(SBP 180s) - notified [facility] on-call and no new orders at this time. Spouse at bedside. No complaints of pain or SOA. Room air this entire shift. Purewick for UOP. No BM noted. Refused majority of turns to allow patient to sleep. Will need video swallow before discharge. A couple more days of IV abx.

## 2025-07-13 NOTE — PROGRESS NOTES
Name: Amandeep Manriquez Jr. ADMIT: 2025   : 1945  PCP: Ori Bejarano MD    MRN: [id number] LOS: 1 days   AGE/SEX: 80 y.o. male  ROOM: [facility]     Subjective   Subjective   Lying in bed. No complaints. Wife at bedside.     Objective   Objective   Vital Signs  Temp:  [97.5 °F (36.4 °C)-97.9 °F (36.6 °C)] 97.9 °F (36.6 °C)  Heart Rate:  [73-90] 87  Resp:  [18] 18  BP: (130-180)/(76-89) 133/86  SpO2:  [95 %-96 %] 95 %  on  Flow (L/min) (Oxygen Therapy):  [2] 2;   Device (Oxygen Therapy): room air  Body mass index is 31.76 kg/m².    Physical Exam  Constitutional:       General: He is not in acute distress.     Appearance: He is ill-appearing. He is not toxic-appearing.   HENT:      Head: Normocephalic and atraumatic.   Cardiovascular:      Rate and Rhythm: Normal rate and regular rhythm.   Pulmonary:      Effort: Pulmonary effort is normal. No respiratory distress.      Breath sounds: Normal breath sounds. No wheezing or rhonchi.   Abdominal:      General: Bowel sounds are normal.      Palpations: Abdomen is soft.      Tenderness: There is no abdominal tenderness. There is no guarding or rebound.   Musculoskeletal:         General: No swelling.   Skin:     General: Skin is warm and dry.   Neurological:      Mental Status: He is alert.   Psychiatric:         Mood and Affect: Mood normal.         Behavior: Behavior normal.     Results Review  I reviewed the patient's new clinical results.  Results from last 7 days   Lab Units 25  0545 25  0529 25  0549   WBC 10*3/mm3 13.09* 15.12* 14.08*   HEMOGLOBIN g/dL 14.1 13.7 15.7   PLATELETS 10*3/mm3 242 221 223     Results from last 7 days   Lab Units 25  0545 25  0529 25  0549   SODIUM mmol/L 142 143 140   POTASSIUM mmol/L 3.9 4.1 4.3   CHLORIDE mmol/L 108* 107 105   CO2 mmol/L 24.9 25.5 26.2   BUN mg/dL 20.0 17.0 15.0   CREATININE mg/dL 0.44* 0.40* 0.51*   GLUCOSE mg/dL 96 145* 105*     Lab Results   Component Value Date     ANIONGAP 9.1 07/13/2025     Estimated Creatinine Clearance: 126.5 mL/min (A) (by C-G formula based on SCr of 0.44 mg/dL (L)).   Lab Results   Component Value Date    EGFR 105.8 07/13/2025     Results from last 7 days   Lab Units 07/11/25  0549   ALBUMIN g/dL 4.0   BILIRUBIN mg/dL 0.4   ALK PHOS U/L 128*   AST (SGOT) U/L 24   ALT (SGPT) U/L 19     Results from last 7 days   Lab Units 07/13/25  0545 07/12/25  0529 07/11/25  0549   CALCIUM mg/dL 9.2 9.4 10.1   ALBUMIN g/dL  --   --  4.0   MAGNESIUM mg/dL  --   --  2.4     Results from last 7 days   Lab Units 07/11/25  1053 07/11/25  0549   PROCALCITONIN ng/mL  --  0.03   LACTATE mmol/L 1.0  --      Hemoglobin A1C   Date/Time Value Ref Range Status   07/11/2025 0549 5.90 (H) 4.80 - 5.60 % Final       No radiology results for the last day    Scheduled Meds  aspirin, 324 mg, Oral, Once  aspirin, 81 mg, Oral, Daily  azithromycin, 500 mg, Intravenous, Q24H  baclofen, 10 mg, Oral, Nightly  cefTRIAXone, 2,000 mg, Intravenous, Q24H  cycloSPORINE, 1 drop, Both Eyes, BID  enoxaparin sodium, 30 mg, Subcutaneous, Daily  latanoprost, 1 drop, Both Eyes, Nightly  metoprolol tartrate, 12.5 mg, Oral, BID  oxybutynin XL, 10 mg, Oral, Daily  rosuvastatin, 40 mg, Oral, Nightly  sennosides-docusate, 2 tablet, Oral, BID  sodium chloride, 3 mL, Intravenous, Q12H  tamsulosin, 0.4 mg, Oral, Nightly    Continuous Infusions   PRN Meds  •  acetaminophen **OR** acetaminophen **OR** acetaminophen  •  [DISCONTINUED] senna-docusate sodium **AND** polyethylene glycol **AND** bisacodyl **AND** bisacodyl  •  famotidine  •  Morphine **AND** naloxone  •  nitroglycerin  •  ondansetron ODT **OR** ondansetron  •  [COMPLETED] Insert Peripheral IV **AND** sodium chloride  •  sodium chloride  •  sodium chloride  •  traMADol     Diet  Diet: Cardiac; Healthy Heart (2-3 Na+); Fluid Consistency: Thin (IDDSI 0)       Assessment/Plan     Active Hospital Problems    Diagnosis  POA   • **Acute respiratory failure with  hypoxia [J96.01]  Yes   • Bacterial pneumonia [J15.9]  Yes   • DISH (diffuse idiopathic skeletal hyperostosis) [M48.10]  Yes   • Trouble swallowing [R13.10]  Yes   • Pneumonia, bacterial [J15.9]  Yes   • Aspiration pneumonitis [J69.0]  Yes   • Sepsis [A41.9]  Yes   • Leukocytosis [D72.829]  Yes   • Generalized weakness [R53.1]  Yes   • History of CVA (cerebrovascular accident) [Z86.73]  Not Applicable   • Short-term memory loss [R41.3]  Yes   • 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     Patient is a 80 y.o. male presents to hospital with chest pain radiating to his back and was found to have acute hypoxic respiratory failure with pneumonia and concern for aspiration pneumonitis.     Acute hypoxic respiratory failure  Aspiration pneumonia/pneumonitis (recurrent?)  CTA no PE. Atelectasis, reticulonodular opacification right middle lobe progressed, right upper lobe nodular opacification.  Continue ceftriaxone treating aspiration pneumonia  Respiratory panel negative stop azithromycin  SLP evaluation: VFSS planned  Radiology recommends follow-up CT chest 8 weeks for resolution and exclude possibility of malignancy    Chest pain  Probably pleuritic  Cardiology did not recommend ischemic evaluation    Hypertension elevated SBP reading very early morning better this a.m.  Hyperlipidemia rosuvastatin  BPH tamsulosin  Memory problems Outpatient neurocognitive evaluation  Diffuse osteoporosis DISH    DVT prophylaxis  Lovenox 30 mg SC daily    Discharge  Probably home tomorrow after VFSS  Expected Discharge Date: 7/14/2025; Expected Discharge Time:     Discussed with patient, family, and nursing staff    Jas Swenson MD  [facility]  07/13/25 08:51 EDT

## 2025-07-13 NOTE — PLAN OF CARE
Goal Outcome Evaluation:  Plan of Care Reviewed With: patient, spouse    Progress: improving      Problem: Adult Inpatient Plan of Care  Goal: Plan of Care Review  Outcome: Progressing  Flowsheets (Taken 7/13/2025 9515)  Progress: improving  Plan of Care Reviewed With:   patient   spouse   VSS. Spouse at bedside. No complaints of pain or SOA. Room air this shift. Purewick for UOP. No BM noted. Cont IV abx.,Turning q2h w/ assistance from spouse at bedside. Will need video swallow before discharge (likely tomorrow)

## 2025-07-14 ENCOUNTER — APPOINTMENT (OUTPATIENT)
Dept: GENERAL RADIOLOGY | Facility: HOSPITAL | Age: 80
End: 2025-07-14
Payer: MEDICARE

## 2025-07-14 VITALS
BODY MASS INDEX: 31.58 KG/M2 | HEART RATE: 91 BPM | OXYGEN SATURATION: 95 % | SYSTOLIC BLOOD PRESSURE: 158 MMHG | HEIGHT: 64 IN | RESPIRATION RATE: 18 BRPM | TEMPERATURE: 97.9 F | DIASTOLIC BLOOD PRESSURE: 85 MMHG | WEIGHT: 185 LBS

## 2025-07-14 LAB
ANION GAP SERPL CALCULATED.3IONS-SCNC: 9.1 MMOL/L (ref 5–15)
BUN SERPL-MCNC: 18 MG/DL (ref 8–23)
BUN/CREAT SERPL: 41.9 (ref 7–25)
CALCIUM SPEC-SCNC: 9 MG/DL (ref 8.6–10.5)
CHLORIDE SERPL-SCNC: 107 MMOL/L (ref 98–107)
CO2 SERPL-SCNC: 25.9 MMOL/L (ref 22–29)
CREAT SERPL-MCNC: 0.43 MG/DL (ref 0.76–1.27)
DEPRECATED RDW RBC AUTO: 45.9 FL (ref 37–54)
EGFRCR SERPLBLD CKD-EPI 2021: 106.6 ML/MIN/1.73
ERYTHROCYTE [DISTWIDTH] IN BLOOD BY AUTOMATED COUNT: 13.4 % (ref 12.3–15.4)
GLUCOSE SERPL-MCNC: 102 MG/DL (ref 65–99)
HCT VFR BLD AUTO: 42.4 % (ref 37.5–51)
HGB BLD-MCNC: 13.6 G/DL (ref 13–17.7)
MCH RBC QN AUTO: 29.6 PG (ref 26.6–33)
MCHC RBC AUTO-ENTMCNC: 32.1 G/DL (ref 31.5–35.7)
MCV RBC AUTO: 92.4 FL (ref 79–97)
PLATELET # BLD AUTO: 215 10*3/MM3 (ref 140–450)
PMV BLD AUTO: 9.6 FL (ref 6–12)
POTASSIUM SERPL-SCNC: 4 MMOL/L (ref 3.5–5.2)
RBC # BLD AUTO: 4.59 10*6/MM3 (ref 4.14–5.8)
SODIUM SERPL-SCNC: 142 MMOL/L (ref 136–145)
WBC NRBC COR # BLD AUTO: 7.92 10*3/MM3 (ref 3.4–10.8)

## 2025-07-14 PROCEDURE — 85027 COMPLETE CBC AUTOMATED: CPT | Performed by: INTERNAL MEDICINE

## 2025-07-14 PROCEDURE — 80048 BASIC METABOLIC PNL TOTAL CA: CPT | Performed by: INTERNAL MEDICINE

## 2025-07-14 PROCEDURE — 25010000002 ENOXAPARIN PER 10 MG: Performed by: INTERNAL MEDICINE

## 2025-07-14 PROCEDURE — 74230 X-RAY XM SWLNG FUNCJ C+: CPT

## 2025-07-14 PROCEDURE — 92611 MOTION FLUOROSCOPY/SWALLOW: CPT

## 2025-07-14 RX ADMIN — CYCLOSPORINE 1 DROP: 0.5 EMULSION OPHTHALMIC at 11:43

## 2025-07-14 RX ADMIN — TRAMADOL HYDROCHLORIDE 50 MG: 50 TABLET ORAL at 00:53

## 2025-07-14 RX ADMIN — ASPIRIN 81 MG CHEWABLE TABLET 81 MG: 81 TABLET CHEWABLE at 11:17

## 2025-07-14 RX ADMIN — ENOXAPARIN SODIUM 30 MG: 100 INJECTION SUBCUTANEOUS at 11:16

## 2025-07-14 RX ADMIN — SENNOSIDES, DOCUSATE SODIUM 2 TABLET: 50; 8.6 TABLET, FILM COATED ORAL at 11:16

## 2025-07-14 RX ADMIN — OXYBUTYNIN CHLORIDE 10 MG: 10 TABLET, EXTENDED RELEASE ORAL at 11:17

## 2025-07-14 RX ADMIN — METOPROLOL TARTRATE 12.5 MG: 25 TABLET, FILM COATED ORAL at 11:16

## 2025-07-14 RX ADMIN — Medication 3 ML: at 11:43

## 2025-07-14 NOTE — DISCHARGE PLACEMENT REQUEST
"Amandeep Manriquez Jr. (80 y.o. Male)       Date of Birth   1945    Social Security Number       Address   [address] [facility] [address] [facility] [address]    Home Phone   [phone]    MRN   [id number]       Nondenominational   None    Marital Status                               Admission Date   7/11/2025    Admission Type   Emergency    Admitting Provider   Gavin Miller MD    Attending Provider   Jas Swenson MD    Department, Room/Bed   [address], E448/1       Discharge Date       Discharge Disposition   Home or Self Care    Discharge Destination                                 Attending Provider: Jas Swenson MD    Allergies: Iodinated Contrast Media, Ampicillin    Isolation: None   Infection: None   Code Status: CPR    Ht: 162.6 cm (64\")   Wt: 83.9 kg (185 lb)    Admission Cmt: None   Principal Problem: Acute respiratory failure with hypoxia [J96.01]                   Active Insurance as of 7/11/2025       Primary Coverage       Payor Plan Insurance Group Employer/Plan Group    [employer] MEDICARE REPLACEMENT [employer] O [id number]       Payor Plan Address Payor Plan Phone Number Payor Plan Fax Number Effective Dates    [address] [address] [phone]  1/1/2018 - None Entered    [facility] [id number]         Subscriber Name Subscriber Birth Date Member ID       AMANDEEP MANRIQUEZ JR. 1945 [id number]                     Emergency Contacts        (Rel.) Home Phone Work Phone Mobile Phone    RomeliaJulissa may (Spouse) [phone] -- [phone]                "

## 2025-07-14 NOTE — MBS/VFSS/FEES
Acute Care - Speech Language Pathology   Swallow Initial Evaluation [facility]     Patient Name: Amandeep Manriquez Jr.  : 1945  MRN: [id number]  Today's Date: 2025               Admit Date: 2025    Visit Dx:     ICD-10-CM ICD-9-CM   1. Acute respiratory failure with hypoxia  J96.01 518.81     Patient Active Problem List   Diagnosis    CAD (coronary artery disease), native coronary artery    Essential hypertension    Mixed hyperlipidemia    Congenital myopathy    Arthropathy of right sacroiliac joint    Osteoarthritis of right knee    Degenerative lumbar spinal stenosis    Renal stone    Bilateral impacted cerumen    Benign prostatic hyperplasia without lower urinary tract symptoms    Vertigo    High risk medication use    Vitamin D deficiency    OAB (overactive bladder)    Rosacea    Short-term memory loss    History of CVA (cerebrovascular accident)    COVID-19    Generalized weakness    Hypokalemia    Ureterolithiasis    Leukocytosis    Acute respiratory failure with hypoxia    DISH (diffuse idiopathic skeletal hyperostosis)    Trouble swallowing    Pneumonia, bacterial    Aspiration pneumonitis    Sepsis    Bacterial pneumonia     Past Medical History:   Diagnosis Date    Acute myocardial infarction     Aneurysm of right femoral artery     Arthritis     BPH (benign prostatic hyperplasia)     CAD (coronary artery disease), native coronary artery     Congenital myopathy     DDD (degenerative disc disease), lumbar     with stenosis    Diabetes mellitus     Dizziness     Chronic Dizziness    Encounter for Medicare annual wellness exam     Essential hypertension     Gastrointestinal hemorrhage     Hard to intubate     History of impacted cerumen     Hyperlipidemia     Hypertension     Mixed hyperlipidemia     Sleep apnea      Past Surgical History:   Procedure Laterality Date    CARDIAC CATHETERIZATION  2015    30% Left main, 99% mid to distal LAD, 70-80% left circumflex, 80% proximal to mid  RCA.    CATARACT EXTRACTION      CERVICAL FUSION      CHOLECYSTECTOMY      CORONARY ANGIOPLASTY WITH STENT PLACEMENT      CYSTOSCOPY W/ URETERAL STENT PLACEMENT Left 2/9/2025    Procedure: CYSTOSCOPY URETERAL CATHETER/STENT INSERTION;  Surgeon: Alvino Daniels MD;  Location: [facility];  Service: Urology;  Laterality: Left;    KNEE SURGERY Right     OTHER SURGICAL HISTORY      percutaneous injection of extremity pseudoaneurysm       SLP Recommendation and Plan  SLP Swallowing Diagnosis: mild, oral dysphagia, pharyngeal dysphagia (07/14/25 1015)  SLP Diet Recommendation: regular textures, thin liquids (07/14/25 1015)  Recommended Precautions and Strategies: no straw, upright posture during/after eating, small bites of food and sips of liquid (07/14/25 1015)  SLP Rec. for Method of Medication Administration: meds whole, meds crushed, with puree, as tolerated (07/14/25 1015)     Monitor for Signs of Aspiration: yes, notify SLP if any concerns (07/14/25 1015)  Recommended Diagnostics: reassess via FEES (07/14/25 1015)  Swallow Criteria for Skilled Therapeutic Interventions Met: demonstrates skilled criteria (07/14/25 1015)  Anticipated Discharge Disposition (SLP): home with assist, anticipate therapy at next level of care (07/14/25 1015)  Rehab Potential/Prognosis, Swallowing: good, to achieve stated therapy goals (07/14/25 1015)  Therapy Frequency (Swallow): PRN (07/14/25 1015)  Predicted Duration Therapy Intervention (Days): until discharge (07/14/25 1015)  Oral Care Recommendations: Oral Care BID/PRN (07/14/25 1015)                                               SWALLOW EVALUATION (Last 72 Hours)       SLP Adult Swallow Evaluation       Row Name 07/14/25 1015 07/12/25 1100                Rehab Evaluation    Document Type evaluation  -OC evaluation  -LS       Subjective Information no complaints  -OC --       Patient Observations alert;cooperative  -OC --       Patient Effort good  -OC good  -LS          General  Information    Patient Profile Reviewed yes  -OC yes  -LS       Pertinent History Of Current Problem -- 80-year-old male presents to hospital with chest pain radiating to his back and was found to have acute hypoxic respiratory failure with pneumonia and concern for aspiration pneumonitis.  -LS       Current Method of Nutrition regular textures;thin liquids  -OC regular textures;thin liquids  -LS       Precautions/Limitations, Vision difficult to assess  -OC --  macular degeneration  -LS       Precautions/Limitations, Hearing WFL;for purposes of eval  -OC WFL;for purposes of eval  -LS       Prior Level of Function-Communication unknown  -OC WFL  -LS       Prior Level of Function-Swallowing no diet consistency restrictions  -OC no diet consistency restrictions  -LS       Plans/Goals Discussed with patient  -OC patient and family  -LS       Barriers to Rehab none identified  -OC medically complex  -LS          Pain    Pretreatment Pain Rating 0/10 - no pain  -OC --       Posttreatment Pain Rating 0/10 - no pain  -OC --          Oral Motor Structure and Function    Dentition Assessment natural, present and adequate  -OC --       Secretion Management WNL/WFL  -OC --       Volitional Swallow WFL  -OC --          Oral Musculature and Cranial Nerve Assessment    Oral Motor General Assessment WFL  -OC WFL  -LS          General Eating/Swallowing Observations    Eating/Swallowing Skills -- self-fed;fed by SLP  -LS       Positioning During Eating -- upright 90 degree;upright in bed  -LS       Utensils Used -- spoon;cup  -LS       Consistencies Trialed -- soft to chew textures;pureed;mixed consistency;thin liquids  -LS          Clinical Swallow Eval    Oral Prep Phase -- WFL  -LS       Oral Transit -- WFL  -LS       Oral Residue -- WFL  -LS       Pharyngeal Phase -- no overt signs/symptoms of pharyngeal impairment  -LS       Clinical Swallow Evaluation Summary -- Clinical bedside dysphagia evaluaion completed this date. Pt  presented within functional limites all aspects of the swallow..  SLP recommends pt remain on  a regular diet with thin liquids  no straws.  Medication whole/crushed with puree/ thin liquids. Oral care BID.  Upright at 90 degrees for all meals  plus 30 minutes after.  SLP recommends a VFSS to r/o silent aspiration.  -LS          MBS/VFSS Interpretation    VFSS Summary VFSS completed in conjunction with Priya WONG. Patient presents with mild oropharyngeal dysphagia characterized by mistiming, decreased epiglottic deflection, decreased pharyngeal constriction, and reduced base of tongue retraction. Patient demonstrated prespill thin via cup to the pyriforms. With consecutive swallows patient with mild residue pooling to the pyriforms after the swallow. Patient demonstrated mistiming with thin via straw, increased spillage, and increased residue after the swallow. Eventual penetration during the swallow thin via straw and penetration/aspiration or residue pooling in the pyriforms after the swallow. Spontaneous cough not effective in clearing aspirated materials. Patient demonstrated tongue pumping with puree. Prolonged mastication with soft solids and dry solids. Piece meal deglutition. Patient with mild lingual and pharyngeal residue solids after the swallow. Able to partially clear residue with thin via cup. No penetration or aspiration thin and nectar thick via cup.  -OC --          SLP Communication to Radiology    Summary Statement VFSS completed in conjunction with Priya WONG. Patient presents with mild oropharyngeal dysphagia characterized by mistiming, decreased epiglottic deflection, decreased pharyngeal constriction, and reduced base of tongue retraction. Patient demonstrated prespill thin via cup to the pyriforms. With consecutive swallows patient with mild residue pooling to the pyriforms after the swallow. Patient demonstrated mistiming with thin via straw, increased spillage, and increased  residue after the swallow. Eventual penetration during the swallow thin via straw and penetration/aspiration or residue pooling in the pyriforms after the swallow. Spontaneous cough not effective in clearing aspirated materials. Patient demonstrated tongue pumping with puree. Prolonged mastication with soft solids and dry solids. Piece meal deglutition. Patient with mild lingual and pharyngeal residue solids after the swallow. Able to partially clear residue with thin via cup. No penetration or aspiration thin and nectar thick via cup.  -OC --          SLP Evaluation Clinical Impression    SLP Swallowing Diagnosis mild;oral dysphagia;pharyngeal dysphagia  -OC --       Functional Impact risk of aspiration/pneumonia  -OC --       Rehab Potential/Prognosis, Swallowing good, to achieve stated therapy goals  -OC --       Swallow Criteria for Skilled Therapeutic Interventions Met demonstrates skilled criteria  -OC --          Recommendations    Therapy Frequency (Swallow) PRN  -OC PRN  -LS       Predicted Duration Therapy Intervention (Days) until discharge  -OC until discharge  -LS       SLP Diet Recommendation regular textures;thin liquids  -OC regular textures;thin liquids  -LS       Recommended Diagnostics reassess via FEES  -OC VFSS (MBS)  -LS       Recommended Precautions and Strategies no straw;upright posture during/after eating;small bites of food and sips of liquid  -OC no straw;upright posture during/after eating;small bites of food and sips of liquid  -LS       Oral Care Recommendations Oral Care BID/PRN  -OC Oral Care BID/PRN  -LS       SLP Rec. for Method of Medication Administration meds whole;meds crushed;with puree;as tolerated  -OC meds whole;meds crushed;with thin liquids;with puree  -LS       Monitor for Signs of Aspiration yes;notify SLP if any concerns  -OC yes;notify SLP if any concerns  -LS       Anticipated Discharge Disposition (SLP) home with assist;anticipate therapy at next level of care  -OC  --                 User Key  (r) = Recorded By, (t) = Taken By, (c) = Cosigned By      Initials Name Effective Dates    Alice Schwartz SLP 08/28/23 -     Jevon Sanchez SLP 01/05/24 -                     EDUCATION  The patient has been educated in the following areas:   Dysphagia (Swallowing Impairment).                Time Calculation:    Time Calculation- SLP       Row Name 07/14/25 1517             Time Calculation- SLP    SLP Start Time 1015  -OC      SLP Received On 07/14/25  -OC         Untimed Charges    SLP Eval/Re-eval  ST Motion Fluoro Eval Swallow - 50334  -OC      86000-JE Motion Fluoro Eval Swallow Minutes 90  -OC         Total Minutes    Untimed Charges Total Minutes 90  -OC       Total Minutes 90  -OC                User Key  (r) = Recorded By, (t) = Taken By, (c) = Cosigned By      Initials Name Provider Type    Alice Schwartz SLP Speech and Language Pathologist                    Therapy Charges for Today       Code Description Service Date Service Provider Modifiers Qty    13146277740 HC [facility] MOTION FLUORO EVAL SWALLOW 6 7/14/2025 Alice Reyes SLP GN 1                 VALERIY Angulo  7/14/2025

## 2025-07-14 NOTE — CASE MANAGEMENT/SOCIAL WORK
Continued Stay Note  [facility]     Patient Name: Amandeep Manriquez Jr.  MRN: [id number]  Today's Date: 7/14/2025    Admit Date: 7/11/2025    Plan: Home with wife and [facility] (current) via [facility] (accepted)   Discharge Plan       Row Name 07/14/25 1130       Plan    Plan Home with wife and [facility] (current) via [facility] (accepted)    Plan Comments [facility] notified by nurse manager Jo Ann that the patient needs stretcher transport arranged in order to discharge home. [facility] met with the patient and his wife Julissa at bedside regarding transportation. Patient is bed bound and his wife uses a ruby lift at home to move him. He is non-ambulatory and bed bound at baseline. [facility] completed PCS form for EMS and gave to patient's nurse. [facility] accepted transport and dispatched EMS. [facility] informed by patient's nurse that he is current with . Chart review revealed patient discharged home 5 months ago with [facility]. [facility] called [facility] and confirmed the patient is still current with [facility] health services. [facility] and [facility] informed of patient's discharge today. GHULAM, JANUSZW.    Final Discharge Disposition Code 06 - home with home health care                   Discharge Codes    No documentation.                 Expected Discharge Date and Time       Expected Discharge Date Expected Discharge Time    Jul 14, 2025

## 2025-07-14 NOTE — PLAN OF CARE
Goal Outcome Evaluation:  Plan of Care Reviewed With: patient           Outcome Evaluation: C/o back pain repositioned and prn med given. No other issues.VFSS in am.VSS.Spouse at bedside.

## 2025-07-14 NOTE — PLAN OF CARE
Goal Outcome Evaluation:            VFSS completed. Recommend continue with regular/thins. Meds whole/crushed with puree. Recommend NO STRAW, SINGLE sips, double swallow intermittently with all PO, upright, slow rate with all PO. Recommend dysphagia therapy at next level of care. SLP to follow.

## 2025-07-14 NOTE — DISCHARGE SUMMARY
Date of Discharge:  7/14/2025    PCP: Ori Bejarano MD    Discharge Diagnosis:   Active Hospital Problems    Diagnosis  POA    **Acute respiratory failure with hypoxia [J96.01]  Yes    Bacterial pneumonia [J15.9]  Yes    DISH (diffuse idiopathic skeletal hyperostosis) [M48.10]  Yes    Trouble swallowing [R13.10]  Yes    Pneumonia, bacterial [J15.9]  Yes    Aspiration pneumonitis [J69.0]  Yes    Sepsis [A41.9]  Yes    Leukocytosis [D72.829]  Yes    Generalized weakness [R53.1]  Yes    History of CVA (cerebrovascular accident) [Z86.73]  Not Applicable    Short-term memory loss [R41.3]  Yes    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          Consults       Date and Time Order Name Status Description    7/11/2025 10:33 AM Inpatient Cardiology Consult Completed     7/11/2025  9:26 AM [facility] (on-call MD unless specified) Details            Hospital Course  Patient is a 80 y.o. male with a history of myopathy and immobility uses a Latonia lift at home presented with pleuritic chest pain. Cardiology ruled out ACS and did not recommend any further ischemic evaluation. CTA was negative for PE. It did show atelectasis, worsening opacification right middle lobe, opacification right upper lobe. It was felt that he could be aspirating (wife reported episodes at home) and he was started on antibiotics treating pneumonia. Radiologist did recommend I discussed with patient and wife today and they agreed. 2 reasons: To ensure resolution and also to exclude possibility of malignancy. Also recommend outpatient neurocognitive evaluation given memory problems. He is back to baseline and would like to go home. He will be discharged after VFSS today. Diet modifications depending on that.    I discussed the patient's findings and my  recommendations with patient, family, and nursing staff.    Temp:  [97.7 °F (36.5 °C)-97.9 °F (36.6 °C)] 97.9 °F (36.6 °C)  Heart Rate:  [74-84] 74  Resp:  [18] 18  BP: (123-158)/(80-95) 158/85  Body mass index is 31.76 kg/m².    Physical Exam  Constitutional:       General: He is not in acute distress.     Appearance: He is not toxic-appearing.   HENT:      Head: Normocephalic and atraumatic.   Cardiovascular:      Rate and Rhythm: Normal rate and regular rhythm.   Pulmonary:      Effort: Pulmonary effort is normal. No respiratory distress.      Breath sounds: Normal breath sounds. No wheezing or rhonchi.   Abdominal:      General: Bowel sounds are normal.      Palpations: Abdomen is soft.      Tenderness: There is no abdominal tenderness.   Musculoskeletal:         General: No swelling.   Skin:     General: Skin is warm and dry.   Neurological:      Mental Status: He is alert.   Psychiatric:         Mood and Affect: Mood normal.         Behavior: Behavior normal.       Disposition: Home or Self Care       Discharge Medications        New Medications        Instructions Start Date   amoxicillin-clavulanate 875-125 MG per tablet  Commonly known as: AUGMENTIN   1 tablet, Oral, 2 Times Daily             Changes to Medications        Instructions Start Date   Myrbetriq 50 MG tablet sustained-release 24 hour 24 hr tablet  Generic drug: Mirabegron ER  What changed: how much to take   TAKE ONE TABLET BY MOUTH DAILY             Continue These Medications        Instructions Start Date   aspirin 81 MG chewable tablet   81 mg, Daily      baclofen 10 MG tablet  Commonly known as: LIORESAL   10 mg, Oral, Nightly      cycloSPORINE 0.05 % ophthalmic emulsion  Commonly known as: RESTASIS   1 drop, 2 Times Daily      latanoprost 0.005 % ophthalmic solution  Commonly known as: XALATAN   1 drop, Nightly      metoprolol tartrate 25 MG tablet  Commonly known as: LOPRESSOR   12.5 mg, Oral, 2 Times Daily      nitroglycerin 0.4 MG SL  tablet  Commonly known as: NITROSTAT   1 tablet      oxybutynin XL 10 MG 24 hr tablet  Commonly known as: DITROPAN-XL   10 mg, Daily      PRESERVISION AREDS 2+MULTI VIT PO   1 tablet, Daily      rosuvastatin 40 MG tablet  Commonly known as: CRESTOR   TAKE ONE TABLET BY MOUTH DAILY      sennosides-docusate 8.6-50 MG per tablet  Commonly known as: PERICOLACE   2 tablets, Oral, 2 Times Daily      tamsulosin 0.4 MG capsule 24 hr capsule  Commonly known as: FLOMAX   0.4 mg, Oral, Nightly      traMADol 50 MG tablet  Commonly known as: ULTRAM   50 mg, Every 6 Hours PRN      vitamin E 100 UNIT capsule   100 Units, Daily              Diet Instructions       Diet: Regular/House Diet      Discharge Diet: Regular/House Diet    Texture: Regular Texture (IDDSI 7)    Fluid Consistency: Thin (IDDSI 0)    See SLP recommendations after VFSS today           Activity Instructions       Activity as Tolerated             Additional Instructions for the Follow-ups that You Need to Schedule       Call MD for problems / concerns.   As directed             Follow-up Information       Ori Bejarano MD Follow up in 1 week(s).    Specialty: Internal Medicine  Why: Schedule follow-up CT chest 8 weeks, After hospital follow up  Contact information:  [address]  [place] [id number] [phone]                          No future appointments.  Pending Labs       Order Current Status    Blood Culture - Blood, Arm, Left Preliminary result    Blood Culture - Blood, Arm, Right Preliminary result           Jas Swenson MD  [facility]  07/14/25 08:43 EDT    Discharge time spent greater than 30 minutes.

## 2025-07-15 ENCOUNTER — READMISSION MANAGEMENT (OUTPATIENT)
Dept: CALL CENTER | Facility: HOSPITAL | Age: 80
End: 2025-07-15
Payer: MEDICARE

## 2025-07-15 NOTE — CASE MANAGEMENT/SOCIAL WORK
Case Management Discharge Note      Final Note: Home with wife and [facility] (current) via [facility]         Selected Continued Care - Discharged on 7/14/2025 Admission date: 7/11/2025 - Discharge disposition: Home or Self Care      Destination    No services have been selected for the patient.                Durable Medical Equipment    No services have been selected for the patient.                Dialysis/Infusion    No services have been selected for the patient.                Home Medical Care Coordination complete.      Service Provider Services Address Phone Fax Patient Preferred    [facility], Home Nursing, Home Health Services [address] [place] [id number] [phone] --              Therapy    No services have been selected for the patient.                Community Resources    No services have been selected for the patient.                Community & DME    No services have been selected for the patient.                    Transportation Services  Transportation: Ambulance  Ambulance: [facility]  [facility] Ambulance Status: Accepted    Final Discharge Disposition Code: 06 - home with home health care

## 2025-07-15 NOTE — OUTREACH NOTE
Prep Survey      Flowsheet Row Responses   [facility] facility patient discharged from? [facility]   Is LACE score < 7 ? No   Eligibility Readm Mgmt   Discharge diagnosis Acute respiratory failure with hypoxia   Does the patient have one of the following disease processes/diagnoses(primary or secondary)? Other   Does the patient have Home health ordered? Yes   What is the Home health agency?  [facility]   Is there a DME ordered? No   Medication alerts for this patient see avs   Prep survey completed? Yes            Shayla ZAMUDIO - Registered Nurse

## 2025-07-16 LAB
BACTERIA SPEC AEROBE CULT: NORMAL
BACTERIA SPEC AEROBE CULT: NORMAL

## 2025-07-22 ENCOUNTER — READMISSION MANAGEMENT (OUTPATIENT)
Dept: CALL CENTER | Facility: HOSPITAL | Age: 80
End: 2025-07-22
Payer: MEDICARE

## 2025-07-22 NOTE — OUTREACH NOTE
Medical Week 1 Survey      Flowsheet Row Responses   [facility] patient discharged from? [place]   Does the patient have one of the following disease processes/diagnoses(primary or secondary)? Other   Week 1 attempt successful? Yes   Call start time 1500   Call end time 1502   Discharge diagnosis Acute respiratory failure with hypoxia   Person spoke with today (if not patient) and relationship spouse   Medication alerts for this patient see avs   Meds reviewed with patient/caregiver? Yes   Is the patient having any side effects they believe may be caused by any medication additions or changes? No   Does the patient have all medications ordered at discharge? Yes   Is the patient taking all medications as directed (includes completed medication regime)? Yes   Does the patient have a primary care provider?  Yes   Does the patient have an appointment with their PCP within 7 days of discharge? Yes   Has the patient kept scheduled appointments due by today? Yes   What is the Home health agency?  [facility]   Has home health visited the patient within 72 hours of discharge? Yes   Did the patient receive a copy of their discharge instructions? Yes   Nursing interventions Reviewed instructions with patient   What is the patient's perception of their health status since discharge? Improving   Is the patient/caregiver able to teach back signs and symptoms related to disease process for when to call PCP? Yes   Is the patient/caregiver able to teach back signs and symptoms related to disease process for when to call 911? Yes   Is the patient/caregiver able to teach back the hierarchy of who to call/visit for symptoms/problems? PCP, Specialist, Home health nurse, Urgent Care, ED, 911 Yes   Week 1 call completed? Yes   Would this patient benefit from a Referral to Amb Social Work? No   Is the patient interested in additional calls from an ambulatory ? No   Wrap up additional comments spouse  stated pt doing very well. Has Speech therapy along with PT/OT coming to house. no needs at this time   Call end time 1502            ANAND JEFFERS - Registered Nurse

## (undated) DEVICE — ST DIL URETH S/CRV W/SD/PRT 8TO20F 37CM

## (undated) DEVICE — LOU CYSTO: Brand: MEDLINE INDUSTRIES, INC.

## (undated) DEVICE — GLV SURG BIOGEL LTX PF 7 1/2

## (undated) DEVICE — SYR LUERLOK 20CC BX/50

## (undated) DEVICE — NITINOL WIRE WITH HYDROPHILIC TIP: Brand: SENSOR

## (undated) DEVICE — TIDISHIELD UROLOGY DRAIN BAGS FROSTY VINYL STERILE FITS SIEMENS UROSKOP ACCESS 20 PER CASE: Brand: TIDISHIELD

## (undated) DEVICE — CATH FOL COUNCL 2WY 22F 5CC